# Patient Record
Sex: FEMALE | Race: WHITE | Employment: FULL TIME | URBAN - NONMETROPOLITAN AREA
[De-identification: names, ages, dates, MRNs, and addresses within clinical notes are randomized per-mention and may not be internally consistent; named-entity substitution may affect disease eponyms.]

---

## 2020-05-21 LAB
ALBUMIN SERPL-MCNC: NORMAL G/DL
ALP BLD-CCNC: NORMAL U/L
ALT SERPL-CCNC: NORMAL U/L
ANION GAP SERPL CALCULATED.3IONS-SCNC: NORMAL MMOL/L
AST SERPL-CCNC: NORMAL U/L
AVERAGE GLUCOSE: NORMAL
BILIRUB SERPL-MCNC: NORMAL MG/DL
BUN BLDV-MCNC: NORMAL MG/DL
CALCIUM SERPL-MCNC: NORMAL MG/DL
CHLORIDE BLD-SCNC: NORMAL MMOL/L
CHOLESTEROL, TOTAL: NORMAL
CHOLESTEROL/HDL RATIO: NORMAL
CO2: NORMAL
CREAT SERPL-MCNC: NORMAL MG/DL
GFR CALCULATED: NORMAL
GLUCOSE BLD-MCNC: NORMAL MG/DL
HBA1C MFR BLD: 5.6 %
HDLC SERPL-MCNC: NORMAL MG/DL
LDL CHOLESTEROL CALCULATED: NORMAL
NONHDLC SERPL-MCNC: NORMAL MG/DL
POTASSIUM SERPL-SCNC: NORMAL MMOL/L
SODIUM BLD-SCNC: NORMAL MMOL/L
TOTAL PROTEIN: NORMAL
TRIGL SERPL-MCNC: NORMAL MG/DL
VLDLC SERPL CALC-MCNC: NORMAL MG/DL

## 2020-09-09 RX ORDER — CLOBETASOL PROPIONATE 0.5 MG/G
OINTMENT TOPICAL 2 TIMES DAILY
COMMUNITY
End: 2021-05-19 | Stop reason: SDUPTHER

## 2020-10-26 RX ORDER — OMEPRAZOLE 20 MG/1
20 CAPSULE, DELAYED RELEASE ORAL DAILY
Qty: 90 CAPSULE | Refills: 1 | Status: SHIPPED
Start: 2020-10-26 | End: 2021-04-06 | Stop reason: SDUPTHER

## 2020-11-27 ENCOUNTER — TELEPHONE (OUTPATIENT)
Dept: FAMILY MEDICINE CLINIC | Age: 45
End: 2020-11-27

## 2020-12-07 ENCOUNTER — OFFICE VISIT (OUTPATIENT)
Dept: FAMILY MEDICINE CLINIC | Age: 45
End: 2020-12-07
Payer: COMMERCIAL

## 2020-12-07 VITALS
HEIGHT: 68 IN | SYSTOLIC BLOOD PRESSURE: 140 MMHG | WEIGHT: 259.1 LBS | BODY MASS INDEX: 39.27 KG/M2 | HEART RATE: 90 BPM | DIASTOLIC BLOOD PRESSURE: 84 MMHG | OXYGEN SATURATION: 97 % | TEMPERATURE: 98.1 F

## 2020-12-07 PROCEDURE — 99214 OFFICE O/P EST MOD 30 MIN: CPT | Performed by: NURSE PRACTITIONER

## 2020-12-07 PROCEDURE — 96372 THER/PROPH/DIAG INJ SC/IM: CPT | Performed by: NURSE PRACTITIONER

## 2020-12-07 RX ORDER — IBUPROFEN 200 MG
200 TABLET ORAL EVERY 6 HOURS PRN
COMMUNITY
End: 2022-01-27 | Stop reason: ALTCHOICE

## 2020-12-07 RX ORDER — DEXAMETHASONE SODIUM PHOSPHATE 4 MG/ML
4 INJECTION, SOLUTION INTRA-ARTICULAR; INTRALESIONAL; INTRAMUSCULAR; INTRAVENOUS; SOFT TISSUE ONCE
Qty: 1 ML | Refills: 0
Start: 2020-12-07 | End: 2020-12-07

## 2020-12-07 RX ORDER — KETOROLAC TROMETHAMINE 30 MG/ML
30 INJECTION, SOLUTION INTRAMUSCULAR; INTRAVENOUS ONCE
Status: COMPLETED | OUTPATIENT
Start: 2020-12-07 | End: 2020-12-07

## 2020-12-07 RX ORDER — DEXAMETHASONE SODIUM PHOSPHATE 10 MG/ML
10 INJECTION INTRAMUSCULAR; INTRAVENOUS ONCE
Status: DISCONTINUED | OUTPATIENT
Start: 2020-12-07 | End: 2020-12-07

## 2020-12-07 RX ORDER — SUMATRIPTAN 25 MG/1
25 TABLET, FILM COATED ORAL
Qty: 9 TABLET | Refills: 5 | Status: SHIPPED
Start: 2020-12-07 | End: 2021-01-12 | Stop reason: CLARIF

## 2020-12-07 RX ORDER — TOPIRAMATE 25 MG/1
25 TABLET ORAL NIGHTLY
Qty: 60 TABLET | Refills: 3 | Status: SHIPPED
Start: 2020-12-07 | End: 2021-04-06 | Stop reason: SDUPTHER

## 2020-12-07 RX ADMIN — KETOROLAC TROMETHAMINE 30 MG: 30 INJECTION, SOLUTION INTRAMUSCULAR; INTRAVENOUS at 15:19

## 2020-12-07 ASSESSMENT — PATIENT HEALTH QUESTIONNAIRE - PHQ9
SUM OF ALL RESPONSES TO PHQ9 QUESTIONS 1 & 2: 0
SUM OF ALL RESPONSES TO PHQ QUESTIONS 1-9: 0
1. LITTLE INTEREST OR PLEASURE IN DOING THINGS: 0
2. FEELING DOWN, DEPRESSED OR HOPELESS: 0
SUM OF ALL RESPONSES TO PHQ QUESTIONS 1-9: 0
SUM OF ALL RESPONSES TO PHQ QUESTIONS 1-9: 0

## 2020-12-07 ASSESSMENT — ENCOUNTER SYMPTOMS
TROUBLE SWALLOWING: 0
STRIDOR: 0
SINUS PRESSURE: 0
PHOTOPHOBIA: 0
CONSTIPATION: 0
COLOR CHANGE: 0
EYE DISCHARGE: 0
COUGH: 0
ANAL BLEEDING: 0
FACIAL SWELLING: 0
APNEA: 0
BACK PAIN: 0
CHEST TIGHTNESS: 0
SORE THROAT: 0
EYE PAIN: 1
BLOOD IN STOOL: 0
DIARRHEA: 0
RECTAL PAIN: 0
RHINORRHEA: 0
VOICE CHANGE: 0
EYE REDNESS: 0
NAUSEA: 1
SHORTNESS OF BREATH: 0
WHEEZING: 0
ABDOMINAL DISTENTION: 0
CHOKING: 0
SINUS PAIN: 0
ABDOMINAL PAIN: 0
VOMITING: 0
EYE ITCHING: 0

## 2020-12-07 NOTE — PROGRESS NOTES
20  Bonita Sauer : 1975 Sex: female  Age: 41 y.o. Chief Complaint   Patient presents with    Migraine     just aout every day for for 1 yr, takes ibuprofen for pain       Patient complains of migraine headaches States has headaches nearly every day with some worse than others. She states that her eyes hurt with that, nausea, difficulty concentrating. Review of Systems   Constitutional: Positive for fatigue. Negative for activity change, appetite change, chills, diaphoresis, fever and unexpected weight change. HENT: Negative for congestion, dental problem, drooling, ear discharge, ear pain, facial swelling, hearing loss, mouth sores, nosebleeds, postnasal drip, rhinorrhea, sinus pressure, sinus pain, sneezing, sore throat, tinnitus, trouble swallowing and voice change. Eyes: Positive for pain. Negative for photophobia, discharge, redness, itching and visual disturbance. Respiratory: Negative for apnea, cough, choking, chest tightness, shortness of breath, wheezing and stridor. Cardiovascular: Negative for chest pain, palpitations and leg swelling. Gastrointestinal: Positive for nausea. Negative for abdominal distention, abdominal pain, anal bleeding, blood in stool, constipation, diarrhea, rectal pain and vomiting. Endocrine: Negative for cold intolerance, heat intolerance, polydipsia, polyphagia and polyuria. Genitourinary: Negative for decreased urine volume, difficulty urinating, dysuria, enuresis, flank pain, frequency, genital sores, hematuria and urgency. Musculoskeletal: Negative for arthralgias, back pain, gait problem, joint swelling, myalgias, neck pain and neck stiffness. Skin: Positive for rash (psoratic changes back, abdomen, elbows). Negative for color change, pallor and wound. Allergic/Immunologic: Negative for environmental allergies, food allergies and immunocompromised state. Neurological: Positive for headaches.  Negative for dizziness, tremors, seizures, syncope, facial asymmetry, speech difficulty, weakness, light-headedness and numbness. Hematological: Negative for adenopathy. Does not bruise/bleed easily. Psychiatric/Behavioral: Negative for agitation, behavioral problems, confusion, decreased concentration, hallucinations, self-injury, sleep disturbance and suicidal ideas. The patient is not nervous/anxious and is not hyperactive. Current Outpatient Medications:     ibuprofen (ADVIL;MOTRIN) 200 MG tablet, Take 200 mg by mouth every 6 hours as needed for Pain prn, Disp: , Rfl:     topiramate (TOPAMAX) 25 MG tablet, Take 1 tablet by mouth nightly, Disp: 60 tablet, Rfl: 3    SUMAtriptan (IMITREX) 25 MG tablet, Take 1 tablet by mouth once as needed for Migraine, Disp: 9 tablet, Rfl: 5    dexamethasone (DECADRON) 4 MG/ML injection, Inject 1 mL into the muscle once for 1 dose, Disp: 1 mL, Rfl: 0    metFORMIN (GLUCOPHAGE) 500 MG tablet, Take 1 tablet by mouth 2 times daily (with meals), Disp: 180 tablet, Rfl: 1    omeprazole (PRILOSEC) 20 MG delayed release capsule, Take 1 capsule by mouth daily, Disp: 90 capsule, Rfl: 1    venlafaxine (EFFEXOR-XR) 75 MG XR capsule, Take 150 mg by mouth daily , Disp: , Rfl:     Polyethylene Glycol 3350 (MIRALAX PO), Take  by mouth., Disp: , Rfl:     clobetasol (TEMOVATE) 0.05 % ointment, Apply topically 2 times daily Apply topically 2 times daily. , Disp: , Rfl:     Multiple Vitamin (MULTIVITAMIN PO), Take 1 tablet by mouth 2 times daily.   , Disp: , Rfl:   No Known Allergies    Past Medical History:   Diagnosis Date    Chronic back pain     Headache(784.0)     Hemorrhoids     Herpes genital     Hyperlipidemia     Irregular periods/menstrual cycles     Renal calculi     Varicose veins      Past Surgical History:   Procedure Laterality Date    SERENA-EN-Y GASTRIC BYPASS  5/31/11    Lap    TUBAL LIGATION  2001    UPPER GASTROINTESTINAL ENDOSCOPY  2011     Family History   Family history unknown: Yes     Social History     Socioeconomic History    Marital status:      Spouse name: Not on file    Number of children: Not on file    Years of education: Not on file    Highest education level: Not on file   Occupational History    Not on file   Social Needs    Financial resource strain: Not on file    Food insecurity     Worry: Not on file     Inability: Not on file    Transportation needs     Medical: Not on file     Non-medical: Not on file   Tobacco Use    Smoking status: Never Smoker    Smokeless tobacco: Never Used   Substance and Sexual Activity    Alcohol use: No    Drug use: No    Sexual activity: Yes   Lifestyle    Physical activity     Days per week: Not on file     Minutes per session: Not on file    Stress: Not on file   Relationships    Social connections     Talks on phone: Not on file     Gets together: Not on file     Attends [employer] service: Not on file     Active member of club or organization: Not on file     Attends meetings of clubs or organizations: Not on file     Relationship status: Not on file    Intimate partner violence     Fear of current or ex partner: Not on file     Emotionally abused: Not on file     Physically abused: Not on file     Forced sexual activity: Not on file   Other Topics Concern    Not on file   Social History Narrative    Not on file     Patient Active Problem List   Diagnosis    Chronic back pain    Hyperlipidemia    Indigestion        Vitals:    12/07/20 1416 12/07/20 1427   BP: (!) 144/90 (!) 140/84   Site: Left Upper Arm    Position: Sitting    Cuff Size: Large Adult    Pulse: 90    Temp: 98.1 °F (36.7 °C)    TempSrc: Temporal    SpO2: 97%    Weight: 259 lb 1.6 oz (117.5 kg)    Height: 5' 8\" (1.727 m)        Physical Exam  Vitals signs and nursing note reviewed. Constitutional:       General: She is not in acute distress. Appearance: Normal appearance. She is normal weight.  She is not ill-appearing, toxic-appearing or diaphoretic. HENT:      Head: Normocephalic and atraumatic. Right Ear: Tympanic membrane, ear canal and external ear normal. There is no impacted cerumen. Left Ear: Tympanic membrane, ear canal and external ear normal. There is no impacted cerumen. Nose: Nose normal. No congestion or rhinorrhea. Mouth/Throat:      Mouth: Mucous membranes are moist.      Pharynx: Oropharynx is clear. No oropharyngeal exudate or posterior oropharyngeal erythema. Eyes:      General: No scleral icterus. Right eye: No discharge. Left eye: No discharge. Extraocular Movements: Extraocular movements intact. Conjunctiva/sclera: Conjunctivae normal.      Pupils: Pupils are equal, round, and reactive to light. Neck:      Musculoskeletal: Normal range of motion and neck supple. No neck rigidity or muscular tenderness. Vascular: No carotid bruit. Cardiovascular:      Rate and Rhythm: Normal rate and regular rhythm. Pulses: Normal pulses. Heart sounds: Normal heart sounds. No murmur. No friction rub. No gallop. Pulmonary:      Effort: Pulmonary effort is normal. No respiratory distress. Breath sounds: No stridor. No wheezing, rhonchi or rales. Chest:      Chest wall: No tenderness. Abdominal:      General: Abdomen is flat. Bowel sounds are normal. There is no distension. Palpations: Abdomen is soft. There is no mass. Tenderness: There is no abdominal tenderness. There is no right CVA tenderness, left CVA tenderness, guarding or rebound. Hernia: No hernia is present. Musculoskeletal: Normal range of motion. General: No swelling, tenderness, deformity or signs of injury. Right lower leg: No edema. Left lower leg: No edema. Lymphadenopathy:      Cervical: No cervical adenopathy. Skin:     General: Skin is warm and dry. Capillary Refill: Capillary refill takes less than 2 seconds. Coloration: Skin is not jaundiced or pale. Findings: Rash (psoriatic patches to elbows, shoulder, back, abdomen) present. No bruising, erythema or lesion. Neurological:      General: No focal deficit present. Mental Status: She is alert and oriented to person, place, and time. Mental status is at baseline. Cranial Nerves: No cranial nerve deficit. Sensory: No sensory deficit. Motor: No weakness. Coordination: Coordination normal.      Gait: Gait normal.      Deep Tendon Reflexes: Reflexes normal.   Psychiatric:         Mood and Affect: Mood normal.         Behavior: Behavior normal.         Thought Content: Thought content normal.         Judgment: Judgment normal.         Assessment and Plan:  Ketan Melton was seen today for migraine. Diagnoses and all orders for this visit:    Chronic migraine without aura without status migrainosus, not intractable  -     dexamethasone (DECADRON) 4 MG/ML injection; Inject 1 mL into the muscle once for 1 dose    Chronic back pain, unspecified back location, unspecified back pain laterality  -     dexamethasone (DECADRON) 4 MG/ML injection; Inject 1 mL into the muscle once for 1 dose    Mixed hyperlipidemia    Psoriasis  -     External Referral To Rheumatology    Other orders  -     topiramate (TOPAMAX) 25 MG tablet; Take 1 tablet by mouth nightly  -     SUMAtriptan (IMITREX) 25 MG tablet; Take 1 tablet by mouth once as needed for Migraine  -     ketorolac (TORADOL) injection 30 mg  -     Discontinue: dexamethasone (DECADRON) injection 10 mg        Discussions/Education provided to patients during visit:  [] Discussed the importance to stop smoking. [] Advised to monitor eating habits. [] Reviewed and discussed Imaging results. [] Reviewed and discussed Lab results. [] Discussed the importance of drinking plenty of fluids. [] Cut down on Salt and Caffeine.  [] Exercise 2-3 times weekly, if not more. [] Cut down on Sugar and Fats. [x] Continue Medications as Discussed.   [x] Communicated

## 2020-12-29 RX ORDER — VENLAFAXINE HYDROCHLORIDE 150 MG/1
150 CAPSULE, EXTENDED RELEASE ORAL DAILY
Qty: 90 CAPSULE | Refills: 1 | Status: SHIPPED
Start: 2020-12-29 | End: 2021-04-06 | Stop reason: SDUPTHER

## 2020-12-29 RX ORDER — VENLAFAXINE HYDROCHLORIDE 150 MG/1
150 CAPSULE, EXTENDED RELEASE ORAL DAILY
COMMUNITY
End: 2020-12-29 | Stop reason: SDUPTHER

## 2021-01-12 ENCOUNTER — TELEPHONE (OUTPATIENT)
Dept: FAMILY MEDICINE CLINIC | Age: 46
End: 2021-01-12

## 2021-01-12 RX ORDER — SUMATRIPTAN 50 MG/1
TABLET, FILM COATED ORAL
Qty: 9 TABLET | Refills: 1 | Status: CANCELLED | OUTPATIENT
Start: 2021-01-12

## 2021-01-12 RX ORDER — SUMATRIPTAN 50 MG/1
50 TABLET, FILM COATED ORAL
COMMUNITY
End: 2021-01-12 | Stop reason: SDUPTHER

## 2021-01-12 NOTE — TELEPHONE ENCOUNTER
Patient called office stating she has had a migraine for 2 days and the imitrex is not helping she is wondering if you could give her something else - she was last seen on 12-

## 2021-01-13 RX ORDER — SUMATRIPTAN 50 MG/1
TABLET, FILM COATED ORAL
Qty: 9 TABLET | Refills: 1 | Status: SHIPPED
Start: 2021-01-13 | End: 2021-04-06 | Stop reason: SDUPTHER

## 2021-04-06 ENCOUNTER — OFFICE VISIT (OUTPATIENT)
Dept: FAMILY MEDICINE CLINIC | Age: 46
End: 2021-04-06
Payer: COMMERCIAL

## 2021-04-06 VITALS
TEMPERATURE: 97.5 F | SYSTOLIC BLOOD PRESSURE: 130 MMHG | HEIGHT: 68 IN | WEIGHT: 267.8 LBS | DIASTOLIC BLOOD PRESSURE: 90 MMHG | BODY MASS INDEX: 40.59 KG/M2

## 2021-04-06 DIAGNOSIS — G43.911 INTRACTABLE MIGRAINE WITH STATUS MIGRAINOSUS, UNSPECIFIED MIGRAINE TYPE: Primary | ICD-10-CM

## 2021-04-06 DIAGNOSIS — K30 INDIGESTION: ICD-10-CM

## 2021-04-06 DIAGNOSIS — G89.29 CHRONIC BACK PAIN, UNSPECIFIED BACK LOCATION, UNSPECIFIED BACK PAIN LATERALITY: ICD-10-CM

## 2021-04-06 DIAGNOSIS — M54.9 CHRONIC BACK PAIN, UNSPECIFIED BACK LOCATION, UNSPECIFIED BACK PAIN LATERALITY: ICD-10-CM

## 2021-04-06 DIAGNOSIS — E78.2 MIXED HYPERLIPIDEMIA: ICD-10-CM

## 2021-04-06 PROCEDURE — 99213 OFFICE O/P EST LOW 20 MIN: CPT | Performed by: NURSE PRACTITIONER

## 2021-04-06 PROCEDURE — 96372 THER/PROPH/DIAG INJ SC/IM: CPT | Performed by: NURSE PRACTITIONER

## 2021-04-06 RX ORDER — SUMATRIPTAN 50 MG/1
TABLET, FILM COATED ORAL
Qty: 9 TABLET | Refills: 1 | Status: SHIPPED
Start: 2021-04-06 | End: 2021-05-27 | Stop reason: SDUPTHER

## 2021-04-06 RX ORDER — OMEPRAZOLE 20 MG/1
20 CAPSULE, DELAYED RELEASE ORAL DAILY
Qty: 90 CAPSULE | Refills: 1 | Status: SHIPPED
Start: 2021-04-06 | End: 2021-04-26

## 2021-04-06 RX ORDER — DEXAMETHASONE SODIUM PHOSPHATE 4 MG/ML
4 INJECTION, SOLUTION INTRA-ARTICULAR; INTRALESIONAL; INTRAMUSCULAR; INTRAVENOUS; SOFT TISSUE ONCE
Status: COMPLETED | OUTPATIENT
Start: 2021-04-06 | End: 2021-04-06

## 2021-04-06 RX ORDER — TOPIRAMATE 25 MG/1
25 TABLET ORAL NIGHTLY
Qty: 90 TABLET | Refills: 1 | Status: SHIPPED
Start: 2021-04-06 | End: 2021-05-19 | Stop reason: CLARIF

## 2021-04-06 RX ORDER — KETOROLAC TROMETHAMINE 30 MG/ML
60 INJECTION, SOLUTION INTRAMUSCULAR; INTRAVENOUS ONCE
Status: COMPLETED | OUTPATIENT
Start: 2021-04-06 | End: 2021-04-06

## 2021-04-06 RX ORDER — VENLAFAXINE HYDROCHLORIDE 150 MG/1
150 CAPSULE, EXTENDED RELEASE ORAL DAILY
Qty: 90 CAPSULE | Refills: 1 | Status: SHIPPED
Start: 2021-04-06 | End: 2021-06-17

## 2021-04-06 RX ADMIN — DEXAMETHASONE SODIUM PHOSPHATE 4 MG: 4 INJECTION, SOLUTION INTRA-ARTICULAR; INTRALESIONAL; INTRAMUSCULAR; INTRAVENOUS; SOFT TISSUE at 14:35

## 2021-04-06 RX ADMIN — KETOROLAC TROMETHAMINE 60 MG: 30 INJECTION, SOLUTION INTRAMUSCULAR; INTRAVENOUS at 14:36

## 2021-04-06 RX ADMIN — DEXAMETHASONE SODIUM PHOSPHATE 4 MG: 4 INJECTION, SOLUTION INTRA-ARTICULAR; INTRALESIONAL; INTRAMUSCULAR; INTRAVENOUS; SOFT TISSUE at 14:34

## 2021-04-06 ASSESSMENT — ENCOUNTER SYMPTOMS
ANAL BLEEDING: 0
RHINORRHEA: 0
ABDOMINAL PAIN: 0
EYE PAIN: 1
RECTAL PAIN: 0
COUGH: 0
SINUS PAIN: 0
CONSTIPATION: 0
SORE THROAT: 0
COLOR CHANGE: 0
CHOKING: 0
EYE REDNESS: 0
BLOOD IN STOOL: 0
SHORTNESS OF BREATH: 0
CHEST TIGHTNESS: 0
EYE ITCHING: 0
SINUS PRESSURE: 0
WHEEZING: 0
ABDOMINAL DISTENTION: 0
PHOTOPHOBIA: 1
DIARRHEA: 0
NAUSEA: 0
TROUBLE SWALLOWING: 0
VOICE CHANGE: 0
EYE DISCHARGE: 0
APNEA: 0
FACIAL SWELLING: 0
BACK PAIN: 0
VOMITING: 0
STRIDOR: 0

## 2021-04-06 ASSESSMENT — PATIENT HEALTH QUESTIONNAIRE - PHQ9
2. FEELING DOWN, DEPRESSED OR HOPELESS: 0
SUM OF ALL RESPONSES TO PHQ QUESTIONS 1-9: 0
SUM OF ALL RESPONSES TO PHQ9 QUESTIONS 1 & 2: 0

## 2021-04-06 NOTE — PROGRESS NOTES
Patient had  8 mg of deca given in left gluteal and 60mg  Of ketorlac given.  Per martin gilbert given by aaliyah horan

## 2021-04-06 NOTE — PROGRESS NOTES
asymmetry, speech difficulty, weakness, light-headedness and numbness. Hematological: Negative for adenopathy. Does not bruise/bleed easily. Psychiatric/Behavioral: Negative for agitation, behavioral problems, confusion, decreased concentration, hallucinations, self-injury, sleep disturbance and suicidal ideas. The patient is not nervous/anxious and is not hyperactive. Current Outpatient Medications:     metFORMIN (GLUCOPHAGE) 500 MG tablet, Take 1 tablet by mouth 2 times daily, Disp: 180 tablet, Rfl: 1    omeprazole (PRILOSEC) 20 MG delayed release capsule, Take 1 capsule by mouth daily, Disp: 90 capsule, Rfl: 1    SUMAtriptan (IMITREX) 50 MG tablet, Take 1 tablet at onset of migraine and may repeat 4 hours later if no improvement, Disp: 9 tablet, Rfl: 1    topiramate (TOPAMAX) 25 MG tablet, Take 1 tablet by mouth nightly, Disp: 90 tablet, Rfl: 1    venlafaxine (EFFEXOR XR) 150 MG extended release capsule, Take 1 capsule by mouth daily, Disp: 90 capsule, Rfl: 1    ibuprofen (ADVIL;MOTRIN) 200 MG tablet, Take 200 mg by mouth every 6 hours as needed for Pain prn, Disp: , Rfl:     clobetasol (TEMOVATE) 0.05 % ointment, Apply topically 2 times daily Apply topically 2 times daily. , Disp: , Rfl:     Polyethylene Glycol 3350 (MIRALAX PO), Take  by mouth., Disp: , Rfl:     Multiple Vitamin (MULTIVITAMIN PO), Take 1 tablet by mouth 2 times daily.   , Disp: , Rfl:   No Known Allergies    Past Medical History:   Diagnosis Date    Chronic back pain     Headache(784.0)     Hemorrhoids     Herpes genital     Hyperlipidemia     Irregular periods/menstrual cycles     Renal calculi     Varicose veins      Past Surgical History:   Procedure Laterality Date    SERENA-EN-Y GASTRIC BYPASS  5/31/11    Lap    TUBAL LIGATION  2001    UPPER GASTROINTESTINAL ENDOSCOPY  2011     Family History   Family history unknown: Yes     Social History     Socioeconomic History    Marital status:      Spouse name: Not on file    Number of children: Not on file    Years of education: Not on file    Highest education level: Not on file   Occupational History    Not on file   Social Needs    Financial resource strain: Not on file    Food insecurity     Worry: Not on file     Inability: Not on file    Transportation needs     Medical: Not on file     Non-medical: Not on file   Tobacco Use    Smoking status: Never Smoker    Smokeless tobacco: Never Used   Substance and Sexual Activity    Alcohol use: No    Drug use: No    Sexual activity: Yes   Lifestyle    Physical activity     Days per week: Not on file     Minutes per session: Not on file    Stress: Not on file   Relationships    Social connections     Talks on phone: Not on file     Gets together: Not on file     Attends Congregation service: Not on file     Active member of club or organization: Not on file     Attends meetings of clubs or organizations: Not on file     Relationship status: Not on file    Intimate partner violence     Fear of current or ex partner: Not on file     Emotionally abused: Not on file     Physically abused: Not on file     Forced sexual activity: Not on file   Other Topics Concern    Not on file   Social History Narrative    Not on file     Patient Active Problem List   Diagnosis    Chronic back pain    Hyperlipidemia    Indigestion        Vitals:    04/06/21 1407   BP: (!) 130/90  Comment: provider notifefd of high bp   Temp: 97.5 °F (36.4 °C)   TempSrc: Temporal   Weight: 267 lb 12.8 oz (121.5 kg)   Height: 5' 8\" (1.727 m)       Physical Exam    Assessment and Plan:  Shoshana Lugo was seen today for migraine and medication refill. Diagnoses and all orders for this visit:    Intractable migraine with status migrainosus, unspecified migraine type    Chronic back pain, unspecified back location, unspecified back pain laterality    Mixed hyperlipidemia    Indigestion    Other orders  -     metFORMIN (GLUCOPHAGE) 500 MG tablet;  Take 1 tablet by mouth 2 times daily  -     omeprazole (PRILOSEC) 20 MG delayed release capsule; Take 1 capsule by mouth daily  -     SUMAtriptan (IMITREX) 50 MG tablet; Take 1 tablet at onset of migraine and may repeat 4 hours later if no improvement  -     topiramate (TOPAMAX) 25 MG tablet; Take 1 tablet by mouth nightly  -     venlafaxine (EFFEXOR XR) 150 MG extended release capsule; Take 1 capsule by mouth daily  -     dexamethasone (DECADRON) injection 4 mg  -     dexamethasone (DECADRON) injection 4 mg  -     ketorolac (TORADOL) injection 60 mg        Discussions/Education provided to patients during visit:  [] Discussed the importance to stop smoking. [] Advised to monitor eating habits. [] Reviewed and discussed Imaging results. [] Reviewed and discussed Lab results. [] Discussed the importance of drinking plenty of fluids. [] Cut down on Salt and Caffeine.  [] Exercise 2-3 times weekly, if not more. [] Cut down on Sugar and Fats. [x] Continue Medications as Discussed. [x] Communicated with patient any concerns, to phone office. [x] Follow up as directed. Return if symptoms worsen or fail to improve.       Seen By:  LYNSEY Russell - CNP

## 2021-04-26 RX ORDER — OMEPRAZOLE 20 MG/1
CAPSULE, DELAYED RELEASE ORAL
Qty: 90 CAPSULE | Refills: 1 | Status: SHIPPED
Start: 2021-04-26 | End: 2021-09-23

## 2021-05-07 ENCOUNTER — OFFICE VISIT (OUTPATIENT)
Dept: FAMILY MEDICINE CLINIC | Age: 46
End: 2021-05-07
Payer: COMMERCIAL

## 2021-05-07 VITALS
WEIGHT: 270.7 LBS | DIASTOLIC BLOOD PRESSURE: 90 MMHG | HEART RATE: 85 BPM | SYSTOLIC BLOOD PRESSURE: 138 MMHG | OXYGEN SATURATION: 98 % | TEMPERATURE: 97.2 F | BODY MASS INDEX: 41.03 KG/M2 | HEIGHT: 68 IN

## 2021-05-07 DIAGNOSIS — M05.9 RHEUMATOID ARTHRITIS WITH POSITIVE RHEUMATOID FACTOR, INVOLVING UNSPECIFIED SITE (HCC): Primary | ICD-10-CM

## 2021-05-07 DIAGNOSIS — E78.2 MIXED HYPERLIPIDEMIA: ICD-10-CM

## 2021-05-07 DIAGNOSIS — M54.9 CHRONIC BACK PAIN, UNSPECIFIED BACK LOCATION, UNSPECIFIED BACK PAIN LATERALITY: ICD-10-CM

## 2021-05-07 DIAGNOSIS — E66.01 CLASS 3 SEVERE OBESITY DUE TO EXCESS CALORIES WITH SERIOUS COMORBIDITY AND BODY MASS INDEX (BMI) OF 40.0 TO 44.9 IN ADULT (HCC): ICD-10-CM

## 2021-05-07 DIAGNOSIS — G89.29 CHRONIC BACK PAIN, UNSPECIFIED BACK LOCATION, UNSPECIFIED BACK PAIN LATERALITY: ICD-10-CM

## 2021-05-07 PROCEDURE — 99213 OFFICE O/P EST LOW 20 MIN: CPT | Performed by: NURSE PRACTITIONER

## 2021-05-07 RX ORDER — TRAMADOL HYDROCHLORIDE 50 MG/1
50 TABLET ORAL EVERY 6 HOURS PRN
Qty: 28 TABLET | Refills: 0 | Status: SHIPPED | OUTPATIENT
Start: 2021-05-07 | End: 2021-05-14

## 2021-05-07 RX ORDER — FOLIC ACID 1 MG/1
TABLET ORAL
COMMUNITY
Start: 2021-03-16 | End: 2022-05-26

## 2021-05-07 ASSESSMENT — ENCOUNTER SYMPTOMS
ANAL BLEEDING: 0
RECTAL PAIN: 0
EYE REDNESS: 0
SORE THROAT: 0
SINUS PAIN: 0
ABDOMINAL DISTENTION: 0
VOMITING: 0
STRIDOR: 0
BLOOD IN STOOL: 0
NAUSEA: 0
SINUS PRESSURE: 0
DIARRHEA: 0
WHEEZING: 0
EYE PAIN: 0
PHOTOPHOBIA: 0
APNEA: 0
RHINORRHEA: 0
COUGH: 0
TROUBLE SWALLOWING: 0
BACK PAIN: 1
ABDOMINAL PAIN: 0
CHOKING: 0
EYE DISCHARGE: 0
FACIAL SWELLING: 0
CHEST TIGHTNESS: 0
CONSTIPATION: 0
EYE ITCHING: 0
COLOR CHANGE: 0
VOICE CHANGE: 0
SHORTNESS OF BREATH: 0

## 2021-05-07 NOTE — PROGRESS NOTES
headaches. Hematological: Negative for adenopathy. Does not bruise/bleed easily. Psychiatric/Behavioral: Negative for agitation, behavioral problems, confusion, decreased concentration, hallucinations, self-injury, sleep disturbance and suicidal ideas. The patient is not nervous/anxious and is not hyperactive. Current Outpatient Medications:     folic acid (FOLVITE) 1 MG tablet, TAKE 1 TABLET BY MOUTH EVERY DAY, Disp: , Rfl:     METHOTREXATE SODIUM IJ, Inject 20 mg as directed once a week, Disp: , Rfl:     naltrexone-buPROPion (CONTRAVE) 8-90 MG per extended release tablet, Take 2 tablets by mouth 2 times daily, Disp: 60 tablet, Rfl: 1    traMADol (ULTRAM) 50 MG tablet, Take 1 tablet by mouth every 6 hours as needed for Pain for up to 7 days. Intended supply: 7 days. Take lowest dose possible to manage pain, Disp: 28 tablet, Rfl: 0    omeprazole (PRILOSEC) 20 MG delayed release capsule, TAKE 1 CAPSULE BY MOUTH EVERY DAY, Disp: 90 capsule, Rfl: 1    metFORMIN (GLUCOPHAGE) 500 MG tablet, Take 1 tablet by mouth 2 times daily, Disp: 180 tablet, Rfl: 1    SUMAtriptan (IMITREX) 50 MG tablet, Take 1 tablet at onset of migraine and may repeat 4 hours later if no improvement, Disp: 9 tablet, Rfl: 1    topiramate (TOPAMAX) 25 MG tablet, Take 1 tablet by mouth nightly, Disp: 90 tablet, Rfl: 1    venlafaxine (EFFEXOR XR) 150 MG extended release capsule, Take 1 capsule by mouth daily, Disp: 90 capsule, Rfl: 1    ibuprofen (ADVIL;MOTRIN) 200 MG tablet, Take 200 mg by mouth every 6 hours as needed for Pain prn, Disp: , Rfl:     clobetasol (TEMOVATE) 0.05 % ointment, Apply topically 2 times daily Apply topically 2 times daily. , Disp: , Rfl:     Polyethylene Glycol 3350 (MIRALAX PO), Take  by mouth., Disp: , Rfl:     Multiple Vitamin (MULTIVITAMIN PO), Take 1 tablet by mouth 2 times daily.   , Disp: , Rfl:   No Known Allergies    Past Medical History:   Diagnosis Date    Chronic back pain     Size: Medium Adult Medium Adult   Pulse: 85    Temp: 97.2 °F (36.2 °C)    TempSrc: Temporal    SpO2: 98%    Weight: 270 lb 11.2 oz (122.8 kg)    Height: 5' 8\" (1.727 m)        Physical Exam  Vitals signs and nursing note reviewed. Constitutional:       General: She is not in acute distress. Appearance: Normal appearance. She is obese. She is not ill-appearing, toxic-appearing or diaphoretic. HENT:      Head: Normocephalic and atraumatic. Right Ear: Tympanic membrane, ear canal and external ear normal. There is no impacted cerumen. Left Ear: Tympanic membrane, ear canal and external ear normal. There is no impacted cerumen. Nose: Nose normal. No congestion or rhinorrhea. Mouth/Throat:      Mouth: Mucous membranes are moist.      Pharynx: Oropharynx is clear. No oropharyngeal exudate or posterior oropharyngeal erythema. Eyes:      General: No scleral icterus. Right eye: No discharge. Left eye: No discharge. Extraocular Movements: Extraocular movements intact. Conjunctiva/sclera: Conjunctivae normal.      Pupils: Pupils are equal, round, and reactive to light. Neck:      Musculoskeletal: Normal range of motion and neck supple. No neck rigidity or muscular tenderness. Vascular: No carotid bruit. Cardiovascular:      Rate and Rhythm: Normal rate and regular rhythm. Pulses: Normal pulses. Heart sounds: Normal heart sounds. No murmur. No friction rub. No gallop. Pulmonary:      Effort: Pulmonary effort is normal. No respiratory distress. Breath sounds: No stridor. No wheezing, rhonchi or rales. Chest:      Chest wall: No tenderness. Abdominal:      General: Abdomen is flat. Bowel sounds are normal. There is no distension. Palpations: Abdomen is soft. There is no mass. Tenderness: There is no abdominal tenderness. There is no right CVA tenderness, left CVA tenderness, guarding or rebound. Hernia: No hernia is present. Musculoskeletal: Normal range of motion. General: Tenderness (lumbar) present. No swelling, deformity or signs of injury. Right lower leg: No edema. Left lower leg: No edema. Lymphadenopathy:      Cervical: No cervical adenopathy. Skin:     General: Skin is warm and dry. Capillary Refill: Capillary refill takes less than 2 seconds. Coloration: Skin is not jaundiced or pale. Findings: No bruising, erythema, lesion or rash. Neurological:      General: No focal deficit present. Mental Status: She is alert and oriented to person, place, and time. Mental status is at baseline. Cranial Nerves: No cranial nerve deficit. Sensory: No sensory deficit. Motor: No weakness. Coordination: Coordination normal.      Gait: Gait normal.      Deep Tendon Reflexes: Reflexes normal.   Psychiatric:         Mood and Affect: Mood normal.         Behavior: Behavior normal.         Thought Content: Thought content normal.         Judgment: Judgment normal.         Assessment and Plan:  Lj Saab was seen today for arthritis. Diagnoses and all orders for this visit:    Rheumatoid arthritis with positive rheumatoid factor, involving unspecified site (HCC)  -     traMADol (ULTRAM) 50 MG tablet; Take 1 tablet by mouth every 6 hours as needed for Pain for up to 7 days. Intended supply: 7 days. Take lowest dose possible to manage pain    Chronic back pain, unspecified back location, unspecified back pain laterality    Mixed hyperlipidemia    Class 3 severe obesity due to excess calories with serious comorbidity and body mass index (BMI) of 40.0 to 44.9 in [facility])    Other orders  -     naltrexone-buPROPion (CONTRAVE) 8-90 MG per extended release tablet; Take 2 tablets by mouth 2 times daily        Discussions/Education provided to patients during visit:  [] Discussed the importance to stop smoking. [] Advised to monitor eating habits. [] Reviewed and discussed Imaging results. [] Reviewed and discussed Lab results. [] Discussed the importance of drinking plenty of fluids. [] Cut down on Salt and Caffeine.  [] Exercise 2-3 times weekly, if not more. [] Cut down on Sugar and Fats. [x] Continue Medications as Discussed. [x] Communicated with patient any concerns, to phone office. [x] Follow up as directed. Return in about 1 week (around 5/14/2021), or if symptoms worsen or fail to improve.       Seen By:  LYNSEY Fernandez - CNP

## 2021-05-10 ENCOUNTER — TELEPHONE (OUTPATIENT)
Dept: FAMILY MEDICINE CLINIC | Age: 46
End: 2021-05-10

## 2021-05-10 NOTE — TELEPHONE ENCOUNTER
Patient called wanting to know if office received information from [facility] to prior auth medication. Informed Joe marc  - she will call them back and have them fax the information,.

## 2021-05-17 ENCOUNTER — TELEPHONE (OUTPATIENT)
Dept: FAMILY MEDICINE CLINIC | Age: 46
End: 2021-05-17

## 2021-05-17 NOTE — TELEPHONE ENCOUNTER
Patient is wondering if it has been approved yet. I Informed patient Rodolfo Garrison RN did the prior authorization through coverMyMeds and unfortunately I can't see if it was approved since she did it in her name. Diamond Cleveland checked on 05- and it was still pending. Informed patient she could call the pharmacy and have them run the claim and see if it has been approved otherwise she will have to wait until Friday 05- when Diamond Cleveland returns to the office.

## 2021-05-19 ENCOUNTER — OFFICE VISIT (OUTPATIENT)
Dept: FAMILY MEDICINE CLINIC | Age: 46
End: 2021-05-19
Payer: COMMERCIAL

## 2021-05-19 VITALS
RESPIRATION RATE: 20 BRPM | OXYGEN SATURATION: 98 % | BODY MASS INDEX: 40.62 KG/M2 | HEIGHT: 68 IN | WEIGHT: 268 LBS | SYSTOLIC BLOOD PRESSURE: 170 MMHG | TEMPERATURE: 97.5 F | DIASTOLIC BLOOD PRESSURE: 98 MMHG | HEART RATE: 100 BPM

## 2021-05-19 DIAGNOSIS — R23.3 ABNORMAL BRUISING: ICD-10-CM

## 2021-05-19 DIAGNOSIS — M79.89 LEG SWELLING: ICD-10-CM

## 2021-05-19 DIAGNOSIS — M79.604 PAIN OF RIGHT LOWER EXTREMITY: ICD-10-CM

## 2021-05-19 DIAGNOSIS — M25.551 HIP PAIN, ACUTE, RIGHT: ICD-10-CM

## 2021-05-19 DIAGNOSIS — M25.551 HIP PAIN, ACUTE, RIGHT: Primary | ICD-10-CM

## 2021-05-19 LAB
ALBUMIN SERPL-MCNC: 4.1 G/DL (ref 3.5–5.2)
ALP BLD-CCNC: 77 U/L (ref 35–104)
ALT SERPL-CCNC: 17 U/L (ref 0–32)
ANION GAP SERPL CALCULATED.3IONS-SCNC: 14 MMOL/L (ref 7–16)
AST SERPL-CCNC: 21 U/L (ref 0–31)
BASOPHILS ABSOLUTE: 0.04 E9/L (ref 0–0.2)
BASOPHILS RELATIVE PERCENT: 0.5 % (ref 0–2)
BILIRUB SERPL-MCNC: 0.3 MG/DL (ref 0–1.2)
BUN BLDV-MCNC: 18 MG/DL (ref 6–20)
CALCIUM SERPL-MCNC: 9.2 MG/DL (ref 8.6–10.2)
CHLORIDE BLD-SCNC: 103 MMOL/L (ref 98–107)
CO2: 25 MMOL/L (ref 22–29)
CREAT SERPL-MCNC: 0.7 MG/DL (ref 0.5–1)
EOSINOPHILS ABSOLUTE: 0.16 E9/L (ref 0.05–0.5)
EOSINOPHILS RELATIVE PERCENT: 1.9 % (ref 0–6)
GFR AFRICAN AMERICAN: >60
GFR NON-AFRICAN AMERICAN: >60 ML/MIN/1.73
GLUCOSE BLD-MCNC: 106 MG/DL (ref 74–99)
HCT VFR BLD CALC: 38.7 % (ref 34–48)
HEMOGLOBIN: 12.2 G/DL (ref 11.5–15.5)
IMMATURE GRANULOCYTES #: 0.04 E9/L
IMMATURE GRANULOCYTES %: 0.5 % (ref 0–5)
INTERNATIONAL NORMALIZATION RATIO, POC: 1
LYMPHOCYTES ABSOLUTE: 2.65 E9/L (ref 1.5–4)
LYMPHOCYTES RELATIVE PERCENT: 31.7 % (ref 20–42)
MCH RBC QN AUTO: 30.7 PG (ref 26–35)
MCHC RBC AUTO-ENTMCNC: 31.5 % (ref 32–34.5)
MCV RBC AUTO: 97.5 FL (ref 80–99.9)
MONOCYTES ABSOLUTE: 0.59 E9/L (ref 0.1–0.95)
MONOCYTES RELATIVE PERCENT: 7.1 % (ref 2–12)
NEUTROPHILS ABSOLUTE: 4.87 E9/L (ref 1.8–7.3)
NEUTROPHILS RELATIVE PERCENT: 58.3 % (ref 43–80)
PDW BLD-RTO: 13 FL (ref 11.5–15)
PLATELET # BLD: 337 E9/L (ref 130–450)
PMV BLD AUTO: 9.4 FL (ref 7–12)
POTASSIUM SERPL-SCNC: 4.3 MMOL/L (ref 3.5–5)
PROTHROMBIN TIME, POC: NORMAL
RBC # BLD: 3.97 E12/L (ref 3.5–5.5)
SODIUM BLD-SCNC: 142 MMOL/L (ref 132–146)
TOTAL PROTEIN: 6.9 G/DL (ref 6.4–8.3)
WBC # BLD: 8.4 E9/L (ref 4.5–11.5)

## 2021-05-19 PROCEDURE — 85610 PROTHROMBIN TIME: CPT | Performed by: NURSE PRACTITIONER

## 2021-05-19 PROCEDURE — 99214 OFFICE O/P EST MOD 30 MIN: CPT | Performed by: NURSE PRACTITIONER

## 2021-05-19 RX ORDER — POLYETHYLENE GLYCOL 3350 17 G/17G
17 POWDER, FOR SOLUTION ORAL DAILY
Qty: 510 G | Refills: 5 | Status: SHIPPED | OUTPATIENT
Start: 2021-05-19 | End: 2021-06-18

## 2021-05-19 RX ORDER — CLOBETASOL PROPIONATE 0.5 MG/G
OINTMENT TOPICAL 2 TIMES DAILY
Qty: 3 TUBE | Refills: 5 | Status: SHIPPED
Start: 2021-05-19 | End: 2022-05-26

## 2021-05-19 SDOH — ECONOMIC STABILITY: FOOD INSECURITY: WITHIN THE PAST 12 MONTHS, YOU WORRIED THAT YOUR FOOD WOULD RUN OUT BEFORE YOU GOT MONEY TO BUY MORE.: NEVER TRUE

## 2021-05-19 ASSESSMENT — PATIENT HEALTH QUESTIONNAIRE - PHQ9
SUM OF ALL RESPONSES TO PHQ9 QUESTIONS 1 & 2: 0
SUM OF ALL RESPONSES TO PHQ QUESTIONS 1-9: 0
2. FEELING DOWN, DEPRESSED OR HOPELESS: 0

## 2021-05-19 ASSESSMENT — ENCOUNTER SYMPTOMS
VOMITING: 0
ABDOMINAL PAIN: 0
WHEEZING: 0
NAUSEA: 0
VOICE CHANGE: 0
SHORTNESS OF BREATH: 0
TROUBLE SWALLOWING: 0
CONSTIPATION: 0
DIARRHEA: 0
BACK PAIN: 1
COUGH: 0
BLOOD IN STOOL: 0
CHEST TIGHTNESS: 0

## 2021-05-19 ASSESSMENT — SOCIAL DETERMINANTS OF HEALTH (SDOH): HOW HARD IS IT FOR YOU TO PAY FOR THE VERY BASICS LIKE FOOD, HOUSING, MEDICAL CARE, AND HEATING?: NOT HARD AT ALL

## 2021-05-19 NOTE — PATIENT INSTRUCTIONS
Patient Education        Hip Pain: Care Instructions  Your Care Instructions     Hip pain may be caused by many things, including overuse, a fall, or a twisting movement. Another cause of hip pain is arthritis. Your pain may increase when you stand up, walk, or squat. The pain may come and go or may be constant. Home treatment can help relieve hip pain, swelling, and stiffness. If your pain is ongoing, you may need more tests and treatment. Follow-up care is a key part of your treatment and safety. Be sure to make and go to all appointments, and call your doctor if you are having problems. It's also a good idea to know your test results and keep a list of the medicines you take. How can you care for yourself at home? · Take pain medicines exactly as directed. ? If the doctor gave you a prescription medicine for pain, take it as prescribed. ? If you are not taking a prescription pain medicine, ask your doctor if you can take an over-the-counter medicine. · Rest and protect your hip. Take a break from any activity, including standing or walking, that may cause pain. · Put ice or a cold pack against your hip for 10 to 20 minutes at a time. Try to do this every 1 to 2 hours for the next 3 days (when you are awake) or until the swelling goes down. Put a thin cloth between the ice and your skin. · Sleep on your healthy side with a pillow between your knees, or sleep on your back with pillows under your knees. · If there is no swelling, you can put moist heat, a heating pad, or a warm cloth on your hip. Do gentle stretching exercises to help keep your hip flexible. · Learn how to prevent falls. Have your vision and hearing checked regularly. Wear slippers or shoes with a nonskid sole. · Stay at a healthy weight. · Wear comfortable shoes. When should you call for help? Call 911 anytime you think you may need emergency care.  For example, call if:    · You have sudden chest pain and shortness of breath, or you cough up blood.     · You are not able to stand or walk or bear weight.     · Your buttocks, legs, or feet feel numb or tingly.     · Your leg or foot is cool or pale or changes color.     · You have severe pain. Call your doctor now or seek immediate medical care if:    · You have signs of infection, such as:  ? Increased pain, swelling, warmth, or redness in the hip area. ? Red streaks leading from the hip area. ? Pus draining from the hip area. ? A fever.     · You have signs of a blood clot, such as:  ? Pain in your calf, back of the knee, thigh, or groin. ? Redness and swelling in your leg or groin.     · You are not able to bend, straighten, or move your leg normally.     · You have trouble urinating or having bowel movements. Watch closely for changes in your health, and be sure to contact your doctor if:    · You do not get better as expected. Where can you learn more? Go to https://3225 films.Velocent Systems. org and sign in to your [employer] account. Enter X477 in the NI box to learn more about \"Hip Pain: Care Instructions. \"     If you do not have an account, please click on the \"Sign Up Now\" link. Current as of: February 26, 2020               Content Version: 12.8  © 2006-2021 [employer]. Care instructions adapted under license by [facility] ([facility]). If you have questions about a medical condition or this instruction, always ask your healthcare professional. [place] [address] any warranty or liability for your use of this information. Patient Education        Joint Pain: Care Instructions  Your Care Instructions     Many people have small aches and pains from overuse or injury to muscles and joints. Joint injuries often happen during sports or recreation, work tasks, or projects around the home.  An overuse injury can happen when you put too much stress on a joint or when you do an activity that stresses the joint over and over, such as using the computer or rowing a boat. You can take action at home to help your muscles and joints get better. You should feel better in 1 to 2 weeks, but it can take 3 months or more to heal completely. Follow-up care is a key part of your treatment and safety. Be sure to make and go to all appointments, and call your doctor if you are having problems. It's also a good idea to know your test results and keep a list of the medicines you take. How can you care for yourself at home? · Do not put weight on the injured joint for at least a day or two. · For the first day or two after an injury, do not take hot showers or baths, and do not use hot packs. The heat could make swelling worse. · Put ice or a cold pack on the sore joint for 10 to 20 minutes at a time. Try to do this every 1 to 2 hours for the next 3 days (when you are awake) or until the swelling goes down. Put a thin cloth between the ice and your skin. · Wrap the injury in an elastic bandage. Do not wrap it too tightly because this can cause more swelling. · Prop up the sore joint on a pillow when you ice it or anytime you sit or lie down during the next 3 days. Try to keep it above the level of your heart. This will help reduce swelling. · Take an over-the-counter pain medicine, such as acetaminophen (Tylenol), ibuprofen (Advil, Motrin), or naproxen (Aleve). Read and follow all instructions on the label. · After 1 or 2 days of rest, begin moving the joint gently. While the joint is still healing, you can begin to exercise using activities that do not strain or hurt the painful joint. When should you call for help? Call your doctor now or seek immediate medical care if:    · You have signs of infection, such as:  ? Increased pain, swelling, warmth, and redness. ? Red streaks leading from the joint. ? A fever.    Watch closely for changes in your health, and be sure to contact your doctor if:    · Your movement or symptoms are not getting better after 1 to 2 weeks of home treatment. Where can you learn more? Go to https://chpepiceweb.[employer]F [email or web address] org and sign in to your Mobile Pulse account. Enter P205 in the Yamiseehire box to learn more about \"Joint Pain: Care Instructions. \"     If you do not have an account, please click on the \"Sign Up Now\" link. Current as of: November 16, 2020               Content Version: 12.8  © 2006-2021 [employer]. Care instructions adapted under license by [facility] ([facility]). If you have questions about a medical condition or this instruction, always ask your healthcare professional. [place] [address] any warranty or liability for your use of this information. Patient Education        Leg Pain: Care Instructions  Your Care Instructions  Many things can cause leg pain. Too much exercise or overuse can cause a muscle cramp (or charley horse). You can get leg cramps from not eating a balanced diet that has enough potassium, calcium, and other minerals. If you do not drink enough fluids or are taking certain medicines, you may develop leg cramps. Other causes of leg pain include injuries, blood flow problems, nerve damage, and twisted and enlarged veins (varicose veins). You can usually ease pain with self-care. Your doctor may recommend that you rest your leg and keep it elevated. Follow-up care is a key part of your treatment and safety. Be sure to make and go to all appointments, and call your doctor if you are having problems. It's also a good idea to know your test results and keep a list of the medicines you take. How can you care for yourself at home? · Take pain medicines exactly as directed. ? If the doctor gave you a prescription medicine for pain, take it as prescribed. ? If you are not taking a prescription pain medicine, ask your doctor if you can take an over-the-counter medicine. · Take any other medicines exactly as prescribed.  Call your doctor if you think you are having a problem with your medicine. · Rest your leg while you have pain, and avoid standing for long periods of time. · Prop up your leg at or above the level of your heart when possible. · Make sure you are eating a balanced diet that is rich in calcium, potassium, and magnesium, especially if you are pregnant. · If directed by your doctor, put ice or a cold pack on the area for 10 to 20 minutes at a time. Put a thin cloth between the ice and your skin. · Your leg may be in a splint, a brace, or an elastic bandage, and you may have crutches to help you walk. Follow your doctor's directions about how long to wear supports and how to use the crutches. When should you call for help? Call 911 anytime you think you may need emergency care. For example, call if:    · You have sudden chest pain and shortness of breath, or you cough up blood.     · Your leg is cool or pale or changes color. Call your doctor now or seek immediate medical care if:    · You have increasing or severe pain.     · Your leg suddenly feels weak and you cannot move it.     · You have signs of a blood clot, such as:  ? Pain in your calf, back of the knee, thigh, or groin. ? Redness and swelling in your leg or groin.     · You have signs of infection, such as:  ? Increased pain, swelling, warmth, or redness. ? Red streaks leading from the sore area. ? Pus draining from a place on your leg. ? A fever.     · You cannot bear weight on your leg. Watch closely for changes in your health, and be sure to contact your doctor if:    · You do not get better as expected. Where can you learn more? Go to https://Rioglass Solar Holdingmargieeb.Peers App. org and sign in to your Tutor Assignment account. Enter H852 in the Convertigo box to learn more about \"Leg Pain: Care Instructions. \"     If you do not have an account, please click on the \"Sign Up Now\" link.   Current as of: February 26, 2020               Content Version: 12.8  © the doctor gave you a prescription medicine for pain, take it as prescribed. ? If you are not taking a prescription pain medicine, ask your doctor if you can take an over-the-counter medicine. · Put ice or a cold pack on the area for 10 to 20 minutes at a time to ease pain. Put a thin cloth between the ice and your skin. When should you call for help? Call your doctor now or seek immediate medical care if:    · You have new pain, or your pain gets worse.     · You have new symptoms such as a fever, a rash, or chills. Watch closely for changes in your health, and be sure to contact your doctor if:    · You do not get better as expected. Where can you learn more? Go to https://AReflectionOf Inc..Ver[employer]ell Group. org and sign in to your Me[employer] account. Enter R216 in the Citygoo box to learn more about \"Musculoskeletal Pain: Care Instructions. \"     If you do not have an account, please click on the \"Sign Up Now\" link. Current as of: August 4, 2020               Content Version: 12.8  © 3595-2302 [employer]. Care instructions adapted under license by [facility] ([facility]). If you have questions about a medical condition or this instruction, always ask your healthcare professional. [address] any warranty or liability for your use of this information. Patient Education        Fibromyalgia: Care Instructions  Overview     Fibromyalgia is a painful condition that is not completely understood by medical experts. The cause of fibromyalgia is not known. It can make you feel tired and ache all over. It causes tender spots at specific points of the body that hurt only when you press on them. You may have trouble sleeping, as well as other symptoms. These problems can upset your work and home life. Symptoms tend to come and go, although they may never go away completely. Fibromyalgia does not harm your muscles, joints, or organs.   Follow-up care is a key part of your treatment and safety. Be sure to make and go to all appointments, and call your doctor if you are having problems. It's also a good idea to know your test results and keep a list of the medicines you take. How can you care for yourself at home? · Exercise often. Walk, swim, or bike to help with pain and sleep problems and to make you feel better. · Try to get a good night's sleep. Go to bed and get up at the same time each day, whether you feel rested or not. Make sure you have a good mattress and pillow. · Reduce stress. Avoid things that cause you stress, if you can. If not, work at making them less stressful. Learn to use biofeedback, guided imagery, meditation, or other methods to relax. · Make healthy changes. Eat a balanced diet, quit smoking, and limit alcohol and caffeine. · Use a heating pad set on low or take warm baths or showers for pain. Using cold packs for up to 20 minutes at a time can also relieve pain. Put a thin cloth between the cold pack and your skin. A gentle massage might help too. · Be safe with medicines. Take your medicines exactly as prescribed. Call your doctor if you think you are having a problem with your medicine. Your doctor may talk to you about taking antidepressant medicines. These medicines may improve sleep, relieve pain, and in some cases treat depression. · Learn about fibromyalgia. This makes coping easier. Then, take an active role in your treatment. · Think about joining a support group with others who have fibromyalgia to learn more and get support. When should you call for help? Watch closely for changes in your health, and be sure to contact your doctor if:    · You feel sad, helpless, or hopeless; lose interest in things you used to enjoy; or have other symptoms of depression.     · Your fibromyalgia symptoms get worse. Where can you learn more? Go to [email or web address]://leigh ann.[email or web address] [email or web address] org and sign in to your Run My Errands account.  Enter V003 in the Search Health Information box to learn more about \"Fibromyalgia: Care Instructions. \"     If you do not have an account, please click on the \"Sign Up Now\" link. Current as of: August 4, 2020               Content Version: 12.8  © 2006-2021 [employer]. Care instructions adapted under license by [facility] ([facility]). If you have questions about a medical condition or this instruction, always ask your healthcare professional. [address] any warranty or liability for your use of this information. Patient Education        Musculoskeletal Pain: Care Instructions  Your Care Instructions     Different problems with the bones, muscles, nerves, ligaments, and tendons in the body can cause pain. One or more areas of your body may ache or burn. Or they may feel tired, stiff, or sore. The medical term for this type of pain is musculoskeletal pain. It can have many different causes. Sometimes the pain is caused by an injury such as a strain or sprain. Or you might have pain from using one part of your body in the same way over and over again. This is called overuse. In some cases, the cause of the pain is another health problem such as arthritis or fibromyalgia. The doctor will examine you and ask you questions about your health to help find the cause of your pain. Blood tests or imaging tests like an X-ray may also be helpful. But sometimes doctors can't find a cause of the pain. Treatment depends on your symptoms and the cause of the pain, if known. The doctor has checked you carefully, but problems can develop later. If you notice any problems or new symptoms, get medical treatment right away. Follow-up care is a key part of your treatment and safety. Be sure to make and go to all appointments, and call your doctor if you are having problems. It's also a good idea to know your test results and keep a list of the medicines you take. How can you care for yourself at home?   · Rest until you feel better. · Do not do anything that makes the pain worse. Return to exercise gradually if you feel better and your doctor says it's okay. · Be safe with medicines. Read and follow all instructions on the label. ? If the doctor gave you a prescription medicine for pain, take it as prescribed. ? If you are not taking a prescription pain medicine, ask your doctor if you can take an over-the-counter medicine. · Put ice or a cold pack on the area for 10 to 20 minutes at a time to ease pain. Put a thin cloth between the ice and your skin. When should you call for help? Call your doctor now or seek immediate medical care if:    · You have new pain, or your pain gets worse.     · You have new symptoms such as a fever, a rash, or chills. Watch closely for changes in your health, and be sure to contact your doctor if:    · You do not get better as expected. Where can you learn more? Go to https://DataVote.iDreamBooks. org and sign in to your [employer] account. Enter D618 in the S[employer] box to learn more about \"Musculoskeletal Pain: Care Instructions. \"     If you do not have an account, please click on the \"Sign Up Now\" link. Current as of: August 4, 2020               Content Version: 12.8  © 2006-2021 Healthwise, Incorporated. Care instructions adapted under license by [facility] ([facility]). If you have questions about a medical condition or this instruction, always ask your healthcare professional. [place] [address] any warranty or liability for your use of this information. Patient Education        Learning About Making a Plan for Managing Chronic Pain  How can you plan for managing your pain? You and your doctor will work to make your plan. Your plan can include more than one type of pain control. You may take prescription or over-the-counter drugs. You can also use physical treatments, like massage and acupuncture.  Other things can help too, such as meditation or a type of counseling to change how you think about your pain. It's important to let your doctor know how your pain is affecting your life. The goal of a pain management plan isn't to totally get rid of pain. Instead, the goal is to control the pain enough so that daily activities are easier. If your pain isn't controlled well enough, talk with your doctor. You may need to make a new plan. Or your doctor may refer you to a specialist.  Sample pain management plan  Here is an example of a pain management plan. You can work with your doctor to complete it. My Goals   Example: \"Control pain long enough to walk the dog each day\" or \"Reduce or stop taking pain medicines by the end of summer. \"   1. ________________________________________________  2. ________________________________________________  3. ________________________________________________  4. ________________________________________________  My Non-Medicine Strategies   Example: \"Complete my physical therapy exercises each day\" or \"Do yoga twice a week. \"  1. ________________________________________________  2. ________________________________________________  3. ________________________________________________  My Medicines That Help With Pain   Include prescription and over-the-counter medicines. 1. Medicine: ______________________  ? How much to take: ______________________________  ? How often to take it: ______________________________  ? Other instructions: ______________________________  2. Medicine: ______________________  ? How much to take: ______________________________  ? How often to take it: ______________________________  ? Other instructions: ______________________________  My Next Steps   If I'm not meeting my goals, my doctor recommends:  · ________________________________________________  · ________________________________________________  · ________________________________________________  Where can you learn more?   Go to

## 2021-05-19 NOTE — PROGRESS NOTES
Dejuan Griggs (:  1975) is a 41 y.o. female,Established patient, here for evaluation of the following chief complaint(s):  Leg Swelling (right  hip an leg pain and swelling, has had this for  5 weeks ago feels like they are tired overworked), Medication Check (wants to know whatas going on with her contrave), and Other (inr today per emil 1.0)         ASSESSMENT/PLAN:  1. Hip pain, acute, right  -     XR HIP RIGHT (2-3 VIEWS); Future  2. Leg swelling  -     US DUP LOWER EXTREMITY RIGHT TOREY; Future  3. Pain of right lower extremity  -     US DUP LOWER EXTREMITY RIGHT TOREY; Future  4. Abnormal bruising  -     Comprehensive Metabolic Panel; Future  -     CBC Auto Differential; Future  -     POCT INR  -     US DUP LOWER EXTREMITY RIGHT TOREY; Future      Return in about 1 week (around 2021) for with Meggan. Subjective   SUBJECTIVE/OBJECTIVE:  There is an obvious prominence on the lateral aspect of the right hip, she also has some multiple bruising specifically on the abdomen. She describes a tightness along the thigh of the ipsilateral leg denies any shortness of breath, nausea, vomiting or diarrhea. No fevers reported. She describes having to get better so that she can go back to work. She states she is unable to drive long distances because of the pain in her hip. Review of Systems   Constitutional: Negative for activity change, chills, diaphoresis, fatigue, fever and unexpected weight change. HENT: Negative for trouble swallowing and voice change. Eyes: Negative for visual disturbance. Respiratory: Negative for cough, chest tightness, shortness of breath and wheezing. Cardiovascular: Negative for chest pain, palpitations and leg swelling. Gastrointestinal: Negative for abdominal pain, blood in stool, constipation, diarrhea, nausea and vomiting. Endocrine: Negative for polydipsia, polyphagia and polyuria. Genitourinary: Negative for dysuria, enuresis, frequency and hematuria. Musculoskeletal: Positive for arthralgias, back pain, gait problem, joint swelling and myalgias. Negative for neck stiffness. Skin: Negative for rash. Neurological: Negative for dizziness, seizures, syncope, facial asymmetry, weakness, light-headedness, numbness and headaches. Hematological: Does not bruise/bleed easily. Psychiatric/Behavioral: Negative for behavioral problems, confusion, hallucinations and suicidal ideas. The patient is not nervous/anxious. Objective   Physical Exam  Constitutional:       Appearance: Normal appearance. HENT:      Head: Normocephalic. Cardiovascular:      Rate and Rhythm: Normal rate and regular rhythm. Pulses: Normal pulses. Pulmonary:      Effort: Pulmonary effort is normal.      Breath sounds: Normal breath sounds. Abdominal:      General: Abdomen is flat. Musculoskeletal:      Comments: HIP:    Right      Inspection/Palpation -obvious lateral swelling without any heat or redness appreciated no streaking noted. There is no fluctuance or evidence of abscess. Global  tenderness, swelling extended into the middle thigh but no swelling noted at the knee no deformities, no crepitance, no erythema, no warmth over the region, leg length equal, femoral pulses full and equal    Range of Motion -                                limited range of motion due to pain    Strength/Tone - normal 5/5   Diagnostic Tests  Hip joint is non-tender in all fields and both legs equal in length     Neurological:      Mental Status: She is alert. She also describes having back pain into the sciatic region. Which she is currently seeing Meggan for I wanted to rule out any ominous causes for leg swelling and bruising her PT/INR here was 1.0    Ultrasound done today reveals: The visualized veins of the right lower extremity are patent and free of   echogenic thrombus. The veins demonstrate good compressibility with normal   color flow study and spectral analysis. On this date 5/19/2021 I have spent 20 minutes reviewing previous notes, test results and face to face with the patient discussing the diagnosis and importance of compliance with the treatment plan as well as documenting on the day of the visit. An electronic signature was used to authenticate this note.         --LYNSEY Goldman - CNP

## 2021-05-20 ENCOUNTER — TELEPHONE (OUTPATIENT)
Dept: FAMILY MEDICINE CLINIC | Age: 46
End: 2021-05-20

## 2021-05-20 LAB — SEDIMENTATION RATE, ERYTHROCYTE: 5 MM/HR (ref 0–20)

## 2021-05-20 RX ORDER — PREDNISONE 20 MG/1
20 TABLET ORAL 2 TIMES DAILY
Qty: 10 TABLET | Refills: 0 | Status: SHIPPED | OUTPATIENT
Start: 2021-05-20 | End: 2021-05-25

## 2021-05-20 NOTE — TELEPHONE ENCOUNTER
I sent you a message concerning possibly putting her on prednisone or getting rheumatology involved please advise

## 2021-05-24 ENCOUNTER — TELEPHONE (OUTPATIENT)
Dept: FAMILY MEDICINE CLINIC | Age: 46
End: 2021-05-24

## 2021-05-24 NOTE — TELEPHONE ENCOUNTER
PA for Contrave denied. This was ordered y  Meggan    Pt. Asking if Meggan could order her something else to suppress her appetite? Or should Jay address this?

## 2021-05-25 NOTE — PROGRESS NOTES
I can't give anything that would increase the BP. I considered Dizko SamuraiAZFileTrek but not covered by insurance. Insurance refused Contrave.

## 2021-05-26 ENCOUNTER — TELEPHONE (OUTPATIENT)
Dept: FAMILY MEDICINE CLINIC | Age: 46
End: 2021-05-26

## 2021-05-26 NOTE — TELEPHONE ENCOUNTER
Called pt. To says there is not any other weight loss med to give her because of her high blood pressure. She said she just saw Leland Clark (5-19-21)   and he said he might put her on Ozempic. She would like to try this. Her pharmacy is [facility]. Actually the conversation about Ozempic was 5-7 months ago. Jay let me know if this message should go to Meggan. Also pt. Was to follow up with Meggan around 5-26-21, pt. Never scheduled this.

## 2021-05-26 NOTE — TELEPHONE ENCOUNTER
Again, per Jay , this is an Meggan pt. Now. She can talk to pt. As she was suppose to see Meggan around 5-26-21, pt. Never made this follow up appt. Pt. Advised, she talked to Homer Phillip to set up appt. With Meggan. Pt. States she only saw Meggan because Tracie Laurent was not available.

## 2021-05-26 NOTE — TELEPHONE ENCOUNTER
Aria Jayashree is a diabetic medication not a weight loss medication. I may have stated that one of the benefits of Ozempic is weight loss but I typically only use it in diabetic patients. Also she has been frequenting going to Meggan and it sounds like she is more comfortable seeing her so lets go ahead and switch providers.

## 2021-05-26 NOTE — TELEPHONE ENCOUNTER
Her insurance will not cover Saxenda either. Can't use phenermine because of blood pressure. Nothing to offer at this point.

## 2021-05-27 ENCOUNTER — OFFICE VISIT (OUTPATIENT)
Dept: FAMILY MEDICINE CLINIC | Age: 46
End: 2021-05-27
Payer: COMMERCIAL

## 2021-05-27 ENCOUNTER — NURSE ONLY (OUTPATIENT)
Dept: FAMILY MEDICINE CLINIC | Age: 46
End: 2021-05-27
Payer: COMMERCIAL

## 2021-05-27 VITALS
WEIGHT: 265.5 LBS | HEIGHT: 68 IN | SYSTOLIC BLOOD PRESSURE: 130 MMHG | DIASTOLIC BLOOD PRESSURE: 70 MMHG | RESPIRATION RATE: 18 BRPM | OXYGEN SATURATION: 96 % | BODY MASS INDEX: 40.24 KG/M2 | HEART RATE: 100 BPM | TEMPERATURE: 97.8 F

## 2021-05-27 DIAGNOSIS — M79.604 PAIN OF RIGHT LOWER EXTREMITY: ICD-10-CM

## 2021-05-27 DIAGNOSIS — M79.89 LEG SWELLING: ICD-10-CM

## 2021-05-27 DIAGNOSIS — Z00.00 EXAMINATION: Primary | ICD-10-CM

## 2021-05-27 DIAGNOSIS — M25.551 HIP PAIN, ACUTE, RIGHT: ICD-10-CM

## 2021-05-27 DIAGNOSIS — E66.01 CLASS 3 SEVERE OBESITY DUE TO EXCESS CALORIES WITH SERIOUS COMORBIDITY AND BODY MASS INDEX (BMI) OF 40.0 TO 44.9 IN ADULT (HCC): ICD-10-CM

## 2021-05-27 DIAGNOSIS — E88.81 INSULIN RESISTANCE: ICD-10-CM

## 2021-05-27 DIAGNOSIS — Z00.00 PREVENTATIVE HEALTH CARE: ICD-10-CM

## 2021-05-27 DIAGNOSIS — K30 INDIGESTION: ICD-10-CM

## 2021-05-27 DIAGNOSIS — E78.2 MIXED HYPERLIPIDEMIA: ICD-10-CM

## 2021-05-27 DIAGNOSIS — M05.9 RHEUMATOID ARTHRITIS WITH POSITIVE RHEUMATOID FACTOR, INVOLVING UNSPECIFIED SITE (HCC): ICD-10-CM

## 2021-05-27 DIAGNOSIS — R22.41 HIP MASS, RIGHT: ICD-10-CM

## 2021-05-27 DIAGNOSIS — T50.905A ADVERSE EFFECT OF DRUG, INITIAL ENCOUNTER: ICD-10-CM

## 2021-05-27 DIAGNOSIS — R23.3 ABNORMAL BRUISING: ICD-10-CM

## 2021-05-27 DIAGNOSIS — Z00.00 PREVENTATIVE HEALTH CARE: Primary | ICD-10-CM

## 2021-05-27 LAB
CHOLESTEROL, TOTAL: 192 MG/DL (ref 0–199)
HBA1C MFR BLD: 5.5 % (ref 4–5.6)
HDLC SERPL-MCNC: 55 MG/DL
LDL CHOLESTEROL CALCULATED: 107 MG/DL (ref 0–99)
TRIGL SERPL-MCNC: 150 MG/DL (ref 0–149)
TSH SERPL DL<=0.05 MIU/L-ACNC: 1.32 UIU/ML (ref 0.27–4.2)
VLDLC SERPL CALC-MCNC: 30 MG/DL

## 2021-05-27 PROCEDURE — 99396 PREV VISIT EST AGE 40-64: CPT | Performed by: NURSE PRACTITIONER

## 2021-05-27 PROCEDURE — 36415 COLL VENOUS BLD VENIPUNCTURE: CPT | Performed by: NURSE PRACTITIONER

## 2021-05-27 RX ORDER — SUMATRIPTAN 50 MG/1
TABLET, FILM COATED ORAL
Qty: 9 TABLET | Refills: 1 | Status: SHIPPED
Start: 2021-05-27 | End: 2021-07-21 | Stop reason: SDUPTHER

## 2021-05-27 SDOH — ECONOMIC STABILITY: FOOD INSECURITY: WITHIN THE PAST 12 MONTHS, THE FOOD YOU BOUGHT JUST DIDN'T LAST AND YOU DIDN'T HAVE MONEY TO GET MORE.: NEVER TRUE

## 2021-05-27 ASSESSMENT — ENCOUNTER SYMPTOMS
WHEEZING: 0
VOICE CHANGE: 0
ABDOMINAL PAIN: 0
CHEST TIGHTNESS: 0
CONSTIPATION: 0
NAUSEA: 0
COUGH: 0
VOMITING: 0
BLOOD IN STOOL: 0
SHORTNESS OF BREATH: 0
BACK PAIN: 1
DIARRHEA: 0
TROUBLE SWALLOWING: 0

## 2021-05-27 NOTE — PATIENT INSTRUCTIONS
Patient Education        Insulin Resistance: Care Instructions  Your Care Instructions     Insulin resistance means that the body can't use insulin as it should. Insulin lets sugar (glucose) enter the body's cells, where it is used for energy. It also helps muscles, fat, and liver cells store sugar to be released when needed. If the body tissues don't respond to insulin right, the blood sugar level rises. Insulin resistance mainly is caused by obesity. But other medical conditions, such as acromegaly and Cushing's syndrome, also can cause it. It can run in families too. Follow-up care is a key part of your treatment and safety. Be sure to make and go to all appointments, and call your doctor if you are having problems. It's also a good idea to know your test results and keep a list of the medicines you take. How can you care for yourself at home? · Take your medicines exactly as prescribed. Call your doctor if you think you are having a problem with your medicine. You will get more details on the specific medicines your doctor prescribes. · Make healthy food choices. · Get at least 30 minutes of exercise on most days of the week. Exercise helps control your blood sugar. It also helps you stay at a healthy weight. Walking is a good choice. You also may want to do other activities, such as running, swimming, cycling, or playing tennis or team sports. · Try to lose weight. Losing even a small amount of weight can help. · Do not smoke. If you need help quitting, talk to your doctor about stop-smoking programs and medicines. These can increase your chances of quitting for good. When should you call for help? Watch closely for changes in your health, and be sure to contact your doctor if:    · Your blood sugar stays outside the level your doctor set for you.     · You have any problems. Where can you learn more? Go to [email or web address] [email or web address] org and sign in to your Freshmilk NetTV account.  Enter [id number] in the Search Health Information box to learn more about \"Insulin Resistance: Care Instructions. \"     If you do not have an account, please click on the \"Sign Up Now\" link. Current as of: August 31, 2020               Content Version: 12.8  © 2006-2021 Healthwise, Incorporated. Care instructions adapted under license by [facility] ([facility]). If you have questions about a medical condition or this instruction, always ask your healthcare professional. [place] [address] any warranty or liability for your use of this information. Patient Education        Leg Pain: Care Instructions  Your Care Instructions  Many things can cause leg pain. Too much exercise or overuse can cause a muscle cramp (or charley horse). You can get leg cramps from not eating a balanced diet that has enough potassium, calcium, and other minerals. If you do not drink enough fluids or are taking certain medicines, you may develop leg cramps. Other causes of leg pain include injuries, blood flow problems, nerve damage, and twisted and enlarged veins (varicose veins). You can usually ease pain with self-care. Your doctor may recommend that you rest your leg and keep it elevated. Follow-up care is a key part of your treatment and safety. Be sure to make and go to all appointments, and call your doctor if you are having problems. It's also a good idea to know your test results and keep a list of the medicines you take. How can you care for yourself at home? · Take pain medicines exactly as directed. ? If the doctor gave you a prescription medicine for pain, take it as prescribed. ? If you are not taking a prescription pain medicine, ask your doctor if you can take an over-the-counter medicine. · Take any other medicines exactly as prescribed. Call your doctor if you think you are having a problem with your medicine. · Rest your leg while you have pain, and avoid standing for long periods of time.   · Prop up your leg at or above the level of your heart when possible. · Make sure you are eating a balanced diet that is rich in calcium, potassium, and magnesium, especially if you are pregnant. · If directed by your doctor, put ice or a cold pack on the area for 10 to 20 minutes at a time. Put a thin cloth between the ice and your skin. · Your leg may be in a splint, a brace, or an elastic bandage, and you may have crutches to help you walk. Follow your doctor's directions about how long to wear supports and how to use the crutches. When should you call for help? Call 911 anytime you think you may need emergency care. For example, call if:    · You have sudden chest pain and shortness of breath, or you cough up blood.     · Your leg is cool or pale or changes color. Call your doctor now or seek immediate medical care if:    · You have increasing or severe pain.     · Your leg suddenly feels weak and you cannot move it.     · You have signs of a blood clot, such as:  ? Pain in your calf, back of the knee, thigh, or groin. ? Redness and swelling in your leg or groin.     · You have signs of infection, such as:  ? Increased pain, swelling, warmth, or redness. ? Red streaks leading from the sore area. ? Pus draining from a place on your leg. ? A fever.     · You cannot bear weight on your leg. Watch closely for changes in your health, and be sure to contact your doctor if:    · You do not get better as expected. Where can you learn more? Go to [email or web address] org and sign in to your Soluble Systems account. Enter K358 in the Maternova box to learn more about \"Leg Pain: Care Instructions. \"     If you do not have an account, please click on the \"Sign Up Now\" link. Current as of: February 26, 2020               Content Version: 12.8  © 4566-9570 Healthwise, Incorporated. Care instructions adapted under license by [facility] ([facility]).  If you have questions about a medical condition or this instruction, always ask your healthcare professional. [place] [address] any warranty or liability for your use of this information. Patient Education        Side Effects of Medicine: Care Instructions  Your Care Instructions     Medicines are a big part of treatment for many health problems. But all medicines have side effects. Often these are mild problems. They might include a dry mouth or upset stomach. But sometimes medicines can cause dangerous side effects. One example is a bad allergic reaction. The best treatment will depend on what side effects you have. If you have a serious side effect, you may need to stop taking the medicine. You may also need to take another medicine to treat the side effect. If you have a mild side effect, it may go away after you take the medicine for a while. The doctor has checked you carefully, but problems can develop later. If you notice any problems or new symptoms, get medical treatment right away. Follow-up care is a key part of your treatment and safety. Be sure to make and go to all appointments, and call your doctor if you are having problems. It's also a good idea to know your test results and keep a list of the medicines you take. How can you care for yourself at home? · Be safe with medicines. Take your medicines exactly as prescribed. Call your doctor if you think you are having a problem with your medicine. · Call your doctor if side effects bother you and you wonder if you should keep taking a medicine. Your doctor may be able to lower your dose or change your medicine. Do not suddenly quit taking your medicine unless a doctor tells you to. · Make sure your doctor has a list of all the medicines, vitamins, supplements, and herbal remedies you take. Ask about side effects. When should you call for help?   Watch closely for changes in your health, and be sure to contact your doctor if:    · You think you are having a new problem with your medicine.     · You do not get better as expected. Where can you learn more? Go to [email or web address].[email or web address] org and sign in to your [email or web address] account. Enter D152 in the Streemio box to learn more about \"Side Effects of Medicine: Care Instructions. \"     If you do not have an account, please click on the \"Sign Up Now\" link. Current as of: May 27, 2020               Content Version: 12.8  © 2006-2021 Healthwise, Incorporated. Care instructions adapted under license by [facility] ([facility]). If you have questions about a medical condition or this instruction, always ask your healthcare professional. [address] any warranty or liability for your use of this information.

## 2021-05-27 NOTE — PROGRESS NOTES
Right anicbuicle venipuncture performed     Patient states whatever blood work she needs done is covered through her preventive exam yearly physical insurance will cover these labs the A1C,TSH,LIPID.

## 2021-05-27 NOTE — PROGRESS NOTES
Celeste Ramsay (:  1975) is a 41 y.o. female,Established patient, here for evaluation of the following chief complaint(s): Annual Exam (  Carolin [facility] VISIT, through her insurance ) and Other (wants to discuss weight losss)         ASSESSMENT/PLAN:  1. Examination  2. Insulin resistance  3. Mixed hyperlipidemia  4. Indigestion  5. Adverse effect of drug, initial encounter  Comments:  Metformin  6. Hip mass, right  -     US SOFT TISSUE LIMITED AREA; Future  7. Leg swelling  8. Hip pain, acute, right  9. Abnormal bruising  10. Pain of right lower extremity  11. Rheumatoid arthritis with positive rheumatoid factor, involving unspecified site ([address]  12. Class 3 severe obesity due to excess calories with serious comorbidity and body mass index (BMI) of 40.0 to 44.9 in [facility])      No follow-ups on file. Subjective   SUBJECTIVE/OBJECTIVE:  Has been on Metformin but is having some diarrhea increase in nausea would like to try something else for the Glucose      Patient last visit  There is an obvious prominence on the lateral aspect of the right hip, she also has some multiple bruising specifically on the abdomen. She describes a tightness along the thigh of the ipsilateral leg denies any shortness of breath, nausea, vomiting or diarrhea. No fevers reported. She describes having to get better so that she can go back to work. She states she is unable to drive long distances because of the pain in her hip. Other  Associated symptoms include arthralgias, joint swelling and myalgias. Pertinent negatives include no abdominal pain, chest pain, chills, coughing, diaphoresis, fatigue, fever, headaches, nausea, numbness, rash, vomiting or weakness. Review of Systems   Constitutional: Negative for activity change, chills, diaphoresis, fatigue, fever and unexpected weight change. HENT: Negative for trouble swallowing and voice change. Eyes: Negative for visual disturbance. Respiratory: Negative for cough, chest tightness, shortness of breath and wheezing. Cardiovascular: Negative for chest pain, palpitations and leg swelling. Gastrointestinal: Negative for abdominal pain, blood in stool, constipation, diarrhea, nausea and vomiting. Endocrine: Negative for polydipsia, polyphagia and polyuria. Genitourinary: Negative for dysuria, enuresis, frequency and hematuria. Musculoskeletal: Positive for arthralgias, back pain, gait problem, joint swelling and myalgias. Negative for neck stiffness. Skin: Negative for rash. Neurological: Negative for dizziness, seizures, syncope, facial asymmetry, weakness, light-headedness, numbness and headaches. Hematological: Does not bruise/bleed easily. Psychiatric/Behavioral: Negative for behavioral problems, confusion, hallucinations and suicidal ideas. The patient is not nervous/anxious. Objective   Physical Exam  Constitutional:       Appearance: Normal appearance. HENT:      Head: Normocephalic. Cardiovascular:      Rate and Rhythm: Normal rate and regular rhythm. Pulses: Normal pulses. Pulmonary:      Effort: Pulmonary effort is normal.      Breath sounds: Normal breath sounds. Abdominal:      General: Abdomen is flat. Musculoskeletal:      Comments: HIP:    Right      Inspection/Palpation -obvious lateral swelling without any heat or redness appreciated no streaking noted. There is no fluctuance or evidence of abscess. Global  tenderness, swelling extended into the middle thigh but no swelling noted at the knee no deformities, no crepitance, no erythema, no warmth over the region, leg length equal, femoral pulses full and equal    Range of Motion -                                limited range of motion due to pain    Strength/Tone - normal 5/5   Diagnostic Tests  Hip joint is non-tender in all fields and both legs equal in length     Neurological:      Mental Status: She is alert.            On this date 5/19/2021 I have spent 20 minutes reviewing previous notes, test results and face to face with the patient discussing the diagnosis and importance of compliance with the treatment plan as well as documenting on the day of the visit. An electronic signature was used to authenticate this note.         --LYNSEY Pérez - CNP

## 2021-06-17 RX ORDER — VENLAFAXINE HYDROCHLORIDE 150 MG/1
CAPSULE, EXTENDED RELEASE ORAL
Qty: 90 CAPSULE | Refills: 1 | Status: SHIPPED
Start: 2021-06-17 | End: 2021-11-02 | Stop reason: SDUPTHER

## 2021-07-21 RX ORDER — SUMATRIPTAN 50 MG/1
TABLET, FILM COATED ORAL
Qty: 9 TABLET | Refills: 1 | Status: SHIPPED
Start: 2021-07-21 | End: 2021-09-22 | Stop reason: SDUPTHER

## 2021-09-22 RX ORDER — SUMATRIPTAN 50 MG/1
TABLET, FILM COATED ORAL
Qty: 9 TABLET | Refills: 1 | Status: SHIPPED
Start: 2021-09-22 | End: 2021-11-02 | Stop reason: SDUPTHER

## 2021-09-23 RX ORDER — OMEPRAZOLE 20 MG/1
CAPSULE, DELAYED RELEASE ORAL
Qty: 90 CAPSULE | Refills: 1 | Status: SHIPPED
Start: 2021-09-23 | End: 2021-12-15

## 2021-09-27 ENCOUNTER — OFFICE VISIT (OUTPATIENT)
Dept: FAMILY MEDICINE CLINIC | Age: 46
End: 2021-09-27
Payer: COMMERCIAL

## 2021-09-27 VITALS
HEIGHT: 68 IN | HEART RATE: 94 BPM | TEMPERATURE: 97.8 F | DIASTOLIC BLOOD PRESSURE: 84 MMHG | OXYGEN SATURATION: 100 % | BODY MASS INDEX: 38.72 KG/M2 | SYSTOLIC BLOOD PRESSURE: 124 MMHG | WEIGHT: 255.5 LBS | RESPIRATION RATE: 18 BRPM

## 2021-09-27 DIAGNOSIS — E78.2 MIXED HYPERLIPIDEMIA: ICD-10-CM

## 2021-09-27 DIAGNOSIS — K30 INDIGESTION: ICD-10-CM

## 2021-09-27 DIAGNOSIS — M54.2 CERVICAL PAIN (NECK): Primary | ICD-10-CM

## 2021-09-27 DIAGNOSIS — G89.29 CHRONIC BACK PAIN, UNSPECIFIED BACK LOCATION, UNSPECIFIED BACK PAIN LATERALITY: ICD-10-CM

## 2021-09-27 DIAGNOSIS — M54.9 CHRONIC BACK PAIN, UNSPECIFIED BACK LOCATION, UNSPECIFIED BACK PAIN LATERALITY: ICD-10-CM

## 2021-09-27 PROCEDURE — 96372 THER/PROPH/DIAG INJ SC/IM: CPT | Performed by: NURSE PRACTITIONER

## 2021-09-27 PROCEDURE — 99213 OFFICE O/P EST LOW 20 MIN: CPT | Performed by: NURSE PRACTITIONER

## 2021-09-27 RX ORDER — PREDNISONE 20 MG/1
20 TABLET ORAL DAILY
Qty: 10 TABLET | Refills: 0 | Status: SHIPPED | OUTPATIENT
Start: 2021-09-27 | End: 2021-10-07

## 2021-09-27 RX ORDER — DEXAMETHASONE SODIUM PHOSPHATE 10 MG/ML
10 INJECTION INTRAMUSCULAR; INTRAVENOUS ONCE
Status: COMPLETED | OUTPATIENT
Start: 2021-09-27 | End: 2021-09-27

## 2021-09-27 RX ORDER — CYCLOBENZAPRINE HCL 10 MG
10 TABLET ORAL 3 TIMES DAILY PRN
Qty: 30 TABLET | Refills: 0 | Status: SHIPPED | OUTPATIENT
Start: 2021-09-27 | End: 2021-10-07

## 2021-09-27 RX ORDER — SEMAGLUTIDE 1.34 MG/ML
INJECTION, SOLUTION SUBCUTANEOUS
COMMUNITY
Start: 2021-09-09 | End: 2021-12-03 | Stop reason: SDUPTHER

## 2021-09-27 RX ORDER — DEXAMETHASONE SODIUM PHOSPHATE 10 MG/ML
10 INJECTION INTRAMUSCULAR; INTRAVENOUS ONCE
Qty: 1 ML | Refills: 0
Start: 2021-09-27 | End: 2021-09-27

## 2021-09-27 RX ADMIN — DEXAMETHASONE SODIUM PHOSPHATE 10 MG: 10 INJECTION INTRAMUSCULAR; INTRAVENOUS at 15:50

## 2021-09-27 ASSESSMENT — ENCOUNTER SYMPTOMS
CONSTIPATION: 0
ABDOMINAL DISTENTION: 0
ANAL BLEEDING: 0
CHEST TIGHTNESS: 0
RECTAL PAIN: 0
COLOR CHANGE: 0
FACIAL SWELLING: 0
SORE THROAT: 0
CHOKING: 0
BACK PAIN: 1
EYE REDNESS: 0
STRIDOR: 0
VOMITING: 0
BLOOD IN STOOL: 0
WHEEZING: 0
EYE DISCHARGE: 0
VOICE CHANGE: 0
APNEA: 0
SINUS PAIN: 0
EYE ITCHING: 0
RHINORRHEA: 0
DIARRHEA: 0
EYE PAIN: 0
TROUBLE SWALLOWING: 0
PHOTOPHOBIA: 0
COUGH: 0
NAUSEA: 0
ABDOMINAL PAIN: 0
SINUS PRESSURE: 0
SHORTNESS OF BREATH: 0

## 2021-09-27 NOTE — PROGRESS NOTES
rash and wound. Allergic/Immunologic: Negative for environmental allergies, food allergies and immunocompromised state. Neurological: Positive for numbness (left arm). Negative for dizziness, tremors, seizures, syncope, facial asymmetry, speech difficulty, weakness, light-headedness and headaches. Hematological: Negative for adenopathy. Does not bruise/bleed easily. Psychiatric/Behavioral: Negative for agitation, behavioral problems, confusion, decreased concentration, hallucinations, self-injury, sleep disturbance and suicidal ideas. The patient is not nervous/anxious and is not hyperactive. Current Outpatient Medications:     OZEMPIC, 0.25 OR 0.5 MG/DOSE, 2 MG/1.5ML SOPN, inject 0.5 milligrams subcutaneously every week, Disp: , Rfl:     predniSONE (DELTASONE) 20 MG tablet, Take 1 tablet by mouth daily for 10 days, Disp: 10 tablet, Rfl: 0    cyclobenzaprine (FLEXERIL) 10 MG tablet, Take 1 tablet by mouth 3 times daily as needed for Muscle spasms, Disp: 30 tablet, Rfl: 0    omeprazole (PRILOSEC) 20 MG delayed release capsule, TAKE 1 CAPSULE BY MOUTH EVERY DAY, Disp: 90 capsule, Rfl: 1    SUMAtriptan (IMITREX) 50 MG tablet, Take 1 tablet at onset of migraine and may repeat 4 hours later if no improvement, Disp: 9 tablet, Rfl: 1    venlafaxine (EFFEXOR XR) 150 MG extended release capsule, TAKE 1 CAPSULE BY MOUTH EVERY DAY, Disp: 90 capsule, Rfl: 1    clobetasol (TEMOVATE) 0.05 % ointment, Apply topically 2 times daily Apply topically 2 times daily. , Disp: 3 Tube, Rfl: 5    folic acid (FOLVITE) 1 MG tablet, TAKE 1 TABLET BY MOUTH EVERY DAY, Disp: , Rfl:     METHOTREXATE SODIUM IJ, Inject 20 mg as directed once a week, Disp: , Rfl:     ibuprofen (ADVIL;MOTRIN) 200 MG tablet, Take 200 mg by mouth every 6 hours as needed for Pain prn, Disp: , Rfl:     Multiple Vitamin (MULTIVITAMIN PO), Take 1 tablet by mouth 2 times daily.   , Disp: , Rfl:     metFORMIN (GLUCOPHAGE) 500 MG tablet, Take 1 tablet by mouth 2 times daily (Patient not taking: Reported on 9/27/2021), Disp: 180 tablet, Rfl: 1  No Known Allergies    Past Medical History:   Diagnosis Date    Chronic back pain     Headache(784.0)     Hemorrhoids     Herpes genital     Hyperlipidemia     Irregular periods/menstrual cycles     Renal calculi     Varicose veins      Past Surgical History:   Procedure Laterality Date    SERENA-EN-Y GASTRIC BYPASS  5/31/11    Lap    TUBAL LIGATION  2001    UPPER GASTROINTESTINAL ENDOSCOPY  2011     Family History   Family history unknown: Yes     Social History     Socioeconomic History    Marital status:      Spouse name: Not on file    Number of children: Not on file    Years of education: Not on file    Highest education level: Not on file   Occupational History    Not on file   Tobacco Use    Smoking status: Never Smoker    Smokeless tobacco: Never Used   Vaping Use    Vaping Use: Never used   Substance and Sexual Activity    Alcohol use: No    Drug use: No    Sexual activity: Yes   Other Topics Concern    Not on file   Social History Narrative    Not on file     Social Determinants of Health     Financial Resource Strain: Low Risk     Difficulty of Paying Living Expenses: Not hard at all   Food Insecurity: No Food Insecurity    Worried About Running Out of Food in the Last Year: Never true    Angela of Food in the Last Year: Never true   Transportation Needs:     Lack of Transportation (Medical):      Lack of Transportation (Non-Medical):    Physical Activity:     Days of Exercise per Week:     Minutes of Exercise per Session:    Stress:     Feeling of Stress :    Social Connections:     Frequency of Communication with Friends and Family:     Frequency of Social Gatherings with Friends and Family:     Attends Restorationist Services:     Active Member of Clubs or Organizations:     Attends Club or Organization Meetings:     Marital Status:    Intimate Partner Violence:  Fear of Current or Ex-Partner:     Emotionally Abused:     Physically Abused:     Sexually Abused:      Patient Active Problem List   Diagnosis    Chronic back pain    Hyperlipidemia    Indigestion        Vitals:    09/27/21 1455   BP: 124/84   Pulse: 94   Resp: 18   Temp: 97.8 °F (36.6 °C)   TempSrc: Temporal   SpO2: 100%   Weight: 255 lb 8 oz (115.9 kg)   Height: 5' 8\" (1.727 m)       Physical Exam  Vitals and nursing note reviewed. Constitutional:       General: She is not in acute distress. Appearance: Normal appearance. She is normal weight. She is not ill-appearing, toxic-appearing or diaphoretic. HENT:      Head: Normocephalic and atraumatic. Right Ear: Tympanic membrane, ear canal and external ear normal. There is no impacted cerumen. Left Ear: Tympanic membrane, ear canal and external ear normal. There is no impacted cerumen. Nose: Nose normal. No congestion or rhinorrhea. Mouth/Throat:      Mouth: Mucous membranes are moist.      Pharynx: Oropharynx is clear. No oropharyngeal exudate or posterior oropharyngeal erythema. Eyes:      General: No scleral icterus. Right eye: No discharge. Left eye: No discharge. Extraocular Movements: Extraocular movements intact. Conjunctiva/sclera: Conjunctivae normal.      Pupils: Pupils are equal, round, and reactive to light. Neck:      Vascular: No carotid bruit. Cardiovascular:      Rate and Rhythm: Normal rate and regular rhythm. Pulses: Normal pulses. Heart sounds: Normal heart sounds. No murmur heard. No friction rub. No gallop. Pulmonary:      Effort: Pulmonary effort is normal. No respiratory distress. Breath sounds: No stridor. No wheezing, rhonchi or rales. Chest:      Chest wall: No tenderness. Abdominal:      General: Abdomen is flat. Bowel sounds are normal. There is no distension. Palpations: Abdomen is soft. There is no mass. Tenderness:  There is no abdominal tenderness. There is no right CVA tenderness, left CVA tenderness, guarding or rebound. Hernia: No hernia is present. Musculoskeletal:         General: No swelling, tenderness, deformity or signs of injury. Normal range of motion. Cervical back: Normal range of motion and neck supple. No rigidity. No muscular tenderness. Right lower leg: No edema. Left lower leg: No edema. Lymphadenopathy:      Cervical: No cervical adenopathy. Skin:     General: Skin is warm and dry. Capillary Refill: Capillary refill takes less than 2 seconds. Coloration: Skin is not jaundiced or pale. Findings: No bruising, erythema, lesion or rash. Neurological:      General: No focal deficit present. Mental Status: She is alert and oriented to person, place, and time. Mental status is at baseline. Cranial Nerves: No cranial nerve deficit. Sensory: No sensory deficit. Motor: No weakness. Coordination: Coordination normal.      Gait: Gait normal.      Deep Tendon Reflexes: Reflexes normal.   Psychiatric:         Mood and Affect: Mood normal.         Behavior: Behavior normal.         Thought Content: Thought content normal.         Judgment: Judgment normal.         Assessment and Plan:  Arjun Torres was seen today for neck pain and medication refill. Diagnoses and all orders for this visit:    Cervical pain (neck)  -     XR CERVICAL SPINE (2-3 VIEWS); Future    Chronic back pain, unspecified back location, unspecified back pain laterality    Mixed hyperlipidemia    Indigestion    Other orders  -     Discontinue: dexamethasone (DECADRON) 10 MG/ML injection; Inject 1 mL into the muscle once for 1 dose  -     predniSONE (DELTASONE) 20 MG tablet; Take 1 tablet by mouth daily for 10 days  -     cyclobenzaprine (FLEXERIL) 10 MG tablet;  Take 1 tablet by mouth 3 times daily as needed for Muscle spasms  -     dexamethasone (DECADRON) injection 10 mg        Discussions/Education provided to patients during visit:  [] Discussed the importance to stop smoking. [] Advised to monitor eating habits. [] Reviewed and discussed Imaging results. [] Reviewed and discussed Lab results. [] Discussed the importance of drinking plenty of fluids. [] Cut down on Salt and Caffeine.  [] Exercise 2-3 times weekly, if not more. [] Cut down on Sugar and Fats. [x] Continue Medications as Discussed. [x] Communicated with patient any concerns, to phone office. [x] Follow up as directed. Return in about 2 weeks (around 10/11/2021).       Seen By:      LYNSEY Lee - CNP

## 2021-09-29 ENCOUNTER — OFFICE VISIT (OUTPATIENT)
Dept: FAMILY MEDICINE CLINIC | Age: 46
End: 2021-09-29
Payer: COMMERCIAL

## 2021-09-29 VITALS
HEART RATE: 109 BPM | TEMPERATURE: 96.6 F | HEIGHT: 68 IN | DIASTOLIC BLOOD PRESSURE: 88 MMHG | BODY MASS INDEX: 38.81 KG/M2 | WEIGHT: 256.1 LBS | OXYGEN SATURATION: 98 % | SYSTOLIC BLOOD PRESSURE: 136 MMHG

## 2021-09-29 DIAGNOSIS — G89.29 CHRONIC BACK PAIN, UNSPECIFIED BACK LOCATION, UNSPECIFIED BACK PAIN LATERALITY: ICD-10-CM

## 2021-09-29 DIAGNOSIS — M54.9 CHRONIC BACK PAIN, UNSPECIFIED BACK LOCATION, UNSPECIFIED BACK PAIN LATERALITY: ICD-10-CM

## 2021-09-29 DIAGNOSIS — M54.2 CERVICAL PAIN (NECK): Primary | ICD-10-CM

## 2021-09-29 DIAGNOSIS — M62.838 CERVICAL PARASPINAL MUSCLE SPASM: ICD-10-CM

## 2021-09-29 PROCEDURE — 20552 NJX 1/MLT TRIGGER POINT 1/2: CPT | Performed by: NURSE PRACTITIONER

## 2021-09-29 RX ORDER — LIDOCAINE HYDROCHLORIDE 10 MG/ML
20 INJECTION, SOLUTION INFILTRATION; PERINEURAL ONCE
Status: DISCONTINUED | OUTPATIENT
Start: 2021-09-29 | End: 2021-09-29

## 2021-09-29 RX ORDER — LIDOCAINE HYDROCHLORIDE 10 MG/ML
2 INJECTION, SOLUTION INFILTRATION; PERINEURAL ONCE
Status: COMPLETED | OUTPATIENT
Start: 2021-09-29 | End: 2021-09-29

## 2021-09-29 RX ORDER — TRIAMCINOLONE ACETONIDE 40 MG/ML
40 INJECTION, SUSPENSION INTRA-ARTICULAR; INTRAMUSCULAR ONCE
Status: COMPLETED | OUTPATIENT
Start: 2021-09-29 | End: 2021-09-29

## 2021-09-29 RX ADMIN — LIDOCAINE HYDROCHLORIDE 2 ML: 10 INJECTION, SOLUTION INFILTRATION; PERINEURAL at 16:58

## 2021-09-29 RX ADMIN — TRIAMCINOLONE ACETONIDE 40 MG: 40 INJECTION, SUSPENSION INTRA-ARTICULAR; INTRAMUSCULAR at 16:56

## 2021-09-29 ASSESSMENT — ENCOUNTER SYMPTOMS
BACK PAIN: 0
TROUBLE SWALLOWING: 0
WHEEZING: 0
BLOOD IN STOOL: 0
NAUSEA: 0
CONSTIPATION: 0
DIARRHEA: 0
COUGH: 0
SHORTNESS OF BREATH: 0
ABDOMINAL PAIN: 0
VOMITING: 0
VOICE CHANGE: 0
CHEST TIGHTNESS: 0

## 2021-09-29 NOTE — PROGRESS NOTES
Dolores Greene (:  1975) is a 40 y.o. female,Established patient, here for evaluation of the following chief complaint(s):  Shoulder Pain (back of shoulder pain that radiates down arm and side of back on left side, gets numb and tingly, feels like her muscle is being stretched, hurts to move it certain ways )    10 min spent performing and discussing procedure risks, hazards and alternatives were discussed with the patient. Medications used: 1% lidocaine without epi  2   ml, mixed with 40 mg/ml of Kenalog    The area over the myofascial spasm was prepped in the usual sterile manner the needle was inserted into the affected area and steroid was injected. There were no complications during the procedure. Follow-up: The patient tolerated the procedure well without complications standard post procedure care was explained and return precautions given to the patient. ASSESSMENT/PLAN:  1. Cervical pain (neck)  -     triamcinolone acetonide (KENALOG-40) injection 40 mg; 40 mg, IntraMUSCular, ONCE, On 21 at 1715, For 1 dose  -     lidocaine 1 % injection 20 mL; 20 mL, IntraDERmal, ONCE, On 21 at 1715, For 1 dose  2. Chronic back pain, unspecified back location, unspecified back pain laterality  -     triamcinolone acetonide (KENALOG-40) injection 40 mg; 40 mg, IntraMUSCular, ONCE, On 21 at 1715, For 1 dose  -     lidocaine 1 % injection 20 mL; 20 mL, IntraDERmal, ONCE, On 21 at 1715, For 1 dose  3. Cervical paraspinal muscle spasm  -     triamcinolone acetonide (KENALOG-40) injection 40 mg; 40 mg, IntraMUSCular, ONCE, On 21 at 1715, For 1 dose  -     lidocaine 1 % injection 20 mL; 20 mL, IntraDERmal, ONCE, On 21 at 1715, For 1 dose      Return if symptoms worsen or fail to improve.          Subjective   SUBJECTIVE/OBJECTIVE:        LAST VISIT  Neck Pain       entire left side in pain, pt thinks possible pinched nerve since Thursday   Medication Refill      pt would like a smaller tube of Temovate--last tube cost was $30.00        Patient complains of sudden onset of neck pain that started on Thursday and has progressively gotten worse. She is unable to turn her head to drive. She has pain radiating down her left shoulder and left arm. The muscles feel very tight and then arm feels numb and tingly. She does not know of any mechanism of injury. She does lift boxes at work. Was given Prednisone and Flexiril and an X-Ray was ordered (Which was not done)      Review of Systems   Constitutional: Negative for activity change, chills, diaphoresis, fatigue, fever and unexpected weight change. HENT: Negative for trouble swallowing and voice change. Eyes: Negative for visual disturbance. Respiratory: Negative for cough, chest tightness, shortness of breath and wheezing. Cardiovascular: Negative for chest pain, palpitations and leg swelling. Gastrointestinal: Negative for abdominal pain, blood in stool, constipation, diarrhea, nausea and vomiting. Endocrine: Negative for polydipsia, polyphagia and polyuria. Genitourinary: Negative for dysuria, enuresis, frequency and hematuria. Musculoskeletal: Positive for arthralgias, myalgias, neck pain and neck stiffness. Negative for back pain, gait problem and joint swelling. Skin: Negative for rash. Neurological: Negative for dizziness, seizures, syncope, facial asymmetry, weakness, light-headedness, numbness and headaches. Hematological: Does not bruise/bleed easily. Psychiatric/Behavioral: Negative for behavioral problems, confusion, hallucinations and suicidal ideas. The patient is not nervous/anxious. Objective   Physical Exam  Constitutional:       Appearance: Normal appearance. She is normal weight. Cardiovascular:      Rate and Rhythm: Normal rate and regular rhythm. Pulmonary:      Effort: Pulmonary effort is normal.      Breath sounds: Normal breath sounds. No wheezing. Chest:      Chest wall: No tenderness. Abdominal:      General: Abdomen is flat. Bowel sounds are normal.      Palpations: Abdomen is soft. Musculoskeletal:         General: Tenderness present. No swelling or deformity. Cervical back: Normal range of motion and neck supple. Comments: CERVICAL SPINE :    Inspection/Palpation - No bony tenderness, no paraspinal tenderness, no palpable defect no step offs appreciated, otherwise normal alignment and posture    Range of Motion -   full range of motion  full ROM-moves slowly and ratchet like    Strength/Tone -   no atrophy, normal strength and tone; Non diagnostic due to pain      Diagnostic Tests -bilateral  equal     Neurological:      Mental Status: She is alert. On this date 9/29/2021 I have spent 30 minutes reviewing previous notes, test results and face to face with the patient discussing the diagnosis and importance of compliance with the treatment plan as well as documenting on the day of the visit. An electronic signature was used to authenticate this note.         --Andreia Solano, LYNSEY - CNP

## 2021-10-11 ENCOUNTER — OFFICE VISIT (OUTPATIENT)
Dept: FAMILY MEDICINE CLINIC | Age: 46
End: 2021-10-11
Payer: COMMERCIAL

## 2021-10-11 VITALS
RESPIRATION RATE: 18 BRPM | SYSTOLIC BLOOD PRESSURE: 122 MMHG | DIASTOLIC BLOOD PRESSURE: 78 MMHG | OXYGEN SATURATION: 99 % | WEIGHT: 259.2 LBS | HEART RATE: 102 BPM | TEMPERATURE: 97 F | HEIGHT: 68 IN | BODY MASS INDEX: 39.28 KG/M2

## 2021-10-11 DIAGNOSIS — M25.512 ACUTE PAIN OF LEFT SHOULDER: ICD-10-CM

## 2021-10-11 DIAGNOSIS — M54.12 CERVICAL NEUROPATHIC PAIN: Primary | ICD-10-CM

## 2021-10-11 DIAGNOSIS — M50.90 CERVICAL DISC DISEASE: ICD-10-CM

## 2021-10-11 PROCEDURE — 99213 OFFICE O/P EST LOW 20 MIN: CPT | Performed by: NURSE PRACTITIONER

## 2021-10-11 RX ORDER — CYCLOBENZAPRINE HCL 10 MG
10 TABLET ORAL 3 TIMES DAILY PRN
Qty: 30 TABLET | Refills: 0 | Status: SHIPPED | OUTPATIENT
Start: 2021-10-11 | End: 2021-10-21

## 2021-10-11 ASSESSMENT — ENCOUNTER SYMPTOMS
NAUSEA: 0
WHEEZING: 0
ABDOMINAL PAIN: 0
BLOOD IN STOOL: 0
SHORTNESS OF BREATH: 0
CHOKING: 0
SINUS PRESSURE: 0
EYE DISCHARGE: 0
EYE PAIN: 0
COLOR CHANGE: 0
COUGH: 0
RHINORRHEA: 0
APNEA: 0
SINUS PAIN: 0
CHEST TIGHTNESS: 0
DIARRHEA: 0
EYE ITCHING: 0
ABDOMINAL DISTENTION: 0
RECTAL PAIN: 0
BACK PAIN: 0
EYE REDNESS: 0
SORE THROAT: 0
TROUBLE SWALLOWING: 0
CONSTIPATION: 0
VOMITING: 0
VOICE CHANGE: 0
STRIDOR: 0
PHOTOPHOBIA: 0
FACIAL SWELLING: 0
ANAL BLEEDING: 0

## 2021-10-11 NOTE — PROGRESS NOTES
10/11/21  Shelby Camacho : 1975 Sex: female  Age: 40 y.o. Chief Complaint   Patient presents with    Shoulder Pain     pt here for left shoulder pain, fingers and toes are tingling & numb, pt was here last week shoulder is worse. Patient complains of severe left shoulder pain stating it starts in her neck and radiates into shoulder and down the arm to numbness in hand. She has been taking Aleve`without any relief. She has had an injection of steroids by Margie Muro last week. She has not had any relief from that either. She bas pain with ROM of neck and shoulder. Review of Systems   Constitutional: Negative for activity change, appetite change, chills, diaphoresis, fatigue, fever and unexpected weight change. HENT: Negative for congestion, dental problem, drooling, ear discharge, ear pain, facial swelling, hearing loss, mouth sores, nosebleeds, postnasal drip, rhinorrhea, sinus pressure, sinus pain, sneezing, sore throat, tinnitus, trouble swallowing and voice change. Eyes: Negative for photophobia, pain, discharge, redness, itching and visual disturbance. Respiratory: Negative for apnea, cough, choking, chest tightness, shortness of breath, wheezing and stridor. Cardiovascular: Negative for chest pain, palpitations and leg swelling. Gastrointestinal: Negative for abdominal distention, abdominal pain, anal bleeding, blood in stool, constipation, diarrhea, nausea, rectal pain and vomiting. Endocrine: Negative for cold intolerance, heat intolerance, polydipsia, polyphagia and polyuria. Genitourinary: Negative for decreased urine volume, difficulty urinating, dysuria, enuresis, flank pain, frequency, genital sores, hematuria and urgency. Musculoskeletal: Positive for arthralgias (left shoulder), neck pain and neck stiffness. Negative for back pain, gait problem, joint swelling and myalgias. Skin: Negative for color change, pallor, rash and wound.    Allergic/Immunologic: Negative for environmental allergies, food allergies and immunocompromised state. Neurological: Negative for dizziness, tremors, seizures, syncope, facial asymmetry, speech difficulty, weakness, light-headedness, numbness and headaches. Hematological: Negative for adenopathy. Does not bruise/bleed easily. Psychiatric/Behavioral: Negative for agitation, behavioral problems, confusion, decreased concentration, hallucinations, self-injury, sleep disturbance and suicidal ideas. The patient is nervous/anxious (in regards to pain). The patient is not hyperactive. Current Outpatient Medications:     cyclobenzaprine (FLEXERIL) 10 MG tablet, Take 1 tablet by mouth 3 times daily as needed for Muscle spasms, Disp: 30 tablet, Rfl: 0    OZEMPIC, 0.25 OR 0.5 MG/DOSE, 2 MG/1.5ML SOPN, inject 0.5 milligrams subcutaneously every week, Disp: , Rfl:     omeprazole (PRILOSEC) 20 MG delayed release capsule, TAKE 1 CAPSULE BY MOUTH EVERY DAY, Disp: 90 capsule, Rfl: 1    SUMAtriptan (IMITREX) 50 MG tablet, Take 1 tablet at onset of migraine and may repeat 4 hours later if no improvement, Disp: 9 tablet, Rfl: 1    venlafaxine (EFFEXOR XR) 150 MG extended release capsule, TAKE 1 CAPSULE BY MOUTH EVERY DAY, Disp: 90 capsule, Rfl: 1    clobetasol (TEMOVATE) 0.05 % ointment, Apply topically 2 times daily Apply topically 2 times daily. , Disp: 3 Tube, Rfl: 5    folic acid (FOLVITE) 1 MG tablet, TAKE 1 TABLET BY MOUTH EVERY DAY, Disp: , Rfl:     METHOTREXATE SODIUM IJ, Inject 20 mg as directed once a week, Disp: , Rfl:     ibuprofen (ADVIL;MOTRIN) 200 MG tablet, Take 200 mg by mouth every 6 hours as needed for Pain prn, Disp: , Rfl:     Multiple Vitamin (MULTIVITAMIN PO), Take 1 tablet by mouth 2 times daily.   , Disp: , Rfl:     metFORMIN (GLUCOPHAGE) 500 MG tablet, Take 1 tablet by mouth 2 times daily (Patient not taking: Reported on 9/27/2021), Disp: 180 tablet, Rfl: 1  No Known Allergies    Past Medical History: Diagnosis Date    Chronic back pain     Headache(784.0)     Hemorrhoids     Herpes genital     Hyperlipidemia     Irregular periods/menstrual cycles     Renal calculi     Varicose veins      Past Surgical History:   Procedure Laterality Date    SERENA-EN-Y GASTRIC BYPASS  5/31/11    Lap    TUBAL LIGATION  2001    UPPER GASTROINTESTINAL ENDOSCOPY  2011     Family History   Family history unknown: Yes     Social History     Socioeconomic History    Marital status:      Spouse name: Not on file    Number of children: Not on file    Years of education: Not on file    Highest education level: Not on file   Occupational History    Not on file   Tobacco Use    Smoking status: Never Smoker    Smokeless tobacco: Never Used   Vaping Use    Vaping Use: Never used   Substance and Sexual Activity    Alcohol use: No    Drug use: No    Sexual activity: Yes   Other Topics Concern    Not on file   Social History Narrative    Not on file     Social Determinants of Health     Financial Resource Strain: Low Risk     Difficulty of Paying Living Expenses: Not hard at all   Food Insecurity: No Food Insecurity    Worried About Running Out of Food in the Last Year: Never true    Angela of Food in the Last Year: Never true   Transportation Needs:     Lack of Transportation (Medical):      Lack of Transportation (Non-Medical):    Physical Activity:     Days of Exercise per Week:     Minutes of Exercise per Session:    Stress:     Feeling of Stress :    Social Connections:     Frequency of Communication with Friends and Family:     Frequency of Social Gatherings with Friends and Family:     Attends [employer] Services:     Active Member of Clubs or Organizations:     Attends Club or Organization Meetings:     Marital Status:    Intimate Partner Violence:     Fear of Current or Ex-Partner:     Emotionally Abused:     Physically Abused:     Sexually Abused:      Patient Active Problem List Diagnosis    Chronic back pain    Hyperlipidemia    Indigestion        Vitals:    10/11/21 1051   BP: 122/78   Pulse: 102   Resp: 18   Temp: 97 °F (36.1 °C)   TempSrc: Temporal   SpO2: 99%   Weight: 259 lb 3.2 oz (117.6 kg)   Height: 5' 8\" (1.727 m)       Physical Exam  Vitals and nursing note reviewed. Constitutional:       General: She is not in acute distress. Appearance: Normal appearance. She is normal weight. She is not ill-appearing, toxic-appearing or diaphoretic. HENT:      Head: Normocephalic and atraumatic. Right Ear: Tympanic membrane, ear canal and external ear normal. There is no impacted cerumen. Left Ear: Tympanic membrane, ear canal and external ear normal. There is no impacted cerumen. Nose: Nose normal. No congestion or rhinorrhea. Mouth/Throat:      Mouth: Mucous membranes are moist.      Pharynx: Oropharynx is clear. No oropharyngeal exudate or posterior oropharyngeal erythema. Eyes:      General: No scleral icterus. Right eye: No discharge. Left eye: No discharge. Extraocular Movements: Extraocular movements intact. Conjunctiva/sclera: Conjunctivae normal.      Pupils: Pupils are equal, round, and reactive to light. Neck:      Vascular: No carotid bruit. Cardiovascular:      Rate and Rhythm: Normal rate and regular rhythm. Pulses: Normal pulses. Heart sounds: Normal heart sounds. No murmur heard. No friction rub. No gallop. Pulmonary:      Effort: Pulmonary effort is normal. No respiratory distress. Breath sounds: No stridor. No wheezing, rhonchi or rales. Chest:      Chest wall: No tenderness. Abdominal:      General: Abdomen is flat. Bowel sounds are normal. There is no distension. Palpations: Abdomen is soft. There is no mass. Tenderness: There is no abdominal tenderness. There is no right CVA tenderness, left CVA tenderness, guarding or rebound. Hernia: No hernia is present. Musculoskeletal:         General: Tenderness present. No swelling, deformity or signs of injury. Cervical back: Normal range of motion and neck supple. No rigidity. No muscular tenderness. Right lower leg: No edema. Left lower leg: No edema. Comments: Decreased ROM of cervical spine to the left. States pain shoots down left arm when she looks to the left. Limited extension and abduction of left arm. Lymphadenopathy:      Cervical: No cervical adenopathy. Skin:     General: Skin is warm and dry. Capillary Refill: Capillary refill takes less than 2 seconds. Coloration: Skin is not jaundiced or pale. Findings: No bruising, erythema, lesion or rash. Neurological:      General: No focal deficit present. Mental Status: She is alert and oriented to person, place, and time. Mental status is at baseline. Cranial Nerves: No cranial nerve deficit. Sensory: No sensory deficit. Motor: No weakness. Coordination: Coordination normal.      Gait: Gait normal.      Deep Tendon Reflexes: Reflexes normal.   Psychiatric:         Thought Content: Thought content normal.         Judgment: Judgment normal.      Comments: Anxious about pain, irritable         Assessment and Plan:  Loyd Paniagua was seen today for shoulder pain. Diagnoses and all orders for this visit:    Cervical neuropathic pain  -     MRI CERVICAL SPINE WITH CONTRAST; Future    Acute pain of left shoulder    Other orders  -     cyclobenzaprine (FLEXERIL) 10 MG tablet; Take 1 tablet by mouth 3 times daily as needed for Muscle spasms        Discussions/Education provided to patients during visit:  [] Discussed the importance to stop smoking. [] Advised to monitor eating habits. [] Reviewed and discussed Imaging results. [] Reviewed and discussed Lab results. [] Discussed the importance of drinking plenty of fluids. [] Cut down on Salt and Caffeine.  [] Exercise 2-3 times weekly, if not more.   [] Cut down

## 2021-10-12 ENCOUNTER — OFFICE VISIT (OUTPATIENT)
Dept: FAMILY MEDICINE CLINIC | Age: 46
End: 2021-10-12
Payer: COMMERCIAL

## 2021-10-12 VITALS
SYSTOLIC BLOOD PRESSURE: 136 MMHG | WEIGHT: 258.9 LBS | TEMPERATURE: 97.7 F | BODY MASS INDEX: 37.06 KG/M2 | DIASTOLIC BLOOD PRESSURE: 82 MMHG | HEART RATE: 106 BPM | OXYGEN SATURATION: 98 % | HEIGHT: 70 IN

## 2021-10-12 DIAGNOSIS — M54.12 CERVICAL NEUROPATHIC PAIN: Primary | ICD-10-CM

## 2021-10-12 DIAGNOSIS — M54.2 CERVICAL PAIN (NECK): ICD-10-CM

## 2021-10-12 DIAGNOSIS — M50.90 CERVICAL DISC DISEASE: ICD-10-CM

## 2021-10-12 DIAGNOSIS — M25.512 ACUTE PAIN OF LEFT SHOULDER: ICD-10-CM

## 2021-10-12 DIAGNOSIS — E04.1 THYROID NODULE: ICD-10-CM

## 2021-10-12 DIAGNOSIS — E04.1 NODULE OF LEFT LOBE OF THYROID GLAND: ICD-10-CM

## 2021-10-12 PROCEDURE — 99214 OFFICE O/P EST MOD 30 MIN: CPT | Performed by: NURSE PRACTITIONER

## 2021-10-12 RX ORDER — HYDROCODONE BITARTRATE AND ACETAMINOPHEN 5; 325 MG/1; MG/1
1 TABLET ORAL EVERY 4 HOURS PRN
Qty: 42 TABLET | Refills: 0 | Status: SHIPPED | OUTPATIENT
Start: 2021-10-12 | End: 2021-10-19

## 2021-10-12 RX ORDER — TIZANIDINE 4 MG/1
4 TABLET ORAL 3 TIMES DAILY
Qty: 42 TABLET | Refills: 0 | Status: SHIPPED
Start: 2021-10-12 | End: 2021-11-02 | Stop reason: SDUPTHER

## 2021-10-12 ASSESSMENT — ENCOUNTER SYMPTOMS
COUGH: 0
VOMITING: 0
NAUSEA: 0
CONSTIPATION: 0
ABDOMINAL PAIN: 0
SHORTNESS OF BREATH: 0
WHEEZING: 0
TROUBLE SWALLOWING: 0
DIARRHEA: 0
CHEST TIGHTNESS: 0
BACK PAIN: 0
VOICE CHANGE: 0
BLOOD IN STOOL: 0

## 2021-10-12 NOTE — PROGRESS NOTES
Jolly Mejia (:  1975) is a 40 y.o. female,Established patient, here for evaluation of the following chief complaint(s):  Discuss Labs (Discuss MRI )       ASSESSMENT/PLAN:  1. Cervical neuropathic pain  -     HYDROcodone-acetaminophen (NORCO) 5-325 MG per tablet; Take 1 tablet by mouth every 4 hours as needed for Pain for up to 7 days. Intended supply: 7 days. Take lowest dose possible to manage pain, Disp-42 tablet, R-0Normal  2. Acute pain of left shoulder  -     HYDROcodone-acetaminophen (NORCO) 5-325 MG per tablet; Take 1 tablet by mouth every 4 hours as needed for Pain for up to 7 days. Intended supply: 7 days. Take lowest dose possible to manage pain, Disp-42 tablet, R-0Normal  3. Cervical disc disease  -     HYDROcodone-acetaminophen (NORCO) 5-325 MG per tablet; Take 1 tablet by mouth every 4 hours as needed for Pain for up to 7 days. Intended supply: 7 days. Take lowest dose possible to manage pain, Disp-42 tablet, R-0Normal  4. Cervical pain (neck)  -     HYDROcodone-acetaminophen (NORCO) 5-325 MG per tablet; Take 1 tablet by mouth every 4 hours as needed for Pain for up to 7 days. Intended supply: 7 days. Take lowest dose possible to manage pain, Disp-42 tablet, R-0Normal  5. Thyroid nodule  6. Nodule of left lobe of thyroid gland  Comments:  4 mm       Return in about 6 months (around 2022). Subjective   SUBJECTIVE/OBJECTIVE:  This is a very pleasant 67-year-old white female who has had neck pain and has alternated between me and Omero Kovacs for care. She has not responded to conventional treatment MRI shows some left-sided issues but it has an incidental finding of a 4 mm thyroid node in the left side. I have no documentation of any prior studies reflecting thyroid nodules in the past.  All her thyroid studies have been within normal limits.   Meggan has already done a referral to neurosurgeon for the neck and patient is here to discuss the thyroid nodule      LAST VISIT  Was given a trigger point injection into the region with no real results patient returned saw Meggan who ordered an MRI     Neck Pain       entire left side in pain, pt thinks possible pinched nerve since Thursday   Medication Refill      pt would like a smaller tube of Temovate--last tube cost was $30.00        Patient complains of sudden onset of neck pain that started on Thursday and has progressively gotten worse. She is unable to turn her head to drive. She has pain radiating down her left shoulder and left arm. The muscles feel very tight and then arm feels numb and tingly. She does not know of any mechanism of injury. She does lift boxes at work. Was given Prednisone and Flexiril and an X-Ray was ordered (Which was not done)      Review of Systems   Constitutional: Negative for activity change, chills, diaphoresis, fatigue, fever and unexpected weight change. HENT: Negative for trouble swallowing and voice change. Eyes: Negative for visual disturbance. Respiratory: Negative for cough, chest tightness, shortness of breath and wheezing. Cardiovascular: Negative for chest pain, palpitations and leg swelling. Gastrointestinal: Negative for abdominal pain, blood in stool, constipation, diarrhea, nausea and vomiting. Endocrine: Negative for polydipsia, polyphagia and polyuria. Genitourinary: Negative for dysuria, enuresis, frequency and hematuria. Musculoskeletal: Positive for arthralgias, myalgias, neck pain and neck stiffness. Negative for back pain, gait problem and joint swelling. Skin: Negative for rash. Neurological: Negative for dizziness, seizures, syncope, facial asymmetry, weakness, light-headedness, numbness and headaches. Hematological: Does not bruise/bleed easily. Psychiatric/Behavioral: Negative for behavioral problems, confusion, hallucinations and suicidal ideas. The patient is not nervous/anxious.            Objective   Physical Exam  Constitutional:       Appearance: Normal appearance. She is normal weight. Cardiovascular:      Rate and Rhythm: Normal rate and regular rhythm. Pulmonary:      Effort: Pulmonary effort is normal.      Breath sounds: Normal breath sounds. No wheezing. Chest:      Chest wall: No tenderness. Abdominal:      General: Abdomen is flat. Bowel sounds are normal.      Palpations: Abdomen is soft. Musculoskeletal:         General: Tenderness present. No swelling or deformity. Cervical back: Normal range of motion and neck supple. Comments: CERVICAL SPINE :    Inspection/Palpation - No bony tenderness, no paraspinal tenderness, no palpable defect no step offs appreciated, otherwise normal alignment and posture    Range of Motion -   full range of motion  full ROM-moves slowly and ratchet like    Strength/Tone -   no atrophy, normal strength and tone; Non diagnostic due to pain      Diagnostic Tests -bilateral  equal     Neurological:      Mental Status: She is alert. On this date 9/29/2021 I have spent 30 minutes reviewing previous notes, test results and face to face with the patient discussing the diagnosis and importance of compliance with the treatment plan as well as documenting on the day of the visit. An electronic signature was used to authenticate this note.         --LYNSEY Soriano - CNP

## 2021-10-12 NOTE — PATIENT INSTRUCTIONS
Patient Education        Neck Pain: Care Instructions  Your Care Instructions     You can have neck pain anywhere from the bottom of your head to the top of your shoulders. It can spread to the upper back or arms. Injuries, painting a ceiling, sleeping with your neck twisted, staying in one position for too long, and many other activities can cause neck pain. Most neck pain gets better with home care. Your doctor may recommend medicine to relieve pain or relax your muscles. He or she may suggest exercise and physical therapy to increase flexibility and relieve stress. You may need to wear a special (cervical) collar to support your neck for a day or two. Follow-up care is a key part of your treatment and safety. Be sure to make and go to all appointments, and call your doctor if you are having problems. It's also a good idea to know your test results and keep a list of the medicines you take. How can you care for yourself at home? · Try using a heating pad on a low or medium setting for 15 to 20 minutes every 2 or 3 hours. Try a warm shower in place of one session with the heating pad. · You can also try an ice pack for 10 to 15 minutes every 2 to 3 hours. Put a thin cloth between the ice and your skin. · Take pain medicines exactly as directed. ? If the doctor gave you a prescription medicine for pain, take it as prescribed. ? If you are not taking a prescription pain medicine, ask your doctor if you can take an over-the-counter medicine. · If your doctor recommends a cervical collar, wear it exactly as directed. When should you call for help? Call your doctor now or seek immediate medical care if:    · You have new or worsening numbness in your arms, buttocks or legs.     · You have new or worsening weakness in your arms or legs. (This could make it hard to stand up.)     · You lose control of your bladder or bowels.    Watch closely for changes in your health, and be sure to contact your doctor if:    · Your neck pain is getting worse.     · You are not getting better after 1 week.     · You do not get better as expected. Where can you learn more? Go to https://chpewei.FINXI. org and sign in to your PeerIndex account. Enter [id number] in the [facility] box to learn more about \"Neck Pain: Care Instructions. \"     If you do not have an account, please click on the \"Sign Up Now\" link. Current as of: July 1, 2021               Content Version: 13.0  © 2006-2021 [employer]. Care instructions adapted under license by [facility] ([facility]). If you have questions about a medical condition or this instruction, always ask your healthcare professional. [address] any warranty or liability for your use of this information. Patient Education        Pinched Nerve in the Neck: Care Instructions  Your Care Instructions  A pinched nerve in the neck happens when a vertebra or disc in the upper part of your spine is damaged. This damage can happen because of an injury. Or it can just happen with age. The changes caused by the damage may put pressure on a nearby nerve root, pinching it. This causes symptoms such as sharp pain in your neck, shoulder, arm, hand, or back. You may also have tingling or numbness. Sometimes it makes your arm weaker. The symptoms are usually worse when you turn your head or strain your neck. For many people, the symptoms get better over time and finally go away. Early treatment usually includes medicines for pain and swelling. Sometimes physical therapy and special exercises may help. Follow-up care is a key part of your treatment and safety. Be sure to make and go to all appointments, and call your doctor if you are having problems. It's also a good idea to know your test results and keep a list of the medicines you take. How can you care for yourself at home? · Be safe with medicines.  Read and follow all instructions on the label.  ? If the doctor gave you a prescription medicine for pain, take it as prescribed. ? If you are not taking a prescription pain medicine, ask your doctor if you can take an over-the-counter medicine. · Try using a heating pad on a low or medium setting for 15 to 20 minutes every 2 or 3 hours. Try a warm shower in place of one session with the heating pad. You can also buy single-use heat wraps that last up to 8 hours. · You can also try an ice pack for 10 to 15 minutes every 2 to 3 hours. There isn't strong evidence that either heat or ice will help. But you can try them to see if they help you. · Don't spend too long in one position. Take short breaks to move around and change positions. · Wear a seat belt and shoulder harness when you are in a car. · Sleep with a pillow under your head and neck that keeps your neck straight. · If you were given a neck brace (cervical collar) to limit neck motion, wear it as instructed for as many days as your doctor tells you to. Do not wear it longer than you were told to. Wearing a brace for too long can lead to neck stiffness and can weaken the neck muscles. · Follow your doctor's instructions for gentle neck-stretching exercises. · Do not smoke. Smoking can slow healing of your discs. If you need help quitting, talk to your doctor about stop-smoking programs and medicines. These can increase your chances of quitting for good. · Avoid strenuous work or exercise until your doctor says it is okay. When should you call for help? Call 911 anytime you think you may need emergency care. For example, call if:    · You are unable to move an arm or a leg at all. Call your doctor now or seek immediate medical care if:    · You have new or worse symptoms in your arms, legs, chest, belly, or buttocks. Symptoms may include:  ? Numbness or tingling. ? Weakness. ? Pain.     · You lose bladder or bowel control.    Watch closely for changes in your health, and be sure to contact your doctor if:    · You are not getting better as expected. Where can you learn more? Go to https://chpepiceweb.mobME Solutions. org and sign in to your Novopyxis account. Enter U347 in the luxustravel.es box to learn more about \"Pinched Nerve in the Neck: Care Instructions. \"     If you do not have an account, please click on the \"Sign Up Now\" link. Current as of: July 1, 2021               Content Version: 13.0  © 2006-2021 [employer]. Care instructions adapted under license by [facility] ([facility]). If you have questions about a medical condition or this instruction, always ask your healthcare professional. [address] any warranty or liability for your use of this information. Patient Education        Neck: Exercises  Introduction  Here are some examples of exercises for you to try. The exercises may be suggested for a condition or for rehabilitation. Start each exercise slowly. Ease off the exercises if you start to have pain. You will be told when to start these exercises and which ones will work best for you. How to do the exercises  Neck stretch    1. This stretch works best if you keep your shoulder down as you lean away from it. To help you remember to do this, start by relaxing your shoulders and lightly holding on to your thighs or your chair. 2. Tilt your head toward your shoulder and hold for 15 to 30 seconds. Let the weight of your head stretch your muscles. 3. If you would like a little added stretch, use your hand to gently and steadily pull your head toward your shoulder. For example, keeping your right shoulder down, lean your head to the left. 4. Repeat 2 to 4 times toward each shoulder. Diagonal neck stretch    1. Turn your head slightly toward the direction you will be stretching, and tilt your head diagonally toward your chest and hold for 15 to 30 seconds.   2. If you would like a little added stretch, use your hand to gently and steadily pull your head forward on the diagonal.  3. Repeat 2 to 4 times toward each side. Dorsal glide stretch    The dorsal glide stretches the back of the neck. If you feel pain, do not glide so far back. Some people find this exercise easier to do while lying on their backs with an ice pack on the neck. 1. Sit or stand tall and look straight ahead. 2. Slowly tuck your chin as you glide your head backward over your body  3. Hold for a count of 6, and then relax for up to 10 seconds. 4. Repeat 8 to 12 times. Chest and shoulder stretch    1. Sit or stand tall and glide your head backward as in the dorsal glide stretch. 2. Raise both arms so that your hands are next to your ears. 3. Take a deep breath, and as you breathe out, lower your elbows down and behind your back. You will feel your shoulder blades slide down and together, and at the same time you will feel a stretch across your chest and the front of your shoulders. 4. Hold for about 6 seconds, and then relax for up to 10 seconds. 5. Repeat 8 to 12 times. Strengthening: Hands on head    1. Move your head backward, forward, and side to side against gentle pressure from your hands, holding each position for about 6 seconds. 2. Repeat 8 to 12 times. Follow-up care is a key part of your treatment and safety. Be sure to make and go to all appointments, and call your doctor if you are having problems. It's also a good idea to know your test results and keep a list of the medicines you take. Where can you learn more? Go to https://ODEGARD Media Groupkati.FashionAde.com (Abund[employer]oset). org and sign in to your Cokonnect account. Enter P975 in the [facility] box to learn more about \"Neck: Exercises. \"     If you do not have an account, please click on the \"Sign Up Now\" link. Current as of: July 1, 2021               Content Version: 13.0  © 1275-2560 Healthwise, Incorporated. Care instructions adapted under license by [facility] ([facility]).  If you have questions about a it hard to do your normal activities, surgery is an option when other treatments haven't helped. · Over the long term, surgery and nonsurgical treatments work about the same to reduce pain and other symptoms. · Back surgery has some risks, including infection, nerve damage, and the chance that the surgery won't relieve your symptoms. And even if you get better with surgery, there is a chance that you may get new symptoms in the future. · If you don't choose surgery now, you can change your mind later if your symptoms haven't gotten better or have gotten worse even with other treatments. Why might you choose surgery? · Surgery works well for many people who have sciatica from a herniated disc. · Surgery offers faster pain relief than other treatment. · You have tried exercises and medicines for a few months. You do not think they have helped you. · You feel okay about the idea of having back surgery. Why might you choose not to have surgery? · Treatments without surgery work for most people. · In the long term, people who do not have surgery are just about as likely to be able to do their daily activities as people who do have surgery. · You do not like the thought of surgery. · Your pain is not bad enough that you need to have surgery right now. Your decision  Thinking about the facts and your feelings can help you make a decision that is right for you. Be sure you understand the benefits and risks of your options, and think about what else you need to do before you make the decision. Where can you learn more? Go to https://Dr. Scribbleskati.Logopro. org and sign in to your Agari account. Enter G253 in the Onfido box to learn more about \"Deciding About Surgery for a Herniated Disc. \"     If you do not have an account, please click on the \"Sign Up Now\" link. Current as of: July 1, 2021               Content Version: 13.0  © 6402-1464 Healthwise, Incorporated.    Care instructions adapted under license by [facility] ([facility]). If you have questions about a medical condition or this instruction, always ask your healthcare professional. [address] any warranty or liability for your use of this information.

## 2021-10-13 ENCOUNTER — TELEPHONE (OUTPATIENT)
Dept: FAMILY MEDICINE CLINIC | Age: 46
End: 2021-10-13

## 2021-10-13 DIAGNOSIS — M54.12 CERVICAL NEUROPATHIC PAIN: Primary | ICD-10-CM

## 2021-10-13 DIAGNOSIS — M25.512 ACUTE PAIN OF LEFT SHOULDER: ICD-10-CM

## 2021-10-13 DIAGNOSIS — M50.90 CERVICAL DISC DISEASE: ICD-10-CM

## 2021-10-13 DIAGNOSIS — M54.2 CERVICAL PAIN (NECK): ICD-10-CM

## 2021-10-13 NOTE — TELEPHONE ENCOUNTER
I called her and gave her Dr. Alice Braswell number and the soonest they can get her in for the consult is 11/18/2021? What should she do?

## 2021-10-13 NOTE — TELEPHONE ENCOUNTER
Get her self on the emergency call us and use of medications that I gave her to get her through till that date

## 2021-10-13 NOTE — TELEPHONE ENCOUNTER
The specialist Meggan referred her to is not in that practice anymore, and besides that they are [place] and she does not want to go that far. Can you refer her to someone in [place] please? Thanks.

## 2021-10-14 RX ORDER — FLUCONAZOLE 150 MG/1
150 TABLET ORAL DAILY
Qty: 3 TABLET | Refills: 0 | Status: SHIPPED | OUTPATIENT
Start: 2021-10-14 | End: 2021-10-17

## 2021-10-14 NOTE — TELEPHONE ENCOUNTER
Per patient she has vaginal yeast infection due to taking a lot of medications lately, would like diflucan.

## 2021-10-21 ENCOUNTER — TELEPHONE (OUTPATIENT)
Dept: FAMILY MEDICINE CLINIC | Age: 46
End: 2021-10-21

## 2021-10-21 ENCOUNTER — OFFICE VISIT (OUTPATIENT)
Dept: FAMILY MEDICINE CLINIC | Age: 46
End: 2021-10-21
Payer: COMMERCIAL

## 2021-10-21 VITALS
WEIGHT: 257 LBS | DIASTOLIC BLOOD PRESSURE: 90 MMHG | HEIGHT: 70 IN | RESPIRATION RATE: 18 BRPM | TEMPERATURE: 97.4 F | HEART RATE: 100 BPM | SYSTOLIC BLOOD PRESSURE: 142 MMHG | OXYGEN SATURATION: 98 % | BODY MASS INDEX: 36.79 KG/M2

## 2021-10-21 DIAGNOSIS — M50.90 CERVICAL DISC DISEASE: ICD-10-CM

## 2021-10-21 DIAGNOSIS — M54.2 CERVICAL PAIN (NECK): ICD-10-CM

## 2021-10-21 DIAGNOSIS — M25.512 ACUTE PAIN OF LEFT SHOULDER: Primary | ICD-10-CM

## 2021-10-21 DIAGNOSIS — M54.12 CERVICAL NEUROPATHIC PAIN: ICD-10-CM

## 2021-10-21 PROCEDURE — 99213 OFFICE O/P EST LOW 20 MIN: CPT | Performed by: NURSE PRACTITIONER

## 2021-10-21 RX ORDER — OXYCODONE HYDROCHLORIDE AND ACETAMINOPHEN 5; 325 MG/1; MG/1
1 TABLET ORAL EVERY 6 HOURS PRN
Qty: 28 TABLET | Refills: 0 | Status: SHIPPED
Start: 2021-10-21 | End: 2021-11-02 | Stop reason: SDUPTHER

## 2021-10-21 RX ORDER — GABAPENTIN 100 MG/1
100 CAPSULE ORAL 3 TIMES DAILY
Qty: 90 CAPSULE | Refills: 0 | Status: SHIPPED
Start: 2021-10-21 | End: 2021-11-02

## 2021-10-21 ASSESSMENT — ENCOUNTER SYMPTOMS
CONSTIPATION: 0
SHORTNESS OF BREATH: 0
NAUSEA: 0
DIARRHEA: 0
WHEEZING: 0
ABDOMINAL PAIN: 0
VOICE CHANGE: 0
BLOOD IN STOOL: 0
BACK PAIN: 0
TROUBLE SWALLOWING: 0
CHEST TIGHTNESS: 0
VOMITING: 0
COUGH: 0

## 2021-10-21 NOTE — TELEPHONE ENCOUNTER
Pt. States Tylenol #3 is not helping her neck pain. Could you try something else for her? Pharnacy is [facility].    She doesn't see surgeon for another couple weeks.

## 2021-10-21 NOTE — TELEPHONE ENCOUNTER
So I told her that I could write her for 1 week at a time without seeing her.   I wrote her an increased amount of Tylenol 3 so that it would last her until her appointment if that is not helping her she needs to come back in with the bottle and I will write her something else

## 2021-10-21 NOTE — PATIENT INSTRUCTIONS
Patient Education        Pinched Nerve in the Neck: Care Instructions  Your Care Instructions  A pinched nerve in the neck happens when a vertebra or disc in the upper part of your spine is damaged. This damage can happen because of an injury. Or it can just happen with age. The changes caused by the damage may put pressure on a nearby nerve root, pinching it. This causes symptoms such as sharp pain in your neck, shoulder, arm, hand, or back. You may also have tingling or numbness. Sometimes it makes your arm weaker. The symptoms are usually worse when you turn your head or strain your neck. For many people, the symptoms get better over time and finally go away. Early treatment usually includes medicines for pain and swelling. Sometimes physical therapy and special exercises may help. Follow-up care is a key part of your treatment and safety. Be sure to make and go to all appointments, and call your doctor if you are having problems. It's also a good idea to know your test results and keep a list of the medicines you take. How can you care for yourself at home? · Be safe with medicines. Read and follow all instructions on the label. ? If the doctor gave you a prescription medicine for pain, take it as prescribed. ? If you are not taking a prescription pain medicine, ask your doctor if you can take an over-the-counter medicine. · Try using a heating pad on a low or medium setting for 15 to 20 minutes every 2 or 3 hours. Try a warm shower in place of one session with the heating pad. You can also buy single-use heat wraps that last up to 8 hours. · You can also try an ice pack for 10 to 15 minutes every 2 to 3 hours. There isn't strong evidence that either heat or ice will help. But you can try them to see if they help you. · Don't spend too long in one position. Take short breaks to move around and change positions. · Wear a seat belt and shoulder harness when you are in a car.   · Sleep with a pillow Instructions  Your Care Instructions     You can hurt your shoulder by using it too much during an activity, such as fishing or baseball. It can also happen as part of the everyday wear and tear of getting older. Shoulder injuries can be slow to heal, but your shoulder should get better with time. Your doctor may recommend a sling to rest your shoulder. If you have injured your shoulder, you may need testing and treatment. Follow-up care is a key part of your treatment and safety. Be sure to make and go to all appointments, and call your doctor if you are having problems. It's also a good idea to know your test results and keep a list of the medicines you take. How can you care for yourself at home? · Take pain medicines exactly as directed. ? If the doctor gave you a prescription medicine for pain, take it as prescribed. ? If you are not taking a prescription pain medicine, ask your doctor if you can take an over-the-counter medicine. ? Do not take two or more pain medicines at the same time unless the doctor told you to. Many pain medicines contain acetaminophen, which is Tylenol. Too much acetaminophen (Tylenol) can be harmful. · If your doctor recommends that you wear a sling, use it as directed. Do not take it off before your doctor tells you to. · Put ice or a cold pack on the sore area for 10 to 20 minutes at a time. Put a thin cloth between the ice and your skin. · If there is no swelling, you can put moist heat, a heating pad, or a warm cloth on your shoulder. Some doctors suggest alternating between hot and cold. · Rest your shoulder for a few days. If your doctor recommends it, you can then begin gentle exercise of the shoulder, but do not lift anything heavy. When should you call for help? Call 911 anytime you think you may need emergency care. For example, call if:    · You have chest pain or pressure. This may occur with:  ? Sweating. ? Shortness of breath. ? Nausea or vomiting.   ? Pain that spreads from the chest to the neck, jaw, or one or both shoulders or arms. ? Dizziness or lightheadedness. ? A fast or uneven pulse. After calling 911, chew 1 adult-strength aspirin. Wait for an ambulance. Do not try to drive yourself.     · Your arm or hand is cool or pale or changes color. Call your doctor now or seek immediate medical care if:    · You have signs of infection, such as:  ? Increased pain, swelling, warmth, or redness in your shoulder. ? Red streaks leading from a place on your shoulder. ? Pus draining from an area of your shoulder. ? Swollen lymph nodes in your neck, armpits, or groin. ? A fever. Watch closely for changes in your health, and be sure to contact your doctor if:    · You cannot use your shoulder.     · Your shoulder does not get better as expected. Where can you learn more? Go to https://ToolWire.Interactive Project. org and sign in to your SoftArt account. Enter X707 in the Trovali box to learn more about \"Shoulder Pain: Care Instructions. \"     If you do not have an account, please click on the \"Sign Up Now\" link. Current as of: July 1, 2021               Content Version: 13.0  © 2006-2021 Healthwise, Incorporated. Care instructions adapted under license by [facility] ([facility]). If you have questions about a medical condition or this instruction, always ask your healthcare professional. [place] [address] any warranty or liability for your use of this information.

## 2021-10-21 NOTE — PROGRESS NOTES
Nga Hunt (:  1975) is a 40 y.o. female,Established patient, here for evaluation of the following chief complaint(s):  Discuss Medications (pn meds not working )       ASSESSMENT/PLAN:  1. Acute pain of left shoulder  -     oxyCODONE-acetaminophen (PERCOCET) 5-325 MG per tablet; Take 1 tablet by mouth every 6 hours as needed for Pain for up to 7 days. Intended supply: 7 days. Take lowest dose possible to manage pain, Disp-28 tablet, R-0Normal  2. Cervical neuropathic pain  -     oxyCODONE-acetaminophen (PERCOCET) 5-325 MG per tablet; Take 1 tablet by mouth every 6 hours as needed for Pain for up to 7 days. Intended supply: 7 days. Take lowest dose possible to manage pain, Disp-28 tablet, R-0Normal  3. Cervical disc disease  4. Cervical pain (neck)      No follow-ups on file. Subjective   SUBJECTIVE/OBJECTIVE:  Patient is very tearful here in the office she states that she feels like I do not believe her and that she is a bother every time she comes in and therefore is the reason why she did not call me earlier to tell me that the pain medication was not helping with the pain. I explained to her that we can try to change the pain medication but it would have required her to come in because of the legislative stance on narcotics and acute injuries. She states that the pain medication that I wrote her was only helping with the pain for only a Half hour to maybe an hour  Is not getting relief with the meds is not slated to get in with the specialist 2021  Return with 13 pills still in the bottle      This is a very pleasant 28-year-old white female who has had neck pain and [facility]. She has not responded to conventional treatment MRI shows some left-sided issues but it has an incidental finding of a 4 mm thyroid node in the left side.   I have no documentation of any prior studies reflecting thyroid nodules in the past.  All her thyroid studies have been within normal limits. Meggan has already done a referral to neurosurgeon for the neck and patient is here to discuss the thyroid nodule      LAST VISIT  Was given a trigger point injection into the region with no real results patient returned saw Meggan who ordered an MRI     Neck Pain       entire left side in pain, pt thinks possible pinched nerve since Thursday   Medication Refill      pt would like a smaller tube of Temovate--last tube cost was $30.00        Patient complains of sudden onset of neck pain that started on Thursday and has progressively gotten worse. She is unable to turn her head to drive. She has pain radiating down her left shoulder and left arm. The muscles feel very tight and then arm feels numb and tingly. She does not know of any mechanism of injury. She does lift boxes at work. Was given Prednisone and Flexiril and an X-Ray was ordered (Which was not done)      Review of Systems   Constitutional: Negative for activity change, chills, diaphoresis, fatigue, fever and unexpected weight change. HENT: Negative for trouble swallowing and voice change. Eyes: Negative for visual disturbance. Respiratory: Negative for cough, chest tightness, shortness of breath and wheezing. Cardiovascular: Negative for chest pain, palpitations and leg swelling. Gastrointestinal: Negative for abdominal pain, blood in stool, constipation, diarrhea, nausea and vomiting. Endocrine: Negative for polydipsia, polyphagia and polyuria. Genitourinary: Negative for dysuria, enuresis, frequency and hematuria. Musculoskeletal: Positive for arthralgias, myalgias, neck pain and neck stiffness. Negative for back pain, gait problem and joint swelling. Skin: Negative for rash. Neurological: Negative for dizziness, seizures, syncope, facial asymmetry, weakness, light-headedness, numbness and headaches. Hematological: Does not bruise/bleed easily.    Psychiatric/Behavioral: Negative for behavioral problems, confusion, hallucinations and suicidal ideas. The patient is not nervous/anxious. Objective   Physical Exam  Constitutional:       Appearance: Normal appearance. She is normal weight. Cardiovascular:      Rate and Rhythm: Normal rate and regular rhythm. Pulmonary:      Effort: Pulmonary effort is normal.      Breath sounds: Normal breath sounds. No wheezing. Chest:      Chest wall: No tenderness. Abdominal:      General: Abdomen is flat. Bowel sounds are normal.      Palpations: Abdomen is soft. Musculoskeletal:         General: Tenderness present. No swelling or deformity. Cervical back: Normal range of motion and neck supple. Comments: CERVICAL SPINE :    Inspection/Palpation - No bony tenderness, no paraspinal tenderness, no palpable defect no step offs appreciated, otherwise normal alignment and posture    Range of Motion -   full range of motion  full ROM-moves slowly and ratchet like    Strength/Tone -   no atrophy, normal strength and tone; Non diagnostic due to pain      Diagnostic Tests -bilateral  equal     Neurological:      Mental Status: She is alert. On this date 9/29/2021 I have spent 30 minutes reviewing previous notes, test results and face to face with the patient discussing the diagnosis and importance of compliance with the treatment plan as well as documenting on the day of the visit. An electronic signature was used to authenticate this note.         --LYNSEY Carranza - CNP

## 2021-11-02 ENCOUNTER — OFFICE VISIT (OUTPATIENT)
Dept: FAMILY MEDICINE CLINIC | Age: 46
End: 2021-11-02
Payer: COMMERCIAL

## 2021-11-02 VITALS
HEIGHT: 70 IN | TEMPERATURE: 97.5 F | RESPIRATION RATE: 20 BRPM | WEIGHT: 256 LBS | SYSTOLIC BLOOD PRESSURE: 138 MMHG | DIASTOLIC BLOOD PRESSURE: 88 MMHG | BODY MASS INDEX: 36.65 KG/M2

## 2021-11-02 DIAGNOSIS — M54.9 CHRONIC BACK PAIN, UNSPECIFIED BACK LOCATION, UNSPECIFIED BACK PAIN LATERALITY: ICD-10-CM

## 2021-11-02 DIAGNOSIS — M54.2 CERVICAL PAIN (NECK): ICD-10-CM

## 2021-11-02 DIAGNOSIS — M54.12 CERVICAL NEUROPATHIC PAIN: ICD-10-CM

## 2021-11-02 DIAGNOSIS — G89.29 CHRONIC BACK PAIN, UNSPECIFIED BACK LOCATION, UNSPECIFIED BACK PAIN LATERALITY: ICD-10-CM

## 2021-11-02 DIAGNOSIS — M25.512 ACUTE PAIN OF LEFT SHOULDER: ICD-10-CM

## 2021-11-02 DIAGNOSIS — M50.90 CERVICAL DISC DISEASE: Primary | ICD-10-CM

## 2021-11-02 PROCEDURE — 99213 OFFICE O/P EST LOW 20 MIN: CPT | Performed by: NURSE PRACTITIONER

## 2021-11-02 RX ORDER — VENLAFAXINE HYDROCHLORIDE 150 MG/1
150 CAPSULE, EXTENDED RELEASE ORAL DAILY
Qty: 90 CAPSULE | Refills: 1 | Status: SHIPPED
Start: 2021-11-02 | End: 2022-04-06

## 2021-11-02 RX ORDER — OXYCODONE HYDROCHLORIDE AND ACETAMINOPHEN 5; 325 MG/1; MG/1
1 TABLET ORAL EVERY 4 HOURS PRN
Qty: 42 TABLET | Refills: 0 | Status: SHIPPED | OUTPATIENT
Start: 2021-11-02 | End: 2021-11-09

## 2021-11-02 RX ORDER — SUMATRIPTAN 50 MG/1
TABLET, FILM COATED ORAL
Qty: 9 TABLET | Refills: 1 | Status: SHIPPED
Start: 2021-11-02 | End: 2021-11-24

## 2021-11-02 RX ORDER — GABAPENTIN 300 MG/1
300 CAPSULE ORAL 3 TIMES DAILY
Qty: 90 CAPSULE | Refills: 0 | Status: SHIPPED
Start: 2021-11-02 | End: 2021-11-30

## 2021-11-02 RX ORDER — TIZANIDINE 4 MG/1
4 TABLET ORAL 3 TIMES DAILY
Qty: 90 TABLET | Refills: 0 | Status: SHIPPED
Start: 2021-11-02 | End: 2021-11-24

## 2021-11-02 ASSESSMENT — ENCOUNTER SYMPTOMS
CONSTIPATION: 0
BACK PAIN: 0
CHEST TIGHTNESS: 0
ABDOMINAL PAIN: 0
VOICE CHANGE: 0
COUGH: 0
VOMITING: 0
TROUBLE SWALLOWING: 0
SHORTNESS OF BREATH: 0
NAUSEA: 0
DIARRHEA: 0
WHEEZING: 0
BLOOD IN STOOL: 0

## 2021-11-02 NOTE — PROGRESS NOTES
Dotty Pham (:  1975) is a 40 y.o. female,Established patient, here for evaluation of the following chief complaint(s):  Medication Refill (neck pain goes to neuro on )       ASSESSMENT/PLAN:  1. Cervical disc disease  2. Acute pain of left shoulder  -     oxyCODONE-acetaminophen (PERCOCET) 5-325 MG per tablet; Take 1 tablet by mouth every 4 hours as needed for Pain for up to 7 days. Intended supply: 7 days. Take lowest dose possible to manage pain, Disp-42 tablet, R-0Normal  3. Cervical neuropathic pain  -     oxyCODONE-acetaminophen (PERCOCET) 5-325 MG per tablet; Take 1 tablet by mouth every 4 hours as needed for Pain for up to 7 days. Intended supply: 7 days. Take lowest dose possible to manage pain, Disp-42 tablet, R-0Normal  4. Cervical pain (neck)  5. Chronic back pain, unspecified back location, unspecified back pain laterality      No follow-ups on file. Subjective   SUBJECTIVE/OBJECTIVE:  Patient is very tearful here in the office she states that she feels like I do not believe her and that she is a bother every time she comes in and therefore is the reason why she did not call me earlier to tell me that the pain medication was not helping with the pain. I explained to her that we can try to change the pain medication but it would have required her to come in because of the legislative stance on narcotics and acute injuries. She states that the pain medication that I wrote her was only helping with the pain for only a Half hour to maybe an hour  Is not getting relief with the meds is not slated to get in with the specialist 2021  Return with 13 pills still in the bottle      This is a very pleasant 29-year-old white female who has had neck pain and has alternated between me and [facility] for care. She has not responded to conventional treatment MRI shows some left-sided issues but it has an incidental finding of a 4 mm thyroid node in the left side.   I have no documentation of any prior studies reflecting thyroid nodules in the past.  All her thyroid studies have been within normal limits. Meggan has already done a referral to neurosurgeon for the neck and patient is here to discuss the thyroid nodule      LAST VISIT  Was given a trigger point injection into the region with no real results patient returned saw Meggan who ordered an MRI     Neck Pain       entire left side in pain, pt thinks possible pinched nerve since Thursday   Medication Refill      pt would like a smaller tube of Temovate--last tube cost was $30.00        Patient complains of sudden onset of neck pain that started on Thursday and has progressively gotten worse. She is unable to turn her head to drive. She has pain radiating down her left shoulder and left arm. The muscles feel very tight and then arm feels numb and tingly. She does not know of any mechanism of injury. She does lift boxes at work. Was given Prednisone and Flexiril and an X-Ray was ordered (Which was not done)      Review of Systems   Constitutional: Negative for activity change, chills, diaphoresis, fatigue, fever and unexpected weight change. HENT: Negative for trouble swallowing and voice change. Eyes: Negative for visual disturbance. Respiratory: Negative for cough, chest tightness, shortness of breath and wheezing. Cardiovascular: Negative for chest pain, palpitations and leg swelling. Gastrointestinal: Negative for abdominal pain, blood in stool, constipation, diarrhea, nausea and vomiting. Endocrine: Negative for polydipsia, polyphagia and polyuria. Genitourinary: Negative for dysuria, enuresis, frequency and hematuria. Musculoskeletal: Positive for arthralgias, myalgias, neck pain and neck stiffness. Negative for back pain, gait problem and joint swelling. Skin: Negative for rash. Neurological: Negative for dizziness, seizures, syncope, facial asymmetry, weakness, light-headedness, numbness and headaches. Hematological: Does not bruise/bleed easily. Psychiatric/Behavioral: Negative for behavioral problems, confusion, hallucinations and suicidal ideas. The patient is not nervous/anxious. Objective   Physical Exam  Constitutional:       Appearance: Normal appearance. She is normal weight. Cardiovascular:      Rate and Rhythm: Normal rate and regular rhythm. Pulmonary:      Effort: Pulmonary effort is normal.      Breath sounds: Normal breath sounds. No wheezing. Chest:      Chest wall: No tenderness. Abdominal:      General: Abdomen is flat. Bowel sounds are normal.      Palpations: Abdomen is soft. Musculoskeletal:         General: Tenderness present. No swelling or deformity. Cervical back: Normal range of motion and neck supple. Comments: CERVICAL SPINE :    Inspection/Palpation - No bony tenderness, no paraspinal tenderness, no palpable defect no step offs appreciated, otherwise normal alignment and posture    Range of Motion -   full range of motion  full ROM-moves slowly and ratchet like    Strength/Tone -   no atrophy, normal strength and tone; Non diagnostic due to pain      Diagnostic Tests -bilateral  equal     Neurological:      Mental Status: She is alert. On this date 9/29/2021 I have spent 30 minutes reviewing previous notes, test results and face to face with the patient discussing the diagnosis and importance of compliance with the treatment plan as well as documenting on the day of the visit. An electronic signature was used to authenticate this note.         --LYNSEY Patel - CNP

## 2021-11-02 NOTE — PATIENT INSTRUCTIONS
Patient Education        Learning About a Cervical Epidural Injection  What is a cervical epidural steroid injection? A cervical epidural steroid injection is a shot of medicine into the area around the spinal cord in your neck. You may get it to help with pain, tingling, or numbness in your neck, shoulder, or arm. It may have a steroid to reduce swelling and pain and a local anesthetic to numb the nerves. How is a cervical epidural steroid injection done? The doctor will use a tiny needle to numb the skin where you are getting the injection. After the skin is numb, your doctor will use a larger needle for the epidural injection. He or she may use X-ray or ultrasound to help guide the needle. You may feel some pressure. But you should not feel pain. How long does an epidural steroid injection take? It takes about 10 to 15 minutes to get this injection. You will probably go home about 20 to 30 minutes after you get it. What can you expect after a cervical epidural steroid injection? If your injection included local anesthetic medicine, your neck, shoulder, arm, or hand may feel heavy or numb right after the shot. With a local anesthetic, your pain may be gone right away. But it may return after a few hours. This is because the steroid hasn't started working yet. Before the steroid starts to work, your neck, shoulder, or arm may be sore for a few days. These injections don't always work. When they do, it takes 1 to 5 days. The pain relief can last for several days to a few months or longer. Some people are dizzy or feel sick to their stomach after getting this shot. These symptoms usually don't last very long. You may want to do less than normal for a few days. But you may also be able to return to your daily routine. If your pain is better, you may be able to keep doing your normal activities or physical therapy. But try not to overdo it, even if your pain has improved a lot.  If your pain is only a little better or if it comes back, your doctor may want you to get another injection in a few weeks. If your pain has not changed, talk to your doctor about other treatment choices. Follow-up care is a key part of your treatment and safety. Be sure to make and go to all appointments, and call your doctor if you are having problems. It's also a good idea to know your test results and keep a list of the medicines you take. Where can you learn more? Go to https://PockitpeOpiatalk.[email or web address] org and sign in to your [employer] account. Enter L710 in the [employer] box to learn more about \"Learning About a Cervical Epidural Injection. \"     If you do not have an account, please click on the \"Sign Up Now\" link. Current as of: July 1, 2021               Content Version: 13.0  © 2909-0714 [email or web address]. Care instructions adapted under license by [facility] ([facility]). If you have questions about a medical condition or this instruction, always ask your healthcare professional. [place] [address] any warranty or liability for your use of this information. Patient Education        Neck Pain: Care Instructions  Your Care Instructions     You can have neck pain anywhere from the bottom of your head to the top of your shoulders. It can spread to the upper back or arms. Injuries, painting a ceiling, sleeping with your neck twisted, staying in one position for too long, and many other activities can cause neck pain. Most neck pain gets better with home care. Your doctor may recommend medicine to relieve pain or relax your muscles. He or she may suggest exercise and physical therapy to increase flexibility and relieve stress. You may need to wear a special (cervical) collar to support your neck for a day or two. Follow-up care is a key part of your treatment and safety. Be sure to make and go to all appointments, and call your doctor if you are having problems.  It's also a good idea to know your test results and keep a list of the medicines you take. How can you care for yourself at home? · Try using a heating pad on a low or medium setting for 15 to 20 minutes every 2 or 3 hours. Try a warm shower in place of one session with the heating pad. · You can also try an ice pack for 10 to 15 minutes every 2 to 3 hours. Put a thin cloth between the ice and your skin. · Take pain medicines exactly as directed. ? If the doctor gave you a prescription medicine for pain, take it as prescribed. ? If you are not taking a prescription pain medicine, ask your doctor if you can take an over-the-counter medicine. · If your doctor recommends a cervical collar, wear it exactly as directed. When should you call for help? Call your doctor now or seek immediate medical care if:    · You have new or worsening numbness in your arms, buttocks or legs.     · You have new or worsening weakness in your arms or legs. (This could make it hard to stand up.)     · You lose control of your bladder or bowels. Watch closely for changes in your health, and be sure to contact your doctor if:    · Your neck pain is getting worse.     · You are not getting better after 1 week.     · You do not get better as expected. Where can you learn more? Go to https://Syntaxin.Jianshu. org and sign in to your [employer] account. Enter [id number] in the [facility] box to learn more about \"Neck Pain: Care Instructions. \"     If you do not have an account, please click on the \"Sign Up Now\" link. Current as of: July 1, 2021               Content Version: 13.0  © 4969-4989 [employer]. Care instructions adapted under license by [facility] ([facility]). If you have questions about a medical condition or this instruction, always ask your healthcare professional. [address] any warranty or liability for your use of this information.

## 2021-11-11 ENCOUNTER — TELEPHONE (OUTPATIENT)
Dept: FAMILY MEDICINE CLINIC | Age: 46
End: 2021-11-11

## 2021-11-11 DIAGNOSIS — Z20.822 EXPOSURE TO 2019-NCOV: Primary | ICD-10-CM

## 2021-11-11 NOTE — TELEPHONE ENCOUNTER
Wants covid test again so she can return to work in 8 days since she was vaccinated and  if this one is negative. Shona Nick scheduled her for tomorrow.

## 2021-11-18 ENCOUNTER — INITIAL CONSULT (OUTPATIENT)
Dept: NEUROSURGERY | Age: 46
End: 2021-11-18
Payer: COMMERCIAL

## 2021-11-18 VITALS
RESPIRATION RATE: 20 BRPM | SYSTOLIC BLOOD PRESSURE: 149 MMHG | DIASTOLIC BLOOD PRESSURE: 101 MMHG | WEIGHT: 256 LBS | TEMPERATURE: 98.1 F | BODY MASS INDEX: 38.8 KG/M2 | HEART RATE: 89 BPM | OXYGEN SATURATION: 96 % | HEIGHT: 68 IN

## 2021-11-18 DIAGNOSIS — M54.2 NECK PAIN: Primary | ICD-10-CM

## 2021-11-18 PROCEDURE — 99204 OFFICE O/P NEW MOD 45 MIN: CPT | Performed by: NEUROLOGICAL SURGERY

## 2021-11-18 RX ORDER — METHYLPREDNISOLONE 4 MG/1
TABLET ORAL
Qty: 1 KIT | Refills: 0 | Status: SHIPPED | OUTPATIENT
Start: 2021-11-18 | End: 2021-11-24

## 2021-11-18 RX ORDER — OXYCODONE HYDROCHLORIDE AND ACETAMINOPHEN 5; 325 MG/1; MG/1
1 TABLET ORAL EVERY 4 HOURS PRN
Qty: 42 TABLET | Refills: 0 | Status: SHIPPED
Start: 2021-11-18 | End: 2021-12-03 | Stop reason: SDUPTHER

## 2021-11-18 ASSESSMENT — ENCOUNTER SYMPTOMS
RESPIRATORY NEGATIVE: 1
ALLERGIC/IMMUNOLOGIC NEGATIVE: 1
CONSTIPATION: 1
EYES NEGATIVE: 1

## 2021-11-18 NOTE — PROGRESS NOTES
Martita Larsen (:  1975) is a 40 y.o. female,New patient, here for evaluation of the following chief complaint(s):  Neck Pain (neck pain is constant, down the shoulder in to the left arm, throbbing pain)         ASSESSMENT/PLAN:  1. Neck pain  -     oxyCODONE-acetaminophen (PERCOCET) 5-325 MG per tablet; Take 1 tablet by mouth every 4 hours as needed for Pain for up to 7 days. Intended supply: 7 days. Take lowest dose possible to manage pain, Disp-42 tablet, R-0Normal  -     External Referral To Physical Therapy  -     Holly Ventura MD, Pain Medicine, [place]  40year old lady who presents with neck and left arm pain. Her MRI shows a left C6-C7 herniated disk. I will send her to physical therapy and pain management for epidurals. If she fails this, she will need a C6-C7 anterior cervical diskectomy and fusion    No follow-ups on file. Subjective   SUBJECTIVE/OBJECTIVE:  HPI  40year old lady who presents with neck and left arm pain since September. The pain is described as a combinations of sharp, stabbing, aching, burning, cramping and shooting. The pain is made worse with activity and better with rest. It is rated as 7/10. She admits to numbness, tinging and weakness in her left arm. She has tried oral narcotic pain medications, membrane stabilizers and muscle relaxants. Review of Systems   Constitutional: Negative. HENT: Negative. Eyes: Negative. Respiratory: Negative. Cardiovascular: Negative. Gastrointestinal: Positive for constipation. Endocrine: Negative. Genitourinary: Negative. Musculoskeletal: Negative. Skin: Negative. Allergic/Immunologic: Negative. Neurological: Positive for headaches. Hematological: Bruises/bleeds easily. Psychiatric/Behavioral: The patient is nervous/anxious. Objective   Physical Exam  Vitals reviewed. Constitutional:       General: She is not in acute distress. Appearance: Normal appearance.  She is normal weight. She is not ill-appearing, toxic-appearing or diaphoretic. HENT:      Head: Normocephalic and atraumatic. Eyes:      General: No visual field deficit or scleral icterus. Right eye: No discharge. Left eye: No discharge. Extraocular Movements: Extraocular movements intact. Conjunctiva/sclera: Conjunctivae normal.      Pupils: Pupils are equal, round, and reactive to light. Pulmonary:      Effort: Pulmonary effort is normal. No respiratory distress. Abdominal:      General: Abdomen is flat. There is no distension. Musculoskeletal:         General: No swelling, tenderness, deformity or signs of injury. Right lower leg: No edema. Left lower leg: No edema. Skin:     General: Skin is warm and dry. Capillary Refill: Capillary refill takes less than 2 seconds. Coloration: Skin is not jaundiced or pale. Findings: No bruising, erythema, lesion or rash. Neurological:      General: No focal deficit present. Mental Status: She is alert and oriented to person, place, and time. Mental status is at baseline. GCS: GCS eye subscore is 4. GCS verbal subscore is 5. GCS motor subscore is 6. Cranial Nerves: Cranial nerves are intact. No cranial nerve deficit, dysarthria or facial asymmetry. Sensory: No sensory deficit. Motor: Weakness present. No tremor, atrophy, abnormal muscle tone, seizure activity or pronator drift. Coordination: Coordination is intact. Romberg sign negative. Coordination normal. Finger-Nose-Finger Test and Heel to [facility] Test normal. Rapid alternating movements normal.      Gait: Gait normal.      Deep Tendon Reflexes: Reflexes normal. Babinski sign absent on the right side. Babinski sign absent on the left side. Reflex Scores:       Tricep reflexes are 2+ on the right side and 2+ on the left side. Bicep reflexes are 2+ on the right side and 2+ on the left side.        Brachioradialis reflexes are 2+ on the right side and 2+ on the left side. Patellar reflexes are 2+ on the right side and 2+ on the left side. Achilles reflexes are 2+ on the right side and 2+ on the left side. Comments: 4/5 in LUE   Psychiatric:         Mood and Affect: Mood normal.         Behavior: Behavior normal.         Thought Content: Thought content normal.         Judgment: Judgment normal.            On this date 11/18/2021 I have spent 45 minutes reviewing previous notes, test results and face to face with the patient discussing the diagnosis and importance of compliance with the treatment plan as well as documenting on the day of the visit. An electronic signature was used to authenticate this note.     --Ji Fuentes MD

## 2021-11-22 ENCOUNTER — TELEPHONE (OUTPATIENT)
Dept: FAMILY MEDICINE CLINIC | Age: 46
End: 2021-11-22

## 2021-11-22 NOTE — TELEPHONE ENCOUNTER
I spoke with patient and Covid test was ordered on 11- but it hasn't been done yet. Per patient cancel that order because she went to [facility] and had the Coivd test done there.

## 2021-11-24 ENCOUNTER — TELEPHONE (OUTPATIENT)
Dept: NEUROSURGERY | Age: 46
End: 2021-11-24

## 2021-11-24 RX ORDER — SUMATRIPTAN 50 MG/1
TABLET, FILM COATED ORAL
Qty: 9 TABLET | Refills: 1 | Status: SHIPPED
Start: 2021-11-24 | End: 2022-01-31 | Stop reason: SDUPTHER

## 2021-11-24 RX ORDER — TIZANIDINE 4 MG/1
TABLET ORAL
Qty: 90 TABLET | Refills: 0 | Status: SHIPPED
Start: 2021-11-24 | End: 2021-12-03 | Stop reason: SDUPTHER

## 2021-11-24 NOTE — TELEPHONE ENCOUNTER
Patient requesting a muscle relaxer to [facility].   Pt#  [phone]

## 2021-11-30 ENCOUNTER — OFFICE VISIT (OUTPATIENT)
Dept: FAMILY MEDICINE CLINIC | Age: 46
End: 2021-11-30
Payer: COMMERCIAL

## 2021-11-30 VITALS
HEIGHT: 70 IN | DIASTOLIC BLOOD PRESSURE: 86 MMHG | BODY MASS INDEX: 36.08 KG/M2 | SYSTOLIC BLOOD PRESSURE: 136 MMHG | HEART RATE: 88 BPM | RESPIRATION RATE: 18 BRPM | WEIGHT: 252 LBS | TEMPERATURE: 97.6 F | OXYGEN SATURATION: 98 %

## 2021-11-30 DIAGNOSIS — J35.1 ENLARGED TONSILS: ICD-10-CM

## 2021-11-30 DIAGNOSIS — R09.89 GLOBUS SENSATION: Primary | ICD-10-CM

## 2021-11-30 LAB — S PYO AG THROAT QL: NORMAL

## 2021-11-30 PROCEDURE — 99214 OFFICE O/P EST MOD 30 MIN: CPT | Performed by: NURSE PRACTITIONER

## 2021-11-30 PROCEDURE — 87880 STREP A ASSAY W/OPTIC: CPT | Performed by: NURSE PRACTITIONER

## 2021-11-30 RX ORDER — DEXAMETHASONE 6 MG/1
6 TABLET ORAL DAILY
Qty: 7 TABLET | Refills: 0 | Status: SHIPPED | OUTPATIENT
Start: 2021-11-30 | End: 2021-12-07

## 2021-11-30 RX ORDER — GABAPENTIN 300 MG/1
300 CAPSULE ORAL 3 TIMES DAILY
Qty: 90 CAPSULE | Refills: 0 | Status: SHIPPED
Start: 2021-11-30 | End: 2022-01-14

## 2021-11-30 ASSESSMENT — ENCOUNTER SYMPTOMS
CONSTIPATION: 0
TROUBLE SWALLOWING: 0
SORE THROAT: 0
VOMITING: 0
COUGH: 0
SHORTNESS OF BREATH: 0
DIARRHEA: 0
NAUSEA: 0
WHEEZING: 0
CHEST TIGHTNESS: 0
ABDOMINAL PAIN: 0

## 2021-11-30 NOTE — PROGRESS NOTES
Chief Complaint:   Other (patient states that yesterday she started feeling like someone is putting a finger down her throat. . Patient denies pain, sore throat. . feels it could be tonsilitits )    History of Present Illness   HPI:  See Pineda is a 40 y.o. female who presents to [facility] today for globus sensation. She reports symptoms started yesterday and feels as if something was stuck in her throat or possibly could be tonsillitis. She states that her throat is not sore however it feels like someone is putting her finger down her throat. She states that the symptoms started just randomly. She states it does not feel as if the food is stuck in her throat more like something is blocking her throat. She denies any fever, chills, nausea, vomiting, diarrhea, chest pain, shortness of breath, dysphagia, trismus or drooling. She is handling her secretions well. Prior to Visit Medications    Medication Sig Taking? Authorizing Provider   SUMAtriptan (IMITREX) 50 MG tablet take 1 tablet by mouth AT ONSET OF MIGRAINE MAY REPEAT 4 HOURS LATER IF NO IMPROVEMENT Yes LYNSEY Franco CNP   tiZANidine (ZANAFLEX) 4 MG tablet TAKE 1 TABLET BY MOUTH THREE TIMES A DAY Yes LYNSEY Franco CNP   venlafaxine (EFFEXOR XR) 150 MG extended release capsule Take 1 capsule by mouth daily Yes LYNSEY Franco CNP   gabapentin (NEURONTIN) 300 MG capsule Take 1 capsule by mouth 3 times daily for 30 days.  Intended supply: 30 days Yes LYNSEY Franoc CNP   diclofenac (VOLTAREN) 50 MG EC tablet Take 1 tablet by mouth 2 times daily Yes LYNSEY Franco CNP   OZEMPIC, 0.25 OR 0.5 MG/DOSE, 2 MG/1.5ML SOPN inject 0.5 milligrams subcutaneously every week Yes Historical Provider, MD   omeprazole (PRILOSEC) 20 MG delayed release capsule TAKE 1 CAPSULE BY MOUTH EVERY DAY Yes LYNSEY Franco CNP   clobetasol (TEMOVATE) 0.05 % ointment Apply topically 2 times daily Apply topically 2 times daily. Yes Teja Carey APRN - CNP   folic acid (FOLVITE) 1 MG tablet TAKE 1 TABLET BY MOUTH EVERY DAY Yes Historical Provider, MD   METHOTREXATE SODIUM IJ Inject 20 mg as directed once a week Yes Historical Provider, MD   Multiple Vitamin (MULTIVITAMIN PO) Take 1 tablet by mouth 2 times daily. Yes Historical Provider, MD   metFORMIN (GLUCOPHAGE) 500 MG tablet Take 1 tablet by mouth 2 times daily  Patient not taking: Reported on 9/27/2021  LYNSEY Baird - CNP   ibuprofen (ADVIL;MOTRIN) 200 MG tablet Take 200 mg by mouth every 6 hours as needed for Pain prn  Patient not taking: Reported on 10/21/2021  Historical Provider, MD     Review of Systems   Review of Systems   Constitutional: Negative for activity change, chills and fever. HENT: Negative for congestion, drooling, sore throat and trouble swallowing. Globus sensation in throat   Respiratory: Negative for cough, chest tightness, shortness of breath and wheezing. Cardiovascular: Negative for chest pain, palpitations and leg swelling. Gastrointestinal: Negative for abdominal pain, constipation, diarrhea, nausea and vomiting. Genitourinary: Negative for dysuria and urgency. Musculoskeletal: Negative for myalgias and neck pain. Neurological: Negative for dizziness, weakness, light-headedness and numbness. Psychiatric/Behavioral: The patient is not nervous/anxious. Patient's medical, social, and family history reviewed    Past Medical History:  has a past medical history of Chronic back pain, Headache(784.0), Hemorrhoids, Herpes genital, Hyperlipidemia, Irregular periods/menstrual cycles, Renal calculi, and Varicose veins. Past Surgical History:  has a past surgical history that includes Tubal ligation (2001); Upper gastrointestinal endoscopy (2011); and Adam-en-Y Gastric Bypass (5/31/11). Social History:  reports that she has never smoked.  She has never used smokeless tobacco. She reports that she does not drink alcohol and does not use drugs. Family History: Family history is unknown by patient. Allergies: Patient has no known allergies. Physical Exam   Vital Signs:  /86   Pulse 88   Temp 97.6 °F (36.4 °C)   Resp 18   Ht 5' 10\" (1.778 m)   Wt 252 lb (114.3 kg)   SpO2 98%   BMI 36.16 kg/m²    Oxygen Saturation Interpretation: Normal.    Physical Exam  Vitals reviewed. Constitutional:       Appearance: Normal appearance. She is well-developed. She is not toxic-appearing. HENT:      Head: Normocephalic and atraumatic. Right Ear: Hearing normal.      Left Ear: Hearing normal.      Nose: Nose normal.      Mouth/Throat:      Lips: Pink. Mouth: Mucous membranes are moist.      Pharynx: Oropharynx is clear. Uvula midline. No pharyngeal swelling, oropharyngeal exudate, posterior oropharyngeal erythema or uvula swelling. Tonsils: No tonsillar exudate or tonsillar abscesses. 1+ on the right. 1+ on the left. Eyes:      General: Lids are normal.      Conjunctiva/sclera: Conjunctivae normal.      Pupils: Pupils are equal, round, and reactive to light. Neck:      Trachea: Trachea normal.   Cardiovascular:      Rate and Rhythm: Normal rate and regular rhythm. Pulses: Normal pulses. Heart sounds: Normal heart sounds. Pulmonary:      Effort: Pulmonary effort is normal.      Breath sounds: Normal breath sounds. Abdominal:      General: Abdomen is flat. Bowel sounds are normal.      Palpations: Abdomen is soft. Musculoskeletal:      Cervical back: Normal range of motion. Lymphadenopathy:      Cervical: No cervical adenopathy. Skin:     General: Skin is warm and dry. Capillary Refill: Capillary refill takes less than 2 seconds. Neurological:      Mental Status: She is alert and oriented to person, place, and time.    Psychiatric:         Attention and Perception: Attention normal.         Mood and Affect: Mood and affect normal.         Speech: Speech normal.         Behavior: Behavior normal. Behavior is cooperative. Thought Content: Thought content normal.         Cognition and Memory: Cognition normal.         Judgment: Judgment normal.       Test Results Section   (All laboratory and radiology results have been personally reviewed by myself)  Labs:  Results for orders placed or performed in visit on 11/30/21   POCT rapid strep A   Result Value Ref Range    Strep A Ag None Detected None Detected     Imaging: All Radiology results interpreted by Radiologist unless otherwise noted. No results found. Medical Decision Making   MDM:   69-year-old female who presents here for globus sensation for the last couple days. On physical examination she does have 1+ tonsillar hypertrophy bilaterally without exudate. Rapid strep was negative in the office. X-ray was obtained in the office, will advise patient results once available from radiology. I did look at her x-ray did not see any obvious foreign object obstructing her airway. We will trial her on Decadron for 7 days. She does have an appoint with her PCP on Friday 12/3 and will advise him if her symptoms are not improving. Possible referral to general surgery for EGD if her symptoms not improving. She was advised if her symptoms worsen she is to go to her PCP or the ER immediately. Red flag symptoms were discussed with patient. Patient verbalized understanding is agreeable plan of care. All questions were answered. Assessment / Plan   Impression(s):  Omayra Hernandez was seen today for other. Diagnoses and all orders for this visit:    Globus sensation  -     XR NECK SOFT TISSUE; Future  -     dexamethasone (DECADRON) 6 MG tablet; Take 1 tablet by mouth daily for 7 days    Enlarged tonsils  -     POCT rapid strep A  -     dexamethasone (DECADRON) 6 MG tablet; Take 1 tablet by mouth daily for 7 days    Discharged home. Patient condition is good    Return if symptoms worsen or fail to improve.      New Medications     New Prescriptions    DEXAMETHASONE (DECADRON) 6 MG TABLET    Take 1 tablet by mouth daily for 7 days     Electronically signed by LYNSEY Decker CNP   DD: 11/30/21    **This report was transcribed using voice recognition software. Every effort was made to ensure accuracy; however, inadvertent computerized transcription errors may be present.

## 2021-12-03 ENCOUNTER — OFFICE VISIT (OUTPATIENT)
Dept: FAMILY MEDICINE CLINIC | Age: 46
End: 2021-12-03
Payer: COMMERCIAL

## 2021-12-03 VITALS
DIASTOLIC BLOOD PRESSURE: 72 MMHG | HEIGHT: 70 IN | HEART RATE: 67 BPM | WEIGHT: 246.5 LBS | OXYGEN SATURATION: 98 % | BODY MASS INDEX: 35.29 KG/M2 | SYSTOLIC BLOOD PRESSURE: 128 MMHG | TEMPERATURE: 97.8 F | RESPIRATION RATE: 18 BRPM

## 2021-12-03 DIAGNOSIS — M54.12 CERVICAL RADICULOPATHY: ICD-10-CM

## 2021-12-03 DIAGNOSIS — K30 INDIGESTION: ICD-10-CM

## 2021-12-03 DIAGNOSIS — E04.1 THYROID NODULE: ICD-10-CM

## 2021-12-03 DIAGNOSIS — E78.2 MIXED HYPERLIPIDEMIA: ICD-10-CM

## 2021-12-03 DIAGNOSIS — M25.512 ACUTE PAIN OF LEFT SHOULDER: ICD-10-CM

## 2021-12-03 DIAGNOSIS — M50.30 DDD (DEGENERATIVE DISC DISEASE), CERVICAL: ICD-10-CM

## 2021-12-03 DIAGNOSIS — M54.2 CERVICAL PAIN (NECK): ICD-10-CM

## 2021-12-03 DIAGNOSIS — G89.29 CHRONIC BACK PAIN, UNSPECIFIED BACK LOCATION, UNSPECIFIED BACK PAIN LATERALITY: ICD-10-CM

## 2021-12-03 DIAGNOSIS — M54.9 CHRONIC BACK PAIN, UNSPECIFIED BACK LOCATION, UNSPECIFIED BACK PAIN LATERALITY: ICD-10-CM

## 2021-12-03 DIAGNOSIS — M62.838 CERVICAL PARASPINAL MUSCLE SPASM: ICD-10-CM

## 2021-12-03 DIAGNOSIS — Z20.822 EXPOSURE TO 2019-NCOV: ICD-10-CM

## 2021-12-03 DIAGNOSIS — M50.90 CERVICAL DISC DISEASE: Primary | ICD-10-CM

## 2021-12-03 DIAGNOSIS — M54.2 NECK PAIN: ICD-10-CM

## 2021-12-03 PROCEDURE — 99213 OFFICE O/P EST LOW 20 MIN: CPT | Performed by: FAMILY MEDICINE

## 2021-12-03 RX ORDER — PANTOPRAZOLE SODIUM 40 MG/1
40 TABLET, DELAYED RELEASE ORAL
Qty: 60 TABLET | Refills: 5 | Status: SHIPPED
Start: 2021-12-03 | End: 2022-01-31 | Stop reason: SDUPTHER

## 2021-12-03 RX ORDER — SEMAGLUTIDE 1.34 MG/ML
0.25 INJECTION, SOLUTION SUBCUTANEOUS WEEKLY
Qty: 1 PEN | Refills: 5 | Status: SHIPPED
Start: 2021-12-03 | End: 2022-02-28

## 2021-12-03 RX ORDER — OMEPRAZOLE 20 MG/1
20 CAPSULE, DELAYED RELEASE ORAL DAILY
Qty: 90 CAPSULE | Refills: 1 | Status: CANCELLED | OUTPATIENT
Start: 2021-12-03 | End: 2022-03-03

## 2021-12-03 RX ORDER — TIZANIDINE 4 MG/1
4 TABLET ORAL 3 TIMES DAILY
Qty: 270 TABLET | Refills: 1 | Status: SHIPPED
Start: 2021-12-03 | End: 2022-05-09 | Stop reason: SDUPTHER

## 2021-12-03 RX ORDER — OXYCODONE HYDROCHLORIDE AND ACETAMINOPHEN 5; 325 MG/1; MG/1
1 TABLET ORAL EVERY 4 HOURS PRN
Qty: 42 TABLET | Refills: 0 | Status: SHIPPED | OUTPATIENT
Start: 2021-12-03 | End: 2021-12-10

## 2021-12-03 ASSESSMENT — ENCOUNTER SYMPTOMS
ABDOMINAL DISTENTION: 0
CONSTIPATION: 0
RHINORRHEA: 0
NAUSEA: 0
VOICE CHANGE: 0
WHEEZING: 0
FACIAL SWELLING: 0
DIARRHEA: 0
VOMITING: 0
SORE THROAT: 0
RECTAL PAIN: 0
CHEST TIGHTNESS: 0
PHOTOPHOBIA: 0
APNEA: 0
BACK PAIN: 1
ABDOMINAL PAIN: 1
SHORTNESS OF BREATH: 0
COUGH: 0
SINUS PAIN: 0

## 2021-12-03 ASSESSMENT — PATIENT HEALTH QUESTIONNAIRE - PHQ9
SUM OF ALL RESPONSES TO PHQ QUESTIONS 1-9: 0
SUM OF ALL RESPONSES TO PHQ9 QUESTIONS 1 & 2: 0
1. LITTLE INTEREST OR PLEASURE IN DOING THINGS: 0
SUM OF ALL RESPONSES TO PHQ QUESTIONS 1-9: 0
SUM OF ALL RESPONSES TO PHQ QUESTIONS 1-9: 0
2. FEELING DOWN, DEPRESSED OR HOPELESS: 0

## 2021-12-03 NOTE — PROGRESS NOTES
12/3/21  Nirmal Monreal : 1975 Sex: female  Age: 40 y.o. Chief Complaint   Patient presents with    New Patient     wants tot alk about her throat pain and axcid reflux    Medication Refill     needs all meds plus methotrexte ordered        Multiple problem   Psoriatic arthritis         Review of Systems   Constitutional: Negative for appetite change, chills, diaphoresis, fatigue, fever and unexpected weight change. HENT: Negative for congestion, dental problem, ear discharge, ear pain, facial swelling, hearing loss, mouth sores, rhinorrhea, sinus pain, sore throat, tinnitus and voice change. Eyes: Negative for photophobia and visual disturbance (no visual changes). Respiratory: Negative for apnea, cough, chest tightness, shortness of breath and wheezing. Cardiovascular: Negative for chest pain and palpitations. Gastrointestinal: Positive for abdominal pain. Negative for abdominal distention, constipation, diarrhea, nausea, rectal pain and vomiting. Endocrine: Negative for cold intolerance, heat intolerance and polyuria. Genitourinary: Negative for dysuria, frequency, hematuria and urgency. Musculoskeletal: Positive for arthralgias, back pain, myalgias, neck pain and neck stiffness. Negative for gait problem and joint swelling. Skin: Negative for rash and wound. Allergic/Immunologic: Negative for environmental allergies. Neurological: Positive for light-headedness and headaches. Negative for dizziness, tremors, seizures, syncope, facial asymmetry, speech difficulty, weakness and numbness. Hematological: Does not bruise/bleed easily. Psychiatric/Behavioral: Negative for agitation, hallucinations and suicidal ideas. The patient is not nervous/anxious. Physical Exam  Vitals reviewed. Constitutional:       General: She is not in acute distress. Appearance: She is obese. She is not ill-appearing. HENT:      Head: Normocephalic.       Right Ear: Tympanic membrane, ear canal and external ear normal. There is no impacted cerumen. Left Ear: Tympanic membrane, ear canal and external ear normal. There is no impacted cerumen. Nose: No rhinorrhea. Mouth/Throat:      Pharynx: No posterior oropharyngeal erythema. Eyes:      General:         Right eye: No discharge. Left eye: No discharge. Extraocular Movements: Extraocular movements intact. Pupils: Pupils are equal, round, and reactive to light. Neck:      Vascular: No carotid bruit. Cardiovascular:      Rate and Rhythm: Rhythm irregular. Heart sounds: Normal heart sounds. No murmur heard. No gallop. Pulmonary:      Breath sounds: Normal breath sounds. No wheezing, rhonchi or rales. Abdominal:      Palpations: Abdomen is soft. There is no mass. Tenderness: There is no abdominal tenderness. There is no guarding or rebound. Hernia: No hernia is present. Musculoskeletal:         General: Normal range of motion. Cervical back: Neck supple. Lymphadenopathy:      Cervical: No cervical adenopathy. Skin:     General: Skin is warm and dry. Findings: No bruising or rash. Neurological:      General: No focal deficit present. Mental Status: She is alert and oriented to person, place, and time. Cranial Nerves: No cranial nerve deficit. Motor: No weakness. Coordination: Coordination normal.      Gait: Gait normal.   Psychiatric:         Mood and Affect: Mood normal.         Behavior: Behavior normal.         Assessment and Plan:  Curtis Cruz was seen today for new patient and medication refill. Diagnoses and all orders for this visit:    Cervical disc disease    Neck pain  -     oxyCODONE-acetaminophen (PERCOCET) 5-325 MG per tablet; Take 1 tablet by mouth every 4 hours as needed for Pain for up to 7 days. Intended supply: 7 days.  Take lowest dose possible to manage pain    Cervical paraspinal muscle spasm    DDD (degenerative disc disease), cervical    Cervical radiculopathy    Cervical pain (neck)    Exposure to 2019-nCoV    Chronic back pain, unspecified back location, unspecified back pain laterality    Indigestion    Acute pain of left shoulder    Thyroid nodule    Mixed hyperlipidemia    Other orders  -     tiZANidine (ZANAFLEX) 4 MG tablet; Take 1 tablet by mouth 3 times daily  -     OZEMPIC, 0.25 OR 0.5 MG/DOSE, 2 MG/1.5ML SOPN; Inject 0.25 mg as directed once a week  -     Clotrimazole 1 % OINT; Apply 30 g topically daily  -     pantoprazole (PROTONIX) 40 MG tablet; Take 1 tablet by mouth 2 times daily (before meals)          Discussions/Education provided to patients during visit:  [] Discussed the importance to stop smoking. [] Advised to monitor eating habits. [] Reviewed and discussed Imaging results. [x] Reviewed and discussed Lab results. [x] Discussed the importance of drinking plenty of fluids. [x] Cut down on Salt, Caffeine, and Sugar. [x] Continue Medications as Discussed. [x] Communicated with patient any concerns, to phone office. Discussed headaches and treatment       No follow-ups on file.       Seen by:     Sergio Tolentino DO

## 2021-12-06 ENCOUNTER — TELEPHONE (OUTPATIENT)
Dept: FAMILY MEDICINE CLINIC | Age: 46
End: 2021-12-06

## 2021-12-06 ENCOUNTER — OFFICE VISIT (OUTPATIENT)
Dept: FAMILY MEDICINE CLINIC | Age: 46
End: 2021-12-06
Payer: COMMERCIAL

## 2021-12-06 VITALS
RESPIRATION RATE: 20 BRPM | SYSTOLIC BLOOD PRESSURE: 132 MMHG | OXYGEN SATURATION: 100 % | TEMPERATURE: 97.8 F | WEIGHT: 247.4 LBS | BODY MASS INDEX: 35.42 KG/M2 | HEART RATE: 90 BPM | HEIGHT: 70 IN | DIASTOLIC BLOOD PRESSURE: 80 MMHG

## 2021-12-06 DIAGNOSIS — R10.9 ABDOMINAL PAIN, UNSPECIFIED ABDOMINAL LOCATION: ICD-10-CM

## 2021-12-06 DIAGNOSIS — E78.2 MIXED HYPERLIPIDEMIA: ICD-10-CM

## 2021-12-06 DIAGNOSIS — M50.90 CERVICAL DISC DISEASE: ICD-10-CM

## 2021-12-06 DIAGNOSIS — K30 INDIGESTION: ICD-10-CM

## 2021-12-06 DIAGNOSIS — R31.9 HEMATURIA, UNSPECIFIED TYPE: Primary | ICD-10-CM

## 2021-12-06 DIAGNOSIS — K21.00 GASTROESOPHAGEAL REFLUX DISEASE WITH ESOPHAGITIS, UNSPECIFIED WHETHER HEMORRHAGE: ICD-10-CM

## 2021-12-06 DIAGNOSIS — M50.30 DDD (DEGENERATIVE DISC DISEASE), CERVICAL: ICD-10-CM

## 2021-12-06 DIAGNOSIS — E04.1 THYROID NODULE: ICD-10-CM

## 2021-12-06 DIAGNOSIS — M54.12 CERVICAL RADICULOPATHY: ICD-10-CM

## 2021-12-06 LAB
BILIRUBIN, POC: NORMAL
BLOOD URINE, POC: NORMAL
CLARITY, POC: NORMAL
COLOR, POC: YELLOW
GLUCOSE URINE, POC: NEGATIVE
KETONES, POC: NORMAL
LEUKOCYTE EST, POC: NORMAL
NITRITE, POC: NEGATIVE
PH, POC: 5.5
PROTEIN, POC: NEGATIVE
SPECIFIC GRAVITY, POC: 1.03
UROBILINOGEN, POC: 0.2

## 2021-12-06 PROCEDURE — 81002 URINALYSIS NONAUTO W/O SCOPE: CPT | Performed by: FAMILY MEDICINE

## 2021-12-06 PROCEDURE — 99213 OFFICE O/P EST LOW 20 MIN: CPT | Performed by: FAMILY MEDICINE

## 2021-12-06 RX ORDER — NEEDLES, SAFETY 27GX1/2"
NEEDLE, DISPOSABLE MISCELLANEOUS
Qty: 12 EACH | Refills: 5 | Status: SHIPPED
Start: 2021-12-06 | End: 2022-05-26

## 2021-12-06 RX ORDER — SUCRALFATE ORAL 1 G/10ML
1 SUSPENSION ORAL 4 TIMES DAILY
Qty: 1200 ML | Refills: 3 | Status: SHIPPED
Start: 2021-12-06 | End: 2021-12-15

## 2021-12-06 RX ORDER — NEEDLES, SAFETY 27GX1/2"
NEEDLE, DISPOSABLE MISCELLANEOUS
COMMUNITY
End: 2021-12-06 | Stop reason: SDUPTHER

## 2021-12-06 NOTE — TELEPHONE ENCOUNTER
Dr. Chapo Núñez you saw patient on 12- for GERD - gave her Pantoprazole 40 mg 1 twice daily. She is still has GERD, pain and burping and she left work today. I spoke with patient to be sure she was taking it twice daily and she is - she stated she is coming into with Nick Dubois at his appointment she will just ask Dr. Chapo Núñez what she should do. Informed patient I will just put her on schedule to see Dr. Chapo Núñez too.

## 2021-12-06 NOTE — PROGRESS NOTES
21  Matias Clement : 1975 Sex: female  Age: 40 y.o. Chief Complaint   Patient presents with    Abdominal Pain     stomach feels like its on fire and hurts, thinks it the dexamethasone med    Anxiety     feeling very anxsiouse ,angry, having angry outbursts    Gastroesophageal Reflux     having burning in throat and stomach wants try somthing to coat it, or try carfate        HPI reflux and abdominal pain     Review of Systems   Constitutional: Positive for fatigue. Negative for appetite change, chills, diaphoresis, fever and unexpected weight change. HENT: Negative for congestion, dental problem, ear discharge, ear pain, facial swelling, hearing loss, mouth sores, rhinorrhea, sinus pain, sore throat, tinnitus and voice change. Eyes: Negative for photophobia and visual disturbance (no visual changes). Respiratory: Negative for apnea, cough, chest tightness, shortness of breath and wheezing. Cardiovascular: Negative for chest pain and palpitations. Gastrointestinal: Positive for abdominal pain. Negative for abdominal distention, constipation, diarrhea, nausea, rectal pain and vomiting. Reflux gerd    Endocrine: Negative for cold intolerance, heat intolerance and polyuria. Genitourinary: Positive for frequency. Negative for dysuria, hematuria and urgency. Musculoskeletal: Positive for arthralgias. Negative for back pain, gait problem, joint swelling, myalgias, neck pain and neck stiffness. Skin: Negative for rash and wound. Allergic/Immunologic: Negative for environmental allergies. Neurological: Negative for dizziness, tremors, seizures, syncope, facial asymmetry, speech difficulty, weakness, light-headedness, numbness and headaches. Hematological: Does not bruise/bleed easily. Psychiatric/Behavioral: Negative for agitation, hallucinations and suicidal ideas. The patient is not nervous/anxious. Physical Exam  Vitals reviewed.    Constitutional: General: She is not in acute distress. Appearance: She is not ill-appearing. HENT:      Head: Normocephalic. Right Ear: Tympanic membrane, ear canal and external ear normal. There is no impacted cerumen. Left Ear: Tympanic membrane, ear canal and external ear normal. There is no impacted cerumen. Nose: No rhinorrhea. Mouth/Throat:      Pharynx: No posterior oropharyngeal erythema. Eyes:      General:         Right eye: No discharge. Left eye: No discharge. Extraocular Movements: Extraocular movements intact. Pupils: Pupils are equal, round, and reactive to light. Neck:      Vascular: No carotid bruit. Cardiovascular:      Rate and Rhythm: Regular rhythm. Heart sounds: Normal heart sounds. No murmur heard. No gallop. Pulmonary:      Breath sounds: Normal breath sounds. No wheezing, rhonchi or rales. Abdominal:      General: There is distension. Palpations: Abdomen is soft. There is no mass. Tenderness: There is abdominal tenderness. There is no guarding or rebound. Hernia: No hernia is present. Musculoskeletal:         General: Normal range of motion. Cervical back: Neck supple. Lymphadenopathy:      Cervical: No cervical adenopathy. Skin:     General: Skin is warm and dry. Findings: No bruising or rash. Neurological:      General: No focal deficit present. Mental Status: She is alert and oriented to person, place, and time. Cranial Nerves: No cranial nerve deficit. Motor: No weakness. Coordination: Coordination normal.      Gait: Gait normal.   Psychiatric:         Mood and Affect: Mood normal.         Behavior: Behavior normal.         Assessment and Plan:  Adonis James was seen today for abdominal pain, anxiety and gastroesophageal reflux.     Diagnoses and all orders for this visit:    Hematuria, unspecified type  -     POCT Urinalysis no Micro    Indigestion    Gastroesophageal reflux disease with esophagitis, unspecified whether hemorrhage    Thyroid nodule    DDD (degenerative disc disease), cervical    Cervical disc disease    Cervical radiculopathy    Mixed hyperlipidemia    Abdominal pain, unspecified abdominal location    Other orders  -     NEEDLE, DISP, 27 G (EASY TOUCH FLIPLOCK NEEDLES) 27G X 1/2\" MISC; Inject 4 times a month  -     Discontinue: sucralfate (CARAFATE) 1 GM/10ML suspension; Take 10 mLs by mouth 4 times daily (Patient not taking: Reported on 12/7/2021)          Discussions/Education provided to patients during visit:  [] Discussed the importance to stop smoking. [] Advised to monitor eating habits. [] Reviewed and discussed Imaging results. [] Reviewed and discussed Lab results. [x] Discussed the importance of drinking plenty of fluids. [x] Cut down on Salt, Caffeine, and Sugar. [x] Continue Medications as Discussed. [x] Communicated with patient any concerns, to phone office. Patient stopped the decadron       No follow-ups on file.       Seen by:     Jojo Barnes DO

## 2021-12-07 RX ORDER — SUCRALFATE 1 G/1
1 TABLET ORAL 4 TIMES DAILY
Qty: 120 TABLET | Refills: 5 | Status: SHIPPED
Start: 2021-12-07 | End: 2022-04-06

## 2021-12-07 RX ORDER — SUCRALFATE 1 G/1
1 TABLET ORAL 4 TIMES DAILY
COMMUNITY
End: 2021-12-07 | Stop reason: SDUPTHER

## 2021-12-07 NOTE — TELEPHONE ENCOUNTER
Received [facility] from [facility] Carafate suspension not covered by insurance the tablets are covered for 10.00 co-pay. I spoke with patient and she said Dr. Malou Milian thought liquid would be easier to swallow but since not covered send tablets to [facility].

## 2021-12-15 ENCOUNTER — OFFICE VISIT (OUTPATIENT)
Dept: PAIN MANAGEMENT | Age: 46
End: 2021-12-15
Payer: COMMERCIAL

## 2021-12-15 ENCOUNTER — PREP FOR PROCEDURE (OUTPATIENT)
Dept: PAIN MANAGEMENT | Age: 46
End: 2021-12-15

## 2021-12-15 ENCOUNTER — TELEPHONE (OUTPATIENT)
Dept: PAIN MANAGEMENT | Age: 46
End: 2021-12-15

## 2021-12-15 VITALS
TEMPERATURE: 98.4 F | WEIGHT: 247 LBS | RESPIRATION RATE: 20 BRPM | BODY MASS INDEX: 35.36 KG/M2 | SYSTOLIC BLOOD PRESSURE: 138 MMHG | HEART RATE: 84 BPM | HEIGHT: 70 IN | DIASTOLIC BLOOD PRESSURE: 82 MMHG | OXYGEN SATURATION: 97 %

## 2021-12-15 DIAGNOSIS — M54.12 CERVICAL RADICULOPATHY: Primary | ICD-10-CM

## 2021-12-15 DIAGNOSIS — M50.30 DDD (DEGENERATIVE DISC DISEASE), CERVICAL: ICD-10-CM

## 2021-12-15 DIAGNOSIS — M50.30 DDD (DEGENERATIVE DISC DISEASE), CERVICAL: Primary | ICD-10-CM

## 2021-12-15 DIAGNOSIS — M47.812 CERVICAL SPONDYLOSIS: ICD-10-CM

## 2021-12-15 DIAGNOSIS — E66.9 OBESITY, UNSPECIFIED CLASSIFICATION, UNSPECIFIED OBESITY TYPE, UNSPECIFIED WHETHER SERIOUS COMORBIDITY PRESENT: ICD-10-CM

## 2021-12-15 DIAGNOSIS — M54.12 CERVICAL RADICULOPATHY: ICD-10-CM

## 2021-12-15 PROCEDURE — 99204 OFFICE O/P NEW MOD 45 MIN: CPT | Performed by: ANESTHESIOLOGY

## 2021-12-15 RX ORDER — SYRINGE AND NEEDLE,INSULIN,1ML 25GX1"
SYRINGE, EMPTY DISPOSABLE MISCELLANEOUS
COMMUNITY
Start: 2021-12-06 | End: 2022-05-26

## 2021-12-15 RX ORDER — PEN NEEDLE, DIABETIC 29 G X1/2"
NEEDLE, DISPOSABLE MISCELLANEOUS
COMMUNITY
Start: 2021-12-12 | End: 2022-05-26

## 2021-12-15 NOTE — TELEPHONE ENCOUNTER
Call to Azul Florez that procedure was approved for 12/16/2021 and that the surgery center should call her a few days before for the pre op call and after 3:00 PM the business day before with the arrival time. Instructed Neha to hold ibuprofen for 24 hours, naprosyn for 4 days and any aspirin containing products or fish oil for 7 days. Instructed to call office back if any questions. Jacinto Nassar verbalized understanding.   Suki Watts RN  Pain Management

## 2021-12-15 NOTE — PROGRESS NOTES
[facility]  [place], [address] [facility]  Dept: [phone]          Consult Note      Patient:  Urszula Azar,  1975    Date of Service:  12/15/21     Requesting Physician:  Francoise Maldonado MD    Reason for Consult:      Patient presents with complaints of neck pain    HISTORY OF PRESENT ILLNESS:      Ms. Urszula Azar is a 40 y.o. female presented today to [address] [place] [address] for evaluation of  Neck pain and left UE pain for about 3 months. Sudden onset pain over the neck with left UE radiation. Pain is constant and is described as aching and throbbing. Pain does radiate to left upper extremity. She  has numbness, tingling, weakness of the left upper extremities     Patient does not have bladder or bowel dysfunction. Alleviating factors include: rest.  Aggravating factors include: movement, bending, lifting. Pain causes functional limitations/ limits Adl's : Yes    Nursing notes and details of the pain history reviewed. Please see intake notes for details. Has been evaluated by Yulisa Dangelo- Dr. Hortencia Weaver- referred for interventions. NOTE:  H/o psoriatic arthritis- on Methotrexate  S/p Gastric bypass in     Previous treatments:   Physical Therapy/ HEP : yes, has been doing HEP since the onset     Medications: - NSAID's : yes -            - Membrane stabilizers : yes - Gabapentin            - Opioids : yes, short term            - Adjuvants or Others : yes, oral steroids/ muscle relaxants    Surgeries: no C spine surgery    She has not been on anticoagulation medications     She is not diabetic.     H/O Smoking: no  H/O alcohol abuse : no  H/O Illicit drug use : no    Employment: employed    Imaging:     MRI of Cervical spine: 10/11/2021:  FINDINGS:  Alignment is relatively anatomic. Omelia Sovereign is preservation of disc height and signal      intensity.  Bone marrow signal intensity is normal.  The craniocervical junction appears intact.      No abnormal signal is identified within the cord in the cervical region.  The thyroid gland is      mildly heterogeneous with a enhancing 4 mm nodule in the left thyroid gland.  Additional findings      and levels are as follows:             C5-6:  Minimal broad-based disc osteophyte complex.  4.5 mm nerve root sheath diverticula on the      left.             C6-7:  Moderate broad-based disc osteophyte complex.  Uncovertebral hypertrophy.  Mild to moderate      central canal stenosis.  6.5mm AP canal diameter.  Moderate to severe left neural foraminal      stenosis.  Mild right neural foraminal stenosis.             IMPRESSION 3T MRI Cervical Spine without and with intravenous contrast:      1.  Disc disease and degenerative changes as discussed worse at C6-7.  Correlate with dermatome      mapping.      2.  4 mm enhancing nodule in the left thyroid gland. Past Medical History:   Diagnosis Date    Chronic back pain     Headache(784.0)     Hemorrhoids     Herpes genital     Hyperlipidemia     Irregular periods/menstrual cycles     Renal calculi     Varicose veins        Past Surgical History:   Procedure Laterality Date    SERENA-EN-Y GASTRIC BYPASS  5/31/11    Lap    TUBAL LIGATION  2001    UPPER GASTROINTESTINAL ENDOSCOPY  2011       Prior to Admission medications    Medication Sig Start Date End Date Taking?  Authorizing Provider   sucralfate (CARAFATE) 1 GM tablet Take 1 tablet by mouth 4 times daily 12/7/21  Yes Roberto Jay DO   NEEDLE, DISP, 27 G (EASY TOUCH FLIPLOCK NEEDLES) 27G X 1/2\" MISC Inject 4 times a month 12/6/21  Yes Roberto Jay DO   tiZANidine (ZANAFLEX) 4 MG tablet Take 1 tablet by mouth 3 times daily 12/3/21 3/3/22 Yes Roberto Jay DO   OZEMPIC, 0.25 OR 0.5 MG/DOSE, 2 MG/1.5ML SOPN Inject 0.25 mg as directed once a week 12/3/21  Yes Roberto Jay DO   Clotrimazole 1 % OINT Apply 30 g topically daily 12/3/21 1/2/22 Yes Sheryle Finder Cohen, DO   pantoprazole (PROTONIX) 40 MG tablet Take 1 tablet by mouth 2 times daily (before meals) 12/3/21  Yes Johnny Hudson DO   SUMAtriptan (IMITREX) 50 MG tablet take 1 tablet by mouth AT ONSET OF MIGRAINE MAY REPEAT 4 HOURS LATER IF NO IMPROVEMENT 11/24/21  Yes LYNSEY Eller CNP   venlafaxine (EFFEXOR XR) 150 MG extended release capsule Take 1 capsule by mouth daily 11/2/21 1/31/22 Yes LYNSEY Eller CNP   omeprazole (PRILOSEC) 20 MG delayed release capsule TAKE 1 CAPSULE BY MOUTH EVERY DAY 9/23/21  Yes LYNSEY Mohan CNP   clobetasol (TEMOVATE) 0.05 % ointment Apply topically 2 times daily Apply topically 2 times daily. 5/19/21  Yes LYNSEY Eller CNP   folic acid (FOLVITE) 1 MG tablet TAKE 1 TABLET BY MOUTH EVERY DAY 3/16/21  Yes Historical Provider, MD   METHOTREXATE SODIUM IJ Inject 0.08 mg as directed once a week    Yes Historical Provider, MD   ibuprofen (ADVIL;MOTRIN) 200 MG tablet Take 200 mg by mouth every 6 hours as needed for Pain prn    Yes Historical Provider, MD   Multiple Vitamin (MULTIVITAMIN PO) Take 1 tablet by mouth 2 times daily. Yes Historical Provider, MD   sucralfate (CARAFATE) 1 GM/10ML suspension Take 10 mLs by mouth 4 times daily  Patient not taking: Reported on 12/7/2021 12/6/21   Johnny Hudson DO   gabapentin (NEURONTIN) 300 MG capsule TAKE 1 CAPSULE BY MOUTH 3 TIMES DAILY FOR 30 DAYS.  INTENDED SUPPLY: 30 DAYS  Patient not taking: Reported on 12/3/2021 11/30/21 12/30/21  Johnny Hudson DO   diclofenac (VOLTAREN) 50 MG EC tablet Take 1 tablet by mouth 2 times daily  Patient not taking: Reported on 12/3/2021 10/12/21   LYNSEY Eller CNP   metFORMIN (GLUCOPHAGE) 500 MG tablet Take 1 tablet by mouth 2 times daily  Patient not taking: Reported on 9/27/2021 4/6/21 7/5/21  LYNSEY Delgado CNP       No Known Allergies    Social History     Socioeconomic History    Marital status:      Spouse name: Not on file    Number of children: Not on file    Years of education: Not on file    Highest education level: Not on file   Occupational History    Not on file   Tobacco Use    Smoking status: Never Smoker    Smokeless tobacco: Never Used   Vaping Use    Vaping Use: Never used   Substance and Sexual Activity    Alcohol use: No    Drug use: No    Sexual activity: Yes   Other Topics Concern    Not on file   Social History Narrative    Not on file     Social Determinants of Health     Financial Resource Strain: Low Risk     Difficulty of Paying Living Expenses: Not hard at all   Food Insecurity: No Food Insecurity    Worried About Running Out of Food in the Last Year: Never true    [address] in the Last Year: Never true   Transportation Needs:     Lack of Transportation (Medical): Not on file    Lack of Transportation (Non-Medical): Not on file   Physical Activity:     Days of Exercise per Week: Not on file    Minutes of Exercise per Session: Not on file   Stress:     Feeling of Stress : Not on file   Social Connections:     Frequency of Communication with Friends and Family: Not on file    Frequency of Social Gatherings with Friends and Family: Not on file    Attends Buddhist Services: Not on file    Active Member of [address] [place] [address] or Organizations: Not on file    Attends Club or Organization Meetings: Not on file    Marital Status: Not on file   Intimate Partner Violence:     Fear of Current or Ex-Partner: Not on file    Emotionally Abused: Not on file    Physically Abused: Not on file    Sexually Abused: Not on file   Housing Stability:     Unable to Pay for Housing in the Last Year: Not on file    Number of [place] in the Last Year: Not on file    Unstable Housing in the Last Year: Not on file       Family History   Family history unknown: Yes       REVIEW OF SYSTEMS:     Patient specifically denies fever/chills, chest pain, shortness of breath, new bowel or bladder complaints. All other review of systems was negative.   Review of Systems - documented

## 2021-12-15 NOTE — PROGRESS NOTES
[facility]  INSTRUCTIONS  . .......................................................................................................................................... [x] Parking the day of Surgery is located in the [facility]. Upon entering the door, make immediate right into the surgery reception room    [x]  Bring photo ID and insurance card     [x] You may have a light breakfast day of procedure    [x]  Wear loose comfortable clothing    [x]  Please follow instructions for medications as given per Dr's office    [x] You can expect a call the business day prior to procedure to notify you of your arrival time     [x] Please arrange for     []  Other instructions          Have you been tested for COVID  No           Have you been told you were positive for COVID Yes (11-9-21)  Have you had any known exposure to someone that is positive for COVID No  Do you have a cough                   No              Do you have shortness of breath No                 Do you have a sore throat            No                Are you having chills                    No                Are you having muscle aches. No                    Please come to the hospital wearing a mask and have your significant other wear a mask as well. Both of you should check your temperature before leaving to come here,  if it is 100 or higher please call [phone] for instruction.

## 2021-12-15 NOTE — PROGRESS NOTES
Patient:  Teri Antonio  1975  Date of Service:  12/15/21      Do you currently have any of the following:    Fever: No  Headache:  No  Cough: No  Shortness of breath: No  Exposed to anyone with these symptoms: No       Patient presents with complaints of neck pain pain that started 3 months ago and has been getting worse. She states the pain began following No specific cause    Pain is constant and is described as aching, throbbing, shooting, stabbing and numb. She rates the pain as a 10/10 on her worst day , 4/10 on her best day, and a 6/10 on average on the VAS scale. Pain does radiate to left arm. She  has numbness, tingling, weakness of the left arm. Alleviating factors include: heat and rest.  Aggravating factors include:  movement. She states that the pain does keep her from sleeping at night. She took her last dose of Percocet this morning. She is  on NSAIDS and  is not on anticoagulation medications to include none and is managed by . Previous treatments: none     Personal Expectations from this treatment: decrease pain, return to work    Resp 20   Ht 5' 10\" (1.778 m)   Wt 247 lb (112 kg)   BMI 35.44 kg/m²     No LMP recorded.

## 2021-12-16 ENCOUNTER — HOSPITAL ENCOUNTER (OUTPATIENT)
Age: 46
Setting detail: OUTPATIENT SURGERY
Discharge: HOME | End: 2021-12-16
Attending: ANESTHESIOLOGY | Admitting: ANESTHESIOLOGY
Payer: COMMERCIAL

## 2021-12-16 ENCOUNTER — HOSPITAL ENCOUNTER (OUTPATIENT)
Dept: GENERAL RADIOLOGY | Age: 46
Discharge: HOME | End: 2021-12-18
Attending: ANESTHESIOLOGY
Payer: COMMERCIAL

## 2021-12-16 VITALS
WEIGHT: 247 LBS | RESPIRATION RATE: 20 BRPM | BODY MASS INDEX: 35.36 KG/M2 | TEMPERATURE: 98 F | HEART RATE: 88 BPM | HEIGHT: 70 IN | OXYGEN SATURATION: 96 % | DIASTOLIC BLOOD PRESSURE: 85 MMHG | SYSTOLIC BLOOD PRESSURE: 144 MMHG

## 2021-12-16 DIAGNOSIS — R52 PAIN MANAGEMENT: ICD-10-CM

## 2021-12-16 LAB
HCG, URINE, POC: NEGATIVE
Lab: NORMAL
NEGATIVE QC PASS/FAIL: NORMAL
POSITIVE QC PASS/FAIL: NORMAL

## 2021-12-16 PROCEDURE — 6360000002 HC RX W HCPCS: Performed by: ANESTHESIOLOGY

## 2021-12-16 PROCEDURE — 2500000003 HC RX 250 WO HCPCS: Performed by: ANESTHESIOLOGY

## 2021-12-16 PROCEDURE — 6360000004 HC RX CONTRAST MEDICATION: Performed by: ANESTHESIOLOGY

## 2021-12-16 PROCEDURE — 3600000002 HC SURGERY LEVEL 2 BASE: Performed by: ANESTHESIOLOGY

## 2021-12-16 PROCEDURE — 62321 NJX INTERLAMINAR CRV/THRC: CPT | Performed by: ANESTHESIOLOGY

## 2021-12-16 PROCEDURE — 7100000010 HC PHASE II RECOVERY - FIRST 15 MIN: Performed by: ANESTHESIOLOGY

## 2021-12-16 PROCEDURE — 3209999900 FLUORO FOR SURGICAL PROCEDURES

## 2021-12-16 PROCEDURE — 7100000011 HC PHASE II RECOVERY - ADDTL 15 MIN: Performed by: ANESTHESIOLOGY

## 2021-12-16 PROCEDURE — 3600000012 HC SURGERY LEVEL 2 ADDTL 15MIN: Performed by: ANESTHESIOLOGY

## 2021-12-16 PROCEDURE — 2709999900 HC NON-CHARGEABLE SUPPLY: Performed by: ANESTHESIOLOGY

## 2021-12-16 PROCEDURE — 2580000003 HC RX 258: Performed by: ANESTHESIOLOGY

## 2021-12-16 RX ORDER — LIDOCAINE HYDROCHLORIDE 5 MG/ML
INJECTION, SOLUTION INFILTRATION; INTRAVENOUS PRN
Status: DISCONTINUED | OUTPATIENT
Start: 2021-12-16 | End: 2021-12-16 | Stop reason: ALTCHOICE

## 2021-12-16 RX ORDER — METHYLPREDNISOLONE ACETATE 40 MG/ML
INJECTION, SUSPENSION INTRA-ARTICULAR; INTRALESIONAL; INTRAMUSCULAR; SOFT TISSUE PRN
Status: DISCONTINUED | OUTPATIENT
Start: 2021-12-16 | End: 2021-12-16 | Stop reason: ALTCHOICE

## 2021-12-16 RX ORDER — SODIUM CHLORIDE 9 MG/ML
INJECTION INTRAVENOUS PRN
Status: DISCONTINUED | OUTPATIENT
Start: 2021-12-16 | End: 2021-12-16 | Stop reason: ALTCHOICE

## 2021-12-16 RX ORDER — OXYCODONE HYDROCHLORIDE AND ACETAMINOPHEN 5; 325 MG/1; MG/1
1 TABLET ORAL EVERY 4 HOURS PRN
COMMUNITY
End: 2021-12-27 | Stop reason: SDUPTHER

## 2021-12-16 ASSESSMENT — PAIN - FUNCTIONAL ASSESSMENT: PAIN_FUNCTIONAL_ASSESSMENT: 0-10

## 2021-12-16 ASSESSMENT — PAIN SCALES - GENERAL: PAINLEVEL_OUTOF10: 0

## 2021-12-16 NOTE — H&P
11/2/21 1/31/22 Yes LYNSEY Duran CNP   clobetasol (TEMOVATE) 0.05 % ointment Apply topically 2 times daily Apply topically 2 times daily. 5/19/21  Yes LYNSEY Soriano CNP   folic acid (FOLVITE) 1 MG tablet TAKE 1 TABLET BY MOUTH EVERY DAY 3/16/21  Yes Historical Provider, MD   METHOTREXATE SODIUM IJ Inject 0.08 mg as directed once a week Friday   Yes Historical Provider, MD   Multiple Vitamin (MULTIVITAMIN PO) Take 1 tablet by mouth daily    Yes Historical Provider, MD   B-D INSULIN SYRINGE 1CC/25GX1\" 25G X 1\" 1 ML MISC INJECT FOUR TIMES A MONTH AS DIRECTED 12/6/21   Historical Provider, MD   BD INSULIN SYRINGE U/F 31G X 5/16\" 1 ML MISC USE FOR METHOTREXATE ADMINISTRATION ONCE A WEEK AS DIRECTED 12/12/21   Historical Provider, MD   gabapentin (NEURONTIN) 300 MG capsule TAKE 1 CAPSULE BY MOUTH 3 TIMES DAILY FOR 30 DAYS.  INTENDED SUPPLY: 30 DAYS  Patient not taking: Reported on 12/3/2021 11/30/21 12/30/21  Dre Gomes DO   diclofenac (VOLTAREN) 50 MG EC tablet Take 1 tablet by mouth 2 times daily  Patient not taking: Reported on 12/3/2021 10/12/21   LYNSEY Soriano CNP   metFORMIN (GLUCOPHAGE) 500 MG tablet Take 1 tablet by mouth 2 times daily  Patient not taking: Reported on 9/27/2021 4/6/21 7/5/21  LYNSEY Jasso CNP   ibuprofen (ADVIL;MOTRIN) 200 MG tablet Take 200 mg by mouth every 6 hours as needed for Pain prn     Historical Provider, MD       No Known Allergies    Social History     Socioeconomic History    Marital status:      Spouse name: Not on file    Number of children: Not on file    Years of education: Not on file    Highest education level: Not on file   Occupational History    Not on file   Tobacco Use    Smoking status: Never Smoker    Smokeless tobacco: Never Used   Vaping Use    Vaping Use: Never used   Substance and Sexual Activity    Alcohol use: No    Drug use: No    Sexual activity: Not on file   Other Topics Concern    Not on file Social History Narrative    Not on file     Social Determinants of Health     Financial Resource Strain: Low Risk     Difficulty of Paying Living Expenses: Not hard at all   Food Insecurity: No Food Insecurity    Worried About Running Out of Food in the Last Year: Never true    [address] in the Last Year: Never true   Transportation Needs:     Lack of Transportation (Medical): Not on file    Lack of Transportation (Non-Medical): Not on file   Physical Activity:     Days of Exercise per Week: Not on file    Minutes of Exercise per Session: Not on file   Stress:     Feeling of Stress : Not on file   Social Connections:     Frequency of Communication with Friends and Family: Not on file    Frequency of Social Gatherings with Friends and Family: Not on file    Attends [employer] Services: Not on file    Active Member of [address] [place] [address] or Organizations: Not on file    Attends Club or Organization Meetings: Not on file    Marital Status: Not on file   Intimate Partner Violence:     Fear of Current or Ex-Partner: Not on file    Emotionally Abused: Not on file    Physically Abused: Not on file    Sexually Abused: Not on file   Housing Stability:     Unable to Pay for Housing in the Last Year: Not on file    Number of [place] in the Last Year: Not on file    Unstable Housing in the Last Year: Not on file       Family History   Family history unknown: Yes         REVIEW OF SYSTEMS:    CONSTITUTIONAL:  negative for  fevers, chills, sweats and fatigue    RESPIRATORY:  negative for  dry cough, cough with sputum, dyspnea, wheezing and chest pain    CARDIOVASCULAR:  negative for chest pain, dyspnea, palpitations, syncope    GASTROINTESTINAL:  negative for nausea, vomiting, change in bowel habits, diarrhea, constipation and abdominal pain    MUSCULOSKELETAL: negative for muscle weakness    SKIN: negative for itching or rashes.     BEHAVIOR/PSYCH:  negative for poor appetite, increased appetite, decreased sleep and poor concentration    All other systems negative      PHYSICAL EXAM:    VITALS:  BP (!) 158/98   Pulse 80   Temp 98 °F (36.7 °C) (Temporal)   Resp 16   Ht 5' 10\" (1.778 m)   Wt 247 lb (112 kg)   SpO2 97%   BMI 35.44 kg/m²     CONSTITUTIONAL:  awake, alert, cooperative, no apparent distress, and appears stated age    EYES: PERRLA, EOMI    LUNGS:  No increased work of breathing, no audible wheezing    CARDIOVASCULAR:  regular rate and rhythm    ABDOMEN:  Soft non tender non distended     EXTREMITIES: no signs of clubbing or cyanosis. MUSCULOSKELETAL: negative for flaccid muscle tone or spastic movements. SKIN: gross examination reveals no signs of rashes, or diaphoresis. NEURO: Cranial nerves II-XII grossly intact. No signs of agitated mood. Assessment/Plan:    Patient  is here for cervical Shellie for  Neck pain/ UE pain. The patient was counseled at length about the risks of jc Covid-19 during their perioperative period and any recovery window from their procedure. The patient was made aware that jc Covid-19  may worsen their prognosis for recovering from their procedure  and lend to a higher morbidity and/or mortality risk. All material risks, benefits, and reasonable alternatives including postponing the procedure were discussed. The patient does wish to proceed with the procedure at this time.       Katlyn Mcgee MD

## 2021-12-16 NOTE — OP NOTE
Operative Note      Patient: Ankit Crockett  YOB: 1975  MRN: [id number]    Date of Procedure: 2021    Pre-Op Diagnosis: CERVICAL RADICULOPATHY, cervical DDD    Post-Op Diagnosis: Same       Procedure(s):  CERVICAL EPIDURAL STEROID INJECTION UNDER FLUORO AT C6 - C7 LEFT PARAMEDIAN    Surgeon(s):  Greta Mendoza MD    Assistant:   * No surgical staff found *    Anesthesia: Local    Estimated Blood Loss (mL): Minimal    Complications: None    Specimens:   * No specimens in log *    Implants:  * No implants in log *      Drains: * No LDAs found *    Findings: good needle placement    Detailed Description of Procedure:   2021    Patient: Ankit Crockett  :  1975  Age:  40 y.o. Sex:  female     PRE-PROCEDURE DIAGNOSIS: Cervical degenerative disc disease, cervical radicular pain. POST-PROCEDURE DIAGNOSIS: Same. PROCEDURE: Fluoroscopic guided cervical epidural steroid injection, #1 at C6-7 level. SURGEON: Greta Mendoza MD    ANESTHESIA: Local    ESTIMATED BLOOD LOSS: None.  ______________________________________________________________________  BRIEF HISTORY:  Ankit Crockett comes in today for the first  therapeutic cervical epidural injection under fluoroscopic guidance. The potential complications of this procedure were discussed with the her again today. She has elected to undergo the aforementioned procedure. Jean complete History & Physical examination were reviewed in depth, a copy of which is in the chart. DESCRIPTION OF PROCEDURE:    After confirming written and informed consent, a time-out was performed and Jean name and date of birth, the procedure to be performed as well as the plan for the location of the needle insertion were confirmed. The patient was brought into the procedure room and placed in the prone position with the head flexed midline on the fluoroscopy table.  A pillow was placed under the patient's head to increase cervical interlaminar space. Standard monitors were placed, and vital signs were observed throughout the procedure. The area was prepped with chloraprep and the C6-C7 interspace was marked under fluoroscopy. The skin and subcutaneous tissues at the above level were anesthestized with 0.5% lidocaine. An # 18 gauge 3-1/2 tuohy epidural needle was inserted and advanced toward the inferior lamina until bony contact was made. The needle was then advanced superiorly toward epidural space. From this point on loss of resistance technique with 5 cc glass syringe was used to confirm entrance of the needle into the epidural space under intermittent lateral fluoroscopy. Once in the epidural space, negative aspiration for blood and CSF was confirmed . Needle tip placement was confirmed by visualizing epidural spread of 1 ml of omnipaque 240 visualized in both AP and lateral live fluoroscopic views. Then after negative aspiration, a solution of 0.9 % saline 3 ml and 60 mg DepoMedrol was easily injected. The needle was gently removed intact. The patient neck was cleaned and a Band-Aid was placed over the needle insertion point. Disposition the patient tolerated the procedure well and there were no complications . Vital signs remained stable throughout the procedure. The patient was escorted to the recovery area where they remained until discharge and written discharge instructions for the procedure were given. Plan: Curtis Cruz will return to our pain management center as scheduled.      Estela Escoto MD

## 2021-12-27 ENCOUNTER — OFFICE VISIT (OUTPATIENT)
Dept: FAMILY MEDICINE CLINIC | Age: 46
End: 2021-12-27
Payer: COMMERCIAL

## 2021-12-27 VITALS
SYSTOLIC BLOOD PRESSURE: 122 MMHG | BODY MASS INDEX: 36.26 KG/M2 | WEIGHT: 253.3 LBS | TEMPERATURE: 97.1 F | RESPIRATION RATE: 20 BRPM | HEIGHT: 70 IN | OXYGEN SATURATION: 97 % | HEART RATE: 106 BPM | DIASTOLIC BLOOD PRESSURE: 70 MMHG

## 2021-12-27 DIAGNOSIS — M54.2 NECK PAIN: Primary | ICD-10-CM

## 2021-12-27 DIAGNOSIS — M62.838 CERVICAL PARASPINAL MUSCLE SPASM: ICD-10-CM

## 2021-12-27 DIAGNOSIS — K21.00 GASTROESOPHAGEAL REFLUX DISEASE WITH ESOPHAGITIS, UNSPECIFIED WHETHER HEMORRHAGE: ICD-10-CM

## 2021-12-27 DIAGNOSIS — E78.2 MIXED HYPERLIPIDEMIA: ICD-10-CM

## 2021-12-27 DIAGNOSIS — M25.512 ACUTE PAIN OF LEFT SHOULDER: ICD-10-CM

## 2021-12-27 DIAGNOSIS — M50.30 DDD (DEGENERATIVE DISC DISEASE), CERVICAL: ICD-10-CM

## 2021-12-27 DIAGNOSIS — M50.90 CERVICAL DISC DISEASE: ICD-10-CM

## 2021-12-27 DIAGNOSIS — M54.12 CERVICAL RADICULOPATHY: ICD-10-CM

## 2021-12-27 DIAGNOSIS — G44.89 OTHER HEADACHE SYNDROME: ICD-10-CM

## 2021-12-27 PROCEDURE — 96372 THER/PROPH/DIAG INJ SC/IM: CPT | Performed by: FAMILY MEDICINE

## 2021-12-27 PROCEDURE — 99214 OFFICE O/P EST MOD 30 MIN: CPT | Performed by: FAMILY MEDICINE

## 2021-12-27 RX ORDER — OMEPRAZOLE 20 MG/1
CAPSULE, DELAYED RELEASE ORAL
COMMUNITY
Start: 2021-12-24 | End: 2022-02-03

## 2021-12-27 RX ORDER — KETOROLAC TROMETHAMINE 30 MG/ML
60 INJECTION, SOLUTION INTRAMUSCULAR; INTRAVENOUS ONCE
Status: COMPLETED | OUTPATIENT
Start: 2021-12-27 | End: 2021-12-27

## 2021-12-27 RX ORDER — OXYCODONE HYDROCHLORIDE AND ACETAMINOPHEN 5; 325 MG/1; MG/1
1 TABLET ORAL EVERY 4 HOURS PRN
Qty: 42 TABLET | Refills: 0 | Status: SHIPPED
Start: 2021-12-27 | End: 2022-01-19 | Stop reason: SDUPTHER

## 2021-12-27 RX ORDER — KETOROLAC TROMETHAMINE 30 MG/ML
30 INJECTION, SOLUTION INTRAMUSCULAR; INTRAVENOUS ONCE
Qty: 1 ML | Refills: 0
Start: 2021-12-27 | End: 2021-12-27 | Stop reason: CLARIF

## 2021-12-27 RX ORDER — DULOXETIN HYDROCHLORIDE 30 MG/1
30 CAPSULE, DELAYED RELEASE ORAL DAILY
Qty: 90 CAPSULE | Refills: 1 | Status: SHIPPED
Start: 2021-12-27 | End: 2022-02-03

## 2021-12-27 RX ORDER — DEXAMETHASONE SODIUM PHOSPHATE 10 MG/ML
10 INJECTION INTRAMUSCULAR; INTRAVENOUS ONCE
Status: COMPLETED | OUTPATIENT
Start: 2021-12-27 | End: 2021-12-27

## 2021-12-27 RX ADMIN — KETOROLAC TROMETHAMINE 60 MG: 30 INJECTION, SOLUTION INTRAMUSCULAR; INTRAVENOUS at 15:25

## 2021-12-27 RX ADMIN — DEXAMETHASONE SODIUM PHOSPHATE 10 MG: 10 INJECTION INTRAMUSCULAR; INTRAVENOUS at 15:02

## 2021-12-27 ASSESSMENT — PATIENT HEALTH QUESTIONNAIRE - PHQ9
SUM OF ALL RESPONSES TO PHQ QUESTIONS 1-9: 0
1. LITTLE INTEREST OR PLEASURE IN DOING THINGS: 0
SUM OF ALL RESPONSES TO PHQ QUESTIONS 1-9: 0
2. FEELING DOWN, DEPRESSED OR HOPELESS: 0
SUM OF ALL RESPONSES TO PHQ QUESTIONS 1-9: 0
SUM OF ALL RESPONSES TO PHQ9 QUESTIONS 1 & 2: 0

## 2021-12-27 NOTE — PROGRESS NOTES
21  Maxcine Ala : 1975 Sex: female  Age: 40 y.o. Chief Complaint   Patient presents with    Neck Pain     2 week check up , has injection 2 weeks ago did not work still having neck pain,     Migraine     wants injection today, she usally gets deca and toradol for migrains and somthing else prescribed her med isnt working for it    [facility] Anxiety     having bad anxity and the shakes today        HPI  Migraines ,anxiety ,depression   Discussed meds     Review of Systems   Constitutional: Positive for fatigue. Negative for appetite change, chills, diaphoresis, fever and unexpected weight change. HENT: Negative for congestion, dental problem, ear discharge, ear pain, facial swelling, hearing loss, mouth sores, rhinorrhea, sinus pain, sore throat, tinnitus and voice change. Eyes: Negative for photophobia and visual disturbance (no visual changes). Respiratory: Negative for apnea, cough, chest tightness, shortness of breath and wheezing. Cardiovascular: Negative for chest pain and palpitations. Gastrointestinal: Negative for abdominal distention, constipation, diarrhea, nausea, rectal pain and vomiting. Reflux gerd    Endocrine: Negative for cold intolerance, heat intolerance and polyuria. Genitourinary: Positive for frequency. Negative for dysuria, hematuria and urgency. Musculoskeletal: Positive for arthralgias. Negative for back pain, gait problem, joint swelling, myalgias, neck pain and neck stiffness. Skin: Negative for rash and wound. Allergic/Immunologic: Negative for environmental allergies. Neurological: Positive for light-headedness and headaches. Negative for dizziness, tremors, seizures, syncope, facial asymmetry, speech difficulty, weakness and numbness. Hematological: Does not bruise/bleed easily. Psychiatric/Behavioral: Negative for agitation, hallucinations and suicidal ideas. The patient is not nervous/anxious. Physical Exam  Vitals reviewed. Constitutional:       General: She is not in acute distress. Appearance: She is not ill-appearing. HENT:      Head: Normocephalic. Right Ear: Tympanic membrane, ear canal and external ear normal. There is no impacted cerumen. Left Ear: Tympanic membrane, ear canal and external ear normal. There is no impacted cerumen. Nose: No rhinorrhea. Mouth/Throat:      Pharynx: No posterior oropharyngeal erythema. Eyes:      General:         Right eye: No discharge. Left eye: No discharge. Extraocular Movements: Extraocular movements intact. Pupils: Pupils are equal, round, and reactive to light. Neck:      Vascular: No carotid bruit. Cardiovascular:      Rate and Rhythm: Regular rhythm. Heart sounds: Normal heart sounds. No murmur heard. No gallop. Pulmonary:      Breath sounds: Normal breath sounds. No wheezing, rhonchi or rales. Abdominal:      Palpations: Abdomen is soft. There is no mass. Tenderness: There is no guarding or rebound. Hernia: No hernia is present. Musculoskeletal:         General: Normal range of motion. Cervical back: Neck supple. Tenderness present. Comments: Spasm and cervical spine decreased motion seems to be a had a contributor   Lymphadenopathy:      Cervical: No cervical adenopathy. Skin:     General: Skin is warm and dry. Findings: No bruising or rash. Neurological:      General: No focal deficit present. Mental Status: She is alert and oriented to person, place, and time. Cranial Nerves: No cranial nerve deficit. Motor: No weakness. Coordination: Coordination normal.      Gait: Gait normal.   Psychiatric:         Mood and Affect: Mood normal.         Behavior: Behavior normal.         Assessment and Plan:  Charo Driscoll was seen today for neck pain, migraine and anxiety. Diagnoses and all orders for this visit:    Neck pain  -     oxyCODONE-acetaminophen (PERCOCET) 5-325 MG per tablet;  Take 1 tablet by mouth every 4 hours as needed for Pain for up to 7 days. Acute pain of left shoulder  -     oxyCODONE-acetaminophen (PERCOCET) 5-325 MG per tablet; Take 1 tablet by mouth every 4 hours as needed for Pain for up to 7 days. Cervical disc disease    Cervical radiculopathy    Other headache syndrome    Gastroesophageal reflux disease with esophagitis, unspecified whether hemorrhage    Mixed hyperlipidemia    Cervical paraspinal muscle spasm    DDD (degenerative disc disease), cervical    Other orders  -     DULoxetine (CYMBALTA) 30 MG extended release capsule; Take 1 capsule by mouth daily  -     Discontinue: ketorolac (TORADOL) 30 MG/ML injection; Inject 1 mL into the muscle once for 1 dose  -     dexamethasone (DECADRON) injection 10 mg  -     ketorolac (TORADOL) injection 60 mg          Discussions/Education provided to patients during visit:  [] Discussed the importance to stop smoking. [] Advised to monitor eating habits. [] Reviewed and discussed Imaging results. [] Reviewed and discussed Lab results. [x] Discussed the importance of drinking plenty of fluids. [x] Cut down on Salt, Caffeine, and Sugar. [x] Continue Medications as Discussed. [x] Communicated with patient any concerns, to phone office. 1month   Toradol 60 mg IM given  Decadron 10 mg given  Treatments for her migraine and seems to be a good treatment at this time for her she has had these in the past and that works for so we gave her those shots. And we will see if that relieves her headache recheck in 1 month      Return in about 1 month (around 1/27/2022).       Seen by:     Anatoly Jameson, DO

## 2022-01-08 ASSESSMENT — ENCOUNTER SYMPTOMS
COUGH: 0
FACIAL SWELLING: 0
PHOTOPHOBIA: 0
CHEST TIGHTNESS: 0
SINUS PAIN: 0
NAUSEA: 0
RECTAL PAIN: 0
ABDOMINAL DISTENTION: 0
APNEA: 0
CONSTIPATION: 0
VOMITING: 0
SORE THROAT: 0
DIARRHEA: 0
RHINORRHEA: 0
SHORTNESS OF BREATH: 0
BACK PAIN: 0
WHEEZING: 0
VOICE CHANGE: 0

## 2022-01-14 RX ORDER — GABAPENTIN 300 MG/1
CAPSULE ORAL
Qty: 90 CAPSULE | Refills: 5 | Status: SHIPPED
Start: 2022-01-14 | End: 2022-02-28

## 2022-01-18 DIAGNOSIS — M54.2 NECK PAIN: ICD-10-CM

## 2022-01-18 DIAGNOSIS — M25.512 ACUTE PAIN OF LEFT SHOULDER: ICD-10-CM

## 2022-01-18 NOTE — TELEPHONE ENCOUNTER
Patients last appointment 12/27/2021.   Patients next scheduled appointment   Future Appointments   Date Time Provider Jimmie Ngo   1/27/2022  9:45 AM Cookie Eisenberg MD [facility]   1/31/2022  9:00 AM DO PATRICE [facility]

## 2022-01-19 ENCOUNTER — TELEPHONE (OUTPATIENT)
Dept: FAMILY MEDICINE CLINIC | Age: 47
End: 2022-01-19

## 2022-01-19 RX ORDER — OXYCODONE HYDROCHLORIDE AND ACETAMINOPHEN 5; 325 MG/1; MG/1
1 TABLET ORAL EVERY 4 HOURS PRN
Qty: 42 TABLET | Refills: 0 | Status: SHIPPED
Start: 2022-01-19 | End: 2022-02-21 | Stop reason: SDUPTHER

## 2022-01-19 NOTE — TELEPHONE ENCOUNTER
Refill for her Percocet is not at the pharmacy. Rx request sent to Dr. Vivian Rosas 1/18/22. Med list does not show Dr. Vivian Rosas has addressed this. Tried to inform pt., had to leave a message on her answer machine to call me back. Pt called back advised Dr. Vivian Rosas has not sent in her rx. Dr. Vivian Rosas did you get this rx for her Percocet?

## 2022-01-27 ENCOUNTER — PREP FOR PROCEDURE (OUTPATIENT)
Dept: PAIN MANAGEMENT | Age: 47
End: 2022-01-27

## 2022-01-27 ENCOUNTER — OFFICE VISIT (OUTPATIENT)
Dept: PAIN MANAGEMENT | Age: 47
End: 2022-01-27
Payer: COMMERCIAL

## 2022-01-27 VITALS
RESPIRATION RATE: 16 BRPM | TEMPERATURE: 98.1 F | BODY MASS INDEX: 39.71 KG/M2 | WEIGHT: 253 LBS | HEIGHT: 67 IN | OXYGEN SATURATION: 98 % | HEART RATE: 78 BPM | SYSTOLIC BLOOD PRESSURE: 138 MMHG | DIASTOLIC BLOOD PRESSURE: 88 MMHG

## 2022-01-27 DIAGNOSIS — M54.12 CERVICAL RADICULOPATHY: ICD-10-CM

## 2022-01-27 DIAGNOSIS — M54.12 CERVICAL RADICULOPATHY: Primary | ICD-10-CM

## 2022-01-27 DIAGNOSIS — M50.30 DDD (DEGENERATIVE DISC DISEASE), CERVICAL: Primary | ICD-10-CM

## 2022-01-27 DIAGNOSIS — M47.812 CERVICAL SPONDYLOSIS: ICD-10-CM

## 2022-01-27 DIAGNOSIS — M50.30 DDD (DEGENERATIVE DISC DISEASE), CERVICAL: ICD-10-CM

## 2022-01-27 PROCEDURE — 99213 OFFICE O/P EST LOW 20 MIN: CPT | Performed by: ANESTHESIOLOGY

## 2022-01-27 RX ORDER — SECUKINUMAB 150 MG/ML
300 INJECTION SUBCUTANEOUS ONCE
COMMUNITY

## 2022-01-27 NOTE — PROGRESS NOTES
[facility]        [address]  [place], [address] [facility]  Dept: [phone]      Follow up Note      Nickoce Done     Date of Visit:  1/27/2022    CC:  Patient presents for follow up   Chief Complaint   Patient presents with    Follow-up     post procedure        HPI:    Neck pain and left UE pain for > 3 months.     Sudden onset pain over the neck with left UE radiation. Pain causes functional limitations/ limits Adl's : Yes     Nursing notes and details of the pain history reviewed. Please see intake notes for details.     Has been evaluated by [facility]- Dr. Gonzalez Points- referred for interventions.     NOTE:  H/o psoriatic arthritis- on Methotrexate  S/p Gastric bypass in 2009  H/o fibromaylgia- diffuse pain     Previous treatments:   Physical Therapy/ HEP : yes, has been doing HEP since the onset      Medications: - NSAID's : yes -                       - Membrane stabilizers : yes - Gabapentin                       - Opioids : yes, short term                       - Adjuvants or Others : yes, oral steroids/ muscle relaxants     Surgeries: no C spine surgery     She has not been on anticoagulation medications      She is not diabetic.     H/O Smoking: no  H/O alcohol abuse : no  H/O Illicit drug use : no     Employment: employed     Imaging:      MRI of Cervical spine: 10/11/2021:  FINDINGS:  Alignment is relatively anatomic.  There is preservation of disc height and signal      intensity.  Bone marrow signal intensity is normal.  The craniocervical junction appears intact.      No abnormal signal is identified within the cord in the cervical region.  The thyroid gland is      mildly heterogeneous with a enhancing 4 mm nodule in the left thyroid gland.  Additional findings      and levels are as follows:             C5-6:  Minimal broad-based disc osteophyte complex.  4.5 mm nerve root sheath diverticula on the      left.             C6-7:  Moderate broad-based disc osteophyte complex.  Uncovertebral hypertrophy.  Mild to moderate      central canal stenosis.  6.5mm AP canal diameter.  Moderate to severe left neural foraminal      stenosis.  Mild right neural foraminal stenosis.             IMPRESSION 3T MRI Cervical Spine without and with intravenous contrast:      1.  Disc disease and degenerative changes as discussed worse at C6-7.  Correlate with dermatome      mapping.      2.  4 mm enhancing nodule in the left thyroid gland. OARRS report[de-identified] Reviewed    Past Medical History:   Diagnosis Date    Arthritis     psoriatic    Chronic back pain     Depression     Headache(784.0)     Hemorrhoids     Herpes genital     \"years ago\" ; no outbreaks    Irregular periods/menstrual cycles     Renal calculi     history of    Varicose veins        Past Surgical History:   Procedure Laterality Date    PAIN MANAGEMENT PROCEDURE N/A 12/16/2021    CERVICAL EPIDURAL STEROID INJECTION UNDER FLUORO AT C6 - C7 LEFT PARAMEDIAN performed by Mindi Dela Cruz MD at [address] [place] [address]  5/31/11    [facility]    TUBAL LIGATION  2001    UPPER GASTROINTESTINAL ENDOSCOPY  2011       Prior to Admission medications    Medication Sig Start Date End Date Taking?  Authorizing Provider   secukinumab (COSENTYX) 150 MG/ML SOSY Inject 300 mg into the skin once   Yes Historical Provider, MD   gabapentin (NEURONTIN) 300 MG capsule TAKE 1 CAPSULE BY MOUTH 3 TIMES DAILY FOR 30 DAYS 1/14/22 4/14/22 Yes Treva Mcbride, DO   omeprazole (PRILOSEC) 20 MG delayed release capsule  12/24/21  Yes Historical Provider, MD   DULoxetine (CYMBALTA) 30 MG extended release capsule Take 1 capsule by mouth daily 12/27/21  Yes Gabriela Truong, DO   B-D INSULIN SYRINGE 1CC/25GX1\" 25G X 1\" 1 ML MISC INJECT FOUR TIMES A MONTH AS DIRECTED 12/6/21  Yes Historical Provider, MD   BD INSULIN SYRINGE U/F 31G X 5/16\" 1 ML MISC USE FOR METHOTREXATE ADMINISTRATION ONCE A WEEK AS DIRECTED 12/12/21  Yes Historical Provider, MD sucralfate (CARAFATE) 1 GM tablet Take 1 tablet by mouth 4 times daily 12/7/21  Yes Kiley Houston, DO   NEEDLE, DISP, 27 G (EASY TOUCH FLIPLOCK NEEDLES) 27G X 1/2\" MISC Inject 4 times a month 12/6/21  Yes Kiley Lagos, DO   tiZANidine (ZANAFLEX) 4 MG tablet Take 1 tablet by mouth 3 times daily 12/3/21 3/3/22 Yes Yashira Truong, DO   OZEMPIC, 0.25 OR 0.5 MG/DOSE, 2 MG/1.5ML SOPN Inject 0.25 mg as directed once a week 12/3/21  Yes Kiley Lagos, DO   pantoprazole (PROTONIX) 40 MG tablet Take 1 tablet by mouth 2 times daily (before meals) 12/3/21  Yes Kiley Lagos,    SUMAtriptan (IMITREX) 50 MG tablet take 1 tablet by mouth AT ONSET OF MIGRAINE MAY REPEAT 4 HOURS LATER IF NO IMPROVEMENT 11/24/21  Yes LYNSEY Pedroza CNP   venlafaxine (EFFEXOR XR) 150 MG extended release capsule Take 1 capsule by mouth daily 11/2/21 1/31/22 Yes LYNSEY Duran CNP   clobetasol (TEMOVATE) 0.05 % ointment Apply topically 2 times daily Apply topically 2 times daily.  5/19/21  Yes LYNSEY Pedroza CNP   folic acid (FOLVITE) 1 MG tablet TAKE 1 TABLET BY MOUTH EVERY DAY 3/16/21  Yes Historical Provider, MD   Multiple Vitamin (MULTIVITAMIN PO) Take 1 tablet by mouth daily    Yes Historical Provider, MD   diclofenac (VOLTAREN) 50 MG EC tablet Take 1 tablet by mouth 2 times daily  Patient not taking: Reported on 12/3/2021 10/12/21   LYNSEY Pedroza CNP   metFORMIN (GLUCOPHAGE) 500 MG tablet Take 1 tablet by mouth 2 times daily  Patient not taking: Reported on 9/27/2021 4/6/21 7/5/21  LYNSEY Fisher CNP       No Known Allergies    Social History     Socioeconomic History    Marital status:      Spouse name: Not on file    Number of children: Not on file    Years of education: Not on file    Highest education level: Not on file   Occupational History    Not on file   Tobacco Use    Smoking status: Never Smoker    Smokeless tobacco: Never Used   Vaping Use    Vaping Use: Never in no distress and A & O x 3  Build:Overweight  Function: Rises from seated position easily and Moves about room without difficulty     HEENT:     Head:normocephalic, atraumatic     Lungs:     Breathing:normal breathing pattern      CVS:     RRR     Abdomen:     Shape:non-distended and normal     Cervical spine:     Inspection:normal  Palpation:tenderness paravertebral muscles, tenderness trapezium, left, right and positive. Range of motion:some reduction +, pain on end ROM +   Spurling's: positive left     Thoracic spine:                Spine inspection:normal      Lumbar spine:     Spine inspection: Normal   Palpation: Tenderness paravertebral muscles No bilaterally  Range of motion: Decreased,  SLR : negative bilaterally     Musculoskeletal:     Trigger points -no     Extremities:     Tremors:None bilaterally upper and lower  Edema:none x all 4 extremities     Neurological:     Sensory: Normal to light touch      Motor:   Right  5/5              Left  4/5               Right Bicep 5/5           Left Bicep 5/5              Right Triceps 5/5       Left Triceps 5/5          Right Deltoid 5/5     Left Deltoid 5/5                  Right Quadriceps 5/5          Left Quadriceps 5/5           Right Gastrocnemius 5/5    Left Gastrocnemius 5/5  Right Ant Tibialis 5/5  Left Ant Tibialis 5/5     Gait:normal Yes     Dermatology:     Skin:no rashes or lesions noted     Assessment/Plan:       Diagnosis Orders   1. DDD (degenerative disc disease), cervical      2. Cervical radiculopathy      3. Cervical spondylosis      4. Obesity, unspecified classification, unspecified obesity type, unspecified whether serious comorbidity present            40 y.o.  female with h/o neck pain and left UE radicular pain. Failed conservative treatment.   MRI: C6-7 DDD/ stenosis     Has been evaluated by NSG- recommended interventions.     Past h/o gastric bypass/ h/o psoriatic arthritis.     She takes percocet prn/ Zanaflex Prn.     Has tried Gabapentin. S/P Cervical DOMINICK # 1 with > 50% improvement. Has residual pain, causing functional limitations. Doing HEP.    Plan:  # 2 Cervical Epidural steroid injection under fluoroscopy. RBA discussed patient agreed.     Physical therapy.     Instructed to take percocet only prn for severe pain. Discussed issues with long term opioid use. She has been started on Cymbalta- which is helping her.    (she apparently is not taking gabapentin-).  Consider changing it to Lyrica.      Counseling : Patient encouraged to stay active and to watch/lose weight     Encouraged to continue Regular home exercise program as tolerated - stretching / strengthening.     Treatment plan discussed with the patient including medication and procedure side effects.     Grayson Martines MD    CC;  Zohaib Talley, DO

## 2022-01-27 NOTE — PROGRESS NOTES
Do you currently have any of the following:    Fever: No  Headache:  No  Cough: No  Shortness of breath: No  Exposed to anyone with these symptoms: No         Colin Medina presents to the [facility] on 1/27/2022. Deni Rico is complaining of pain neck /left shoulder blade  The pain is intermittent. The pain is described as burning and numb. Pain is rated on her best day at a 3, on her worst day at a 7, and on average at a 4 on the VAS scale. She took her last dose of     Any procedures since your last visit:     Pacemaker or defibrillator: No managed by     She is not on NSAIDS and is not on anticoagulation medications to include none and is managed by     Medication Contract and Consent for Opioid Use Documents Filed      No documents found                /88   Pulse 78   Temp 98.1 °F (36.7 °C) (Infrared)   Resp 16   Ht 5' 7\" (1.702 m)   Wt 253 lb (114.8 kg)   SpO2 98%   BMI 39.63 kg/m²      No LMP recorded.

## 2022-01-31 ENCOUNTER — OFFICE VISIT (OUTPATIENT)
Dept: FAMILY MEDICINE CLINIC | Age: 47
End: 2022-01-31
Payer: COMMERCIAL

## 2022-01-31 VITALS
TEMPERATURE: 97.2 F | HEART RATE: 71 BPM | HEIGHT: 67 IN | WEIGHT: 247.6 LBS | OXYGEN SATURATION: 99 % | SYSTOLIC BLOOD PRESSURE: 122 MMHG | DIASTOLIC BLOOD PRESSURE: 80 MMHG | BODY MASS INDEX: 38.86 KG/M2

## 2022-01-31 DIAGNOSIS — Z00.00 ROUTINE GENERAL MEDICAL EXAMINATION AT A HEALTH CARE FACILITY: ICD-10-CM

## 2022-01-31 DIAGNOSIS — Z00.00 ROUTINE GENERAL MEDICAL EXAMINATION AT A HEALTH CARE FACILITY: Primary | ICD-10-CM

## 2022-01-31 LAB
ALBUMIN SERPL-MCNC: 4.2 G/DL (ref 3.5–5.2)
ALP BLD-CCNC: 76 U/L (ref 35–104)
ALT SERPL-CCNC: 17 U/L (ref 0–32)
ANION GAP SERPL CALCULATED.3IONS-SCNC: 13 MMOL/L (ref 7–16)
AST SERPL-CCNC: 21 U/L (ref 0–31)
BASOPHILS ABSOLUTE: 0.05 E9/L (ref 0–0.2)
BASOPHILS RELATIVE PERCENT: 0.7 % (ref 0–2)
BILIRUB SERPL-MCNC: 0.5 MG/DL (ref 0–1.2)
BILIRUBIN, POC: NORMAL
BLOOD URINE, POC: NEGATIVE
BUN BLDV-MCNC: 14 MG/DL (ref 6–20)
CALCIUM SERPL-MCNC: 9.8 MG/DL (ref 8.6–10.2)
CHLORIDE BLD-SCNC: 105 MMOL/L (ref 98–107)
CHOLESTEROL, TOTAL: 179 MG/DL (ref 0–199)
CLARITY, POC: NORMAL
CO2: 25 MMOL/L (ref 22–29)
COLOR, POC: NORMAL
CREAT SERPL-MCNC: 0.8 MG/DL (ref 0.5–1)
EOSINOPHILS ABSOLUTE: 0.16 E9/L (ref 0.05–0.5)
EOSINOPHILS RELATIVE PERCENT: 2.3 % (ref 0–6)
GFR AFRICAN AMERICAN: >60
GFR NON-AFRICAN AMERICAN: >60 ML/MIN/1.73
GLUCOSE BLD-MCNC: 99 MG/DL (ref 74–99)
GLUCOSE URINE, POC: NEGATIVE
HBA1C MFR BLD: 5.4 % (ref 4–5.6)
HCT VFR BLD CALC: 38.2 % (ref 34–48)
HDLC SERPL-MCNC: 46 MG/DL
HEMOGLOBIN: 12.2 G/DL (ref 11.5–15.5)
IMMATURE GRANULOCYTES #: 0.02 E9/L
IMMATURE GRANULOCYTES %: 0.3 % (ref 0–5)
KETONES, POC: NORMAL
LDL CHOLESTEROL CALCULATED: 105 MG/DL (ref 0–99)
LEUKOCYTE EST, POC: NEGATIVE
LYMPHOCYTES ABSOLUTE: 1.79 E9/L (ref 1.5–4)
LYMPHOCYTES RELATIVE PERCENT: 25.8 % (ref 20–42)
MCH RBC QN AUTO: 31.7 PG (ref 26–35)
MCHC RBC AUTO-ENTMCNC: 31.9 % (ref 32–34.5)
MCV RBC AUTO: 99.2 FL (ref 80–99.9)
MONOCYTES ABSOLUTE: 0.47 E9/L (ref 0.1–0.95)
MONOCYTES RELATIVE PERCENT: 6.8 % (ref 2–12)
NEUTROPHILS ABSOLUTE: 4.46 E9/L (ref 1.8–7.3)
NEUTROPHILS RELATIVE PERCENT: 64.1 % (ref 43–80)
NITRITE, POC: NEGATIVE
PDW BLD-RTO: 13.6 FL (ref 11.5–15)
PH, POC: 6
PLATELET # BLD: 340 E9/L (ref 130–450)
PMV BLD AUTO: 9.9 FL (ref 7–12)
POTASSIUM SERPL-SCNC: 4.5 MMOL/L (ref 3.5–5)
PROTEIN, POC: NORMAL
RBC # BLD: 3.85 E12/L (ref 3.5–5.5)
SODIUM BLD-SCNC: 143 MMOL/L (ref 132–146)
SPECIFIC GRAVITY, POC: 1.03
T4 FREE: 1.13 NG/DL (ref 0.93–1.7)
TOTAL PROTEIN: 6.8 G/DL (ref 6.4–8.3)
TRIGL SERPL-MCNC: 139 MG/DL (ref 0–149)
TSH SERPL DL<=0.05 MIU/L-ACNC: 0.95 UIU/ML (ref 0.27–4.2)
UROBILINOGEN, POC: NORMAL
VLDLC SERPL CALC-MCNC: 28 MG/DL
WBC # BLD: 7 E9/L (ref 4.5–11.5)

## 2022-01-31 PROCEDURE — 99214 OFFICE O/P EST MOD 30 MIN: CPT | Performed by: FAMILY MEDICINE

## 2022-01-31 PROCEDURE — 81002 URINALYSIS NONAUTO W/O SCOPE: CPT | Performed by: FAMILY MEDICINE

## 2022-01-31 RX ORDER — SUMATRIPTAN 50 MG/1
TABLET, FILM COATED ORAL
Qty: 9 TABLET | Refills: 1 | Status: SHIPPED
Start: 2022-01-31 | End: 2022-02-21

## 2022-01-31 RX ORDER — PANTOPRAZOLE SODIUM 40 MG/1
40 TABLET, DELAYED RELEASE ORAL
Qty: 180 TABLET | Refills: 1 | Status: SHIPPED
Start: 2022-01-31 | End: 2022-02-15 | Stop reason: SDUPTHER

## 2022-01-31 RX ORDER — DULOXETIN HYDROCHLORIDE 60 MG/1
60 CAPSULE, DELAYED RELEASE ORAL DAILY
Qty: 30 CAPSULE | Refills: 5 | Status: SHIPPED
Start: 2022-01-31 | End: 2022-07-20

## 2022-01-31 RX ORDER — ONDANSETRON 4 MG/1
4 TABLET, ORALLY DISINTEGRATING ORAL 3 TIMES DAILY PRN
Qty: 60 TABLET | Refills: 1 | Status: SHIPPED
Start: 2022-01-31 | End: 2022-09-06

## 2022-01-31 RX ORDER — CLOTRIMAZOLE AND BETAMETHASONE DIPROPIONATE 10; .64 MG/G; MG/G
CREAM TOPICAL
Qty: 60 G | Refills: 2 | Status: SHIPPED | OUTPATIENT
Start: 2022-01-31

## 2022-01-31 ASSESSMENT — ENCOUNTER SYMPTOMS
RHINORRHEA: 0
SHORTNESS OF BREATH: 0
DIARRHEA: 0
SORE THROAT: 0
APNEA: 0
FACIAL SWELLING: 0
NAUSEA: 0
SINUS PAIN: 0
COUGH: 0
ABDOMINAL DISTENTION: 0
CONSTIPATION: 0
CHEST TIGHTNESS: 0
WHEEZING: 0
RECTAL PAIN: 0
PHOTOPHOBIA: 0
VOICE CHANGE: 0
BACK PAIN: 0
VOMITING: 0

## 2022-01-31 NOTE — PROGRESS NOTES
22  Alyse Phelan : 1975 Sex: female  Age: 40 y.o. Chief Complaint   Patient presents with    Annual Exam       HPI follow UP   HX OF PSORIATIC ARTHRITIS   NOW  SAYING ARTHRITIS DR SAYS FIBROMYALGIA     Review of Systems   Constitutional: Positive for fatigue. Negative for appetite change, chills, diaphoresis, fever and unexpected weight change. HENT: Negative for congestion, dental problem, ear discharge, ear pain, facial swelling, hearing loss, mouth sores, rhinorrhea, sinus pain, sore throat, tinnitus and voice change. Eyes: Negative for photophobia and visual disturbance (no visual changes). Respiratory: Negative for apnea, cough, chest tightness, shortness of breath and wheezing. Cardiovascular: Negative for chest pain and palpitations. Gastrointestinal: Negative for abdominal distention, constipation, diarrhea, nausea, rectal pain and vomiting. Reflux gerd    Endocrine: Negative for cold intolerance, heat intolerance and polyuria. Genitourinary: Positive for frequency. Negative for dysuria, hematuria and urgency. Musculoskeletal: Positive for arthralgias. Negative for back pain, gait problem, joint swelling, myalgias, neck pain and neck stiffness. Skin: Negative for rash and wound. Allergic/Immunologic: Negative for environmental allergies. Neurological: Positive for light-headedness and headaches. Negative for dizziness, tremors, seizures, syncope, facial asymmetry, speech difficulty, weakness and numbness. Hematological: Does not bruise/bleed easily. Psychiatric/Behavioral: Negative for agitation, hallucinations and suicidal ideas. The patient is not nervous/anxious. Physical Exam  Vitals reviewed. Constitutional:       General: She is not in acute distress. Appearance: She is not ill-appearing. HENT:      Head: Normocephalic. Right Ear: Tympanic membrane, ear canal and external ear normal. There is no impacted cerumen.       Left Ear: Tympanic membrane, ear canal and external ear normal. There is no impacted cerumen. Nose: No rhinorrhea. Mouth/Throat:      Pharynx: No posterior oropharyngeal erythema. Eyes:      General:         Right eye: No discharge. Left eye: No discharge. Extraocular Movements: Extraocular movements intact. Pupils: Pupils are equal, round, and reactive to light. Neck:      Vascular: No carotid bruit. Cardiovascular:      Rate and Rhythm: Regular rhythm. Heart sounds: Normal heart sounds. No murmur heard. No gallop. Pulmonary:      Breath sounds: Normal breath sounds. No wheezing, rhonchi or rales. Abdominal:      Palpations: Abdomen is soft. There is no mass. Tenderness: There is no guarding or rebound. Hernia: No hernia is present. Musculoskeletal:      Cervical back: Neck supple. Tenderness present. Comments: Spasm and cervical spine decreased motion seems to be a had a contributor   Lymphadenopathy:      Cervical: No cervical adenopathy. Skin:     General: Skin is warm and dry. Findings: No bruising or rash. Neurological:      General: No focal deficit present. Mental Status: She is alert and oriented to person, place, and time. Cranial Nerves: No cranial nerve deficit. Motor: No weakness. Coordination: Coordination normal.      Gait: Gait normal.   Psychiatric:         Mood and Affect: Mood normal.         Behavior: Behavior normal.         Assessment and Plan:  Trent Santillan was seen today for annual exam.    Diagnoses and all orders for this visit:    Routine general medical examination at a health care facility  -     POCT Urinalysis no Micro  -     Hemoglobin A1C; Future  -     TSH without Reflex; Future  -     T4, Free; Future  -     Lipid Panel; Future  -     Comprehensive Metabolic Panel;  Future  -     CBC Auto Differential; Future    Other orders  -     SUMAtriptan (IMITREX) 50 MG tablet; take 1 tablet by mouth AT ONSET OF MIGRAINE MAY REPEAT 4 HOURS LATER IF NO IMPROVEMENT  -     pantoprazole (PROTONIX) 40 MG tablet; Take 1 tablet by mouth 2 times daily (before meals)  -     DULoxetine (CYMBALTA) 60 MG extended release capsule; Take 1 capsule by mouth daily  -     ondansetron (ZOFRAN-ODT) 4 MG disintegrating tablet; Take 1 tablet by mouth 3 times daily as needed for Nausea or Vomiting  -     clotrimazole-betamethasone (LOTRISONE) 1-0.05 % cream; Apply topically 2 times daily. -     Cancel: CBC Auto Differential; Future  -     Cancel: Comprehensive Metabolic Panel; Future  -     Cancel: Lipid Panel; Future  -     Cancel: T4, Free; Future  -     Cancel: TSH without Reflex; Future  -     Cancel: Hemoglobin A1C; Future  -     US THYROID; Future          Discussions/Education provided to patients during visit:  [] Discussed the importance to stop smoking. [] Advised to monitor eating habits. [] Reviewed and discussed Imaging results. [] Reviewed and discussed Lab results. [] Discussed the importance of drinking plenty of fluids. [] Cut down on Salt, Caffeine, and Sugar. [x] Continue Medications as Discussed. February US OF THYROID  AT [facility] PATIENT PREFERENCE   Continue meds  Currently seeing rheumatologist who now believe that she has fibromyalgia also  [x] Communicated with patient any concerns, to phone office. Labs ordered and recheck in with labs in 2 months      No follow-ups on file.       Seen by:     Nasir Presley DO

## 2022-02-01 ENCOUNTER — TELEPHONE (OUTPATIENT)
Dept: FAMILY MEDICINE CLINIC | Age: 47
End: 2022-02-01

## 2022-02-01 ENCOUNTER — TELEPHONE (OUTPATIENT)
Dept: PAIN MANAGEMENT | Age: 47
End: 2022-02-01

## 2022-02-01 NOTE — TELEPHONE ENCOUNTER
Per patient she saw her blood work results on my chart and it shows anemia. Should she get B-12 injections. Informed patient Dr. Carmen Driscoll has not reviewed the results yet - lets see what he says.

## 2022-02-01 NOTE — TELEPHONE ENCOUNTER
Call to Vance Maravilla that procedure was approved for 02/07/2022 and that the surgery center should call her a few days before for the pre op call and after 3:00 PM the business day before with the arrival time. Instructed Neha to hold ibuprofen and voltaren for 24 hours, naprosyn for 4 days and any aspirin containing products or fish oil for 7 days. Instructed to call office back if any questions. Karlene Belle verbalized understanding.     Shanelle Parish RN  Pain Management

## 2022-02-03 RX ORDER — FERROUS SULFATE 325(65) MG
325 TABLET ORAL
COMMUNITY

## 2022-02-03 RX ORDER — UBIDECARENONE 75 MG
50 CAPSULE ORAL DAILY
COMMUNITY
End: 2022-05-26

## 2022-02-03 NOTE — PROGRESS NOTES
Have you been tested for COVID  Yes           Have you been told you were positive for COVID Yes 11/2021  Have you had any known exposure to someone that is positive for COVID No  Do you have a cough                   No              Do you have shortness of breath No                 Do you have a sore throat            No                Are you having chills                    No                Are you having muscle aches. No                    Please come to the hospital wearing a mask and have your significant other wear a mask as well. Both of you should check your temperature before leaving to come here,  if it is 100 or higher please call [phone] for instruction. [facility] PAIN MANAGEMENT  INSTRUCTIONS  . .......................................................................................................................................... [x] Parking the day of Surgery is located in the [facility].   Upon entering the door, make immediate right into the surgery reception room    [x]  Bring photo ID and insurance card     [x] You may have a light breakfast day of procedure    [x]  Wear loose comfortable clothing    [x]  Please follow instructions for medications as given per Dr's office    [x] You can expect a call the business day prior to procedure to notify you of your arrival time     [x] Please arrange for     []  Other instructions

## 2022-02-07 ENCOUNTER — HOSPITAL ENCOUNTER (OUTPATIENT)
Dept: GENERAL RADIOLOGY | Age: 47
Setting detail: OUTPATIENT SURGERY
Discharge: HOME | End: 2022-02-09
Attending: ANESTHESIOLOGY
Payer: COMMERCIAL

## 2022-02-07 ENCOUNTER — HOSPITAL ENCOUNTER (OUTPATIENT)
Age: 47
Setting detail: OUTPATIENT SURGERY
Discharge: HOME | End: 2022-02-07
Attending: ANESTHESIOLOGY | Admitting: ANESTHESIOLOGY
Payer: COMMERCIAL

## 2022-02-07 VITALS
WEIGHT: 247 LBS | RESPIRATION RATE: 18 BRPM | TEMPERATURE: 97.8 F | HEIGHT: 67 IN | SYSTOLIC BLOOD PRESSURE: 176 MMHG | DIASTOLIC BLOOD PRESSURE: 83 MMHG | BODY MASS INDEX: 38.77 KG/M2 | OXYGEN SATURATION: 100 % | HEART RATE: 78 BPM

## 2022-02-07 DIAGNOSIS — R52 PAIN MANAGEMENT: ICD-10-CM

## 2022-02-07 LAB
HCG, URINE, POC: NEGATIVE
Lab: NORMAL
NEGATIVE QC PASS/FAIL: NORMAL
POSITIVE QC PASS/FAIL: NORMAL

## 2022-02-07 PROCEDURE — 7100000010 HC PHASE II RECOVERY - FIRST 15 MIN: Performed by: ANESTHESIOLOGY

## 2022-02-07 PROCEDURE — 3209999900 FLUORO FOR SURGICAL PROCEDURES

## 2022-02-07 PROCEDURE — 6360000002 HC RX W HCPCS: Performed by: ANESTHESIOLOGY

## 2022-02-07 PROCEDURE — 7100000011 HC PHASE II RECOVERY - ADDTL 15 MIN: Performed by: ANESTHESIOLOGY

## 2022-02-07 PROCEDURE — A4216 STERILE WATER/SALINE, 10 ML: HCPCS | Performed by: ANESTHESIOLOGY

## 2022-02-07 PROCEDURE — 62321 NJX INTERLAMINAR CRV/THRC: CPT | Performed by: ANESTHESIOLOGY

## 2022-02-07 PROCEDURE — 2709999900 HC NON-CHARGEABLE SUPPLY: Performed by: ANESTHESIOLOGY

## 2022-02-07 PROCEDURE — 2580000003 HC RX 258: Performed by: ANESTHESIOLOGY

## 2022-02-07 PROCEDURE — 6360000004 HC RX CONTRAST MEDICATION: Performed by: ANESTHESIOLOGY

## 2022-02-07 PROCEDURE — 2500000003 HC RX 250 WO HCPCS: Performed by: ANESTHESIOLOGY

## 2022-02-07 PROCEDURE — 3600000002 HC SURGERY LEVEL 2 BASE: Performed by: ANESTHESIOLOGY

## 2022-02-07 RX ORDER — LIDOCAINE HYDROCHLORIDE 5 MG/ML
INJECTION, SOLUTION INFILTRATION; INTRAVENOUS PRN
Status: DISCONTINUED | OUTPATIENT
Start: 2022-02-07 | End: 2022-02-07 | Stop reason: ALTCHOICE

## 2022-02-07 RX ORDER — METHYLPREDNISOLONE ACETATE 40 MG/ML
INJECTION, SUSPENSION INTRA-ARTICULAR; INTRALESIONAL; INTRAMUSCULAR; SOFT TISSUE PRN
Status: DISCONTINUED | OUTPATIENT
Start: 2022-02-07 | End: 2022-02-07 | Stop reason: ALTCHOICE

## 2022-02-07 RX ORDER — OMEPRAZOLE 20 MG/1
20 CAPSULE, DELAYED RELEASE ORAL EVERY 12 HOURS
COMMUNITY
End: 2022-02-28

## 2022-02-07 RX ORDER — SODIUM CHLORIDE 9 MG/ML
INJECTION INTRAVENOUS PRN
Status: DISCONTINUED | OUTPATIENT
Start: 2022-02-07 | End: 2022-02-07 | Stop reason: ALTCHOICE

## 2022-02-07 ASSESSMENT — PAIN - FUNCTIONAL ASSESSMENT: PAIN_FUNCTIONAL_ASSESSMENT: 0-10

## 2022-02-07 NOTE — H&P
LAURIE FERREIRA [facility] - BE[facility]AVIOR[facility] HE[facility] SERVICES Pain Management  [place], [address]  Dept: [phone]    Procedure History & Physical      Anniece Bamberger     HPI:    Patient  is here for Cervical Shellie for neck pain/ UE pain. Labs/imaging studies reviewed   All question and concerns addressed including R/B/A associated with the procedure    Past Medical History:   Diagnosis Date    Arthritis     psoriatic    Chronic back pain     Depression     Headache(784.0)     Hemorrhoids     Herpes genital     \"years ago\" ; no outbreaks    Irregular periods/menstrual cycles     Renal calculi     history of    Varicose veins        Past Surgical History:   Procedure Laterality Date    PAIN MANAGEMENT PROCEDURE N/A 12/16/2021    CERVICAL EPIDURAL STEROID INJECTION UNDER FLUORO AT C6 - C7 LEFT PARAMEDIAN performed by Etta Song MD at [facility]  5/31/11    [facility]    TUBAL LIGATION  2001    UPPER GASTROINTESTINAL ENDOSCOPY  2011       Prior to Admission medications    Medication Sig Start Date End Date Taking?  Authorizing Provider   omeprazole (PRILOSEC) 20 MG delayed release capsule Take 20 mg by mouth every 12 hours   Yes Historical Provider, MD   vitamin B-12 (CYANOCOBALAMIN) 100 MCG tablet Take 50 mcg by mouth daily   Yes Historical Provider, MD   ferrous sulfate (IRON 325) 325 (65 Fe) MG tablet Take 325 mg by mouth daily (with breakfast)   Yes Historical Provider, MD   SUMAtriptan (IMITREX) 50 MG tablet take 1 tablet by mouth AT ONSET OF MIGRAINE MAY REPEAT 4 HOURS LATER IF NO IMPROVEMENT 1/31/22  Yes Amaya Clemente,    pantoprazole (PROTONIX) 40 MG tablet Take 1 tablet by mouth 2 times daily (before meals) 1/31/22 7/30/22 Yes Amaya Clemente DO   DULoxetine (CYMBALTA) 60 MG extended release capsule Take 1 capsule by mouth daily 1/31/22  Yes Amaya Clemente,    ondansetron (ZOFRAN-ODT) 4 MG disintegrating tablet Take 1 tablet by mouth 3 times daily as needed for Nausea or Vomiting 1/31/22 3/2/22 Yes Estefany Velazco HILARIA Truong, DO   clotrimazole-betamethasone (LOTRISONE) 1-0.05 % cream Apply topically 2 times daily. 1/31/22  Yes Vicki Osborne, DO   secukinumab (COSENTYX) 150 MG/ML SOSY Inject 300 mg into the skin once   Yes Historical Provider, MD   sucralfate (CARAFATE) 1 GM tablet Take 1 tablet by mouth 4 times daily 12/7/21  Yes Vicki Osborne, DO   tiZANidine (ZANAFLEX) 4 MG tablet Take 1 tablet by mouth 3 times daily 12/3/21 3/3/22 Yes Vicki Osborne, DO   OZEMPIC, 0.25 OR 0.5 MG/DOSE, 2 MG/1.5ML SOPN Inject 0.25 mg as directed once a week 12/3/21  Yes Vicki Osborne,    venlafaxine (EFFEXOR XR) 150 MG extended release capsule Take 1 capsule by mouth daily 11/2/21 2/3/22 Yes LYNSEY Duran - CNP   clobetasol (TEMOVATE) 0.05 % ointment Apply topically 2 times daily Apply topically 2 times daily.  5/19/21  Yes LYNSEY Cantor CNP   folic acid (FOLVITE) 1 MG tablet TAKE 1 TABLET BY MOUTH EVERY DAY 3/16/21  Yes Historical Provider, MD   Multiple Vitamin (MULTIVITAMIN PO) Take 1 tablet by mouth daily    Yes Historical Provider, MD   gabapentin (NEURONTIN) 300 MG capsule TAKE 1 CAPSULE BY MOUTH 3 TIMES DAILY FOR 30 DAYS 1/14/22 4/14/22  Vicki Osborne,    B-D INSULIN SYRINGE 1CC/25GX1\" 25G X 1\" 1 ML MISC INJECT FOUR TIMES A MONTH AS DIRECTED  Patient not taking: Reported on 1/31/2022 12/6/21   Historical Provider, MD   BD INSULIN SYRINGE U/F 31G X 5/16\" 1 ML MISC USE FOR METHOTREXATE ADMINISTRATION ONCE A WEEK AS DIRECTED  Patient not taking: Reported on 1/31/2022 12/12/21   Historical Provider, MD   NEEDLE, DISP, 27 G (EASY TOUCH [address]) 27G X 1/2\" MISC Inject 4 times a month  Patient not taking: Reported on 1/31/2022 12/6/21   Vicki Osborne DO   metFORMIN (GLUCOPHAGE) 500 MG tablet Take 1 tablet by mouth 2 times daily  Patient not taking: Reported on 9/27/2021 4/6/21 7/5/21  LYNSEY Kirk CNP       No Known Allergies    Social History     Socioeconomic History    Marital status:      Spouse name: Not on file    Number of children: Not on file    Years of education: Not on file    Highest education level: Not on file   Occupational History    Not on file   Tobacco Use    Smoking status: Never Smoker    Smokeless tobacco: Never Used   Vaping Use    Vaping Use: Never used   Substance and Sexual Activity    Alcohol use: No    Drug use: No    Sexual activity: Not on file   Other Topics Concern    Not on file   Social History Narrative    Not on file     Social Determinants of Health     Financial Resource Strain: Low Risk     Difficulty of Paying Living Expenses: Not hard at all   Food Insecurity: No Food Insecurity    Worried About [facility] in the Last Year: Never true    [facility] in the Last Year: Never true   Transportation Needs:     Lack of Transportation (Medical): Not on file    Lack of Transportation (Non-Medical):  Not on file   Physical Activity:     Days of Exercise per Week: Not on file    Minutes of Exercise per Session: Not on file   Stress:     Feeling of Stress : Not on file   Social Connections:     Frequency of Communication with Friends and Family: Not on file    Frequency of Social Gatherings with Friends and Family: Not on file    Attends Jehovah's witness Services: Not on file    Active Member of [address] [place] [address] [employer] or Organizations: Not on file    Attends Club or Organization Meetings: Not on file    Marital Status: Not on file   Intimate Partner Violence:     Fear of Current or Ex-Partner: Not on file    Emotionally Abused: Not on file    Physically Abused: Not on file    Sexually Abused: Not on file   Housing Stability:     Unable to Pay for Housing in the Last Year: Not on file    Number of [place] in the Last Year: Not on file    Unstable Housing in the Last Year: Not on file       Family History   Family history unknown: Yes         REVIEW OF SYSTEMS:    CONSTITUTIONAL:  negative for  fevers, chills, sweats and fatigue    RESPIRATORY:  negative for  dry cough, cough with sputum, dyspnea, wheezing and chest pain    CARDIOVASCULAR:  negative for chest pain, dyspnea, palpitations, syncope    GASTROINTESTINAL:  negative for nausea, vomiting, change in bowel habits, diarrhea, constipation and abdominal pain    MUSCULOSKELETAL: negative for muscle weakness    SKIN: negative for itching or rashes. BEHAVIOR/PSYCH:  negative for poor appetite, increased appetite, decreased sleep and poor concentration    All other systems negative      PHYSICAL EXAM:    VITALS:  BP (!) 165/99   Pulse 81   Temp 97.8 °F (36.6 °C) (Temporal)   Resp 20   Ht 5' 6.5\" (1.689 m)   Wt 247 lb (112 kg)   LMP 01/17/2022 (Approximate)   SpO2 96%   BMI 39.27 kg/m²     CONSTITUTIONAL:  awake, alert, cooperative, no apparent distress, and appears stated age    EYES: PERRLA, EOMI    LUNGS:  No increased work of breathing, no audible wheezing    CARDIOVASCULAR:  regular rate and rhythm    ABDOMEN:  Soft non tender non distended     EXTREMITIES: no signs of clubbing or cyanosis. MUSCULOSKELETAL: negative for flaccid muscle tone or spastic movements. SKIN: gross examination reveals no signs of rashes, or diaphoresis. NEURO: Cranial nerves II-XII grossly intact. No signs of agitated mood. Assessment/Plan:    Patient  is here for Cervical Shellie for neck pain/ UE pain. The patient was counseled at length about the risks of jc Covid-19 during their perioperative period and any recovery window from their procedure. The patient was made aware that jc Covid-19  may worsen their prognosis for recovering from their procedure  and lend to a higher morbidity and/or mortality risk. All material risks, benefits, and reasonable alternatives including postponing the procedure were discussed. The patient does wish to proceed with the procedure at this time.     Mindi Dela Cruz MD

## 2022-02-15 DIAGNOSIS — Z00.00 ROUTINE GENERAL MEDICAL EXAMINATION AT A HEALTH CARE FACILITY: ICD-10-CM

## 2022-02-15 RX ORDER — PANTOPRAZOLE SODIUM 40 MG/1
40 TABLET, DELAYED RELEASE ORAL
Qty: 180 TABLET | Refills: 1 | Status: SHIPPED
Start: 2022-02-15 | End: 2022-04-06

## 2022-02-15 NOTE — TELEPHONE ENCOUNTER
Patients last appointment 1/31/2022.   Patients next scheduled appointment   Future Appointments   Date Time Provider Jimmie Ngo   2/28/2022  9:45 AM Augusto Isaac MD [facility] PAIN [facility]

## 2022-02-19 DIAGNOSIS — Z00.00 ROUTINE GENERAL MEDICAL EXAMINATION AT A HEALTH CARE FACILITY: ICD-10-CM

## 2022-02-21 ENCOUNTER — OFFICE VISIT (OUTPATIENT)
Dept: FAMILY MEDICINE CLINIC | Age: 47
End: 2022-02-21
Payer: COMMERCIAL

## 2022-02-21 VITALS
HEIGHT: 67 IN | SYSTOLIC BLOOD PRESSURE: 138 MMHG | BODY MASS INDEX: 38.19 KG/M2 | OXYGEN SATURATION: 98 % | WEIGHT: 243.3 LBS | TEMPERATURE: 97 F | HEART RATE: 113 BPM | DIASTOLIC BLOOD PRESSURE: 90 MMHG

## 2022-02-21 DIAGNOSIS — M54.2 NECK PAIN: ICD-10-CM

## 2022-02-21 DIAGNOSIS — T14.8XXA BRUISING: ICD-10-CM

## 2022-02-21 DIAGNOSIS — M25.512 ACUTE PAIN OF LEFT SHOULDER: ICD-10-CM

## 2022-02-21 DIAGNOSIS — M50.90 CERVICAL DISC DISEASE: ICD-10-CM

## 2022-02-21 DIAGNOSIS — R53.83 OTHER FATIGUE: ICD-10-CM

## 2022-02-21 DIAGNOSIS — E78.2 MIXED HYPERLIPIDEMIA: ICD-10-CM

## 2022-02-21 DIAGNOSIS — M50.30 DDD (DEGENERATIVE DISC DISEASE), CERVICAL: ICD-10-CM

## 2022-02-21 DIAGNOSIS — G89.29 CHRONIC BACK PAIN, UNSPECIFIED BACK LOCATION, UNSPECIFIED BACK PAIN LATERALITY: ICD-10-CM

## 2022-02-21 DIAGNOSIS — K21.00 GASTROESOPHAGEAL REFLUX DISEASE WITH ESOPHAGITIS, UNSPECIFIED WHETHER HEMORRHAGE: ICD-10-CM

## 2022-02-21 DIAGNOSIS — R07.9 CHEST PAIN, UNSPECIFIED TYPE: ICD-10-CM

## 2022-02-21 DIAGNOSIS — M62.838 CERVICAL PARASPINAL MUSCLE SPASM: ICD-10-CM

## 2022-02-21 DIAGNOSIS — R07.9 CHEST PAIN, UNSPECIFIED TYPE: Primary | ICD-10-CM

## 2022-02-21 DIAGNOSIS — M54.9 CHRONIC BACK PAIN, UNSPECIFIED BACK LOCATION, UNSPECIFIED BACK PAIN LATERALITY: ICD-10-CM

## 2022-02-21 DIAGNOSIS — M54.12 CERVICAL RADICULOPATHY: ICD-10-CM

## 2022-02-21 LAB
ALBUMIN SERPL-MCNC: 4.3 G/DL (ref 3.5–5.2)
ALP BLD-CCNC: 75 U/L (ref 35–104)
ALT SERPL-CCNC: 17 U/L (ref 0–32)
ANION GAP SERPL CALCULATED.3IONS-SCNC: 8 MMOL/L (ref 7–16)
AST SERPL-CCNC: 18 U/L (ref 0–31)
BASOPHILS ABSOLUTE: 0.04 E9/L (ref 0–0.2)
BASOPHILS RELATIVE PERCENT: 0.5 % (ref 0–2)
BILIRUB SERPL-MCNC: 0.3 MG/DL (ref 0–1.2)
BUN BLDV-MCNC: 18 MG/DL (ref 6–20)
C-REACTIVE PROTEIN: 0.3 MG/DL (ref 0–0.4)
CALCIUM SERPL-MCNC: 9.2 MG/DL (ref 8.6–10.2)
CHLORIDE BLD-SCNC: 103 MMOL/L (ref 98–107)
CO2: 30 MMOL/L (ref 22–29)
CREAT SERPL-MCNC: 0.8 MG/DL (ref 0.5–1)
EOSINOPHILS ABSOLUTE: 0.12 E9/L (ref 0.05–0.5)
EOSINOPHILS RELATIVE PERCENT: 1.4 % (ref 0–6)
GFR AFRICAN AMERICAN: >60
GFR NON-AFRICAN AMERICAN: >60 ML/MIN/1.73
GLUCOSE BLD-MCNC: 98 MG/DL (ref 74–99)
HCT VFR BLD CALC: 41.6 % (ref 34–48)
HEMOGLOBIN: 13.5 G/DL (ref 11.5–15.5)
IMMATURE GRANULOCYTES #: 0.03 E9/L
IMMATURE GRANULOCYTES %: 0.3 % (ref 0–5)
INR BLD: 1
LYMPHOCYTES ABSOLUTE: 2.37 E9/L (ref 1.5–4)
LYMPHOCYTES RELATIVE PERCENT: 27 % (ref 20–42)
MCH RBC QN AUTO: 32 PG (ref 26–35)
MCHC RBC AUTO-ENTMCNC: 32.5 % (ref 32–34.5)
MCV RBC AUTO: 98.6 FL (ref 80–99.9)
MONOCYTES ABSOLUTE: 0.42 E9/L (ref 0.1–0.95)
MONOCYTES RELATIVE PERCENT: 4.8 % (ref 2–12)
NEUTROPHILS ABSOLUTE: 5.81 E9/L (ref 1.8–7.3)
NEUTROPHILS RELATIVE PERCENT: 66 % (ref 43–80)
PDW BLD-RTO: 12.7 FL (ref 11.5–15)
PLATELET # BLD: 400 E9/L (ref 130–450)
PMV BLD AUTO: 9.4 FL (ref 7–12)
POTASSIUM SERPL-SCNC: 4.9 MMOL/L (ref 3.5–5)
PROTHROMBIN TIME: 10.7 SEC (ref 9.3–12.4)
RBC # BLD: 4.22 E12/L (ref 3.5–5.5)
SODIUM BLD-SCNC: 141 MMOL/L (ref 132–146)
TOTAL PROTEIN: 7.1 G/DL (ref 6.4–8.3)
WBC # BLD: 8.8 E9/L (ref 4.5–11.5)

## 2022-02-21 PROCEDURE — 99214 OFFICE O/P EST MOD 30 MIN: CPT | Performed by: FAMILY MEDICINE

## 2022-02-21 PROCEDURE — 93000 ELECTROCARDIOGRAM COMPLETE: CPT | Performed by: FAMILY MEDICINE

## 2022-02-21 RX ORDER — SUMATRIPTAN 50 MG/1
TABLET, FILM COATED ORAL
Qty: 9 TABLET | Refills: 5 | Status: SHIPPED | OUTPATIENT
Start: 2022-02-21

## 2022-02-21 RX ORDER — OXYCODONE HYDROCHLORIDE AND ACETAMINOPHEN 5; 325 MG/1; MG/1
1 TABLET ORAL EVERY 4 HOURS PRN
Qty: 42 TABLET | Refills: 0 | Status: SHIPPED
Start: 2022-02-21 | End: 2022-03-24 | Stop reason: SDUPTHER

## 2022-02-21 RX ORDER — OMEPRAZOLE 40 MG/1
40 CAPSULE, DELAYED RELEASE ORAL
Qty: 180 CAPSULE | Refills: 1 | Status: SHIPPED
Start: 2022-02-21 | End: 2022-02-22 | Stop reason: SDUPTHER

## 2022-02-21 ASSESSMENT — ENCOUNTER SYMPTOMS: CHEST TIGHTNESS: 1

## 2022-02-21 NOTE — PROGRESS NOTES
22  Gloria Espinal : 1975 Sex: female  Age: 40 y.o. Chief Complaint   Patient presents with    Bleeding/Bruising     bruising all over out of nowhere, the bruise on left arm she woke up with this yesterday morning, there is a hard lump, very painful to touch.  Other     shaky, exhausted, not sleeping well, headaches, sweaty, low iron, she is on her period at the time and she has been having blood clots, cramping really bad also     Chest Pain     this started today.  Weight Loss     she has been losing weight, shes eating normal but still losing weight     Discuss Medications     Protonix- insurance will not cover anymore       HPI  As above bruising   Chest pain r side started today       Review of Systems   Constitutional: Positive for fatigue. Negative for appetite change, chills, diaphoresis, fever and unexpected weight change. HENT: Positive for postnasal drip. Negative for congestion, dental problem, ear discharge, ear pain, facial swelling, hearing loss, mouth sores, rhinorrhea, sinus pain, sore throat, tinnitus and voice change. Eyes: Negative for photophobia and visual disturbance (no visual changes). Respiratory: Positive for chest tightness (sharp pain r chest ) and shortness of breath. Negative for apnea, cough and wheezing. Cardiovascular: Positive for chest pain (r chest at midsternal  ). Negative for palpitations. Gastrointestinal: Negative for abdominal distention, abdominal pain, constipation, diarrhea, nausea, rectal pain and vomiting. Endocrine: Negative for cold intolerance, heat intolerance and polyuria. Genitourinary: Positive for frequency. Negative for dysuria, hematuria and urgency. Musculoskeletal: Positive for arthralgias and back pain. Negative for gait problem, joint swelling, myalgias, neck pain and neck stiffness. Skin: Negative for rash and wound. Allergic/Immunologic: Negative for environmental allergies.    Neurological: Negative for dizziness, tremors, seizures, syncope, facial asymmetry, speech difficulty, weakness, light-headedness, numbness and headaches. Hematological: Bruises/bleeds easily. Psychiatric/Behavioral: Negative for agitation, hallucinations and suicidal ideas. The patient is not nervous/anxious. Physical Exam  Vitals reviewed. Constitutional:       General: She is not in acute distress. Appearance: She is not ill-appearing. HENT:      Head: Normocephalic. Right Ear: Tympanic membrane, ear canal and external ear normal. There is no impacted cerumen. Left Ear: Tympanic membrane, ear canal and external ear normal. There is no impacted cerumen. Nose: No rhinorrhea. Mouth/Throat:      Pharynx: No posterior oropharyngeal erythema. Eyes:      General:         Right eye: No discharge. Left eye: No discharge. Extraocular Movements: Extraocular movements intact. Pupils: Pupils are equal, round, and reactive to light. Neck:      Vascular: No carotid bruit. Cardiovascular:      Rate and Rhythm: Regular rhythm. Heart sounds: Normal heart sounds. No murmur heard. No gallop. Pulmonary:      Breath sounds: Normal breath sounds. No wheezing, rhonchi or rales. Abdominal:      Palpations: Abdomen is soft. There is no mass. Tenderness: There is no guarding or rebound. Hernia: No hernia is present. Musculoskeletal:         General: Normal range of motion. Cervical back: Neck supple. Tenderness present. Comments: Spasm and cervical spine decreased motion seems to be a had a contributor   Lymphadenopathy:      Cervical: No cervical adenopathy. Skin:     General: Skin is warm and dry. Findings: No bruising or rash. Neurological:      General: No focal deficit present. Mental Status: She is alert and oriented to person, place, and time. Cranial Nerves: No cranial nerve deficit. Motor: No weakness.       Coordination: Coordination normal.      Gait: Gait normal.   Psychiatric:         Mood and Affect: Mood normal.         Behavior: Behavior normal.         Assessment and Plan:  Immanuel Rojas was seen today for bleeding/bruising, other, chest pain, weight loss and discuss medications. Diagnoses and all orders for this visit:    Chest pain, unspecified type  -     EKG 12 Lead  -     CBC with Auto Differential; Future  -     Comprehensive Metabolic Panel; Future  -     C-Reactive Protein; Future  -     Sedimentation Rate; Future  -     Protime-INR; Future  -     Cancel: Platelet Count; Future  -     MONONUCLEOSIS SCREEN; Future    Neck pain  -     oxyCODONE-acetaminophen (PERCOCET) 5-325 MG per tablet; Take 1 tablet by mouth every 4 hours as needed for Pain for up to 7 days. -     CBC with Auto Differential; Future  -     Comprehensive Metabolic Panel; Future  -     C-Reactive Protein; Future  -     Sedimentation Rate; Future  -     Protime-INR; Future  -     Cancel: Platelet Count; Future  -     MONONUCLEOSIS SCREEN; Future    Acute pain of left shoulder  -     oxyCODONE-acetaminophen (PERCOCET) 5-325 MG per tablet; Take 1 tablet by mouth every 4 hours as needed for Pain for up to 7 days. -     CBC with Auto Differential; Future  -     Comprehensive Metabolic Panel; Future  -     C-Reactive Protein; Future  -     Sedimentation Rate; Future  -     Protime-INR; Future  -     Cancel: Platelet Count; Future  -     MONONUCLEOSIS SCREEN; Future    Other fatigue  -     CBC with Auto Differential; Future  -     Comprehensive Metabolic Panel; Future  -     C-Reactive Protein; Future  -     Sedimentation Rate; Future  -     Protime-INR; Future  -     Cancel: Platelet Count; Future  -     MONONUCLEOSIS SCREEN; Future    Bruising  -     CBC with Auto Differential; Future  -     Comprehensive Metabolic Panel; Future  -     C-Reactive Protein; Future  -     Sedimentation Rate; Future  -     Protime-INR; Future  -     Cancel: Platelet Count;  Future  - MONONUCLEOSIS SCREEN; Future    Gastroesophageal reflux disease with esophagitis, unspecified whether hemorrhage    DDD (degenerative disc disease), cervical    Cervical radiculopathy    Mixed hyperlipidemia    Cervical disc disease    Cervical paraspinal muscle spasm    Chronic back pain, unspecified back location, unspecified back pain laterality    Other orders  -     Discontinue: omeprazole (PRILOSEC) 40 MG delayed release capsule; Take 1 capsule by mouth every morning (before breakfast) (Patient taking differently: Take 40 mg by mouth 2 times daily )          Discussions/Education provided to patients during visit:  [] Discussed the importance to stop smoking. [] Advised to monitor eating habits. [] Reviewed and discussed Imaging results. [x] Reviewed and discussed Lab results. [x] Discussed the importance of drinking plenty of fluids. [] Cut down on Salt, Caffeine, and Sugar. [x] Continue Medications as Discussed. [x] Communicated with patient any concerns, to phone office. Beula Bound multiple labs today to try to figure out what is going on with her  She is very nervous and very concerned about her bruising there is an excessive amount of bruising that is why we draw the blood that we did  If we do not see anything that we can pin is blood problem we will go ahead and consider her seeing a hematologist,  Follow with results within the next few days  Extremely anxious she may need something for her nerves in the near future  Recheck 1 month      No follow-ups on file.       Seen by:     Anatoly Jameson, DO

## 2022-02-22 LAB
MONO TEST: NEGATIVE
SEDIMENTATION RATE, ERYTHROCYTE: 6 MM/HR (ref 0–20)

## 2022-02-22 RX ORDER — OMEPRAZOLE 40 MG/1
40 CAPSULE, DELAYED RELEASE ORAL 2 TIMES DAILY
Qty: 180 CAPSULE | Refills: 1 | Status: SHIPPED
Start: 2022-02-22 | End: 2022-10-06 | Stop reason: SDUPTHER

## 2022-02-22 NOTE — TELEPHONE ENCOUNTER
Dr. Scout Coffman patient stated you were increasing her omeprazole to 40 mg twice daily. PLEASE SEND CORRECTED RX. PENDED.

## 2022-02-23 DIAGNOSIS — R23.3 ABNORMAL BRUISING: Primary | ICD-10-CM

## 2022-02-28 ENCOUNTER — OFFICE VISIT (OUTPATIENT)
Dept: PAIN MANAGEMENT | Age: 47
End: 2022-02-28
Payer: COMMERCIAL

## 2022-02-28 VITALS
BODY MASS INDEX: 38.14 KG/M2 | TEMPERATURE: 94.9 F | RESPIRATION RATE: 18 BRPM | HEART RATE: 74 BPM | SYSTOLIC BLOOD PRESSURE: 120 MMHG | DIASTOLIC BLOOD PRESSURE: 80 MMHG | WEIGHT: 243 LBS | OXYGEN SATURATION: 99 % | HEIGHT: 67 IN

## 2022-02-28 DIAGNOSIS — M79.7 FIBROMYALGIA: ICD-10-CM

## 2022-02-28 DIAGNOSIS — M50.30 DDD (DEGENERATIVE DISC DISEASE), CERVICAL: Primary | ICD-10-CM

## 2022-02-28 DIAGNOSIS — M54.12 CERVICAL RADICULOPATHY: ICD-10-CM

## 2022-02-28 DIAGNOSIS — M47.812 CERVICAL SPONDYLOSIS: ICD-10-CM

## 2022-02-28 DIAGNOSIS — E66.9 OBESITY, UNSPECIFIED CLASSIFICATION, UNSPECIFIED OBESITY TYPE, UNSPECIFIED WHETHER SERIOUS COMORBIDITY PRESENT: ICD-10-CM

## 2022-02-28 PROCEDURE — 99213 OFFICE O/P EST LOW 20 MIN: CPT | Performed by: ANESTHESIOLOGY

## 2022-02-28 RX ORDER — HYDROXYZINE PAMOATE 25 MG/1
25 CAPSULE ORAL 3 TIMES DAILY PRN
COMMUNITY
End: 2022-02-28 | Stop reason: SDUPTHER

## 2022-02-28 RX ORDER — HYDROXYZINE PAMOATE 25 MG/1
25 CAPSULE ORAL 3 TIMES DAILY PRN
Qty: 90 CAPSULE | Refills: 0 | Status: SHIPPED
Start: 2022-02-28 | End: 2022-04-06

## 2022-02-28 ASSESSMENT — ENCOUNTER SYMPTOMS
CONSTIPATION: 0
VOMITING: 0
DIARRHEA: 0
ABDOMINAL DISTENTION: 0
APNEA: 0
ABDOMINAL PAIN: 0
PHOTOPHOBIA: 0
NAUSEA: 0
RHINORRHEA: 0
SORE THROAT: 0
COUGH: 0
FACIAL SWELLING: 0
RECTAL PAIN: 0
SINUS PAIN: 0
SHORTNESS OF BREATH: 1
WHEEZING: 0
BACK PAIN: 1
VOICE CHANGE: 0

## 2022-02-28 NOTE — PROGRESS NOTES
Do you currently have any of the following:    Fever: No  Headache:  No  Cough: No  Shortness of breath: No  Exposed to anyone with these symptoms: No         Colin Medina presents to the [facility] on 2/28/2022. Deni Rico is complaining of pain none  The pain is none . The pain is described as none Pain is rated on her best day at a 0, on her worst day at a 0, and on average at a 0 on the VAS scale. She took her last dose of      Any procedures since your last visit:     Pacemaker or defibrillator: No managed by. She is not on NSAIDS and is not on anticoagulation medications to include none and is managed by     Medication Contract and Consent for Opioid Use Documents Filed      No documents found                /80   Pulse 74   Temp 94.9 °F (34.9 °C) (Infrared)   Resp 18   Ht 5' 7\" (1.702 m)   Wt 243 lb (110.2 kg)   SpO2 99%   BMI 38.06 kg/m²      No LMP recorded.

## 2022-02-28 NOTE — PROGRESS NOTES
[facility] Pain Management        [address]  [facility], [address] [facility]  Dept: [phone]      Follow up Note      Pee Chanell     Date of Visit:  2/28/2022    CC:  Patient presents for follow up   Chief Complaint   Patient presents with    Follow-up     post procedure        HPI:    Neck pain and left UE pain. Failed conservative treatment.      Nursing notes and details of the pain history reviewed.  Please see intake notes for details.     Has been evaluated by [facility]- Dr. Carina Walsh- referred for interventions.     NOTE:  H/o psoriatic arthritis- on Methotrexate  S/p Gastric bypass in 2009  H/o fibromyalgia- diffuse pain     Previous treatments:   Physical Therapy/ HEP : yes, has been doing HEP since the onset      Medications: - NSAID's : yes                        - Membrane stabilizers : yes - Gabapentin                       - Opioids : yes, short term                       - Adjuvants or Others : yes, oral steroids/ muscle relaxants     Surgeries: no C spine surgery     She has not been on anticoagulation medications      She is not diabetic.     H/O Smoking: no  H/O alcohol abuse : no  H/O Illicit drug use : no     Employment: employed     Imaging:      MRI of Cervical spine: 10/11/2021:  FINDINGS:  Alignment is relatively anatomic.  There is preservation of disc height and signal      intensity.  Bone marrow signal intensity is normal.  The craniocervical junction appears intact.      No abnormal signal is identified within the cord in the cervical region.  The thyroid gland is      mildly heterogeneous with a enhancing 4 mm nodule in the left thyroid gland.  Additional findings      and levels are as follows:             C5-6:  Minimal broad-based disc osteophyte complex.  4.5 mm nerve root sheath diverticula on the      left.             C6-7:  Moderate broad-based disc osteophyte complex.  Uncovertebral hypertrophy.  Mild to moderate      central canal stenosis.  6.5mm AP canal diameter.  Moderate to severe left neural foraminal      stenosis.  Mild right neural foraminal stenosis.             IMPRESSION 3T MRI Cervical Spine without and with intravenous contrast:      1.  Disc disease and degenerative changes as discussed worse at C6-7.  Correlate with dermatome      mapping.      2.  4 mm enhancing nodule in the left thyroid gland. OARRS report[de-identified] Reviewed    Past Medical History:   Diagnosis Date    Arthritis     psoriatic    Chronic back pain     Depression     Headache(784.0)     Hemorrhoids     Herpes genital     \"years ago\" ; no outbreaks    Irregular periods/menstrual cycles     Renal calculi     history of    Varicose veins        Past Surgical History:   Procedure Laterality Date    PAIN MANAGEMENT PROCEDURE N/A 12/16/2021    CERVICAL EPIDURAL STEROID INJECTION UNDER FLUORO AT C6 - C7 LEFT PARAMEDIAN performed by Pernell Lanes, MD at [facility] [address] Left 2/7/2022    #2 CERVICAL EPIDURAL STEROID INJECTION UNDER FLUOROSCOPIC GUIDANCE AT C6-C7 LEFT PARAMEDIAN performed by Pernell Lanes, MD at [facility] [place]  5/31/11    Lap    TUBAL LIGATION  2001    UPPER GASTROINTESTINAL ENDOSCOPY  2011       Prior to Admission medications    Medication Sig Start Date End Date Taking? Authorizing Provider   omeprazole (PRILOSEC) 40 MG delayed release capsule Take 1 capsule by mouth 2 times daily 2/22/22 8/21/22 Yes Keith Marroquin, DO   SUMAtriptan (IMITREX) 50 MG tablet take 1 tablet by mouth if needed  Holderrieth Hungerford may repeat in 4 hours IF headache PERSISTS 2/21/22  Yes Keith Marroquin, DO   oxyCODONE-acetaminophen (PERCOCET) 5-325 MG per tablet Take 1 tablet by mouth every 4 hours as needed for Pain for up to 7 days.  2/21/22 2/28/22 Yes Benjamin Truong, DO   ferrous sulfate (IRON 325) 325 (65 Fe) MG tablet Take 325 mg by mouth daily (with breakfast)   Yes Historical Provider, MD   DULoxetine (CYMBALTA) 60 MG extended release capsule Take 1 capsule by mouth daily 1/31/22  Yes Beto Portillo, DO   ondansetron (ZOFRAN-ODT) 4 MG disintegrating tablet Take 1 tablet by mouth 3 times daily as needed for Nausea or Vomiting 1/31/22 3/2/22 Yes Beto Portillo, DO   clotrimazole-betamethasone (LOTRISONE) 1-0.05 % cream Apply topically 2 times daily. 1/31/22  Yes Beto Portillo, DO   secukinumab (COSENTYX) 150 MG/ML SOSY Inject 300 mg into the skin once   Yes Historical Provider, MD   sucralfate (CARAFATE) 1 GM tablet Take 1 tablet by mouth 4 times daily 12/7/21  Yes Beto Portillo, DO   tiZANidine (ZANAFLEX) 4 MG tablet Take 1 tablet by mouth 3 times daily 12/3/21 3/3/22 Yes Beto Portillo, DO   venlafaxine (EFFEXOR XR) 150 MG extended release capsule Take 1 capsule by mouth daily 11/2/21 2/28/22 Yes LYNSEY Duran - CNP   clobetasol (TEMOVATE) 0.05 % ointment Apply topically 2 times daily Apply topically 2 times daily.  5/19/21  Yes LYNSEY Leija CNP   folic acid (FOLVITE) 1 MG tablet TAKE 1 TABLET BY MOUTH EVERY DAY 3/16/21  Yes Historical Provider, MD   Multiple Vitamin (MULTIVITAMIN PO) Take 1 tablet by mouth daily    Yes Historical Provider, MD   pantoprazole (PROTONIX) 40 MG tablet Take 1 tablet by mouth 2 times daily (before meals)  Patient not taking: Reported on 2/21/2022 2/15/22 8/14/22  LYNSEY Rodriguez - CNP   vitamin B-12 (CYANOCOBALAMIN) 100 MCG tablet Take 50 mcg by mouth daily  Patient not taking: Reported on 2/28/2022    Historical Provider, MD VALDERRAMA INSULIN SYRINGE 1CC/25GX1\" 25G X 1\" 1 ML MISC INJECT FOUR TIMES A MONTH AS DIRECTED  Patient not taking: Reported on 1/31/2022 12/6/21   Historical Provider, MD VILLA INSULIN SYRINGE U/F 31G X 5/16\" 1 ML MISC USE FOR METHOTREXATE ADMINISTRATION ONCE A WEEK AS DIRECTED  Patient not taking: Reported on 1/31/2022 12/12/21   Historical Provider, MD   NEEDLE, DISP, 27 G (EASY TOUCH FLIPLOCK NEEDLES) 27G X 1/2\" MISC Inject 4 times a month  Patient not taking: Reported on 1/31/2022 12/6/21   Lawrnce Landau, DO   metFORMIN (GLUCOPHAGE) 500 MG tablet Take 1 tablet by mouth 2 times daily  Patient not taking: Reported on 9/27/2021 4/6/21 7/5/21  Meggan Young, APRN - CNP       No Known Allergies    Social History     Socioeconomic History    Marital status:      Spouse name: Not on file    Number of children: Not on file    Years of education: Not on file    Highest education level: Not on file   Occupational History    Not on file   Tobacco Use    Smoking status: Never Smoker    Smokeless tobacco: Never Used   Vaping Use    Vaping Use: Never used   Substance and Sexual Activity    Alcohol use: No    Drug use: No    Sexual activity: Not on file   Other Topics Concern    Not on file   Social History Narrative    Not on file     Social Determinants of Health     Financial Resource Strain: Low Risk     Difficulty of Paying Living Expenses: Not hard at all   Food Insecurity: No Food Insecurity    Worried About [address] in the Last Year: Never true    [address] in the Last Year: Never true   Transportation Needs:     Lack of Transportation (Medical): Not on file    Lack of Transportation (Non-Medical):  Not on file   Physical Activity:     Days of Exercise per Week: Not on file    Minutes of Exercise per Session: Not on file   Stress:     Feeling of Stress : Not on file   Social Connections:     Frequency of Communication with Friends and Family: Not on file    Frequency of Social Gatherings with Friends and Family: Not on file    Attends [employer] Services: Not on file    Active Member of Clubs or Organizations: Not on file    Attends Club or Organization Meetings: Not on file    Marital Status: Not on file   Intimate Partner Violence:     Fear of Current or Ex-Partner: Not on file    Emotionally Abused: Not on file    Physically Abused: Not on file    Sexually Abused: Not on file   Housing Stability:     Unable to Pay for Housing in the Last Year: Not on file    Number of Places Lived in the Last Year: Not on file    Unstable Housing in the Last Year: Not on file       Family History   Family history unknown: Yes       REVIEW OF SYSTEMS:     Ethan Quezada denies fever/chills, chest pain, shortness of breath, new bowel or bladder complaints. All other review of systems was negative. PHYSICAL EXAMINATION:      /80   Pulse 74   Temp 94.9 °F (34.9 °C) (Infrared)   Resp 18   Ht 5' 7\" (1.702 m)   Wt 243 lb (110.2 kg)   SpO2 99%   BMI 38.06 kg/m²       General:       General appearance:  Pleasant and well-hydrated, in no distress and A & O x 3  Build:Overweight  Function: Rises from seated position easily and Moves about room without difficulty     HEENT:     Head:normocephalic, atraumatic     Lungs:     Breathing:normal breathing pattern      CVS:     RRR     Abdomen:     Shape:non-distended and normal     Cervical spine:     Inspection:normal  Palpation:tenderness paravertebral muscles, tenderness trapezium, left, right and positive. Range of motion:pain improved     Thoracic spine:                Spine inspection:normal      Lumbar spine:     Spine inspection: Normal   Palpation: Tenderness paravertebral muscles No bilaterally  Range of motion: Decreased,  SLR : negative bilaterally     Musculoskeletal:     Tender points +     Extremities:     Tremors:None bilaterally upper and lower  Edema:no     Neurological:     Sensory: Normal to light touch      Motor:   No new changes     Gait:normal Yes       Assessment/Plan:   Diagnosis Orders   1. DDD (degenerative disc disease), cervical     2. Cervical radiculopathy     3. Cervical spondylosis     4. Fibromyalgia     5. Obesity, unspecified classification, unspecified obesity type, unspecified whether serious comorbidity present            40 y.o.  female with h/o neck pain and left UE radicular pain. Failed conservative treatment.   MRI: C6-7 DDD/ stenosis     Has been evaluated by [facility]- recommended interventions.     Past h/o gastric bypass/ h/o psoriatic arthritis.     She takes percocet prn/ Zanaflex Prn.     Has tried Gabapentin. S/P Cervical DOMINICK # 2 with almost complete pain relief.      Physical therapy/ HEP.     Instructed to take percocet only prn for severe pain- she apparently takes it for fibromyalgia. Discussed issues with long term opioid use. Recommend non opioid treatment options. She has been started on Cymbalta- which is helping her.    (she apparently is not taking gabapentin-). Consider changing it to Lyrica.     NOTE: She is consulting a hematologist for h/o easy bruising.     F/U in 6 months or prn.     Counseling : Patient encouraged to stay active and to watch/lose weight     Encouraged to continue Regular home exercise program as tolerated - stretching / strengthening.     Treatment plan discussed with the patient including medication and procedure side effects.     Christofer Haley MD    CC;  Mayra Park DO

## 2022-02-28 NOTE — TELEPHONE ENCOUNTER
Per patient she is having a panic attack she called off work today will back tomorrow needs work slip and Medication. Spoke with Dr. Carmen greenwood for work slip and will do visteral 25 mg tid prn to [facility].

## 2022-03-04 ENCOUNTER — OFFICE VISIT (OUTPATIENT)
Dept: FAMILY MEDICINE CLINIC | Age: 47
End: 2022-03-04
Payer: COMMERCIAL

## 2022-03-04 VITALS
BODY MASS INDEX: 38.96 KG/M2 | TEMPERATURE: 97 F | HEIGHT: 67 IN | HEART RATE: 110 BPM | WEIGHT: 248.2 LBS | DIASTOLIC BLOOD PRESSURE: 78 MMHG | OXYGEN SATURATION: 97 % | SYSTOLIC BLOOD PRESSURE: 130 MMHG

## 2022-03-04 DIAGNOSIS — M54.12 CERVICAL RADICULOPATHY: ICD-10-CM

## 2022-03-04 DIAGNOSIS — M54.9 CHRONIC BACK PAIN, UNSPECIFIED BACK LOCATION, UNSPECIFIED BACK PAIN LATERALITY: ICD-10-CM

## 2022-03-04 DIAGNOSIS — R07.9 CHEST PAIN, UNSPECIFIED TYPE: ICD-10-CM

## 2022-03-04 DIAGNOSIS — F41.9 ANXIETY AND DEPRESSION: ICD-10-CM

## 2022-03-04 DIAGNOSIS — F32.A ANXIETY AND DEPRESSION: ICD-10-CM

## 2022-03-04 DIAGNOSIS — F41.9 ANXIETY: Primary | ICD-10-CM

## 2022-03-04 DIAGNOSIS — E78.2 MIXED HYPERLIPIDEMIA: ICD-10-CM

## 2022-03-04 DIAGNOSIS — R53.83 OTHER FATIGUE: ICD-10-CM

## 2022-03-04 DIAGNOSIS — G89.29 CHRONIC BACK PAIN, UNSPECIFIED BACK LOCATION, UNSPECIFIED BACK PAIN LATERALITY: ICD-10-CM

## 2022-03-04 DIAGNOSIS — M54.2 NECK PAIN: ICD-10-CM

## 2022-03-04 DIAGNOSIS — M50.90 CERVICAL DISC DISEASE: ICD-10-CM

## 2022-03-04 DIAGNOSIS — M25.512 ACUTE PAIN OF LEFT SHOULDER: ICD-10-CM

## 2022-03-04 DIAGNOSIS — K21.00 GASTROESOPHAGEAL REFLUX DISEASE WITH ESOPHAGITIS, UNSPECIFIED WHETHER HEMORRHAGE: ICD-10-CM

## 2022-03-04 DIAGNOSIS — M50.30 DDD (DEGENERATIVE DISC DISEASE), CERVICAL: ICD-10-CM

## 2022-03-04 PROCEDURE — 99214 OFFICE O/P EST MOD 30 MIN: CPT | Performed by: FAMILY MEDICINE

## 2022-03-04 RX ORDER — ALPRAZOLAM 0.5 MG/1
0.5 TABLET ORAL 2 TIMES DAILY PRN
Qty: 60 TABLET | Refills: 1 | Status: SHIPPED
Start: 2022-03-04 | End: 2022-05-05 | Stop reason: SDUPTHER

## 2022-03-04 NOTE — PROGRESS NOTES
3/4/22  Rebekah Nelson : 1975 Sex: female  Age: 40 y.o. Chief Complaint   Patient presents with    Anxiety     Panic attacks, exhausted ,been going on for awhile now     Depression     crying a lot, exhausted ,not sleeping at all ,her mind just doesnt shut off , on edge, overwhelmed,  been going on for awhile now        HPI this patient is having a lot of anxiety crying a lot some depression not sleeping at all mind cannot stop that sort of thing    Review of Systems   Constitutional: Positive for fatigue and unexpected weight change. Negative for appetite change, chills, diaphoresis and fever. HENT: Negative for congestion, dental problem, ear discharge, ear pain, facial swelling, hearing loss, mouth sores, rhinorrhea, sinus pain, sore throat, tinnitus and voice change. Eyes: Negative for photophobia and visual disturbance (no visual changes). Respiratory: Positive for shortness of breath (panic ). Negative for apnea, cough, chest tightness and wheezing. Cardiovascular: Negative for chest pain, palpitations and leg swelling. Gastrointestinal: Negative for abdominal distention, abdominal pain, constipation, diarrhea, nausea, rectal pain and vomiting. Endocrine: Negative for cold intolerance, heat intolerance and polyuria. Genitourinary: Positive for frequency. Negative for dysuria, hematuria and urgency. Musculoskeletal: Positive for arthralgias and myalgias. Negative for back pain, gait problem, joint swelling, neck pain and neck stiffness. Skin: Negative for rash and wound. Allergic/Immunologic: Negative for environmental allergies. Neurological: Negative for dizziness, tremors, seizures, syncope, facial asymmetry, speech difficulty, weakness, light-headedness, numbness and headaches. Hematological: Does not bruise/bleed easily. Psychiatric/Behavioral: Positive for agitation and sleep disturbance. Negative for hallucinations and suicidal ideas.  The patient is nervous/anxious and is hyperactive. Physical Exam  Vitals reviewed. Constitutional:       General: She is not in acute distress. Appearance: She is not ill-appearing. HENT:      Head: Normocephalic. Right Ear: Tympanic membrane, ear canal and external ear normal. There is no impacted cerumen. Left Ear: Tympanic membrane, ear canal and external ear normal. There is no impacted cerumen. Nose: No rhinorrhea. Mouth/Throat:      Pharynx: No posterior oropharyngeal erythema. Eyes:      General:         Right eye: No discharge. Left eye: No discharge. Extraocular Movements: Extraocular movements intact. Pupils: Pupils are equal, round, and reactive to light. Neck:      Vascular: No carotid bruit. Cardiovascular:      Rate and Rhythm: Regular rhythm. Heart sounds: Normal heart sounds. No murmur heard. No gallop. Pulmonary:      Breath sounds: Normal breath sounds. No wheezing, rhonchi or rales. Abdominal:      Palpations: Abdomen is soft. There is no mass. Tenderness: There is no guarding or rebound. Hernia: No hernia is present. Musculoskeletal:         General: Normal range of motion. Cervical back: Neck supple. Tenderness present. Comments: Spasm and cervical spine decreased motion seems to be a had a contributor   Lymphadenopathy:      Cervical: No cervical adenopathy. Skin:     General: Skin is warm and dry. Findings: No bruising or rash. Neurological:      General: No focal deficit present. Mental Status: She is alert and oriented to person, place, and time. Cranial Nerves: No cranial nerve deficit. Motor: No weakness. Coordination: Coordination normal.      Gait: Gait normal.   Psychiatric:         Mood and Affect: Mood normal.         Behavior: Behavior normal.         Assessment and Plan:  Craig Torres was seen today for anxiety and depression.     Diagnoses and all orders for this visit:    Anxiety  - ALPRAZolam (XANAX) 0.5 MG tablet; Take 1 tablet by mouth 2 times daily as needed (anxiety) for up to 30 days. Anxiety and depression    Cervical disc disease    DDD (degenerative disc disease), cervical    Gastroesophageal reflux disease with esophagitis, unspecified whether hemorrhage    Chest pain, unspecified type    Cervical radiculopathy    Neck pain    Acute pain of left shoulder    Other fatigue    Mixed hyperlipidemia    Chronic back pain, unspecified back location, unspecified back pain laterality          Discussions/Education provided to patients during visit:  [] Discussed the importance to stop smoking. [] Advised to monitor eating habits. [] Reviewed and discussed Imaging results. [x] Reviewed and discussed Lab results. [x] Discussed the importance of drinking plenty of fluids. [x] Cut down on Salt, Caffeine, and Sugar. [x] Continue Medications as Discussed. [x] Communicated with patient any concerns, to phone office. cymbalta to 90m   Xanax added . 5  Bid anxiet and panics   Off effexer taper continue   We  counseled this patient extensively about her anxiety about her depression,went thru scenerios with meds   Xanax and manipulation of current meds done         No follow-ups on file.       Seen by:     Carmelo Serrano DO

## 2022-03-24 DIAGNOSIS — M54.2 NECK PAIN: ICD-10-CM

## 2022-03-24 DIAGNOSIS — M25.512 ACUTE PAIN OF LEFT SHOULDER: ICD-10-CM

## 2022-03-24 RX ORDER — OXYCODONE HYDROCHLORIDE AND ACETAMINOPHEN 5; 325 MG/1; MG/1
1 TABLET ORAL EVERY 4 HOURS PRN
Qty: 42 TABLET | Refills: 0 | Status: SHIPPED
Start: 2022-03-24 | End: 2022-04-29 | Stop reason: SDUPTHER

## 2022-03-29 ASSESSMENT — ENCOUNTER SYMPTOMS
ABDOMINAL PAIN: 0
SHORTNESS OF BREATH: 1
WHEEZING: 0
SINUS PAIN: 0
NAUSEA: 0
CHEST TIGHTNESS: 0
SORE THROAT: 0
PHOTOPHOBIA: 0
APNEA: 0
CONSTIPATION: 0
RECTAL PAIN: 0
FACIAL SWELLING: 0
VOMITING: 0
BACK PAIN: 0
VOICE CHANGE: 0
ABDOMINAL DISTENTION: 0
RHINORRHEA: 0
COUGH: 0
DIARRHEA: 0

## 2022-03-30 ENCOUNTER — OFFICE VISIT (OUTPATIENT)
Dept: PAIN MANAGEMENT | Age: 47
End: 2022-03-30
Payer: COMMERCIAL

## 2022-03-30 ENCOUNTER — PREP FOR PROCEDURE (OUTPATIENT)
Dept: PAIN MANAGEMENT | Age: 47
End: 2022-03-30

## 2022-03-30 VITALS
BODY MASS INDEX: 38.92 KG/M2 | SYSTOLIC BLOOD PRESSURE: 143 MMHG | HEART RATE: 92 BPM | HEIGHT: 67 IN | DIASTOLIC BLOOD PRESSURE: 103 MMHG | WEIGHT: 248 LBS | RESPIRATION RATE: 16 BRPM | OXYGEN SATURATION: 95 % | TEMPERATURE: 97.6 F

## 2022-03-30 DIAGNOSIS — M79.7 FIBROMYALGIA: ICD-10-CM

## 2022-03-30 DIAGNOSIS — M54.12 CERVICAL RADICULOPATHY: Primary | ICD-10-CM

## 2022-03-30 DIAGNOSIS — M47.812 CERVICAL SPONDYLOSIS: ICD-10-CM

## 2022-03-30 DIAGNOSIS — M50.30 DDD (DEGENERATIVE DISC DISEASE), CERVICAL: ICD-10-CM

## 2022-03-30 PROCEDURE — 99203 OFFICE O/P NEW LOW 30 MIN: CPT | Performed by: PHYSICIAN ASSISTANT

## 2022-03-30 PROCEDURE — 99213 OFFICE O/P EST LOW 20 MIN: CPT | Performed by: PHYSICIAN ASSISTANT

## 2022-03-30 NOTE — PROGRESS NOTES
Do you currently have any of the following:    Fever: No  Headache:  No  Cough: No  Shortness of breath: No  Exposed to anyone with these symptoms: No         Gloria Espinal [facility] to the [facility] on 3/30/2022. Kylie Hwang is complaining of pain Left arm and left shoulder. The pain is constant. The pain is described as aching, throbbing and shooting. Pain is rated on her best day at a 6, on her worst day at a 10, and on average at a 6 on the VAS scale. She took her last dose of Percocet today. Any procedures since your last visit: No, with  % relief. Pacemaker or defibrillator: No managed by . She is not on NSAIDS and is not on anticoagulation medications to include none and is managed by . Medication Contract and Consent for Opioid Use Documents Filed      No documents found                BP (!) 143/103   Pulse 92   Temp 97.6 °F (36.4 °C) (Infrared)   Resp 16   Ht 5' 7\" (1.702 m)   Wt 248 lb (112.5 kg)   SpO2 95%   BMI 38.84 kg/m²      No LMP recorded.

## 2022-03-30 NOTE — PROGRESS NOTES
[facility]  [address]  [facility]    Follow up Note      Rebekah Nelson     Date of Visit:  3/30/2022    CC:  Patient presents for follow up   Chief Complaint   Patient presents with    Follow-up    Shoulder Pain     left shoulder pain    Arm Pain     left arm       HPI:    Pain is a little worse to left arm. Injection is wearing off. Appropriate analgesia with current medications regimen: Not applicable. Change in quality of symptoms:no. Medication side effects:not applicable . Recent diagnostic testing:none. Recent interventional procedures:none. Previous treatments:   Physical Therapy/ HEP : yes, has been doing HEP since the onset      Medications: - NSAID's : yes                        - Membrane stabilizers : yes - Gabapentin                       - Opioids : yes, short term                       - Adjuvants or Others : yes, oral steroids/ muscle relaxants     Surgeries: no C spine surgery     She has not been on anticoagulation medications      She is not diabetic.     H/O Smoking: no  H/O alcohol abuse : no  H/O Illicit drug use : no     Employment: employed     Imaging:      MRI of Cervical spine: 10/11/2021:  FINDINGS:  Alignment is relatively anatomic.  There is preservation of disc height and signal      intensity.  Bone marrow signal intensity is normal.  The craniocervical junction appears intact.      No abnormal signal is identified within the cord in the cervical region.  The thyroid gland is      mildly heterogeneous with a enhancing 4 mm nodule in the left thyroid gland.  Additional findings      and levels are as follows:             C5-6:  Minimal broad-based disc osteophyte complex.  4.5 mm nerve root sheath diverticula on the      left.              C6-7:  Moderate broad-based disc osteophyte complex.  Uncovertebral hypertrophy.  Mild to moderate      central canal stenosis.  6.5mm AP canal diameter.  Moderate to severe left neural foraminal    stenosis.  Mild right neural foraminal stenosis.             IMPRESSION 3T MRI Cervical Spine without and with intravenous contrast:      1.  Disc disease and degenerative changes as discussed worse at C6-7.  Correlate with dermatome      mapping.      2.  4 mm enhancing nodule in the left thyroid gland.          OARRS report[de-identified] Reviewed      Potential Aberrant Drug-Related Behavior:      Urine Drug Screening:  No medications initiated. Opioid Agreement:  Date enacted:  N/A  Renewal date:    Past Medical History:   Diagnosis Date    Arthritis     psoriatic    Chronic back pain     Depression     Headache(784.0)     Hemorrhoids     Herpes genital     \"years ago\" ; no outbreaks    Irregular periods/menstrual cycles     Renal calculi     history of    Varicose veins        Past Surgical History:   Procedure Laterality Date    PAIN MANAGEMENT PROCEDURE N/A 12/16/2021    CERVICAL EPIDURAL STEROID INJECTION UNDER FLUORO AT C6 - C7 LEFT PARAMEDIAN performed by Brian Hinds MD at [facility] PAIN MANAGEMENT PROCEDURE Left 2/7/2022    #2 CERVICAL EPIDURAL STEROID INJECTION UNDER FLUOROSCOPIC GUIDANCE AT C6-C7 LEFT PARAMEDIAN performed by Brian Hinds MD at [address] [place] [address]  5/31/11    Lap    TUBAL LIGATION  2001    UPPER GASTROINTESTINAL ENDOSCOPY  2011       Prior to Admission medications    Medication Sig Start Date End Date Taking? Authorizing Provider   oxyCODONE-acetaminophen (PERCOCET) 5-325 MG per tablet Take 1 tablet by mouth every 4 hours as needed for Pain for up to 7 days. 3/24/22 3/31/22 Yes Serafin Truong, DO   ALPRAZolam Donzella Sans) 0.5 MG tablet Take 1 tablet by mouth 2 times daily as needed (anxiety) for up to 30 days.  3/4/22 4/3/22 Yes Sweetie Giron DO   hydrOXYzine (VISTARIL) 25 MG capsule Take 1 capsule by mouth 3 times daily as needed for Anxiety 2/28/22  Yes Sweetie Giron,    omeprazole (PRILOSEC) 40 MG delayed release capsule Take 1 capsule by mouth 2 times daily 2/22/22 8/21/22 Yes Carmelo Serrano DO   SUMAtriptan (IMITREX) 50 MG tablet take 1 tablet by mouth if needed AT ONSET OF HEADACHE may repeat in 4 hours IF headache PERSISTS 2/21/22  Yes Carmelo Serrano DO   pantoprazole (PROTONIX) 40 MG tablet Take 1 tablet by mouth 2 times daily (before meals) 2/15/22 8/14/22 Yes LYSNEY Mock CNP   vitamin B-12 (CYANOCOBALAMIN) 100 MCG tablet Take 50 mcg by mouth daily    Yes Historical Provider, MD   ferrous sulfate (IRON 325) 325 (65 Fe) MG tablet Take 325 mg by mouth daily (with breakfast)   Yes Historical Provider, MD   DULoxetine (CYMBALTA) 60 MG extended release capsule Take 1 capsule by mouth daily 1/31/22  Yes Carmelo Serrano DO   clotrimazole-betamethasone (LOTRISONE) 1-0.05 % cream Apply topically 2 times daily. 1/31/22  Yes True Truong, DO   secukinumab (COSENTYX) 150 MG/ML SOSY Inject 300 mg into the skin once   Yes Historical Provider, MD   B-D INSULIN SYRINGE 1CC/25GX1\" 25G X 1\" 1 ML MISC INJECT FOUR TIMES A MONTH AS DIRECTED 12/6/21  Yes Historical Provider, MD   BD INSULIN SYRINGE U/F 31G X 5/16\" 1 ML MISC USE FOR METHOTREXATE ADMINISTRATION ONCE A WEEK AS DIRECTED 12/12/21  Yes Historical Provider, MD   sucralfate (CARAFATE) 1 GM tablet Take 1 tablet by mouth 4 times daily 12/7/21  Yes Carmelo Serrano DO   NEEDLE, DISP, 27 G (EASY TOUCH FLIPLOCK NEEDLES) 27G X 1/2\" MISC Inject 4 times a month 12/6/21  Yes True Truong, DO   clobetasol (TEMOVATE) 0.05 % ointment Apply topically 2 times daily Apply topically 2 times daily.  5/19/21  Yes LYNSEY Lanza CNP   folic acid (FOLVITE) 1 MG tablet TAKE 1 TABLET BY MOUTH EVERY DAY 3/16/21  Yes Historical Provider, MD   Multiple Vitamin (MULTIVITAMIN PO) Take 1 tablet by mouth daily    Yes Historical Provider, MD   venlafaxine (EFFEXOR XR) 150 MG extended release capsule Take 1 capsule by mouth daily 11/2/21 3/4/22  LYNSEY Lanza - CNP   metFORMIN (GLUCOPHAGE) 500 MG tablet Take 1 tablet by mouth 2 times daily  Patient not taking: Reported on 9/27/2021 4/6/21 7/5/21  LYNSEY Iyer CNP       No Known Allergies    Social History     Socioeconomic History    Marital status:      Spouse name: Not on file    Number of children: Not on file    Years of education: Not on file    Highest education level: Not on file   Occupational History    Not on file   Tobacco Use    Smoking status: Never Smoker    Smokeless tobacco: Never Used   Vaping Use    Vaping Use: Never used   Substance and Sexual Activity    Alcohol use: No    Drug use: No    Sexual activity: Not on file   Other Topics Concern    Not on file   Social History Narrative    Not on file     Social Determinants of Health     Financial Resource Strain: Low Risk     Difficulty of Paying Living Expenses: Not hard at all   Food Insecurity: No Food Insecurity    Worried About [address] [employer] in the Last Year: Never true    [facility] [employer] in the Last Year: Never true   Transportation Needs:     Lack of Transportation (Medical): Not on file    Lack of Transportation (Non-Medical):  Not on file   Physical Activity:     Days of Exercise per Week: Not on file    Minutes of Exercise per Session: Not on file   Stress:     Feeling of Stress : Not on file   Social Connections:     Frequency of Communication with Friends and Family: Not on file    Frequency of Social Gatherings with Friends and Family: Not on file    Attends Protestant Services: Not on file    Active Member of Clubs or Organizations: Not on file    Attends Club or Organization Meetings: Not on file    Marital Status: Not on file   Intimate Partner Violence:     Fear of Current or Ex-Partner: Not on file    Emotionally Abused: Not on file    Physically Abused: Not on file    Sexually Abused: Not on file   Housing Stability:     Unable to Pay for Housing in the Last Year: Not on file    Number of [place] in the Last Year: Not on file    Unstable Housing in the Last Year: Not on file       Family History   Family history unknown: Yes       REVIEW OF SYSTEMS:     Shell Saab denies fever/chills, chest pain, shortness of breath, new bowel or bladder complaints. All other review of systems was negative. PHYSICAL EXAMINATION:      BP (!) 143/103   Pulse 92   Temp 97.6 °F (36.4 °C) (Infrared)   Resp 16   Ht 5' 7\" (1.702 m)   Wt 248 lb (112.5 kg)   SpO2 95%   BMI 38.84 kg/m²     General:      General appearance:   pleasant and well-hydrated. , in mild discomfort and A & O x3  Build:Overweight    HEENT:    Head:normocephalic and atraumatic  Sclera: icterus absent,    Lungs:    Breathing:Normal expansion. No wheezing. Abdomen:    Shape:non-distended and normal    Cervical spine:    Inspection:normal  Range of motion:abnormal moderately flexion, extension rotation left and is painful. Extremities:    Tremors:None bilaterally upper and lower  Range of motion:Generally normal shoulders, pain with internal rotation of hips not done. Intact:Yes  Edema:Normal      Neurological:    [address] [facility]    Dermatology:    Skin:no unusual rashes, no skin lesions, no palpable subcutaneous nodules and good skin turgor    Impression:      Diagnosis Orders   1. DDD (degenerative disc disease), cervical      2. Cervical radiculopathy      3. Cervical spondylosis      4. Fibromyalgia      5. Obesity, unspecified classification, unspecified obesity type, unspecified whether serious comorbidity present               40 y. o[place] Kiss h/o neck pain and left UE radicular pain. Failed conservative treatment. MRI: C6-7 DDD/ stenosis     Has been evaluated by NSG- recommended interventions.     Past h/o gastric bypass/ h/o psoriatic arthritis.     She takes percocet prn/ Zanaflex Prn.     Has tried Gabapentin.     S/P Cervical DOMINICK # 2 with almost complete pain relief until recently. Almost 2 months relief. Repeat ordered.  This will be the last one until she starts a new cycle in June.      Physical therapy/ HEP.     Instructed to take percocet only prn for severe pain- she apparently takes it for fibromyalgia. Discussed issues with long term opioid use. Recommend non opioid treatment options.     She has been started on Cymbalta- which is helping her.     (she apparently is not taking gabapentin-). Consider changing it to Lyrica.      NOTE: She is consulting a hematologist for h/o easy bruising.        Counseling : Patient encouraged to stay active and to watch/lose weight     Encouraged to continue Regular home exercise program as tolerated - stretching / strengthening.     Treatment plan discussed with the patient including  procedure side effects. We discussed with the patient that combining opioids, benzodiazepines, alcohol, illicit drugs or sleep aids increases the risk of respiratory depression including death. We discussed that these medications may cause drowsiness, sedation or dizziness and have counseled the patient not to drive or operate machinery. We have discussed that these medications will be prescribed only by one provider. We have discussed with the patient about age related risk factors and have thoroughly discussed the importance of taking these medications as prescribed. The patient verbalizes understanding.     ccreferring physic

## 2022-03-31 ENCOUNTER — TELEPHONE (OUTPATIENT)
Dept: PAIN MANAGEMENT | Age: 47
End: 2022-03-31

## 2022-03-31 NOTE — TELEPHONE ENCOUNTER
Call to Jazmin Natacha that procedure was approved for 4/7/2022 and that the surgery center should call her a few days before for the pre op call and after 3:00 PM the business day before with the arrival time. Instructed Neha to hold ibuprofen for 24 hours, naprosyn for 4 days and any aspirin containing products or fish oil for 7 days. Instructed to call office back if any questions. Ramez Fagan verbalized understanding.

## 2022-04-06 NOTE — PROGRESS NOTES
[facility]  INSTRUCTIONS  . .......................................................................................................................................... Have you been tested for COVID  No           Have you been told you were positive for COVID No  Have you had any known exposure to someone that is positive for COVID No  Do you have a cough                   No              Do you have shortness of breath No                 Do you have a sore throat            No                Are you having chills                    No                Are you having muscle aches. No                    Please come to the hospital wearing a mask and have your significant other wear a mask as well. Both of you should check your temperature before leaving to come here,  if it is 100 or higher please call [phone] for instruction. [x] Parking the day of Surgery is located in the [facility].   Upon entering the door, make immediate right into the surgery reception room    [x]  Bring photo ID and insurance card     [x] You may have a light breakfast day of procedure    [x]  Wear loose comfortable clothing    [x]  Please follow instructions for medications as given per Dr's office    [x] You can expect a call the business day prior to procedure to notify you of your arrival time     [x] Please arrange for     []  Other instructions

## 2022-04-07 ENCOUNTER — HOSPITAL ENCOUNTER (OUTPATIENT)
Age: 47
Setting detail: OUTPATIENT SURGERY
Discharge: HOME | End: 2022-04-07
Attending: ANESTHESIOLOGY | Admitting: ANESTHESIOLOGY
Payer: COMMERCIAL

## 2022-04-07 ENCOUNTER — HOSPITAL ENCOUNTER (OUTPATIENT)
Dept: GENERAL RADIOLOGY | Age: 47
Discharge: HOME | End: 2022-04-09
Attending: ANESTHESIOLOGY
Payer: COMMERCIAL

## 2022-04-07 VITALS
OXYGEN SATURATION: 98 % | TEMPERATURE: 97.7 F | HEART RATE: 82 BPM | RESPIRATION RATE: 15 BRPM | DIASTOLIC BLOOD PRESSURE: 74 MMHG | HEIGHT: 67 IN | SYSTOLIC BLOOD PRESSURE: 138 MMHG | BODY MASS INDEX: 38.92 KG/M2 | WEIGHT: 248 LBS

## 2022-04-07 DIAGNOSIS — R52 PAIN: ICD-10-CM

## 2022-04-07 LAB
HCG, URINE, POC: NEGATIVE
Lab: NORMAL
NEGATIVE QC PASS/FAIL: NORMAL
POSITIVE QC PASS/FAIL: NORMAL

## 2022-04-07 PROCEDURE — 6370000000 HC RX 637 (ALT 250 FOR IP)

## 2022-04-07 PROCEDURE — 62321 NJX INTERLAMINAR CRV/THRC: CPT | Performed by: ANESTHESIOLOGY

## 2022-04-07 PROCEDURE — 3600000002 HC SURGERY LEVEL 2 BASE: Performed by: ANESTHESIOLOGY

## 2022-04-07 PROCEDURE — 2709999900 HC NON-CHARGEABLE SUPPLY: Performed by: ANESTHESIOLOGY

## 2022-04-07 PROCEDURE — 2580000003 HC RX 258: Performed by: ANESTHESIOLOGY

## 2022-04-07 PROCEDURE — 6360000002 HC RX W HCPCS: Performed by: ANESTHESIOLOGY

## 2022-04-07 PROCEDURE — A4216 STERILE WATER/SALINE, 10 ML: HCPCS | Performed by: ANESTHESIOLOGY

## 2022-04-07 PROCEDURE — 3600000012 HC SURGERY LEVEL 2 ADDTL 15MIN: Performed by: ANESTHESIOLOGY

## 2022-04-07 PROCEDURE — 7100000011 HC PHASE II RECOVERY - ADDTL 15 MIN: Performed by: ANESTHESIOLOGY

## 2022-04-07 PROCEDURE — 6360000004 HC RX CONTRAST MEDICATION: Performed by: ANESTHESIOLOGY

## 2022-04-07 PROCEDURE — 3209999900 FLUORO FOR SURGICAL PROCEDURES

## 2022-04-07 PROCEDURE — 7100000010 HC PHASE II RECOVERY - FIRST 15 MIN: Performed by: ANESTHESIOLOGY

## 2022-04-07 PROCEDURE — 2500000003 HC RX 250 WO HCPCS: Performed by: ANESTHESIOLOGY

## 2022-04-07 RX ORDER — METHYLPREDNISOLONE ACETATE 40 MG/ML
INJECTION, SUSPENSION INTRA-ARTICULAR; INTRALESIONAL; INTRAMUSCULAR; SOFT TISSUE PRN
Status: DISCONTINUED | OUTPATIENT
Start: 2022-04-07 | End: 2022-04-07 | Stop reason: ALTCHOICE

## 2022-04-07 RX ORDER — KETOROLAC TROMETHAMINE 10 MG/1
10 TABLET, FILM COATED ORAL 3 TIMES DAILY PRN
Qty: 6 TABLET | Refills: 0 | Status: SHIPPED | OUTPATIENT
Start: 2022-04-07 | End: 2022-05-26

## 2022-04-07 RX ORDER — SODIUM CHLORIDE 9 MG/ML
INJECTION INTRAVENOUS PRN
Status: DISCONTINUED | OUTPATIENT
Start: 2022-04-07 | End: 2022-04-07 | Stop reason: ALTCHOICE

## 2022-04-07 RX ORDER — KETOROLAC TROMETHAMINE 30 MG/ML
30 INJECTION, SOLUTION INTRAMUSCULAR; INTRAVENOUS ONCE
Status: COMPLETED | OUTPATIENT
Start: 2022-04-07 | End: 2022-04-07

## 2022-04-07 RX ORDER — IBUPROFEN 600 MG/1
TABLET ORAL
Status: COMPLETED
Start: 2022-04-07 | End: 2022-04-07

## 2022-04-07 RX ORDER — OXYCODONE HYDROCHLORIDE AND ACETAMINOPHEN 5; 325 MG/1; MG/1
TABLET ORAL
Status: COMPLETED
Start: 2022-04-07 | End: 2022-04-07

## 2022-04-07 RX ORDER — LIDOCAINE HYDROCHLORIDE 5 MG/ML
INJECTION, SOLUTION INFILTRATION; INTRAVENOUS PRN
Status: DISCONTINUED | OUTPATIENT
Start: 2022-04-07 | End: 2022-04-07 | Stop reason: ALTCHOICE

## 2022-04-07 RX ORDER — OXYCODONE HYDROCHLORIDE AND ACETAMINOPHEN 5; 325 MG/1; MG/1
2 TABLET ORAL ONCE
Status: COMPLETED | OUTPATIENT
Start: 2022-04-07 | End: 2022-04-07

## 2022-04-07 RX ORDER — IBUPROFEN 600 MG/1
600 TABLET ORAL ONCE
Status: COMPLETED | OUTPATIENT
Start: 2022-04-07 | End: 2022-04-07

## 2022-04-07 RX ADMIN — OXYCODONE HYDROCHLORIDE AND ACETAMINOPHEN 2 TABLET: 5; 325 TABLET ORAL at 10:45

## 2022-04-07 RX ADMIN — IBUPROFEN 600 MG: 600 TABLET ORAL at 10:44

## 2022-04-07 RX ADMIN — KETOROLAC TROMETHAMINE 30 MG: 30 INJECTION, SOLUTION INTRAMUSCULAR; INTRAVENOUS at 10:58

## 2022-04-07 ASSESSMENT — PAIN - FUNCTIONAL ASSESSMENT: PAIN_FUNCTIONAL_ASSESSMENT: 0-10

## 2022-04-07 ASSESSMENT — PAIN SCALES - GENERAL
PAINLEVEL_OUTOF10: 10
PAINLEVEL_OUTOF10: 7
PAINLEVEL_OUTOF10: 0
PAINLEVEL_OUTOF10: 0
PAINLEVEL_OUTOF10: 10

## 2022-04-07 ASSESSMENT — PAIN DESCRIPTION - ORIENTATION
ORIENTATION: LEFT
ORIENTATION: LEFT

## 2022-04-07 ASSESSMENT — PAIN DESCRIPTION - PAIN TYPE
TYPE: SURGICAL PAIN
TYPE: CHRONIC PAIN
TYPE: SURGICAL PAIN

## 2022-04-07 ASSESSMENT — PAIN DESCRIPTION - LOCATION
LOCATION: BACK;ARM;NECK
LOCATION: BACK;ARM;NECK

## 2022-04-07 ASSESSMENT — PAIN DESCRIPTION - PROGRESSION: CLINICAL_PROGRESSION: RAPIDLY IMPROVING

## 2022-04-07 NOTE — PROGRESS NOTES
Patient states pain much better. Patient now calm and states is feeling \"ok\" now. Resting quietly in bed. Will continue to monitor.

## 2022-04-07 NOTE — OP NOTE
Operative Note      Patient: Dotty Werner  YOB: 1975  MRN: [id number]    Date of Procedure: 2022    Pre-Op Diagnosis: CERVICAL RADICULOPATHY, cervical DDD    Post-Op Diagnosis: Same       Procedure(s):  CERVICAL EPIDURAL STEROID INJECTION C6-C7 LEFT PARAMEDIAN    Surgeon(s):  Vernon Mendieta MD    Assistant:   * No surgical staff found *    Anesthesia: Local    Estimated Blood Loss (mL): Minimal    Complications: None    Specimens:   * No specimens in log *    Implants:  * No implants in log *      Drains: * No LDAs found *    Findings: good needle placement    Detailed Description of Procedure:   2022    Patient: Dotty Werner  :  1975  Age:  40 y.o. Sex:  female     PRE-PROCEDURE DIAGNOSIS: Cervical degenerative disc disease, cervical radicular pain. POST-PROCEDURE DIAGNOSIS: Same. PROCEDURE: Fluoroscopic guided cervical epidural steroid injection, # at C6-C7 level. SURGEON: Vernon Mendieta MD    ANESTHESIA: Local    ESTIMATED BLOOD LOSS: None.  ______________________________________________________________________  BRIEF HISTORY:  Dotty Werner comes in today for the   therapeutic cervical epidural injection under fluoroscopic guidance. The potential complications of this procedure were discussed with the her again today. She has elected to undergo the aforementioned procedure. Jean complete History & Physical examination were reviewed in depth, a copy of which is in the chart. DESCRIPTION OF PROCEDURE:    After confirming written and informed consent, a time-out was performed and Jean name and date of birth, the procedure to be performed as well as the plan for the location of the needle insertion were confirmed. The patient was brought into the procedure room and placed in the prone position with the head flexed midline on the fluoroscopy table. A pillow was placed under the patient's head to increase cervical interlaminar space.  Standard monitors were placed, and vital signs were observed throughout the procedure. The area was prepped with chloraprep and the C6-C7 interspace was marked under fluoroscopy. The skin and subcutaneous tissues at the above level were anesthestized with 0.5% lidocaine. An # 18 gauge 3-1/2 tuohy epidural needle was inserted and advanced toward the inferior lamina until bony contact was made. The needle was then advanced superiorly toward epidural space. From this point on loss of resistance technique with 5 cc glass syringe was used to confirm entrance of the needle into the epidural space under intermittent lateral fluoroscopy. Once in the epidural space , negative aspiration for blood and CSF was confirmed . Needle tip placement was confirmed by visualizing epidural spread of 0.5-1 ml of omnipaque 240 visualized in both AP and lateral live fluoroscopic views. Then after negative aspiration, a solution of 0.9 % Saline 3 ml and 60 mg DepoMedrol was easily injected. The needle was gently removed intact. The patient neck was cleaned and a Band-Aid was placed over the needle insertion point. Disposition the patient tolerated the procedure well and there were no complications . Vital signs remained stable throughout the procedure. The patient was escorted to the recovery area where they remained until discharge and written discharge instructions for the procedure were given. Plan: Shiloh Ahmadi will return to our pain management center as scheduled.      Nigel Kirkpatrick MD

## 2022-04-07 NOTE — H&P
[facility]      Procedure History & Physical      Felicita Ricks     HPI:    Patient  is here for cervical DMOINICK for neck pain/ UE pain. Labs/imaging studies reviewed   All question and concerns addressed including R/B/A associated with the procedure    Past Medical History:   Diagnosis Date    Arthritis     psoriatic    Chronic back pain     Depression     Headache(784.0)     Hemorrhoids     Herpes genital     \"years ago\" ; no outbreaks    Irregular periods/menstrual cycles     Renal calculi     history of    Varicose veins        Past Surgical History:   Procedure Laterality Date    PAIN MANAGEMENT PROCEDURE N/A 12/16/2021    CERVICAL EPIDURAL STEROID INJECTION UNDER FLUORO AT C6 - C7 LEFT PARAMEDIAN performed by Debbe Lennox, MD at [facility] [address] Left 2/7/2022    #2 CERVICAL EPIDURAL STEROID INJECTION UNDER FLUOROSCOPIC GUIDANCE AT C6-C7 LEFT PARAMEDIAN performed by Debbe Lennox, MD at [address] [place] [address]  5/31/11    Lap    TUBAL LIGATION  2001    UPPER GASTROINTESTINAL ENDOSCOPY  2011       Prior to Admission medications    Medication Sig Start Date End Date Taking? Authorizing Provider   omeprazole (PRILOSEC) 40 MG delayed release capsule Take 1 capsule by mouth 2 times daily 2/22/22 8/21/22  Lindsay Goldstein DO   SUMAtriptan (IMITREX) 50 MG tablet take 1 tablet by mouth if needed AT ONSET OF HEADACHE may repeat in 4 hours IF headache PERSISTS 2/21/22   Lindsay Goldstein DO   vitamin B-12 (CYANOCOBALAMIN) 100 MCG tablet Take 50 mcg by mouth daily     Historical Provider, MD   ferrous sulfate (IRON 325) 325 (65 Fe) MG tablet Take 325 mg by mouth daily (with breakfast)    Historical Provider, MD   DULoxetine (CYMBALTA) 60 MG extended release capsule Take 1 capsule by mouth daily 1/31/22   Lindsay Goldstein DO   clotrimazole-betamethasone (LOTRISONE) 1-0.05 % cream Apply topically 2 times daily.  1/31/22   Lindsay Goldstein DO secukinumab (COSENTYX) 150 MG/ML SOSY Inject 300 mg into the skin once    Historical Provider, MD   B-D INSULIN SYRINGE 1CC/25GX1\" 25G X 1\" 1 ML MISC INJECT FOUR TIMES A MONTH AS DIRECTED  Patient not taking: Reported on 4/7/2022 12/6/21   Historical Provider, MD   BD INSULIN SYRINGE U/F 31G X 5/16\" 1 ML MISC USE FOR METHOTREXATE ADMINISTRATION ONCE A WEEK AS DIRECTED  Patient not taking: Reported on 4/7/2022 12/12/21   Historical Provider, MD   NEEDLE, DISP, 27 G (EASY TOUCH FLIPLOCK NEEDLES) 27G X 1/2\" MISC Inject 4 times a month  Patient not taking: Reported on 4/7/2022 12/6/21   Mellissa Shade, DO   clobetasol (TEMOVATE) 0.05 % ointment Apply topically 2 times daily Apply topically 2 times daily. 5/19/21   Deepti Ring, APRN - CNP   folic acid (FOLVITE) 1 MG tablet TAKE 1 TABLET BY MOUTH EVERY DAY 3/16/21   Historical Provider, MD   Multiple Vitamin (MULTIVITAMIN PO) Take 1 tablet by mouth daily     Historical Provider, MD       No Known Allergies    Social History     Socioeconomic History    Marital status:      Spouse name: Not on file    Number of children: Not on file    Years of education: Not on file    Highest education level: Not on file   Occupational History    Not on file   Tobacco Use    Smoking status: Never Smoker    Smokeless tobacco: Never Used   Vaping Use    Vaping Use: Never used   Substance and Sexual Activity    Alcohol use: No    Drug use: No    Sexual activity: Not on file   Other Topics Concern    Not on file   Social History Narrative    Not on file     Social Determinants of Health     Financial Resource Strain: Low Risk     Difficulty of Paying Living Expenses: Not hard at all   Food Insecurity: No Food Insecurity    Worried About [address] in the Last Year: Never true    [address] [employer] in the Last Year: Never true   Transportation Needs:     Lack of Transportation (Medical): Not on file    Lack of Transportation (Non-Medical):  Not on file Physical Activity:     Days of Exercise per Week: Not on file    Minutes of Exercise per Session: Not on file   Stress:     Feeling of Stress : Not on file   Social Connections:     Frequency of Communication with Friends and Family: Not on file    Frequency of Social Gatherings with Friends and Family: Not on file    Attends Mosque Services: Not on file    Active Member of [address] [place] [address] or Organizations: Not on file    Attends Club or Organization Meetings: Not on file    Marital Status: Not on file   Intimate Partner Violence:     Fear of Current or Ex-Partner: Not on file    Emotionally Abused: Not on file    Physically Abused: Not on file    Sexually Abused: Not on file   Housing Stability:     Unable to Pay for Housing in the Last Year: Not on file    Number of [place] in the Last Year: Not on file    Unstable Housing in the Last Year: Not on file       Family History   Family history unknown: Yes         REVIEW OF SYSTEMS:    CONSTITUTIONAL:  negative for  fevers, chills, sweats and fatigue    RESPIRATORY:  negative for  dry cough, cough with sputum, dyspnea, wheezing and chest pain    CARDIOVASCULAR:  negative for chest pain, dyspnea, palpitations, syncope    GASTROINTESTINAL:  negative for nausea, vomiting, change in bowel habits, diarrhea, constipation and abdominal pain    MUSCULOSKELETAL: negative for muscle weakness    SKIN: negative for itching or rashes.     BEHAVIOR/PSYCH:  negative for poor appetite, increased appetite, decreased sleep and poor concentration    All other systems negative      PHYSICAL EXAM:    VITALS:  BP (!) 143/72   Pulse 102   Temp 97.2 °F (36.2 °C) (Temporal)   Resp 14   Ht 5' 7\" (1.702 m)   Wt 248 lb (112.5 kg)   LMP 03/23/2022   SpO2 93%   BMI 38.84 kg/m²     CONSTITUTIONAL:  awake, alert, cooperative, no apparent distress, and appears stated age    EYES: PERRLA, EOMI    LUNGS:  No increased work of breathing, no audible wheezing    CARDIOVASCULAR: regular rate and rhythm    ABDOMEN:  Soft non tender non distended     EXTREMITIES: no signs of clubbing or cyanosis. MUSCULOSKELETAL: negative for flaccid muscle tone or spastic movements. SKIN: gross examination reveals no signs of rashes, or diaphoresis. NEURO: Cranial nerves II-XII grossly intact. No signs of agitated mood. Assessment/Plan:    Patient  is here for cervical DOMINICK for neck pain/ UE pain. The patient was counseled at length about the risks of jc Covid-19 during their perioperative period and any recovery window from their procedure. The patient was made aware that jc Covid-19  may worsen their prognosis for recovering from their procedure  and lend to a higher morbidity and/or mortality risk. All material risks, benefits, and reasonable alternatives including postponing the procedure were discussed. The patient does wish to proceed with the procedure at this time.       Maricarmen Rowe MD

## 2022-04-07 NOTE — PROGRESS NOTES
Patient has a ride to go home. Discharge instructions reviewed with patient and , copy given. Anesthesia instructions reviewed and copy given. Denies questions or concerns.

## 2022-04-07 NOTE — PROGRESS NOTES
Patient tearful, anxious, stating pain 12/10 from neck and going down back and left arm. Spouse at bedside. Dr. Blake Verduzco updated, came to room to evaluate patient-orders received.

## 2022-04-07 NOTE — PROGRESS NOTES
[address] called, they do not have Toradol to fill the patient's Rx. Called pt, she requests Rx to be sent to [facility] (formerly [facility]) in [facility]. Outpatient pharmacy notified and will transfer Rx.

## 2022-04-29 DIAGNOSIS — M54.2 NECK PAIN: ICD-10-CM

## 2022-04-29 DIAGNOSIS — M25.512 ACUTE PAIN OF LEFT SHOULDER: ICD-10-CM

## 2022-04-29 RX ORDER — OXYCODONE HYDROCHLORIDE AND ACETAMINOPHEN 5; 325 MG/1; MG/1
1 TABLET ORAL EVERY 4 HOURS PRN
Qty: 42 TABLET | Refills: 0 | Status: SHIPPED
Start: 2022-04-29 | End: 2022-05-31 | Stop reason: SDUPTHER

## 2022-05-05 DIAGNOSIS — F41.9 ANXIETY: ICD-10-CM

## 2022-05-05 RX ORDER — ALPRAZOLAM 0.5 MG/1
0.5 TABLET ORAL 2 TIMES DAILY PRN
Qty: 60 TABLET | Refills: 0 | Status: SHIPPED
Start: 2022-05-05 | End: 2022-05-26

## 2022-05-09 ENCOUNTER — OFFICE VISIT (OUTPATIENT)
Dept: FAMILY MEDICINE CLINIC | Age: 47
End: 2022-05-09
Payer: COMMERCIAL

## 2022-05-09 VITALS
HEART RATE: 102 BPM | HEIGHT: 67 IN | DIASTOLIC BLOOD PRESSURE: 98 MMHG | TEMPERATURE: 97.1 F | BODY MASS INDEX: 40.12 KG/M2 | OXYGEN SATURATION: 97 % | WEIGHT: 255.6 LBS | SYSTOLIC BLOOD PRESSURE: 160 MMHG

## 2022-05-09 DIAGNOSIS — M54.9 CHRONIC BACK PAIN, UNSPECIFIED BACK LOCATION, UNSPECIFIED BACK PAIN LATERALITY: ICD-10-CM

## 2022-05-09 DIAGNOSIS — M50.90 CERVICAL DISC DISEASE: ICD-10-CM

## 2022-05-09 DIAGNOSIS — E78.2 MIXED HYPERLIPIDEMIA: ICD-10-CM

## 2022-05-09 DIAGNOSIS — R07.9 CHEST PAIN, UNSPECIFIED TYPE: ICD-10-CM

## 2022-05-09 DIAGNOSIS — F41.9 ANXIETY AND DEPRESSION: ICD-10-CM

## 2022-05-09 DIAGNOSIS — M54.12 CERVICAL RADICULOPATHY: ICD-10-CM

## 2022-05-09 DIAGNOSIS — F32.A ANXIETY AND DEPRESSION: ICD-10-CM

## 2022-05-09 DIAGNOSIS — G89.29 CHRONIC BACK PAIN, UNSPECIFIED BACK LOCATION, UNSPECIFIED BACK PAIN LATERALITY: ICD-10-CM

## 2022-05-09 DIAGNOSIS — R53.83 OTHER FATIGUE: ICD-10-CM

## 2022-05-09 DIAGNOSIS — M25.512 ACUTE PAIN OF LEFT SHOULDER: ICD-10-CM

## 2022-05-09 DIAGNOSIS — F41.9 ANXIETY: Primary | ICD-10-CM

## 2022-05-09 DIAGNOSIS — M50.30 DDD (DEGENERATIVE DISC DISEASE), CERVICAL: ICD-10-CM

## 2022-05-09 DIAGNOSIS — K21.00 GASTROESOPHAGEAL REFLUX DISEASE WITH ESOPHAGITIS, UNSPECIFIED WHETHER HEMORRHAGE: ICD-10-CM

## 2022-05-09 PROCEDURE — 99214 OFFICE O/P EST MOD 30 MIN: CPT | Performed by: FAMILY MEDICINE

## 2022-05-09 RX ORDER — DULOXETIN HYDROCHLORIDE 30 MG/1
CAPSULE, DELAYED RELEASE ORAL
COMMUNITY
Start: 2022-04-21 | End: 2022-05-26

## 2022-05-09 RX ORDER — TIZANIDINE 4 MG/1
4 TABLET ORAL 3 TIMES DAILY
Qty: 270 TABLET | Refills: 1 | Status: SHIPPED | OUTPATIENT
Start: 2022-05-09 | End: 2022-08-07

## 2022-05-09 NOTE — PROGRESS NOTES
22  Liam Vargas : 1975 Sex: female  Age: 40 y.o. Chief Complaint   Patient presents with    Medication Check     one month        HPI  Overall check up    Review of Systems   Constitutional: Positive for fatigue and unexpected weight change. Negative for appetite change, chills, diaphoresis and fever. HENT: Negative for congestion, dental problem, ear discharge, ear pain, facial swelling, hearing loss, mouth sores, rhinorrhea, sinus pain, sore throat, tinnitus and voice change. Eyes: Negative for photophobia and visual disturbance (no visual changes). Respiratory: Positive for shortness of breath (panic ). Negative for apnea, cough, chest tightness and wheezing. Cardiovascular: Negative for chest pain, palpitations and leg swelling. Gastrointestinal: Negative for abdominal distention, abdominal pain, constipation, diarrhea, nausea, rectal pain and vomiting. Endocrine: Negative for cold intolerance, heat intolerance and polyuria. Genitourinary: Positive for frequency. Negative for dysuria, hematuria and urgency. Musculoskeletal: Positive for arthralgias and myalgias. Negative for back pain, gait problem, joint swelling, neck pain and neck stiffness. Skin: Negative for rash and wound. Allergic/Immunologic: Negative for environmental allergies. Neurological: Negative for dizziness, tremors, seizures, syncope, facial asymmetry, speech difficulty, weakness, light-headedness, numbness and headaches. Hematological: Does not bruise/bleed easily. Psychiatric/Behavioral: Positive for agitation and sleep disturbance. Negative for hallucinations and suicidal ideas. The patient is nervous/anxious and is hyperactive. Physical Exam  Vitals reviewed. Constitutional:       General: She is not in acute distress. Appearance: She is not ill-appearing. HENT:      Head: Normocephalic.       Right Ear: Tympanic membrane, ear canal and external ear normal. There is no impacted cerumen. Left Ear: Tympanic membrane, ear canal and external ear normal. There is no impacted cerumen. Nose: No rhinorrhea. Mouth/Throat:      Pharynx: No posterior oropharyngeal erythema. Eyes:      General:         Right eye: No discharge. Left eye: No discharge. Extraocular Movements: Extraocular movements intact. Pupils: Pupils are equal, round, and reactive to light. Neck:      Vascular: No carotid bruit. Cardiovascular:      Rate and Rhythm: Regular rhythm. Heart sounds: Normal heart sounds. No murmur heard. No gallop. Pulmonary:      Breath sounds: Normal breath sounds. No wheezing, rhonchi or rales. Abdominal:      Palpations: Abdomen is soft. There is no mass. Tenderness: There is no guarding or rebound. Hernia: No hernia is present. Musculoskeletal:         General: Normal range of motion. Cervical back: Neck supple. Tenderness present. Comments: Spasm and cervical spine decreased motion seems to be a had a contributor   Lymphadenopathy:      Cervical: No cervical adenopathy. Skin:     General: Skin is warm and dry. Findings: No bruising or rash. Neurological:      General: No focal deficit present. Mental Status: She is alert and oriented to person, place, and time. Cranial Nerves: No cranial nerve deficit. Motor: No weakness. Coordination: Coordination normal.      Gait: Gait normal.   Psychiatric:         Mood and Affect: Mood normal.         Behavior: Behavior normal.         Assessment and Plan:  Geno Boucher was seen today for medication check.     Diagnoses and all orders for this visit:    Anxiety  -     External Referral To Psychiatry    Anxiety and depression  -     External Referral To Psychiatry    DDD (degenerative disc disease), cervical    Mixed hyperlipidemia    Gastroesophageal reflux disease with esophagitis, unspecified whether hemorrhage    Cervical radiculopathy    Acute pain of left shoulder    Chest pain, unspecified type    Cervical disc disease    Other fatigue    Chronic back pain, unspecified back location, unspecified back pain laterality    Other orders  -     tiZANidine (ZANAFLEX) 4 MG tablet; Take 1 tablet by mouth 3 times daily          Discussions/Education provided to patients during visit:  [] Discussed the importance to stop smoking. [] Advised to monitor eating habits. [] Reviewed and discussed Imaging results. [] Reviewed and discussed Lab results. [x] Discussed the importance of drinking plenty of fluids. [x] Cut down on Salt, Caffeine, and Sugar. [x] Continue Medications as Discussed. [x] Communicated with patient any concerns, to phone office. Discussion of anxiety and depression we went over her medicines in detail with her about what she should be taking as far as the antidepressant medications antianxiety medications how she should be taking it and various situations that we have that she is tried in the past and what we can possibly try in the future  We decided she has a lot of deep-seated problems and she probably needs to see a psychiatrist so we are referring her to someone that we can find and the other situation is we did give her some tizanidine for shoulder discomfort and pain prescription was written for that  She will be followed in about a month and see what is happening if her feelings get worse she may have to go sooner than we can get her into mental professional      No follow-ups on file.       Seen by:     Burnie Saint, DO

## 2022-05-26 ENCOUNTER — OFFICE VISIT (OUTPATIENT)
Dept: FAMILY MEDICINE CLINIC | Age: 47
End: 2022-05-26
Payer: COMMERCIAL

## 2022-05-26 ENCOUNTER — TELEPHONE (OUTPATIENT)
Dept: FAMILY MEDICINE CLINIC | Age: 47
End: 2022-05-26

## 2022-05-26 VITALS
DIASTOLIC BLOOD PRESSURE: 78 MMHG | HEIGHT: 67 IN | SYSTOLIC BLOOD PRESSURE: 132 MMHG | RESPIRATION RATE: 18 BRPM | BODY MASS INDEX: 40.59 KG/M2 | TEMPERATURE: 97.5 F | OXYGEN SATURATION: 98 % | WEIGHT: 258.6 LBS | HEART RATE: 84 BPM

## 2022-05-26 DIAGNOSIS — T14.8XXA BRUISING: ICD-10-CM

## 2022-05-26 DIAGNOSIS — M06.9 RHEUMATOID ARTHRITIS, INVOLVING UNSPECIFIED SITE, UNSPECIFIED WHETHER RHEUMATOID FACTOR PRESENT (HCC): ICD-10-CM

## 2022-05-26 DIAGNOSIS — M25.50 ARTHRALGIA, UNSPECIFIED JOINT: ICD-10-CM

## 2022-05-26 DIAGNOSIS — R60.9 SWELLING: ICD-10-CM

## 2022-05-26 DIAGNOSIS — M79.7 FIBROMYALGIA: ICD-10-CM

## 2022-05-26 DIAGNOSIS — G89.29 CHRONIC BACK PAIN, UNSPECIFIED BACK LOCATION, UNSPECIFIED BACK PAIN LATERALITY: ICD-10-CM

## 2022-05-26 DIAGNOSIS — M54.9 CHRONIC BACK PAIN, UNSPECIFIED BACK LOCATION, UNSPECIFIED BACK PAIN LATERALITY: ICD-10-CM

## 2022-05-26 DIAGNOSIS — M50.30 DDD (DEGENERATIVE DISC DISEASE), CERVICAL: ICD-10-CM

## 2022-05-26 DIAGNOSIS — M25.50 ARTHRALGIA, UNSPECIFIED JOINT: Primary | ICD-10-CM

## 2022-05-26 DIAGNOSIS — F41.9 ANXIETY: ICD-10-CM

## 2022-05-26 LAB
ALBUMIN SERPL-MCNC: 4 G/DL (ref 3.5–5.2)
ALP BLD-CCNC: 63 U/L (ref 35–104)
ALT SERPL-CCNC: 19 U/L (ref 0–32)
ANION GAP SERPL CALCULATED.3IONS-SCNC: 16 MMOL/L (ref 7–16)
APTT: 35.4 SEC (ref 24.5–35.1)
AST SERPL-CCNC: 22 U/L (ref 0–31)
BASOPHILS ABSOLUTE: 0.03 E9/L (ref 0–0.2)
BASOPHILS RELATIVE PERCENT: 0.5 % (ref 0–2)
BILIRUB SERPL-MCNC: 0.3 MG/DL (ref 0–1.2)
BUN BLDV-MCNC: 17 MG/DL (ref 6–20)
CALCIUM SERPL-MCNC: 8.6 MG/DL (ref 8.6–10.2)
CHLORIDE BLD-SCNC: 106 MMOL/L (ref 98–107)
CO2: 23 MMOL/L (ref 22–29)
CREAT SERPL-MCNC: 0.7 MG/DL (ref 0.5–1)
EOSINOPHILS ABSOLUTE: 0.12 E9/L (ref 0.05–0.5)
EOSINOPHILS RELATIVE PERCENT: 2.1 % (ref 0–6)
GFR AFRICAN AMERICAN: >60
GFR NON-AFRICAN AMERICAN: >60 ML/MIN/1.73
GLUCOSE BLD-MCNC: 85 MG/DL (ref 74–99)
HCT VFR BLD CALC: 40.4 % (ref 34–48)
HEMOGLOBIN: 13.2 G/DL (ref 11.5–15.5)
IMMATURE GRANULOCYTES #: 0.02 E9/L
IMMATURE GRANULOCYTES %: 0.4 % (ref 0–5)
INR BLD: 0.9
LYMPHOCYTES ABSOLUTE: 1.58 E9/L (ref 1.5–4)
LYMPHOCYTES RELATIVE PERCENT: 27.9 % (ref 20–42)
MCH RBC QN AUTO: 32.4 PG (ref 26–35)
MCHC RBC AUTO-ENTMCNC: 32.7 % (ref 32–34.5)
MCV RBC AUTO: 99 FL (ref 80–99.9)
MONOCYTES ABSOLUTE: 0.28 E9/L (ref 0.1–0.95)
MONOCYTES RELATIVE PERCENT: 4.9 % (ref 2–12)
NEUTROPHILS ABSOLUTE: 3.64 E9/L (ref 1.8–7.3)
NEUTROPHILS RELATIVE PERCENT: 64.2 % (ref 43–80)
PDW BLD-RTO: 12 FL (ref 11.5–15)
PLATELET # BLD: 314 E9/L (ref 130–450)
PMV BLD AUTO: 8.8 FL (ref 7–12)
POTASSIUM SERPL-SCNC: 4.1 MMOL/L (ref 3.5–5)
PROTHROMBIN TIME: 10.1 SEC (ref 9.3–12.4)
RBC # BLD: 4.08 E12/L (ref 3.5–5.5)
RHEUMATOID FACTOR: <10 IU/ML (ref 0–13)
SEDIMENTATION RATE, ERYTHROCYTE: 3 MM/HR (ref 0–20)
SODIUM BLD-SCNC: 145 MMOL/L (ref 132–146)
TOTAL PROTEIN: 6.7 G/DL (ref 6.4–8.3)
WBC # BLD: 5.7 E9/L (ref 4.5–11.5)

## 2022-05-26 PROCEDURE — 99214 OFFICE O/P EST MOD 30 MIN: CPT | Performed by: NURSE PRACTITIONER

## 2022-05-26 RX ORDER — BUSPIRONE HYDROCHLORIDE 5 MG/1
5 TABLET ORAL 2 TIMES DAILY
COMMUNITY
Start: 2022-05-18 | End: 2022-05-31

## 2022-05-26 RX ORDER — PREDNISONE 20 MG/1
20 TABLET ORAL DAILY
Qty: 10 TABLET | Refills: 0 | Status: SHIPPED | OUTPATIENT
Start: 2022-05-26 | End: 2022-06-05

## 2022-05-26 RX ORDER — FLUOXETINE HYDROCHLORIDE 40 MG/1
CAPSULE ORAL
COMMUNITY
Start: 2022-05-18 | End: 2022-07-24

## 2022-05-26 SDOH — ECONOMIC STABILITY: FOOD INSECURITY: WITHIN THE PAST 12 MONTHS, THE FOOD YOU BOUGHT JUST DIDN'T LAST AND YOU DIDN'T HAVE MONEY TO GET MORE.: NEVER TRUE

## 2022-05-26 SDOH — ECONOMIC STABILITY: FOOD INSECURITY: WITHIN THE PAST 12 MONTHS, YOU WORRIED THAT YOUR FOOD WOULD RUN OUT BEFORE YOU GOT MONEY TO BUY MORE.: NEVER TRUE

## 2022-05-26 ASSESSMENT — PATIENT HEALTH QUESTIONNAIRE - PHQ9
SUM OF ALL RESPONSES TO PHQ QUESTIONS 1-9: 1
2. FEELING DOWN, DEPRESSED OR HOPELESS: 1
1. LITTLE INTEREST OR PLEASURE IN DOING THINGS: 0
6. FEELING BAD ABOUT YOURSELF - OR THAT YOU ARE A FAILURE OR HAVE LET YOURSELF OR YOUR FAMILY DOWN: 0
9. THOUGHTS THAT YOU WOULD BE BETTER OFF DEAD, OR OF HURTING YOURSELF: 0
7. TROUBLE CONCENTRATING ON THINGS, SUCH AS READING THE NEWSPAPER OR WATCHING TELEVISION: 0
4. FEELING TIRED OR HAVING LITTLE ENERGY: 0
SUM OF ALL RESPONSES TO PHQ QUESTIONS 1-9: 1
5. POOR APPETITE OR OVEREATING: 0
8. MOVING OR SPEAKING SO SLOWLY THAT OTHER PEOPLE COULD HAVE NOTICED. OR THE OPPOSITE, BEING SO FIGETY OR RESTLESS THAT YOU HAVE BEEN MOVING AROUND A LOT MORE THAN USUAL: 0
SUM OF ALL RESPONSES TO PHQ QUESTIONS 1-9: 1
3. TROUBLE FALLING OR STAYING ASLEEP: 0
10. IF YOU CHECKED OFF ANY PROBLEMS, HOW DIFFICULT HAVE THESE PROBLEMS MADE IT FOR YOU TO DO YOUR WORK, TAKE CARE OF THINGS AT HOME, OR GET ALONG WITH OTHER PEOPLE: 0
SUM OF ALL RESPONSES TO PHQ QUESTIONS 1-9: 1
SUM OF ALL RESPONSES TO PHQ9 QUESTIONS 1 & 2: 1

## 2022-05-26 ASSESSMENT — ANXIETY QUESTIONNAIRES
GAD7 TOTAL SCORE: 0
3. WORRYING TOO MUCH ABOUT DIFFERENT THINGS: 0
2. NOT BEING ABLE TO STOP OR CONTROL WORRYING: 0
6. BECOMING EASILY ANNOYED OR IRRITABLE: 0
1. FEELING NERVOUS, ANXIOUS, OR ON EDGE: 0
IF YOU CHECKED OFF ANY PROBLEMS ON THIS QUESTIONNAIRE, HOW DIFFICULT HAVE THESE PROBLEMS MADE IT FOR YOU TO DO YOUR WORK, TAKE CARE OF THINGS AT HOME, OR GET ALONG WITH OTHER PEOPLE: NOT DIFFICULT AT ALL
4. TROUBLE RELAXING: 0
5. BEING SO RESTLESS THAT IT IS HARD TO SIT STILL: 0
7. FEELING AFRAID AS IF SOMETHING AWFUL MIGHT HAPPEN: 0

## 2022-05-26 ASSESSMENT — ENCOUNTER SYMPTOMS
EYE PAIN: 0
CHEST TIGHTNESS: 0
RECTAL PAIN: 0
SHORTNESS OF BREATH: 1
COLOR CHANGE: 0
STRIDOR: 0
DIARRHEA: 0
FACIAL SWELLING: 0
EYE ITCHING: 0
CONSTIPATION: 0
ABDOMINAL DISTENTION: 0
VOMITING: 0
BACK PAIN: 0
EYE DISCHARGE: 0
SINUS PRESSURE: 0
RHINORRHEA: 0
SORE THROAT: 0
APNEA: 0
ANAL BLEEDING: 0
BLOOD IN STOOL: 0
WHEEZING: 0
EYE REDNESS: 0
NAUSEA: 0
PHOTOPHOBIA: 0
COUGH: 0
TROUBLE SWALLOWING: 0
VOICE CHANGE: 0
CHOKING: 0
SINUS PAIN: 0
ABDOMINAL PAIN: 0

## 2022-05-26 ASSESSMENT — SOCIAL DETERMINANTS OF HEALTH (SDOH): HOW HARD IS IT FOR YOU TO PAY FOR THE VERY BASICS LIKE FOOD, HOUSING, MEDICAL CARE, AND HEATING?: NOT HARD AT ALL

## 2022-05-26 NOTE — PROGRESS NOTES
22  Beata Davison : 1975 Sex: female  Age: 40 y.o. Chief Complaint   Patient presents with    Joint Swelling     swelling through whole body,started a week ago        Patient states \" I woke up this morning and have swelling all over my whole body and I'm so freaked out! \" Patient is very anxious. She has not had fever, chills, sweats, No chest pain, palpitations. She feels a little short of breath with exertion. No gi symptoms except feels bloated. No urinary complaints. Review of Systems   Constitutional: Negative for activity change, appetite change, chills, diaphoresis, fatigue, fever and unexpected weight change. HENT: Negative for congestion, dental problem, drooling, ear discharge, ear pain, facial swelling, hearing loss, mouth sores, nosebleeds, postnasal drip, rhinorrhea, sinus pressure, sinus pain, sneezing, sore throat, tinnitus, trouble swallowing and voice change. Eyes: Negative for photophobia, pain, discharge, redness, itching and visual disturbance. Respiratory: Positive for shortness of breath. Negative for apnea, cough, choking, chest tightness, wheezing and stridor. Cardiovascular: Positive for leg swelling. Negative for chest pain and palpitations. Gastrointestinal: Negative for abdominal distention, abdominal pain, anal bleeding, blood in stool, constipation, diarrhea, nausea, rectal pain and vomiting. Endocrine: Negative for cold intolerance, heat intolerance, polydipsia, polyphagia and polyuria. Genitourinary: Negative for decreased urine volume, difficulty urinating, dysuria, enuresis, flank pain, frequency, genital sores, hematuria and urgency. Musculoskeletal: Positive for arthralgias, joint swelling and myalgias. Negative for back pain, gait problem, neck pain and neck stiffness. Skin: Negative for color change, pallor, rash and wound. Allergic/Immunologic: Negative for environmental allergies, food allergies and immunocompromised state. Neurological: Negative for dizziness, tremors, seizures, syncope, facial asymmetry, speech difficulty, weakness, light-headedness, numbness and headaches. Hematological: Negative for adenopathy. Does not bruise/bleed easily. Psychiatric/Behavioral: Negative for agitation, behavioral problems, confusion, decreased concentration, hallucinations, self-injury, sleep disturbance and suicidal ideas. The patient is nervous/anxious. The patient is not hyperactive. Current Outpatient Medications:     predniSONE (DELTASONE) 20 MG tablet, Take 1 tablet by mouth daily for 10 days, Disp: 10 tablet, Rfl: 0    tiZANidine (ZANAFLEX) 4 MG tablet, Take 1 tablet by mouth 3 times daily, Disp: 270 tablet, Rfl: 1    omeprazole (PRILOSEC) 40 MG delayed release capsule, Take 1 capsule by mouth 2 times daily, Disp: 180 capsule, Rfl: 1    SUMAtriptan (IMITREX) 50 MG tablet, take 1 tablet by mouth if needed AT ONSET OF HEADACHE may repeat in 4 hours IF headache PERSISTS, Disp: 9 tablet, Rfl: 5    ferrous sulfate (IRON 325) 325 (65 Fe) MG tablet, Take 325 mg by mouth daily (with breakfast), Disp: , Rfl:     DULoxetine (CYMBALTA) 60 MG extended release capsule, Take 1 capsule by mouth daily, Disp: 30 capsule, Rfl: 5    clotrimazole-betamethasone (LOTRISONE) 1-0.05 % cream, Apply topically 2 times daily. , Disp: 60 g, Rfl: 2    secukinumab (COSENTYX) 150 MG/ML SOSY, Inject 300 mg into the skin once, Disp: , Rfl:     Multiple Vitamin (MULTIVITAMIN PO), Take 1 tablet by mouth daily , Disp: , Rfl:     busPIRone (BUSPAR) 5 MG tablet, Take 5 mg by mouth in the morning and at bedtime, Disp: , Rfl:     FLUoxetine (PROZAC) 40 MG capsule, take 1 capsule by mouth once daily, Disp: , Rfl:   No Known Allergies    Past Medical History:   Diagnosis Date    Arthritis     psoriatic    Chronic back pain     Depression     Headache(784.0)     Hemorrhoids     Herpes genital     \"years ago\" ; no outbreaks    Irregular periods/menstrual cycles     Renal calculi     history of    Varicose veins      Past Surgical History:   Procedure Laterality Date    PAIN MANAGEMENT PROCEDURE N/A 12/16/2021    CERVICAL EPIDURAL STEROID INJECTION UNDER FLUORO AT C6 - C7 LEFT PARAMEDIAN performed by Gailen Cockayne, MD at [facility] OR    PAIN MANAGEMENT PROCEDURE Left 2/7/2022    #2 CERVICAL EPIDURAL STEROID INJECTION UNDER FLUOROSCOPIC GUIDANCE AT C6-C7 LEFT PARAMEDIAN performed by Gailen Cockayne, MD at [facility] PAIN MANAGEMENT PROCEDURE Left 4/7/2022    CERVICAL EPIDURAL STEROID INJECTION C6-C7 LEFT PARAMEDIAN performed by Gailen Cockayne, MD at [address] [place] [address]  5/31/11    Lap    TUBAL LIGATION  2001    UPPER GASTROINTESTINAL ENDOSCOPY  2011     Family History   Family history unknown: Yes     Social History     Socioeconomic History    Marital status:      Spouse name: Not on file    Number of children: Not on file    Years of education: Not on file    Highest education level: Not on file   Occupational History    Not on file   Tobacco Use    Smoking status: Never Smoker    Smokeless tobacco: Never Used   Vaping Use    Vaping Use: Never used   Substance and Sexual Activity    Alcohol use: No    Drug use: No    Sexual activity: Not on file   Other Topics Concern    Not on file   Social History Narrative    Not on file     Social Determinants of Health     Financial Resource Strain: Low Risk     Difficulty of Paying Living Expenses: Not hard at all   Food Insecurity: No Food Insecurity    Worried About Running Out of Food in the Last Year: Never true    Angela of Food in the Last Year: Never true   Transportation Needs:     Lack of Transportation (Medical): Not on file    Lack of Transportation (Non-Medical):  Not on file   Physical Activity:     Days of Exercise per Week: Not on file    Minutes of Exercise per Session: Not on file   Stress:     Feeling of Stress : Not on file Social Connections:     Frequency of Communication with Friends and Family: Not on file    Frequency of Social Gatherings with Friends and Family: Not on file    Attends Jew Services: Not on file    Active Member of Clubs or Organizations: Not on file    Attends Club or Organization Meetings: Not on file    Marital Status: Not on file   Intimate Partner Violence:     Fear of Current or Ex-Partner: Not on file    Emotionally Abused: Not on file    Physically Abused: Not on file    Sexually Abused: Not on file   Housing Stability:     Unable to Pay for Housing in the Last Year: Not on file    Number of [place] in the Last Year: Not on file    Unstable Housing in the Last Year: Not on file     Patient Active Problem List   Diagnosis    Chronic back pain    Hyperlipidemia    Indigestion    DDD (degenerative disc disease), cervical    Cervical radiculopathy        Vitals:    05/26/22 1124   BP: 132/78   Pulse: 84   Resp: 18   Temp: 97.5 °F (36.4 °C)   TempSrc: Temporal   SpO2: 98%   Weight: 258 lb 9.6 oz (117.3 kg)   Height: 5' 7\" (1.702 m)       Physical Exam  Vitals and nursing note reviewed. Constitutional:       General: She is not in acute distress. Appearance: Normal appearance. She is obese. She is not ill-appearing, toxic-appearing or diaphoretic. HENT:      Head: Normocephalic and atraumatic. Right Ear: Tympanic membrane, ear canal and external ear normal. There is no impacted cerumen. Left Ear: Tympanic membrane, ear canal and external ear normal. There is no impacted cerumen. Nose: Nose normal. No congestion or rhinorrhea. Mouth/Throat:      Mouth: Mucous membranes are moist.      Pharynx: Oropharynx is clear. No oropharyngeal exudate or posterior oropharyngeal erythema. Eyes:      General: No scleral icterus. Right eye: No discharge. Left eye: No discharge. Extraocular Movements: Extraocular movements intact. Conjunctiva/sclera: Conjunctivae normal.      Pupils: Pupils are equal, round, and reactive to light. Neck:      Vascular: No carotid bruit. Cardiovascular:      Rate and Rhythm: Normal rate and regular rhythm. Pulses: Normal pulses. Heart sounds: Normal heart sounds. No murmur heard. No friction rub. No gallop. Pulmonary:      Effort: Pulmonary effort is normal. No respiratory distress. Breath sounds: No stridor. No wheezing, rhonchi or rales. Chest:      Chest wall: No tenderness. Abdominal:      General: Bowel sounds are normal. There is no distension. Palpations: Abdomen is soft. There is no mass. Tenderness: There is no abdominal tenderness. There is no right CVA tenderness, left CVA tenderness, guarding or rebound. Hernia: No hernia is present. Comments: obese   Musculoskeletal:         General: Swelling (generalized puffiness to hands and feet) present. No tenderness, deformity or signs of injury. Normal range of motion. Cervical back: Normal range of motion and neck supple. No rigidity. No muscular tenderness. Right lower leg: Edema (nonpitting) present. Left lower leg: Edema (nonpitting) present. Lymphadenopathy:      Cervical: No cervical adenopathy. Skin:     General: Skin is warm and dry. Capillary Refill: Capillary refill takes less than 2 seconds. Coloration: Skin is not jaundiced or pale. Findings: No bruising, erythema, lesion or rash. Neurological:      General: No focal deficit present. Mental Status: She is alert and oriented to person, place, and time. Mental status is at baseline. Cranial Nerves: No cranial nerve deficit. Sensory: No sensory deficit. Motor: No weakness. Coordination: Coordination normal.      Gait: Gait normal.      Deep Tendon Reflexes: Reflexes normal.   Psychiatric:         Thought Content:  Thought content normal.         Judgment: Judgment normal.      Comments: Extremely anxious         Assessment and Plan:  Hiram Marley was seen today for joint swelling. Diagnoses and all orders for this visit:    Arthralgia, unspecified joint  -     NICHOLE; Future  -     CBC with Auto Differential; Future  -     Comprehensive Metabolic Panel; Future  -     Sedimentation Rate; Future  -     C-reactive protein; Future  -     Rheumatoid Factor; Future    Rheumatoid arthritis, involving unspecified site, unspecified whether rheumatoid factor present ([facility] [address]  -     NICHOLE; Future  -     CBC with Auto Differential; Future  -     Comprehensive Metabolic Panel; Future  -     Sedimentation Rate; Future  -     C-reactive protein; Future  -     Rheumatoid Factor; Future    Swelling    Anxiety    Bruising  -     Protime-INR; Future  -     APTT; Future    Fibromyalgia    DDD (degenerative disc disease), cervical    Chronic back pain, unspecified back location, unspecified back pain laterality    Other orders  -     predniSONE (DELTASONE) 20 MG tablet; Take 1 tablet by mouth daily for 10 days        Discussions/Education provided to patients during visit:  [] Discussed the importance to stop smoking. [] Advised to monitor eating habits. [] Reviewed and discussed Imaging results. [] Reviewed and discussed Lab results. [] Discussed the importance of drinking plenty of fluids. [] Cut down on Salt and Caffeine.  [] Exercise 2-3 times weekly, if not more. [] Cut down on Sugar and Fats. [x] Continue Medications as Discussed. [x] Communicated with patient any concerns, to phone office. [x] Follow up as directed. Return if symptoms worsen or fail to improve.       Seen By:      LYNSEY Quintana - CNP

## 2022-05-27 ENCOUNTER — HOSPITAL ENCOUNTER (EMERGENCY)
Age: 47
Discharge: HOME | End: 2022-05-27
Attending: EMERGENCY MEDICINE
Payer: COMMERCIAL

## 2022-05-27 ENCOUNTER — TELEPHONE (OUTPATIENT)
Dept: FAMILY MEDICINE CLINIC | Age: 47
End: 2022-05-27

## 2022-05-27 ENCOUNTER — APPOINTMENT (OUTPATIENT)
Dept: GENERAL RADIOLOGY | Age: 47
End: 2022-05-27
Payer: COMMERCIAL

## 2022-05-27 VITALS
OXYGEN SATURATION: 99 % | BODY MASS INDEX: 39.25 KG/M2 | SYSTOLIC BLOOD PRESSURE: 140 MMHG | TEMPERATURE: 97.7 F | RESPIRATION RATE: 16 BRPM | DIASTOLIC BLOOD PRESSURE: 82 MMHG | HEIGHT: 68 IN | WEIGHT: 259 LBS | HEART RATE: 80 BPM

## 2022-05-27 DIAGNOSIS — R07.9 CHEST PAIN, UNSPECIFIED TYPE: Primary | ICD-10-CM

## 2022-05-27 DIAGNOSIS — R52 GENERALIZED PAIN: ICD-10-CM

## 2022-05-27 DIAGNOSIS — R06.00 DYSPNEA, UNSPECIFIED TYPE: ICD-10-CM

## 2022-05-27 LAB
ALBUMIN SERPL-MCNC: 3.9 G/DL (ref 3.5–5.2)
ALP BLD-CCNC: 64 U/L (ref 35–104)
ALT SERPL-CCNC: 22 U/L (ref 0–32)
ANION GAP SERPL CALCULATED.3IONS-SCNC: 10 MMOL/L (ref 7–16)
ANTI-NUCLEAR ANTIBODY (ANA): NEGATIVE
AST SERPL-CCNC: 21 U/L (ref 0–31)
BASOPHILS ABSOLUTE: 0.04 E9/L (ref 0–0.2)
BASOPHILS RELATIVE PERCENT: 0.5 % (ref 0–2)
BILIRUB SERPL-MCNC: 0.4 MG/DL (ref 0–1.2)
BUN BLDV-MCNC: 16 MG/DL (ref 6–20)
CALCIUM SERPL-MCNC: 8.6 MG/DL (ref 8.6–10.2)
CHLORIDE BLD-SCNC: 104 MMOL/L (ref 98–107)
CO2: 25 MMOL/L (ref 22–29)
CREAT SERPL-MCNC: 0.7 MG/DL (ref 0.5–1)
D DIMER: <200 NG/ML DDU
EKG ATRIAL RATE: 83 BPM
EKG P AXIS: 25 DEGREES
EKG P-R INTERVAL: 170 MS
EKG Q-T INTERVAL: 376 MS
EKG QRS DURATION: 84 MS
EKG QTC CALCULATION (BAZETT): 441 MS
EKG R AXIS: 0 DEGREES
EKG T AXIS: 28 DEGREES
EKG VENTRICULAR RATE: 83 BPM
EOSINOPHILS ABSOLUTE: 0.14 E9/L (ref 0.05–0.5)
EOSINOPHILS RELATIVE PERCENT: 1.8 % (ref 0–6)
GFR AFRICAN AMERICAN: >60
GFR NON-AFRICAN AMERICAN: >60 ML/MIN/1.73
GLUCOSE BLD-MCNC: 104 MG/DL (ref 74–99)
HCG, URINE, POC: NEGATIVE
HCT VFR BLD CALC: 36.7 % (ref 34–48)
HEMOGLOBIN: 12.5 G/DL (ref 11.5–15.5)
IMMATURE GRANULOCYTES #: 0.04 E9/L
IMMATURE GRANULOCYTES %: 0.5 % (ref 0–5)
INFLUENZA A BY PCR: NOT DETECTED
INFLUENZA B BY PCR: NOT DETECTED
LIPASE: 22 U/L (ref 13–60)
LYMPHOCYTES ABSOLUTE: 1.53 E9/L (ref 1.5–4)
LYMPHOCYTES RELATIVE PERCENT: 19.4 % (ref 20–42)
Lab: NORMAL
MAGNESIUM: 2 MG/DL (ref 1.6–2.6)
MCH RBC QN AUTO: 31.9 PG (ref 26–35)
MCHC RBC AUTO-ENTMCNC: 34.1 % (ref 32–34.5)
MCV RBC AUTO: 93.6 FL (ref 80–99.9)
MONOCYTES ABSOLUTE: 0.52 E9/L (ref 0.1–0.95)
MONOCYTES RELATIVE PERCENT: 6.6 % (ref 2–12)
NEGATIVE QC PASS/FAIL: NORMAL
NEUTROPHILS ABSOLUTE: 5.6 E9/L (ref 1.8–7.3)
NEUTROPHILS RELATIVE PERCENT: 71.2 % (ref 43–80)
PDW BLD-RTO: 11.6 FL (ref 11.5–15)
PLATELET # BLD: 286 E9/L (ref 130–450)
PMV BLD AUTO: 8.8 FL (ref 7–12)
POSITIVE QC PASS/FAIL: NORMAL
POTASSIUM SERPL-SCNC: 4 MMOL/L (ref 3.5–5)
PRO-BNP: 129 PG/ML (ref 0–125)
RBC # BLD: 3.92 E12/L (ref 3.5–5.5)
SARS-COV-2, NAAT: NOT DETECTED
SODIUM BLD-SCNC: 139 MMOL/L (ref 132–146)
TOTAL PROTEIN: 6.2 G/DL (ref 6.4–8.3)
TROPONIN, HIGH SENSITIVITY: <6 NG/L (ref 0–9)
WBC # BLD: 7.9 E9/L (ref 4.5–11.5)

## 2022-05-27 PROCEDURE — 85378 FIBRIN DEGRADE SEMIQUANT: CPT

## 2022-05-27 PROCEDURE — 6360000002 HC RX W HCPCS: Performed by: STUDENT IN AN ORGANIZED HEALTH CARE EDUCATION/TRAINING PROGRAM

## 2022-05-27 PROCEDURE — 87502 INFLUENZA DNA AMP PROBE: CPT

## 2022-05-27 PROCEDURE — 83735 ASSAY OF MAGNESIUM: CPT

## 2022-05-27 PROCEDURE — 83690 ASSAY OF LIPASE: CPT

## 2022-05-27 PROCEDURE — 99285 EMERGENCY DEPT VISIT HI MDM: CPT

## 2022-05-27 PROCEDURE — 85025 COMPLETE CBC W/AUTO DIFF WBC: CPT

## 2022-05-27 PROCEDURE — 83880 ASSAY OF NATRIURETIC PEPTIDE: CPT

## 2022-05-27 PROCEDURE — 80053 COMPREHEN METABOLIC PANEL: CPT

## 2022-05-27 PROCEDURE — 84484 ASSAY OF TROPONIN QUANT: CPT

## 2022-05-27 PROCEDURE — 96374 THER/PROPH/DIAG INJ IV PUSH: CPT

## 2022-05-27 PROCEDURE — 93005 ELECTROCARDIOGRAM TRACING: CPT | Performed by: STUDENT IN AN ORGANIZED HEALTH CARE EDUCATION/TRAINING PROGRAM

## 2022-05-27 PROCEDURE — 71045 X-RAY EXAM CHEST 1 VIEW: CPT

## 2022-05-27 PROCEDURE — 36415 COLL VENOUS BLD VENIPUNCTURE: CPT

## 2022-05-27 PROCEDURE — 87635 SARS-COV-2 COVID-19 AMP PRB: CPT

## 2022-05-27 RX ORDER — KETOROLAC TROMETHAMINE 30 MG/ML
15 INJECTION, SOLUTION INTRAMUSCULAR; INTRAVENOUS ONCE
Status: COMPLETED | OUTPATIENT
Start: 2022-05-27 | End: 2022-05-27

## 2022-05-27 RX ADMIN — KETOROLAC TROMETHAMINE 15 MG: 30 INJECTION, SOLUTION INTRAMUSCULAR at 13:57

## 2022-05-27 ASSESSMENT — PAIN DESCRIPTION - DESCRIPTORS: DESCRIPTORS: DULL;ACHING

## 2022-05-27 ASSESSMENT — PAIN DESCRIPTION - LOCATION
LOCATION: HEAD;BACK
LOCATION: CHEST

## 2022-05-27 ASSESSMENT — ENCOUNTER SYMPTOMS
NAUSEA: 0
DIARRHEA: 0
ABDOMINAL PAIN: 0
SHORTNESS OF BREATH: 1
COLOR CHANGE: 0
VOMITING: 0
WHEEZING: 0
CONSTIPATION: 0
BACK PAIN: 0
STRIDOR: 0
COUGH: 0
ABDOMINAL DISTENTION: 0

## 2022-05-27 ASSESSMENT — PAIN DESCRIPTION - PAIN TYPE: TYPE: ACUTE PAIN

## 2022-05-27 ASSESSMENT — PAIN SCALES - GENERAL: PAINLEVEL_OUTOF10: 5

## 2022-05-27 ASSESSMENT — PAIN DESCRIPTION - FREQUENCY: FREQUENCY: CONTINUOUS

## 2022-05-27 ASSESSMENT — PAIN - FUNCTIONAL ASSESSMENT: PAIN_FUNCTIONAL_ASSESSMENT: 0-10

## 2022-05-27 NOTE — ED PROVIDER NOTES
[address]      Pt Name: Bonita Sauer  MRN: [id number]  [place] 1975  Date of evaluation: 5/27/2022      CHIEF COMPLAINT       Chief Complaint   Patient presents with    Chest Pain     mid chest radiates straight thru to back     Other     generalized body edema     Shortness of Breath        HPI  Bonita Sauer is a 40 y.o. female history of chronic back pain presents with complaints of chest pain, generalized body edema, and shortness of breath per patient. Patient states that she has had chest pain, shortness of breath, that has been consistent for last 2 days. Describes her chest pain as gradual in onset, sharp, stabbing, states it goes right through her back. No alleviating or exacerbating factors. Not take any medications or measures alleviate her symptoms. Patient saw her primary care physician yesterday. Had full labs done. She was concerned about mildly elevated labs from her results. I evaluated her results that were on her phone. Everything was not within normal limits. Admits to orthopnea and paroxysmal nocturnal dyspnea and an unintentional weight gain of 5 pounds. On patient's chart she was prescribed prednisone yesterday. States that she has not been taking it. Denies fevers, chills, nausea, vomiting, abdominal pain, flank pain, dysuria, hematuria, diarrhea, constipation, new rashes or sores. Except as noted above the remainder of the review of systems was reviewed and negative. Review of Systems   Constitutional: Negative for activity change, appetite change, diaphoresis, fatigue, fever and unexpected weight change. HENT: Negative for congestion. Eyes: Negative for visual disturbance. Respiratory: Positive for shortness of breath. Negative for cough, wheezing and stridor. Cardiovascular: Positive for chest pain and leg swelling. Negative for palpitations. Gastrointestinal: Negative for abdominal distention, abdominal pain, constipation, diarrhea, nausea and vomiting. Endocrine: Negative for polyphagia and polyuria. Genitourinary: Negative for dysuria and hematuria. Musculoskeletal: Negative for back pain. Skin: Negative for color change, pallor, rash and wound. Neurological: Negative for dizziness and syncope. Hematological: Negative for adenopathy. Does not bruise/bleed easily. Psychiatric/Behavioral: Negative for agitation. Physical Exam  Vitals and nursing note reviewed. Constitutional:       General: She is not in acute distress. Appearance: Normal appearance. She is well-developed. She is obese. She is not ill-appearing or diaphoretic. HENT:      Head: Normocephalic and atraumatic. Nose: Nose normal.      Mouth/Throat:      Mouth: Mucous membranes are moist.      Pharynx: Oropharynx is clear. Eyes:      Extraocular Movements: Extraocular movements intact. Pupils: Pupils are equal, round, and reactive to light. Cardiovascular:      Rate and Rhythm: Normal rate and regular rhythm. Pulses: Normal pulses. Heart sounds: Normal heart sounds. Pulmonary:      Effort: Pulmonary effort is normal.      Breath sounds: Normal breath sounds. No decreased breath sounds, wheezing, rhonchi or rales. Chest:      Chest wall: No mass or deformity. Abdominal:      General: Bowel sounds are normal.      Palpations: Abdomen is soft. Tenderness: There is no abdominal tenderness. There is no right CVA tenderness, left CVA tenderness or rebound. Negative signs include Bowers's sign, Rovsing's sign and McBurney's sign. Musculoskeletal:         General: Normal range of motion. Cervical back: Normal range of motion. Right lower leg: No tenderness. No edema. Left lower leg: No tenderness. No edema. Skin:     General: Skin is warm and dry. Capillary Refill: Capillary refill takes less than 2 seconds. Neurological:      General: No focal deficit present. Mental Status: She is alert and oriented to person, place, and time. Psychiatric:         Mood and Affect: Mood normal.          Procedures     MDM  Number of Diagnoses or Management Options  Chest pain, unspecified type  Dyspnea, unspecified type  Generalized pain  Diagnosis management comments: 52-year-old female history of fibromyalgia presents with complaints of chest pain, shortness, bilateral leg swelling, and unintentional of 5 pounds in the last week. She was prescribed prednisone at an outpatient facility. States that she has not been taking it. Vitals significant here for hypertension. Patient is not tachycardic and afebrile. On physical exam patient is in no acute distress, speaking full sentences, alert and oriented x3. Lungs are clear to auscultation bilaterally no wheeze rales rhonchi. Normal S1-S2. Diagnostic labs and imaging treatment reviewed. EKG showed normal sinus rhythm. No signs of ischemia. Troponin negative. D-dimer was also less than 200. I do not suspect the patient has a pulmonary embolism. Patient does have generalized pain. She was given analgesic medication here in the department with resolution of her symptoms. She was informed of all the results of evaluation she is to follow-up with her primary care physician as soon as she is able. Patient is awake alert, hemodynamic stable, afebrile and in no respiratory distress. Discussed with patient plan for close outpatient follow-up with the patient's PCP as well as return precautions and the patient understands and agrees to the plan.     Patient was seen with attending.                   --------------------------------------------- PAST HISTORY ---------------------------------------------  Past Medical History:  has a past medical history of Arthritis, Chronic back pain, Depression, Headache(784.0), Hemorrhoids, Herpes genital, Irregular periods/menstrual cycles, Renal calculi, and Varicose veins. Past Surgical History:  has a past surgical history that includes Tubal ligation (2001); Upper gastrointestinal endoscopy (2011); Adam-en-Y Gastric Bypass (5/31/11); Pain management procedure (N/A, 12/16/2021); Pain management procedure (Left, 2/7/2022); and Pain management procedure (Left, 4/7/2022). Social History:  reports that she has never smoked. She has never used smokeless tobacco. She reports that she does not drink alcohol and does not use drugs. Family History: Family history is unknown by patient. The patients home medications have been reviewed. Allergies: Patient has no known allergies.     -------------------------------------------------- RESULTS -------------------------------------------------  Labs:  Results for orders placed or performed during the hospital encounter of 05/27/22   Rapid influenza A/B antigens    Specimen: Nares   Result Value Ref Range    Influenza A by PCR Not Detected Not Detected    Influenza B by PCR Not Detected Not Detected   COVID-19, Rapid    Specimen: Nasopharyngeal Swab   Result Value Ref Range    SARS-CoV-2, NAAT Not Detected Not Detected   CBC with Auto Differential   Result Value Ref Range    WBC 7.9 4.5 - 11.5 E9/L    RBC 3.92 3.50 - 5.50 E12/L    Hemoglobin 12.5 11.5 - 15.5 g/dL    Hematocrit 36.7 34.0 - 48.0 %    MCV 93.6 80.0 - 99.9 fL    MCH 31.9 26.0 - 35.0 pg    MCHC 34.1 32.0 - 34.5 %    RDW 11.6 11.5 - 15.0 fL    Platelets 668 600 - 991 E9/L    MPV 8.8 7.0 - 12.0 fL    Neutrophils % 71.2 43.0 - 80.0 %    Immature Granulocytes % 0.5 0.0 - 5.0 %    Lymphocytes % 19.4 (L) 20.0 - 42.0 %    Monocytes % 6.6 2.0 - 12.0 %    Eosinophils % 1.8 0.0 - 6.0 %    Basophils % 0.5 0.0 - 2.0 %    Neutrophils Absolute 5.60 1.80 - 7.30 E9/L    Immature Granulocytes # 0.04 E9/L    Lymphocytes Absolute 1.53 1.50 - 4.00 E9/L    Monocytes Absolute 0.52 0.10 - 0.95 E9/L    Eosinophils Absolute 0.14 0.05 - 0.50 E9/L    Basophils Absolute 0.04 0.00 - 0.20 E9/L   Comprehensive Metabolic Panel   Result Value Ref Range    Sodium 139 132 - 146 mmol/L    Potassium 4.0 3.5 - 5.0 mmol/L    Chloride 104 98 - 107 mmol/L    CO2 25 22 - 29 mmol/L    Anion Gap 10 7 - 16 mmol/L    Glucose 104 (H) 74 - 99 mg/dL    BUN 16 6 - 20 mg/dL    CREATININE 0.7 0.5 - 1.0 mg/dL    GFR Non-African American >60 >=60 mL/min/1.73    GFR African American >60     Calcium 8.6 8.6 - 10.2 mg/dL    Total Protein 6.2 (L) 6.4 - 8.3 g/dL    Albumin 3.9 3.5 - 5.2 g/dL    Total Bilirubin 0.4 0.0 - 1.2 mg/dL    Alkaline Phosphatase 64 35 - 104 U/L    ALT 22 0 - 32 U/L    AST 21 0 - 31 U/L   Magnesium   Result Value Ref Range    Magnesium 2.0 1.6 - 2.6 mg/dL   Brain Natriuretic Peptide   Result Value Ref Range    Pro- (H) 0 - 125 pg/mL   D-Dimer, Quantitative   Result Value Ref Range    D-Dimer, Quant <200 ng/mL DDU   Lipase   Result Value Ref Range    Lipase 22 13 - 60 U/L   Troponin   Result Value Ref Range    Troponin, High Sensitivity <6 0 - 9 ng/L   POC Pregnancy Urine   Result Value Ref Range    HCG, Urine, POC Negative Negative    Lot Number [id number]     Positive QC Pass/Fail Pass     Negative QC Pass/Fail Pass    EKG 12 Lead   Result Value Ref Range    Ventricular Rate 83 BPM    Atrial Rate 83 BPM    P-R Interval 170 ms    QRS Duration 84 ms    Q-T Interval 376 ms    QTc Calculation (Bazett) 441 ms    P Axis 25 degrees    R Axis 0 degrees    T Axis 28 degrees       ECG:  EKG read inter by me. Heart rate 80. Normal sinus rhythm. Left axis deviation. QTc 440. No ST elevations or depressions. Stable as compared to previous EKG.     Radiology:  XR CHEST PORTABLE   Final Result   No acute pulmonary process             ------------------------- NURSING NOTES AND VITALS REVIEWED ---------------------------  Date / Time Roomed:  5/27/2022 10:35 AM  ED Bed Assignment:  21/21    The nursing notes within the ED encounter and vital signs as below have been reviewed. BP (!) 140/82 Comment: manual  Pulse 80   Temp 97.7 °F (36.5 °C) (Oral)   Resp 16   Ht 5' 7.5\" (1.715 m)   Wt 259 lb (117.5 kg)   SpO2 99%   BMI 39.97 kg/m²   Oxygen Saturation Interpretation: normal      ------------------------------------------ PROGRESS NOTES ------------------------------------------    I have spoken with the patient and discussed todays results, in addition to providing specific details for the plan of care and counseling regarding the diagnosis and prognosis. Their questions are answered at this time and they are agreeable with the plan. I discussed at length with them reasons for immediate return here for re evaluation. They will followup with their PCP by calling their office tomorrow. --------------------------------- ADDITIONAL PROVIDER NOTES ---------------------------------  At this time the patient is without objective evidence of an acute process requiring hospitalization or inpatient management. They have remained hemodynamically stable throughout their entire ED visit and are stable for discharge with outpatient follow-up. The plan has been discussed in detail and they are aware of the specific conditions for emergent return, as well as the importance of follow-up. Discharge Medication List as of 5/27/2022  2:04 PM          Diagnosis:  1. Chest pain, unspecified type    2. Dyspnea, unspecified type    3. Generalized pain        Disposition:  Patient's disposition: Discharge to home  Patient's condition is stable.       Kimmie Phipps MD  Resident  06/01/22 [id number]

## 2022-05-27 NOTE — TELEPHONE ENCOUNTER
Not feeling well, just had lab work done at [facility]. Wants Dr. Mikki Zurita to tell her what the abnormal results mean. Her phone is [phone].

## 2022-05-31 ENCOUNTER — OFFICE VISIT (OUTPATIENT)
Dept: FAMILY MEDICINE CLINIC | Age: 47
End: 2022-05-31
Payer: COMMERCIAL

## 2022-05-31 VITALS
DIASTOLIC BLOOD PRESSURE: 96 MMHG | HEIGHT: 68 IN | SYSTOLIC BLOOD PRESSURE: 156 MMHG | WEIGHT: 258 LBS | BODY MASS INDEX: 39.1 KG/M2 | TEMPERATURE: 97.9 F | OXYGEN SATURATION: 98 % | HEART RATE: 92 BPM

## 2022-05-31 DIAGNOSIS — R51.9 ACUTE NONINTRACTABLE HEADACHE, UNSPECIFIED HEADACHE TYPE: Primary | ICD-10-CM

## 2022-05-31 DIAGNOSIS — M54.2 NECK PAIN: ICD-10-CM

## 2022-05-31 DIAGNOSIS — F41.9 ANXIETY AND DEPRESSION: ICD-10-CM

## 2022-05-31 DIAGNOSIS — F32.A ANXIETY AND DEPRESSION: ICD-10-CM

## 2022-05-31 DIAGNOSIS — M54.12 CERVICAL NEUROPATHIC PAIN: ICD-10-CM

## 2022-05-31 DIAGNOSIS — G89.29 CHRONIC BACK PAIN, UNSPECIFIED BACK LOCATION, UNSPECIFIED BACK PAIN LATERALITY: ICD-10-CM

## 2022-05-31 DIAGNOSIS — M06.9 RHEUMATOID ARTHRITIS, INVOLVING UNSPECIFIED SITE, UNSPECIFIED WHETHER RHEUMATOID FACTOR PRESENT (HCC): ICD-10-CM

## 2022-05-31 DIAGNOSIS — F41.9 ANXIETY: ICD-10-CM

## 2022-05-31 DIAGNOSIS — M25.512 ACUTE PAIN OF LEFT SHOULDER: ICD-10-CM

## 2022-05-31 DIAGNOSIS — I10 PRIMARY HYPERTENSION: ICD-10-CM

## 2022-05-31 DIAGNOSIS — M79.7 FIBROMYALGIA: ICD-10-CM

## 2022-05-31 DIAGNOSIS — M54.9 CHRONIC BACK PAIN, UNSPECIFIED BACK LOCATION, UNSPECIFIED BACK PAIN LATERALITY: ICD-10-CM

## 2022-05-31 DIAGNOSIS — K21.00 GASTROESOPHAGEAL REFLUX DISEASE WITH ESOPHAGITIS, UNSPECIFIED WHETHER HEMORRHAGE: ICD-10-CM

## 2022-05-31 PROCEDURE — 99214 OFFICE O/P EST MOD 30 MIN: CPT | Performed by: NURSE PRACTITIONER

## 2022-05-31 RX ORDER — LISINOPRIL 10 MG/1
10 TABLET ORAL DAILY
Qty: 90 TABLET | Refills: 1 | Status: SHIPPED | OUTPATIENT
Start: 2022-05-31

## 2022-05-31 RX ORDER — OXYCODONE HYDROCHLORIDE AND ACETAMINOPHEN 5; 325 MG/1; MG/1
1 TABLET ORAL EVERY 4 HOURS PRN
Qty: 42 TABLET | Refills: 0 | Status: SHIPPED | OUTPATIENT
Start: 2022-05-31 | End: 2022-06-07

## 2022-05-31 RX ORDER — SEMAGLUTIDE 1.34 MG/ML
INJECTION, SOLUTION SUBCUTANEOUS
COMMUNITY
Start: 2022-05-11

## 2022-05-31 ASSESSMENT — ENCOUNTER SYMPTOMS
EYE REDNESS: 0
ABDOMINAL PAIN: 0
RECTAL PAIN: 0
EYE ITCHING: 0
FACIAL SWELLING: 0
APNEA: 0
BLOOD IN STOOL: 0
RECTAL PAIN: 0
CONSTIPATION: 0
ABDOMINAL DISTENTION: 0
VOMITING: 0
EYE PAIN: 0
COLOR CHANGE: 0
WHEEZING: 0
ABDOMINAL DISTENTION: 0
EYE DISCHARGE: 0
DIARRHEA: 0
NAUSEA: 0
PHOTOPHOBIA: 0
COUGH: 0
BACK PAIN: 0
DIARRHEA: 0
RHINORRHEA: 0
SHORTNESS OF BREATH: 1
PHOTOPHOBIA: 0
BACK PAIN: 0
SORE THROAT: 0
TROUBLE SWALLOWING: 0
STRIDOR: 0
WHEEZING: 0
CONSTIPATION: 0
VOMITING: 0
ABDOMINAL PAIN: 0
FACIAL SWELLING: 0
SINUS PAIN: 0
SORE THROAT: 0
APNEA: 0
RHINORRHEA: 0
NAUSEA: 0
COUGH: 0
VOICE CHANGE: 0
CHEST TIGHTNESS: 0
CHEST TIGHTNESS: 0
VOICE CHANGE: 0
CHOKING: 0
ANAL BLEEDING: 0
SINUS PRESSURE: 0
SINUS PAIN: 0
SHORTNESS OF BREATH: 0

## 2022-05-31 NOTE — TELEPHONE ENCOUNTER
Patients last appointment 5/9/2022.   Patients next scheduled appointment   Future Appointments   Date Time Provider Jimmie Ngo   5/31/2022 11:15 AM LYNSEY Aguilera - CNP [facility] PROVIDER:[TOKEN:[[id number]]]

## 2022-05-31 NOTE — LETTER
[facility] [address]  Phone: [phone]  Fax: [phone] LYNSEY Ritchie CNP        May 31, 2022     Patient: Felicita Ricks   YOB: 1975   Date of Visit: 5/31/2022       To Whom It May Concern: It is my medical opinion that María Guzman may return to work on June 2, 2022. If you have any questions or concerns, please don't hesitate to call.     Sincerely,        LYNSEY Valle CNP

## 2022-05-31 NOTE — PROGRESS NOTES
22  General Simple : 1975 Sex: female  Age: 40 y.o. Chief Complaint   Patient presents with    Other     ER Follow up [facility], swelling, elevated blood pressure, over all not feeling well. Patient here post ER visit [facility]. She was not feeling well. Her BP was elevated. She was scared she was having a heart attack. Her EKG showed sinus rhythm with possible slight left atrial enlargement. Labs were normal except  which I told her was really not significant at that level. She had checked it on my chart and it was flagged as elevated. She looked up online and elevated said could be CHF which had her scared. She was relieved when we discussed the results and realized it must be much higher to be significant for CHF. She does state she has a headache. She stopped taking the new medications that psychiatry put her on trazadone and Buspar because she thinks that is what made her feel funny. I did tell her that her BP has been showing elevated for several visits and I would like to try some BP medication. She is in agreement with this. Her swelling is improved and aches are improved with the prednisone taper. Review of Systems   Constitutional: Negative for activity change, appetite change, chills, diaphoresis, fatigue, fever and unexpected weight change. HENT: Negative for congestion, dental problem, drooling, ear discharge, ear pain, facial swelling, hearing loss, mouth sores, nosebleeds, postnasal drip, rhinorrhea, sinus pressure, sinus pain, sneezing, sore throat, tinnitus, trouble swallowing and voice change. Eyes: Negative for photophobia, pain, discharge, redness, itching and visual disturbance. Respiratory: Negative for apnea, cough, choking, chest tightness, shortness of breath, wheezing and stridor. Cardiovascular: Negative for chest pain, palpitations and leg swelling.    Gastrointestinal: Negative for abdominal distention, abdominal pain, anal bleeding, blood in stool, constipation, diarrhea, nausea, rectal pain and vomiting. Endocrine: Negative for cold intolerance, heat intolerance, polydipsia, polyphagia and polyuria. Genitourinary: Negative for decreased urine volume, difficulty urinating, dysuria, enuresis, flank pain, frequency, genital sores, hematuria and urgency. Musculoskeletal: Positive for arthralgias and myalgias. Negative for back pain, gait problem, joint swelling, neck pain and neck stiffness. Skin: Negative for color change, pallor, rash and wound. Allergic/Immunologic: Negative for environmental allergies, food allergies and immunocompromised state. Neurological: Positive for headaches. Negative for dizziness, tremors, seizures, syncope, facial asymmetry, speech difficulty, weakness, light-headedness and numbness. Hematological: Negative for adenopathy. Does not bruise/bleed easily. Psychiatric/Behavioral: Negative for agitation, behavioral problems, confusion, decreased concentration, hallucinations, self-injury, sleep disturbance and suicidal ideas. The patient is nervous/anxious. The patient is not hyperactive. Current Outpatient Medications:     OZEMPIC, 0.25 OR 0.5 MG/DOSE, 2 MG/1.5ML SOPN, inject 0.25 milligrams subcutaneously every week, Disp: , Rfl:     oxyCODONE-acetaminophen (PERCOCET) 5-325 MG per tablet, Take 1 tablet by mouth every 4 hours as needed for Pain for up to 7 days. , Disp: 42 tablet, Rfl: 0    lisinopril (PRINIVIL;ZESTRIL) 10 MG tablet, Take 1 tablet by mouth daily, Disp: 90 tablet, Rfl: 1    FLUoxetine (PROZAC) 40 MG capsule, take 1 capsule by mouth once daily, Disp: , Rfl:     predniSONE (DELTASONE) 20 MG tablet, Take 1 tablet by mouth daily for 10 days, Disp: 10 tablet, Rfl: 0    tiZANidine (ZANAFLEX) 4 MG tablet, Take 1 tablet by mouth 3 times daily, Disp: 270 tablet, Rfl: 1    omeprazole (PRILOSEC) 40 MG delayed release capsule, Take 1 capsule by mouth 2 times daily, Disp: 180 capsule, Rfl: 1    SUMAtriptan (IMITREX) 50 MG tablet, take 1 tablet by mouth if needed AT ONSET OF HEADACHE may repeat in 4 hours IF headache PERSISTS, Disp: 9 tablet, Rfl: 5    ferrous sulfate (IRON 325) 325 (65 Fe) MG tablet, Take 325 mg by mouth daily (with breakfast), Disp: , Rfl:     DULoxetine (CYMBALTA) 60 MG extended release capsule, Take 1 capsule by mouth daily, Disp: 30 capsule, Rfl: 5    clotrimazole-betamethasone (LOTRISONE) 1-0.05 % cream, Apply topically 2 times daily. , Disp: 60 g, Rfl: 2    secukinumab (COSENTYX) 150 MG/ML SOSY, Inject 300 mg into the skin once, Disp: , Rfl:     Multiple Vitamin (MULTIVITAMIN PO), Take 1 tablet by mouth daily , Disp: , Rfl:   No Known Allergies    Past Medical History:   Diagnosis Date    Arthritis     psoriatic    Chronic back pain     Depression     Headache(784.0)     Hemorrhoids     Herpes genital     \"years ago\" ; no outbreaks    Irregular periods/menstrual cycles     Renal calculi     history of    Varicose veins      Past Surgical History:   Procedure Laterality Date    PAIN MANAGEMENT PROCEDURE N/A 12/16/2021    CERVICAL EPIDURAL STEROID INJECTION UNDER FLUORO AT C6 - C7 LEFT PARAMEDIAN performed by Pastor Montague MD at [place] [id number] Left 2/7/2022    #2 CERVICAL EPIDURAL STEROID INJECTION UNDER FLUOROSCOPIC GUIDANCE AT C6-C7 LEFT PARAMEDIAN performed by Pastor Montague MD at [place] [id number] Left 4/7/2022    CERVICAL EPIDURAL STEROID INJECTION C6-C7 LEFT PARAMEDIAN performed by Pastor Montague MD at [address] [place] [address]  5/31/11    Lap    TUBAL LIGATION  2001    UPPER GASTROINTESTINAL ENDOSCOPY  2011     Family History   Family history unknown: Yes     Social History     Socioeconomic History    Marital status:      Spouse name: Not on file    Number of children: Not on file    Years of education: Not on file    Highest education level: Not on file Occupational History    Not on file   Tobacco Use    Smoking status: Never Smoker    Smokeless tobacco: Never Used   Vaping Use    Vaping Use: Never used   Substance and Sexual Activity    Alcohol use: No    Drug use: No    Sexual activity: Not on file   Other Topics Concern    Not on file   Social History Narrative    Not on file     Social Determinants of Health     Financial Resource Strain: Low Risk     Difficulty of Paying Living Expenses: Not hard at all   Food Insecurity: No Food Insecurity    Worried About [facility] in the Last Year: Never true    [facility] in the Last Year: Never true   Transportation Needs:     Lack of Transportation (Medical): Not on file    Lack of Transportation (Non-Medical):  Not on file   Physical Activity:     Days of Exercise per Week: Not on file    Minutes of Exercise per Session: Not on file   Stress:     Feeling of Stress : Not on file   Social Connections:     Frequency of Communication with Friends and Family: Not on file    Frequency of Social Gatherings with Friends and Family: Not on file    Attends [employer]oration[employer] Services: Not on file    Active Member of [address] [place] [address] or Organizations: Not on file    Attends Club or Organization Meetings: Not on file    Marital Status: Not on file   Intimate Partner Violence:     Fear of Current or Ex-Partner: Not on file    Emotionally Abused: Not on file    Physically Abused: Not on file    Sexually Abused: Not on file   Housing Stability:     Unable to Pay for Housing in the Last Year: Not on file    Number of [place] in the Last Year: Not on file    Unstable Housing in the Last Year: Not on file     Patient Active Problem List   Diagnosis    Chronic back pain    Hyperlipidemia    Indigestion    DDD (degenerative disc disease), cervical    Cervical radiculopathy        Vitals:    05/31/22 1057 05/31/22 1103   BP: (!) 178/94 (!) 156/96   Site: Left Upper Arm Left Upper Arm   Position: Sitting Sitting   Cuff Size: Medium Adult Medium Adult   Pulse: 92    Temp: 97.9 °F (36.6 °C)    TempSrc: Temporal    SpO2: 98%    Weight: 258 lb (117 kg)    Height: 5' 7.5\" (1.715 m)        Physical Exam  Vitals and nursing note reviewed. Constitutional:       General: She is not in acute distress. Appearance: Normal appearance. She is normal weight. She is not ill-appearing, toxic-appearing or diaphoretic. HENT:      Head: Normocephalic and atraumatic. Right Ear: Tympanic membrane, ear canal and external ear normal. There is no impacted cerumen. Left Ear: Tympanic membrane, ear canal and external ear normal. There is no impacted cerumen. Nose: Nose normal. No congestion or rhinorrhea. Mouth/Throat:      Mouth: Mucous membranes are moist.      Pharynx: Oropharynx is clear. No oropharyngeal exudate or posterior oropharyngeal erythema. Eyes:      General: No scleral icterus. Right eye: No discharge. Left eye: No discharge. Extraocular Movements: Extraocular movements intact. Conjunctiva/sclera: Conjunctivae normal.      Pupils: Pupils are equal, round, and reactive to light. Neck:      Vascular: No carotid bruit. Cardiovascular:      Rate and Rhythm: Normal rate and regular rhythm. Pulses: Normal pulses. Heart sounds: Normal heart sounds. No murmur heard. No friction rub. No gallop. Pulmonary:      Effort: Pulmonary effort is normal. No respiratory distress. Breath sounds: No stridor. No wheezing, rhonchi or rales. Chest:      Chest wall: No tenderness. Abdominal:      General: Abdomen is flat. Bowel sounds are normal. There is no distension. Palpations: Abdomen is soft. There is no mass. Tenderness: There is no abdominal tenderness. There is no right CVA tenderness, left CVA tenderness, guarding or rebound. Hernia: No hernia is present. Musculoskeletal:         General: No swelling, tenderness, deformity or signs of injury. Normal range of motion. Cervical back: Normal range of motion and neck supple. No rigidity. No muscular tenderness. Right lower leg: No edema. Left lower leg: No edema. Lymphadenopathy:      Cervical: No cervical adenopathy. Skin:     General: Skin is warm and dry. Capillary Refill: Capillary refill takes less than 2 seconds. Coloration: Skin is not jaundiced or pale. Findings: No bruising, erythema, lesion or rash. Neurological:      General: No focal deficit present. Mental Status: She is alert and oriented to person, place, and time. Mental status is at baseline. Cranial Nerves: No cranial nerve deficit. Sensory: No sensory deficit. Motor: No weakness. Coordination: Coordination normal.      Gait: Gait normal.      Deep Tendon Reflexes: Reflexes normal.   Psychiatric:         Thought Content: Thought content normal.         Judgment: Judgment normal.      Comments: Very anxious in mood and behavior. Assessment and Plan:  Jolanda Kanner was seen today for other. Diagnoses and all orders for this visit:    Acute nonintractable headache, unspecified headache type  Comments:  likely BP and anxiety related    Primary hypertension  Comments:  start on lisinopril    Anxiety  Comments:  follow up with psychiatry. Provided emotional support    Neck pain  -     oxyCODONE-acetaminophen (PERCOCET) 5-325 MG per tablet; Take 1 tablet by mouth every 4 hours as needed for Pain for up to 7 days. Acute pain of left shoulder  -     oxyCODONE-acetaminophen (PERCOCET) 5-325 MG per tablet; Take 1 tablet by mouth every 4 hours as needed for Pain for up to 7 days. Chronic back pain, unspecified back location, unspecified back pain laterality  Comments:  on chronic analgesics    Anxiety and depression  Comments:  to follow with psychiatry. Requires extra reassurance during each visit.     Fibromyalgia  Comments:  more comfortable post prednisone    Rheumatoid arthritis, involving unspecified site, unspecified whether rheumatoid factor present ([facility] [address]  Comments:  more comfortable post prednisone    Gastroesophageal reflux disease with esophagitis, unspecified whether hemorrhage  Comments:  stable on omeprazole    Cervical neuropathic pain  Comments:  stable on current medications    Other orders  -     lisinopril (PRINIVIL;ZESTRIL) 10 MG tablet; Take 1 tablet by mouth daily        Discussions/Education provided to patients during visit:  [] Discussed the importance to stop smoking. [] Advised to monitor eating habits. [] Reviewed and discussed Imaging results. [] Reviewed and discussed Lab results. [] Discussed the importance of drinking plenty of fluids. [] Cut down on Salt and Caffeine.  [] Exercise 2-3 times weekly, if not more. [] Cut down on Sugar and Fats. [x] Continue Medications as Discussed. [x] Communicated with patient any concerns, to phone office. [x] Follow up as directed. Return in about 1 month (around 6/30/2022).       Seen By:      LYNSEY Zhu - CNP

## 2022-06-01 RX ORDER — TIZANIDINE 4 MG/1
TABLET ORAL
Qty: 270 TABLET | Refills: 1 | OUTPATIENT
Start: 2022-06-01

## 2022-06-09 DIAGNOSIS — F41.9 ANXIETY: ICD-10-CM

## 2022-06-09 RX ORDER — ALPRAZOLAM 0.5 MG/1
TABLET ORAL
Qty: 60 TABLET | Refills: 2 | Status: SHIPPED
Start: 2022-06-09 | End: 2022-07-24

## 2022-07-01 ENCOUNTER — TELEPHONE (OUTPATIENT)
Dept: FAMILY MEDICINE CLINIC | Age: 47
End: 2022-07-01

## 2022-07-01 ENCOUNTER — OFFICE VISIT (OUTPATIENT)
Dept: FAMILY MEDICINE CLINIC | Age: 47
End: 2022-07-01
Payer: COMMERCIAL

## 2022-07-01 VITALS
HEIGHT: 68 IN | DIASTOLIC BLOOD PRESSURE: 75 MMHG | TEMPERATURE: 97 F | OXYGEN SATURATION: 99 % | WEIGHT: 252 LBS | HEART RATE: 100 BPM | SYSTOLIC BLOOD PRESSURE: 138 MMHG | RESPIRATION RATE: 18 BRPM | BODY MASS INDEX: 38.19 KG/M2

## 2022-07-01 DIAGNOSIS — M25.50 ARTHRALGIA, UNSPECIFIED JOINT: ICD-10-CM

## 2022-07-01 DIAGNOSIS — M79.7 FIBROMYALGIA: ICD-10-CM

## 2022-07-01 DIAGNOSIS — M54.2 NECK PAIN: ICD-10-CM

## 2022-07-01 DIAGNOSIS — E53.8 B12 DEFICIENCY: ICD-10-CM

## 2022-07-01 DIAGNOSIS — I10 PRIMARY HYPERTENSION: Primary | ICD-10-CM

## 2022-07-01 DIAGNOSIS — G89.29 CHRONIC BACK PAIN, UNSPECIFIED BACK LOCATION, UNSPECIFIED BACK PAIN LATERALITY: ICD-10-CM

## 2022-07-01 DIAGNOSIS — M54.9 CHRONIC BACK PAIN, UNSPECIFIED BACK LOCATION, UNSPECIFIED BACK PAIN LATERALITY: ICD-10-CM

## 2022-07-01 DIAGNOSIS — R51.9 ACUTE NONINTRACTABLE HEADACHE, UNSPECIFIED HEADACHE TYPE: ICD-10-CM

## 2022-07-01 DIAGNOSIS — M50.30 DDD (DEGENERATIVE DISC DISEASE), CERVICAL: ICD-10-CM

## 2022-07-01 DIAGNOSIS — K30 INDIGESTION: ICD-10-CM

## 2022-07-01 DIAGNOSIS — M54.12 CERVICAL NEUROPATHIC PAIN: ICD-10-CM

## 2022-07-01 DIAGNOSIS — K21.00 GASTROESOPHAGEAL REFLUX DISEASE WITH ESOPHAGITIS, UNSPECIFIED WHETHER HEMORRHAGE: ICD-10-CM

## 2022-07-01 DIAGNOSIS — E04.1 THYROID NODULE: ICD-10-CM

## 2022-07-01 PROCEDURE — 99213 OFFICE O/P EST LOW 20 MIN: CPT | Performed by: FAMILY MEDICINE

## 2022-07-01 RX ORDER — OXYCODONE HYDROCHLORIDE AND ACETAMINOPHEN 5; 325 MG/1; MG/1
1 TABLET ORAL EVERY 4 HOURS PRN
Qty: 42 TABLET | Refills: 0 | Status: SHIPPED
Start: 2022-07-01 | End: 2022-08-09 | Stop reason: SDUPTHER

## 2022-07-01 RX ORDER — CLONIDINE HYDROCHLORIDE 0.1 MG/1
TABLET ORAL
COMMUNITY
Start: 2022-06-09

## 2022-07-01 RX ORDER — CYANOCOBALAMIN 1000 UG/ML
1000 INJECTION INTRAMUSCULAR; SUBCUTANEOUS ONCE
Qty: 4 ML | Refills: 5 | Status: SHIPPED
Start: 2022-07-01 | End: 2022-07-19

## 2022-07-01 RX ORDER — MIRTAZAPINE 15 MG/1
TABLET, FILM COATED ORAL
COMMUNITY
Start: 2022-06-22

## 2022-07-01 RX ORDER — VORTIOXETINE 20 MG/1
TABLET, FILM COATED ORAL
COMMUNITY
Start: 2022-06-22

## 2022-07-01 SDOH — ECONOMIC STABILITY: FOOD INSECURITY: WITHIN THE PAST 12 MONTHS, YOU WORRIED THAT YOUR FOOD WOULD RUN OUT BEFORE YOU GOT MONEY TO BUY MORE.: NEVER TRUE

## 2022-07-01 SDOH — ECONOMIC STABILITY: FOOD INSECURITY: WITHIN THE PAST 12 MONTHS, THE FOOD YOU BOUGHT JUST DIDN'T LAST AND YOU DIDN'T HAVE MONEY TO GET MORE.: NEVER TRUE

## 2022-07-01 ASSESSMENT — PATIENT HEALTH QUESTIONNAIRE - PHQ9
5. POOR APPETITE OR OVEREATING: 0
2. FEELING DOWN, DEPRESSED OR HOPELESS: 0
SUM OF ALL RESPONSES TO PHQ9 QUESTIONS 1 & 2: 0
9. THOUGHTS THAT YOU WOULD BE BETTER OFF DEAD, OR OF HURTING YOURSELF: 0
4. FEELING TIRED OR HAVING LITTLE ENERGY: 0
8. MOVING OR SPEAKING SO SLOWLY THAT OTHER PEOPLE COULD HAVE NOTICED. OR THE OPPOSITE, BEING SO FIGETY OR RESTLESS THAT YOU HAVE BEEN MOVING AROUND A LOT MORE THAN USUAL: 0
SUM OF ALL RESPONSES TO PHQ QUESTIONS 1-9: 0
3. TROUBLE FALLING OR STAYING ASLEEP: 0
6. FEELING BAD ABOUT YOURSELF - OR THAT YOU ARE A FAILURE OR HAVE LET YOURSELF OR YOUR FAMILY DOWN: 0
1. LITTLE INTEREST OR PLEASURE IN DOING THINGS: 0
7. TROUBLE CONCENTRATING ON THINGS, SUCH AS READING THE NEWSPAPER OR WATCHING TELEVISION: 0
10. IF YOU CHECKED OFF ANY PROBLEMS, HOW DIFFICULT HAVE THESE PROBLEMS MADE IT FOR YOU TO DO YOUR WORK, TAKE CARE OF THINGS AT HOME, OR GET ALONG WITH OTHER PEOPLE: 0
SUM OF ALL RESPONSES TO PHQ QUESTIONS 1-9: 0

## 2022-07-01 ASSESSMENT — SOCIAL DETERMINANTS OF HEALTH (SDOH): HOW HARD IS IT FOR YOU TO PAY FOR THE VERY BASICS LIKE FOOD, HOUSING, MEDICAL CARE, AND HEATING?: NOT HARD AT ALL

## 2022-07-01 NOTE — PROGRESS NOTES
22  Sahil Flores : 1975 Sex: female  Age: 40 y.o. Chief Complaint   Patient presents with    Blood Pressure Check     bp is doing better now,has been checking it . HPI overall checkup  Blood pressure checkup be controlled and is doing better at home monitor    Review of Systems   Constitutional:  Positive for fatigue and unexpected weight change. Negative for appetite change, chills, diaphoresis and fever. HENT:  Negative for congestion, dental problem, ear discharge, ear pain, facial swelling, hearing loss, mouth sores, rhinorrhea, sinus pain, sore throat, tinnitus and voice change. Eyes:  Negative for photophobia and visual disturbance (no visual changes). Respiratory:  Positive for shortness of breath (panic is better). Negative for apnea, cough, chest tightness and wheezing. Cardiovascular:  Negative for chest pain, palpitations and leg swelling. Gastrointestinal:  Negative for abdominal distention, abdominal pain, constipation, diarrhea, nausea, rectal pain and vomiting. Endocrine: Negative for cold intolerance, heat intolerance and polyuria. Genitourinary:  Negative for dysuria, hematuria and urgency. Musculoskeletal:  Positive for arthralgias and myalgias. Negative for back pain, gait problem, joint swelling, neck pain and neck stiffness. Skin:  Negative for rash and wound. Allergic/Immunologic: Negative for environmental allergies. Neurological:  Negative for dizziness, tremors, seizures, syncope, facial asymmetry, speech difficulty, weakness, light-headedness, numbness and headaches. Hematological:  Does not bruise/bleed easily. Psychiatric/Behavioral:  Positive for agitation and sleep disturbance. Negative for hallucinations and suicidal ideas. The patient is nervous/anxious (improved) and is hyperactive. Physical Exam  Vitals reviewed. Constitutional:       General: She is not in acute distress. Appearance: She is not ill-appearing.    HENT: to 7 days. Intended supply: 7 days. Take lowest dose possible to manage pain    Arthralgia, unspecified joint    DDD (degenerative disc disease), cervical    Indigestion    Chronic back pain, unspecified back location, unspecified back pain laterality    Other orders  -     Discontinue: cyanocobalamin 1000 MCG/ML injection; Inject 1 mL into the muscle once for 1 dose        Discussions/Education provided to patients during visit:  [] Discussed the importance to stop smoking. [] Advised to monitor eating habits. [] Reviewed and discussed Imaging results. [] Reviewed and discussed Lab results. [] Discussed the importance of drinking plenty of fluids. [] Cut down on Salt, Caffeine, and Sugar. [x] Continue Medications as Discussed. [x] Communicated with patient any concerns, to phone office. Weekly injections of b12   bp good stable   Follow-up in 1 month see how blood pressure readings are see how the mental health is going and see if her doing well overall  Follow-up at that time orders are written      Return in about 1 month (around 8/1/2022).       Seen by:     Sanjiv Diallo DO

## 2022-07-01 NOTE — TELEPHONE ENCOUNTER
Laura Renteria from [facility] called and asked for a clarification on the B12 sent over. He asked how often the medication was to be given as it was written for 1 dose, and also how much was to be injected as said 1 mL and also 4 mL on the script. Please call the pharmacy at [phone].

## 2022-07-05 ENCOUNTER — NURSE ONLY (OUTPATIENT)
Dept: FAMILY MEDICINE CLINIC | Age: 47
End: 2022-07-05
Payer: COMMERCIAL

## 2022-07-05 DIAGNOSIS — E53.8 B12 DEFICIENCY: Primary | ICD-10-CM

## 2022-07-05 PROCEDURE — 96372 THER/PROPH/DIAG INJ SC/IM: CPT | Performed by: FAMILY MEDICINE

## 2022-07-05 RX ORDER — CYANOCOBALAMIN 1000 UG/ML
1000 INJECTION INTRAMUSCULAR; SUBCUTANEOUS ONCE
Status: COMPLETED | OUTPATIENT
Start: 2022-07-05 | End: 2022-07-05

## 2022-07-05 RX ADMIN — CYANOCOBALAMIN 1000 MCG: 1000 INJECTION INTRAMUSCULAR; SUBCUTANEOUS at 09:22

## 2022-07-12 ENCOUNTER — NURSE ONLY (OUTPATIENT)
Dept: FAMILY MEDICINE CLINIC | Age: 47
End: 2022-07-12
Payer: COMMERCIAL

## 2022-07-12 DIAGNOSIS — E53.8 B12 DEFICIENCY: Primary | ICD-10-CM

## 2022-07-12 PROCEDURE — 96372 THER/PROPH/DIAG INJ SC/IM: CPT | Performed by: FAMILY MEDICINE

## 2022-07-12 RX ORDER — CYANOCOBALAMIN 1000 UG/ML
1000 INJECTION INTRAMUSCULAR; SUBCUTANEOUS ONCE
Status: COMPLETED | OUTPATIENT
Start: 2022-07-12 | End: 2022-07-12

## 2022-07-12 RX ADMIN — CYANOCOBALAMIN 1000 MCG: 1000 INJECTION INTRAMUSCULAR; SUBCUTANEOUS at 09:20

## 2022-07-19 ENCOUNTER — NURSE ONLY (OUTPATIENT)
Dept: FAMILY MEDICINE CLINIC | Age: 47
End: 2022-07-19
Payer: COMMERCIAL

## 2022-07-19 DIAGNOSIS — E53.8 B12 DEFICIENCY: Primary | ICD-10-CM

## 2022-07-19 PROCEDURE — 96372 THER/PROPH/DIAG INJ SC/IM: CPT | Performed by: FAMILY MEDICINE

## 2022-07-19 RX ORDER — CYANOCOBALAMIN 1000 UG/ML
1000 INJECTION INTRAMUSCULAR; SUBCUTANEOUS ONCE
Status: COMPLETED | OUTPATIENT
Start: 2022-07-19 | End: 2022-07-19

## 2022-07-19 RX ADMIN — CYANOCOBALAMIN 1000 MCG: 1000 INJECTION INTRAMUSCULAR; SUBCUTANEOUS at 09:07

## 2022-07-20 DIAGNOSIS — Z00.00 ROUTINE GENERAL MEDICAL EXAMINATION AT A HEALTH CARE FACILITY: ICD-10-CM

## 2022-07-20 RX ORDER — DULOXETIN HYDROCHLORIDE 60 MG/1
CAPSULE, DELAYED RELEASE ORAL
Qty: 30 CAPSULE | Refills: 5 | Status: SHIPPED | OUTPATIENT
Start: 2022-07-20

## 2022-07-24 ASSESSMENT — ENCOUNTER SYMPTOMS
BACK PAIN: 0
FACIAL SWELLING: 0
VOMITING: 0
APNEA: 0
CONSTIPATION: 0
COUGH: 0
ABDOMINAL DISTENTION: 0
SORE THROAT: 0
PHOTOPHOBIA: 0
DIARRHEA: 0
RHINORRHEA: 0
CHEST TIGHTNESS: 0
SINUS PAIN: 0
VOICE CHANGE: 0
SHORTNESS OF BREATH: 1
NAUSEA: 0
WHEEZING: 0
RECTAL PAIN: 0
ABDOMINAL PAIN: 0

## 2022-07-26 ENCOUNTER — NURSE ONLY (OUTPATIENT)
Dept: FAMILY MEDICINE CLINIC | Age: 47
End: 2022-07-26
Payer: COMMERCIAL

## 2022-07-26 PROCEDURE — 96372 THER/PROPH/DIAG INJ SC/IM: CPT | Performed by: FAMILY MEDICINE

## 2022-07-26 RX ORDER — CYANOCOBALAMIN 1000 UG/ML
1000 INJECTION INTRAMUSCULAR; SUBCUTANEOUS ONCE
Status: COMPLETED | OUTPATIENT
Start: 2022-07-26 | End: 2022-07-26

## 2022-07-26 RX ADMIN — CYANOCOBALAMIN 1000 MCG: 1000 INJECTION INTRAMUSCULAR; SUBCUTANEOUS at 09:06

## 2022-08-02 ENCOUNTER — NURSE ONLY (OUTPATIENT)
Dept: FAMILY MEDICINE CLINIC | Age: 47
End: 2022-08-02
Payer: COMMERCIAL

## 2022-08-02 DIAGNOSIS — E53.8 B12 DEFICIENCY: Primary | ICD-10-CM

## 2022-08-02 PROCEDURE — 96372 THER/PROPH/DIAG INJ SC/IM: CPT | Performed by: FAMILY MEDICINE

## 2022-08-02 RX ORDER — CYANOCOBALAMIN 1000 UG/ML
1000 INJECTION INTRAMUSCULAR; SUBCUTANEOUS ONCE
Status: COMPLETED | OUTPATIENT
Start: 2022-08-02 | End: 2022-08-02

## 2022-08-02 RX ADMIN — CYANOCOBALAMIN 1000 MCG: 1000 INJECTION INTRAMUSCULAR; SUBCUTANEOUS at 08:59

## 2022-08-09 ENCOUNTER — NURSE ONLY (OUTPATIENT)
Dept: FAMILY MEDICINE CLINIC | Age: 47
End: 2022-08-09
Payer: COMMERCIAL

## 2022-08-09 DIAGNOSIS — E53.8 B12 DEFICIENCY: Primary | ICD-10-CM

## 2022-08-09 DIAGNOSIS — M54.2 NECK PAIN: ICD-10-CM

## 2022-08-09 PROCEDURE — 96372 THER/PROPH/DIAG INJ SC/IM: CPT | Performed by: FAMILY MEDICINE

## 2022-08-09 RX ORDER — CYANOCOBALAMIN 1000 UG/ML
1000 INJECTION INTRAMUSCULAR; SUBCUTANEOUS ONCE
Status: COMPLETED | OUTPATIENT
Start: 2022-08-09 | End: 2022-08-09

## 2022-08-09 RX ORDER — OXYCODONE HYDROCHLORIDE AND ACETAMINOPHEN 5; 325 MG/1; MG/1
1 TABLET ORAL EVERY 4 HOURS PRN
Qty: 42 TABLET | Refills: 0 | Status: SHIPPED
Start: 2022-08-09 | End: 2022-09-13 | Stop reason: SDUPTHER

## 2022-08-09 RX ADMIN — CYANOCOBALAMIN 1000 MCG: 1000 INJECTION INTRAMUSCULAR; SUBCUTANEOUS at 09:12

## 2022-08-09 NOTE — TELEPHONE ENCOUNTER
Patients last appointment 7/1/2022. Patients next scheduled appointment   Future Appointments   Date Time Provider Jimmie Ngo   8/16/2022  9:00 AM SCHEDULE, [facility]  [facility]

## 2022-08-16 ENCOUNTER — NURSE ONLY (OUTPATIENT)
Dept: FAMILY MEDICINE CLINIC | Age: 47
End: 2022-08-16
Payer: COMMERCIAL

## 2022-08-16 PROCEDURE — 96372 THER/PROPH/DIAG INJ SC/IM: CPT | Performed by: FAMILY MEDICINE

## 2022-08-16 RX ORDER — CYANOCOBALAMIN 1000 UG/ML
1000 INJECTION INTRAMUSCULAR; SUBCUTANEOUS ONCE
Status: COMPLETED | OUTPATIENT
Start: 2022-08-16 | End: 2022-08-16

## 2022-08-16 RX ADMIN — CYANOCOBALAMIN 1000 MCG: 1000 INJECTION INTRAMUSCULAR; SUBCUTANEOUS at 09:06

## 2022-08-23 ENCOUNTER — NURSE ONLY (OUTPATIENT)
Dept: FAMILY MEDICINE CLINIC | Age: 47
End: 2022-08-23
Payer: COMMERCIAL

## 2022-08-23 PROCEDURE — 96372 THER/PROPH/DIAG INJ SC/IM: CPT | Performed by: FAMILY MEDICINE

## 2022-08-23 RX ORDER — CYANOCOBALAMIN 1000 UG/ML
1000 INJECTION INTRAMUSCULAR; SUBCUTANEOUS ONCE
Status: COMPLETED | OUTPATIENT
Start: 2022-08-23 | End: 2022-08-23

## 2022-08-23 RX ADMIN — CYANOCOBALAMIN 1000 MCG: 1000 INJECTION INTRAMUSCULAR; SUBCUTANEOUS at 10:10

## 2022-09-06 ENCOUNTER — NURSE ONLY (OUTPATIENT)
Dept: FAMILY MEDICINE CLINIC | Age: 47
End: 2022-09-06
Payer: COMMERCIAL

## 2022-09-06 DIAGNOSIS — Z00.00 ROUTINE GENERAL MEDICAL EXAMINATION AT A HEALTH CARE FACILITY: ICD-10-CM

## 2022-09-06 PROCEDURE — 96372 THER/PROPH/DIAG INJ SC/IM: CPT | Performed by: NURSE PRACTITIONER

## 2022-09-06 RX ORDER — ONDANSETRON 4 MG/1
4 TABLET, FILM COATED ORAL DAILY PRN
Qty: 30 TABLET | Refills: 0 | Status: SHIPPED | OUTPATIENT
Start: 2022-09-06

## 2022-09-06 RX ORDER — ONDANSETRON 4 MG/1
4 TABLET, FILM COATED ORAL EVERY 8 HOURS PRN
COMMUNITY

## 2022-09-06 RX ORDER — CYANOCOBALAMIN 1000 UG/ML
1000 INJECTION INTRAMUSCULAR; SUBCUTANEOUS ONCE
Status: COMPLETED | OUTPATIENT
Start: 2022-09-06 | End: 2022-09-06

## 2022-09-06 RX ADMIN — CYANOCOBALAMIN 1000 MCG: 1000 INJECTION INTRAMUSCULAR; SUBCUTANEOUS at 09:17

## 2022-09-13 ENCOUNTER — NURSE ONLY (OUTPATIENT)
Dept: FAMILY MEDICINE CLINIC | Age: 47
End: 2022-09-13
Payer: COMMERCIAL

## 2022-09-13 DIAGNOSIS — M54.2 NECK PAIN: ICD-10-CM

## 2022-09-13 DIAGNOSIS — E53.8 B12 DEFICIENCY: Primary | ICD-10-CM

## 2022-09-13 PROCEDURE — 96372 THER/PROPH/DIAG INJ SC/IM: CPT | Performed by: FAMILY MEDICINE

## 2022-09-13 RX ORDER — CYANOCOBALAMIN 1000 UG/ML
1000 INJECTION INTRAMUSCULAR; SUBCUTANEOUS ONCE
Status: COMPLETED | OUTPATIENT
Start: 2022-09-13 | End: 2022-09-13

## 2022-09-13 RX ORDER — OXYCODONE HYDROCHLORIDE AND ACETAMINOPHEN 5; 325 MG/1; MG/1
1 TABLET ORAL EVERY 4 HOURS PRN
Qty: 42 TABLET | Refills: 0 | Status: SHIPPED
Start: 2022-09-13 | End: 2022-10-20 | Stop reason: SDUPTHER

## 2022-09-13 RX ADMIN — CYANOCOBALAMIN 1000 MCG: 1000 INJECTION INTRAMUSCULAR; SUBCUTANEOUS at 09:04

## 2022-09-20 ENCOUNTER — NURSE ONLY (OUTPATIENT)
Dept: FAMILY MEDICINE CLINIC | Age: 47
End: 2022-09-20
Payer: COMMERCIAL

## 2022-09-20 DIAGNOSIS — E53.8 B12 DEFICIENCY: Primary | ICD-10-CM

## 2022-09-20 PROCEDURE — 96372 THER/PROPH/DIAG INJ SC/IM: CPT | Performed by: FAMILY MEDICINE

## 2022-09-20 RX ORDER — CYANOCOBALAMIN 1000 UG/ML
1000 INJECTION INTRAMUSCULAR; SUBCUTANEOUS ONCE
Status: COMPLETED | OUTPATIENT
Start: 2022-09-20 | End: 2022-09-20

## 2022-09-20 RX ADMIN — CYANOCOBALAMIN 1000 MCG: 1000 INJECTION INTRAMUSCULAR; SUBCUTANEOUS at 15:23

## 2022-09-27 ENCOUNTER — NURSE ONLY (OUTPATIENT)
Dept: FAMILY MEDICINE CLINIC | Age: 47
End: 2022-09-27
Payer: COMMERCIAL

## 2022-09-27 DIAGNOSIS — E53.8 B12 DEFICIENCY: Primary | ICD-10-CM

## 2022-09-27 PROCEDURE — 96372 THER/PROPH/DIAG INJ SC/IM: CPT | Performed by: FAMILY MEDICINE

## 2022-09-27 RX ORDER — CYANOCOBALAMIN 1000 UG/ML
1000 INJECTION INTRAMUSCULAR; SUBCUTANEOUS ONCE
Status: COMPLETED | OUTPATIENT
Start: 2022-09-27 | End: 2022-09-27

## 2022-09-27 RX ADMIN — CYANOCOBALAMIN 1000 MCG: 1000 INJECTION INTRAMUSCULAR; SUBCUTANEOUS at 10:05

## 2022-10-04 ENCOUNTER — NURSE ONLY (OUTPATIENT)
Dept: FAMILY MEDICINE CLINIC | Age: 47
End: 2022-10-04
Payer: COMMERCIAL

## 2022-10-04 DIAGNOSIS — E53.8 B12 DEFICIENCY: Primary | ICD-10-CM

## 2022-10-04 PROCEDURE — 96372 THER/PROPH/DIAG INJ SC/IM: CPT | Performed by: FAMILY MEDICINE

## 2022-10-04 RX ORDER — CYANOCOBALAMIN 1000 UG/ML
1000 INJECTION INTRAMUSCULAR; SUBCUTANEOUS ONCE
Status: COMPLETED | OUTPATIENT
Start: 2022-10-04 | End: 2022-10-04

## 2022-10-04 RX ADMIN — CYANOCOBALAMIN 1000 MCG: 1000 INJECTION INTRAMUSCULAR; SUBCUTANEOUS at 09:19

## 2022-10-06 RX ORDER — OMEPRAZOLE 40 MG/1
40 CAPSULE, DELAYED RELEASE ORAL 2 TIMES DAILY
Qty: 180 CAPSULE | Refills: 1 | Status: SHIPPED | OUTPATIENT
Start: 2022-10-06 | End: 2023-04-04

## 2022-10-11 ENCOUNTER — OFFICE VISIT (OUTPATIENT)
Dept: FAMILY MEDICINE CLINIC | Age: 47
End: 2022-10-11
Payer: COMMERCIAL

## 2022-10-11 VITALS
HEIGHT: 68 IN | BODY MASS INDEX: 38.49 KG/M2 | TEMPERATURE: 97.8 F | RESPIRATION RATE: 18 BRPM | HEART RATE: 82 BPM | DIASTOLIC BLOOD PRESSURE: 82 MMHG | WEIGHT: 254 LBS | SYSTOLIC BLOOD PRESSURE: 138 MMHG | OXYGEN SATURATION: 97 %

## 2022-10-11 DIAGNOSIS — Z12.4 SCREENING FOR CERVICAL CANCER: ICD-10-CM

## 2022-10-11 DIAGNOSIS — J34.89 NASAL SORE: ICD-10-CM

## 2022-10-11 DIAGNOSIS — N92.0 MENORRHAGIA WITH REGULAR CYCLE: Primary | ICD-10-CM

## 2022-10-11 LAB
BILIRUBIN, POC: 0
BLOOD URINE, POC: 0
CLARITY, POC: NORMAL
COLOR, POC: NORMAL
GLUCOSE URINE, POC: 0
KETONES, POC: 0
LEUKOCYTE EST, POC: 0
NITRITE, POC: 0
PH, POC: 5.5
PROTEIN, POC: 0
SPECIFIC GRAVITY, POC: 1.03
UROBILINOGEN, POC: 0.2

## 2022-10-11 PROCEDURE — 99214 OFFICE O/P EST MOD 30 MIN: CPT | Performed by: NURSE PRACTITIONER

## 2022-10-11 PROCEDURE — 96372 THER/PROPH/DIAG INJ SC/IM: CPT | Performed by: NURSE PRACTITIONER

## 2022-10-11 PROCEDURE — 81002 URINALYSIS NONAUTO W/O SCOPE: CPT | Performed by: NURSE PRACTITIONER

## 2022-10-11 RX ORDER — CYANOCOBALAMIN 1000 UG/ML
1000 INJECTION INTRAMUSCULAR; SUBCUTANEOUS ONCE
Status: COMPLETED | OUTPATIENT
Start: 2022-10-11 | End: 2022-10-11

## 2022-10-11 RX ADMIN — CYANOCOBALAMIN 1000 MCG: 1000 INJECTION INTRAMUSCULAR; SUBCUTANEOUS at 09:25

## 2022-10-11 ASSESSMENT — ENCOUNTER SYMPTOMS
SORE THROAT: 0
BACK PAIN: 0
BLOOD IN STOOL: 0
SINUS PAIN: 0
ANAL BLEEDING: 0
EYE DISCHARGE: 0
CHOKING: 0
WHEEZING: 0
ABDOMINAL DISTENTION: 0
DIARRHEA: 0
EYE REDNESS: 0
SINUS PRESSURE: 0
EYE ITCHING: 0
VOMITING: 0
APNEA: 0
ABDOMINAL PAIN: 0
VOICE CHANGE: 0
CHEST TIGHTNESS: 0
EYE PAIN: 0
COLOR CHANGE: 0
NAUSEA: 0
CONSTIPATION: 0
STRIDOR: 0
FACIAL SWELLING: 0
RHINORRHEA: 0
TROUBLE SWALLOWING: 0
SHORTNESS OF BREATH: 0
PHOTOPHOBIA: 0
RECTAL PAIN: 0
COUGH: 0

## 2022-10-11 ASSESSMENT — PATIENT HEALTH QUESTIONNAIRE - PHQ9
10. IF YOU CHECKED OFF ANY PROBLEMS, HOW DIFFICULT HAVE THESE PROBLEMS MADE IT FOR YOU TO DO YOUR WORK, TAKE CARE OF THINGS AT HOME, OR GET ALONG WITH OTHER PEOPLE: 0
4. FEELING TIRED OR HAVING LITTLE ENERGY: 0
SUM OF ALL RESPONSES TO PHQ QUESTIONS 1-9: 0
SUM OF ALL RESPONSES TO PHQ QUESTIONS 1-9: 0
7. TROUBLE CONCENTRATING ON THINGS, SUCH AS READING THE NEWSPAPER OR WATCHING TELEVISION: 0
6. FEELING BAD ABOUT YOURSELF - OR THAT YOU ARE A FAILURE OR HAVE LET YOURSELF OR YOUR FAMILY DOWN: 0
3. TROUBLE FALLING OR STAYING ASLEEP: 0
2. FEELING DOWN, DEPRESSED OR HOPELESS: 0
5. POOR APPETITE OR OVEREATING: 0
8. MOVING OR SPEAKING SO SLOWLY THAT OTHER PEOPLE COULD HAVE NOTICED. OR THE OPPOSITE, BEING SO FIGETY OR RESTLESS THAT YOU HAVE BEEN MOVING AROUND A LOT MORE THAN USUAL: 0
1. LITTLE INTEREST OR PLEASURE IN DOING THINGS: 0
SUM OF ALL RESPONSES TO PHQ QUESTIONS 1-9: 0
SUM OF ALL RESPONSES TO PHQ QUESTIONS 1-9: 0
SUM OF ALL RESPONSES TO PHQ9 QUESTIONS 1 & 2: 0
9. THOUGHTS THAT YOU WOULD BE BETTER OFF DEAD, OR OF HURTING YOURSELF: 0

## 2022-10-11 NOTE — PROGRESS NOTES
10/11/22  Rachael Elizabeth : 1975 Sex: female  Age: 56 y.o. Chief Complaint   Patient presents with    Gynecologic Exam     Pap test no problems ,needs on a birth control    Other     Nose has sore in it want it checked        Patient here for gynecological examination. States she has been having terrible menses. Has been soaking through overnight pads 5-6 daily with heavy clotting and cramping. States regular cycle. Asking for birth control. Patient states she has not had PAP exam in 5-6 years. Patient also complains of sores inside her nose. Review of Systems   Constitutional:  Negative for activity change, appetite change, chills, diaphoresis, fatigue, fever and unexpected weight change. HENT:  Negative for congestion, dental problem, drooling, ear discharge, ear pain, facial swelling, hearing loss, mouth sores, nosebleeds, postnasal drip, rhinorrhea, sinus pressure, sinus pain, sneezing, sore throat, tinnitus, trouble swallowing and voice change. Eyes:  Negative for photophobia, pain, discharge, redness, itching and visual disturbance. Respiratory:  Negative for apnea, cough, choking, chest tightness, shortness of breath, wheezing and stridor. Cardiovascular:  Negative for chest pain, palpitations and leg swelling. Gastrointestinal:  Negative for abdominal distention, abdominal pain, anal bleeding, blood in stool, constipation, diarrhea, nausea, rectal pain and vomiting. Endocrine: Negative for cold intolerance, heat intolerance, polydipsia, polyphagia and polyuria. Genitourinary:  Positive for menstrual problem and pelvic pain. Negative for decreased urine volume, difficulty urinating, dysuria, enuresis, flank pain, frequency, genital sores, hematuria and urgency. Musculoskeletal:  Negative for arthralgias, back pain, gait problem, joint swelling, myalgias, neck pain and neck stiffness. Skin:  Negative for color change, pallor, rash and wound.    Allergic/Immunologic: Negative for environmental allergies, food allergies and immunocompromised state. Neurological:  Negative for dizziness, tremors, seizures, syncope, facial asymmetry, speech difficulty, weakness, light-headedness, numbness and headaches. Hematological:  Negative for adenopathy. Does not bruise/bleed easily. Psychiatric/Behavioral:  Negative for agitation, behavioral problems, confusion, decreased concentration, hallucinations, self-injury, sleep disturbance and suicidal ideas. The patient is not nervous/anxious and is not hyperactive. Current Outpatient Medications:     mupirocin (BACTROBAN NASAL) 2 % nasal ointment, Take by Nasal route 2 times daily. , Disp: 1 each, Rfl: 3    omeprazole (PRILOSEC) 40 MG delayed release capsule, Take 1 capsule by mouth 2 times daily, Disp: 180 capsule, Rfl: 1    ondansetron (ZOFRAN) 4 MG tablet, Take 1 tablet by mouth daily as needed for Nausea or Vomiting, Disp: 30 tablet, Rfl: 0    ondansetron (ZOFRAN) 4 MG tablet, Take 4 mg by mouth every 8 hours as needed for Nausea or Vomiting, Disp: , Rfl:     DULoxetine (CYMBALTA) 60 MG extended release capsule, take 1 capsule by mouth once daily, Disp: 30 capsule, Rfl: 5    mirtazapine (REMERON) 15 MG tablet, take 1/2 to 1 tablet by mouth at bedtime if needed, Disp: , Rfl:     cloNIDine (CATAPRES) 0.1 MG tablet, take 1 tablet by mouth twice a day if needed, Disp: , Rfl:     TRINTELLIX 20 MG TABS tablet, take 1 tablet by mouth once daily AT THE SAME TIME EACH DAY, Disp: , Rfl:     OZEMPIC, 0.25 OR 0.5 MG/DOSE, 2 MG/1.5ML SOPN, inject 0.25 milligrams subcutaneously every week, Disp: , Rfl:     lisinopril (PRINIVIL;ZESTRIL) 10 MG tablet, Take 1 tablet by mouth daily, Disp: 90 tablet, Rfl: 1    SUMAtriptan (IMITREX) 50 MG tablet, take 1 tablet by mouth if needed AT ONSET OF HEADACHE may repeat in 4 hours IF headache PERSISTS, Disp: 9 tablet, Rfl: 5    ferrous sulfate (IRON 325) 325 (65 Fe) MG tablet, Take 325 mg by mouth daily (with breakfast), Disp: , Rfl:     clotrimazole-betamethasone (LOTRISONE) 1-0.05 % cream, Apply topically 2 times daily. , Disp: 60 g, Rfl: 2    secukinumab (COSENTYX) 150 MG/ML SOSY, Inject 300 mg into the skin once, Disp: , Rfl:     Multiple Vitamin (MULTIVITAMIN PO), Take 1 tablet by mouth daily , Disp: , Rfl:   No Known Allergies    Past Medical History:   Diagnosis Date    Arthritis     psoriatic    Chronic back pain     Depression     Headache(784.0)     Hemorrhoids     Herpes genital     \"years ago\" ; no outbreaks    Irregular periods/menstrual cycles     Renal calculi     history of    Varicose veins      Past Surgical History:   Procedure Laterality Date    PAIN MANAGEMENT PROCEDURE N/A 12/16/2021    CERVICAL EPIDURAL STEROID INJECTION UNDER FLUORO AT C6 - C7 LEFT PARAMEDIAN performed by Geno Ray MD at [address] [place] [address] 2/7/2022    #2 CERVICAL EPIDURAL STEROID INJECTION UNDER FLUOROSCOPIC GUIDANCE AT C6-C7 LEFT PARAMEDIAN performed by Geno Ray MD at [address] [place] [address] 4/7/2022    CERVICAL EPIDURAL STEROID INJECTION C6-C7 LEFT PARAMEDIAN performed by Geno Ray MD at [address] [place] [address]  5/31/11    Lap    TUBAL LIGATION  2001    UPPER GASTROINTESTINAL ENDOSCOPY  2011     Family History   Family history unknown: Yes     Social History     Socioeconomic History    Marital status:      Spouse name: Not on file    Number of children: Not on file    Years of education: Not on file    Highest education level: Not on file   Occupational History    Not on file   Tobacco Use    Smoking status: Never    Smokeless tobacco: Never   Vaping Use    Vaping Use: Never used   Substance and Sexual Activity    Alcohol use: No    Drug use: No    Sexual activity: Not on file   Other Topics Concern    Not on file   Social History Narrative    Not on file     Social Determinants of Health     Financial Resource Strain: Low Risk Difficulty of Paying Living Expenses: Not hard at all   Food Insecurity: No Food Insecurity    Worried About Running Out of Food in the Last Year: Never true    Ran Out of Food in the Last Year: Never true   Transportation Needs: Not on file   Physical Activity: Not on file   Stress: Not on file   Social Connections: Not on file   Intimate Partner Violence: Not on file   Housing Stability: Not on file     Patient Active Problem List   Diagnosis    Chronic back pain    Hyperlipidemia    Indigestion    DDD (degenerative disc disease), cervical    Cervical radiculopathy        Vitals:    10/11/22 0906   BP: 138/82   Pulse: 82   Resp: 18   Temp: 97.8 °F (36.6 °C)   TempSrc: Temporal   SpO2: 97%   Weight: 254 lb (115.2 kg)   Height: 5' 7.5\" (1.715 m)       Physical Exam  Vitals and nursing note reviewed. Constitutional:       General: She is not in acute distress. Appearance: Normal appearance. She is normal weight. She is not ill-appearing, toxic-appearing or diaphoretic. HENT:      Head: Normocephalic and atraumatic. Right Ear: Tympanic membrane, ear canal and external ear normal. There is no impacted cerumen. Left Ear: Tympanic membrane, ear canal and external ear normal. There is no impacted cerumen. Nose: Nose normal. No congestion or rhinorrhea. Comments: Erythema to nares     Mouth/Throat:      Mouth: Mucous membranes are moist.      Pharynx: Oropharynx is clear. No oropharyngeal exudate or posterior oropharyngeal erythema. Eyes:      General: No scleral icterus. Right eye: No discharge. Left eye: No discharge. Extraocular Movements: Extraocular movements intact. Conjunctiva/sclera: Conjunctivae normal.      Pupils: Pupils are equal, round, and reactive to light. Neck:      Vascular: No carotid bruit. Cardiovascular:      Rate and Rhythm: Normal rate and regular rhythm. Pulses: Normal pulses. Heart sounds: Normal heart sounds. No murmur heard. No friction rub. No gallop. Pulmonary:      Effort: Pulmonary effort is normal. No respiratory distress. Breath sounds: No stridor. No wheezing, rhonchi or rales. Chest:      Chest wall: No tenderness. Abdominal:      General: Abdomen is flat. Bowel sounds are normal. There is no distension. Palpations: Abdomen is soft. There is no mass. Tenderness: There is no abdominal tenderness. There is no right CVA tenderness, left CVA tenderness, guarding or rebound. Hernia: No hernia is present. Genitourinary:     General: Normal vulva. Vagina: No vaginal discharge. Comments: Mild vaginal atrophy. Cervix pink and mobile. Patient obese, difficult to palpate adnexa and uterus. No masses are palpated. Patient refuses rectal exam  Musculoskeletal:         General: No swelling, tenderness, deformity or signs of injury. Normal range of motion. Cervical back: Normal range of motion and neck supple. No rigidity. No muscular tenderness. Right lower leg: No edema. Left lower leg: No edema. Lymphadenopathy:      Cervical: No cervical adenopathy. Skin:     General: Skin is warm and dry. Capillary Refill: Capillary refill takes less than 2 seconds. Coloration: Skin is not jaundiced or pale. Findings: No bruising, erythema, lesion or rash. Neurological:      General: No focal deficit present. Mental Status: She is alert and oriented to person, place, and time. Mental status is at baseline. Cranial Nerves: No cranial nerve deficit. Sensory: No sensory deficit. Motor: No weakness. Coordination: Coordination normal.      Gait: Gait normal.      Deep Tendon Reflexes: Reflexes normal.   Psychiatric:         Mood and Affect: Mood normal.         Behavior: Behavior normal.         Thought Content: Thought content normal.         Judgment: Judgment normal.       Assessment and Plan:  Annie Fletcher was seen today for gynecologic exam and other.     Diagnoses and all orders for this visit:    Menorrhagia with regular cycle  -     US NON OB TRANSVAGINAL; Future  -     POCT Urinalysis no Micro    Screening for cervical cancer  -     PAP SMEAR; Future  -     POCT Urinalysis no Micro    Nasal sore    Other orders  -     cyanocobalamin injection 1,000 mcg  -     mupirocin (BACTROBAN NASAL) 2 % nasal ointment; Take by Nasal route 2 times daily. Discussions/Education provided to patients during visit:  [] Discussed the importance to stop smoking. [] Advised to monitor eating habits. [] Reviewed and discussed Imaging results. [] Reviewed and discussed Lab results. [] Discussed the importance of drinking plenty of fluids. [] Cut down on Salt and Caffeine.  [] Exercise 2-3 times weekly, if not more. [] Cut down on Sugar and Fats. [x] Continue Medications as Discussed. [x] Communicated with patient any concerns, to phone office. [x] Follow up as directed. I am having patient follow up with her PCP Dr. Dulce Maria Hoskins after the transvaginal ultrasound. Return in about 1 week (around 10/18/2022).       Seen By:      LYNSEY Byrd - CNP

## 2022-10-14 ENCOUNTER — TELEPHONE (OUTPATIENT)
Dept: FAMILY MEDICINE CLINIC | Age: 47
End: 2022-10-14

## 2022-10-14 NOTE — TELEPHONE ENCOUNTER
10/11/22 Order for US non ob transvaginal placed. MH has tried 3 times to contact pt., unsuccessful. Pt. Not sure she wants to have it done. She will call and schedule if she decides she wants it.

## 2022-10-18 ENCOUNTER — NURSE ONLY (OUTPATIENT)
Dept: FAMILY MEDICINE CLINIC | Age: 47
End: 2022-10-18
Payer: COMMERCIAL

## 2022-10-18 DIAGNOSIS — E53.8 B12 DEFICIENCY: Primary | ICD-10-CM

## 2022-10-18 PROCEDURE — 96372 THER/PROPH/DIAG INJ SC/IM: CPT | Performed by: FAMILY MEDICINE

## 2022-10-18 RX ORDER — CYANOCOBALAMIN 1000 UG/ML
1000 INJECTION INTRAMUSCULAR; SUBCUTANEOUS ONCE
Status: COMPLETED | OUTPATIENT
Start: 2022-10-18 | End: 2022-10-18

## 2022-10-18 RX ADMIN — CYANOCOBALAMIN 1000 MCG: 1000 INJECTION INTRAMUSCULAR; SUBCUTANEOUS at 09:37

## 2022-10-20 DIAGNOSIS — M54.2 NECK PAIN: ICD-10-CM

## 2022-10-20 RX ORDER — OXYCODONE HYDROCHLORIDE AND ACETAMINOPHEN 5; 325 MG/1; MG/1
1 TABLET ORAL EVERY 4 HOURS PRN
Qty: 42 TABLET | Refills: 0 | Status: SHIPPED | OUTPATIENT
Start: 2022-10-20 | End: 2022-10-27

## 2022-10-25 ENCOUNTER — NURSE ONLY (OUTPATIENT)
Dept: FAMILY MEDICINE CLINIC | Age: 47
End: 2022-10-25
Payer: COMMERCIAL

## 2022-10-25 PROCEDURE — 96372 THER/PROPH/DIAG INJ SC/IM: CPT | Performed by: FAMILY MEDICINE

## 2022-10-25 RX ORDER — CYANOCOBALAMIN 1000 UG/ML
1000 INJECTION, SOLUTION INTRAMUSCULAR; SUBCUTANEOUS ONCE
Status: COMPLETED | OUTPATIENT
Start: 2022-10-25 | End: 2022-10-25

## 2022-10-25 RX ADMIN — CYANOCOBALAMIN 1000 MCG: 1000 INJECTION, SOLUTION INTRAMUSCULAR; SUBCUTANEOUS at 09:14

## 2022-11-01 ENCOUNTER — NURSE ONLY (OUTPATIENT)
Dept: FAMILY MEDICINE CLINIC | Age: 47
End: 2022-11-01
Payer: COMMERCIAL

## 2022-11-01 DIAGNOSIS — E53.8 B12 DEFICIENCY: Primary | ICD-10-CM

## 2022-11-01 PROCEDURE — 96372 THER/PROPH/DIAG INJ SC/IM: CPT | Performed by: FAMILY MEDICINE

## 2022-11-01 RX ORDER — CYANOCOBALAMIN 1000 UG/ML
1000 INJECTION, SOLUTION INTRAMUSCULAR; SUBCUTANEOUS ONCE
Status: COMPLETED | OUTPATIENT
Start: 2022-11-01 | End: 2022-11-01

## 2022-11-01 RX ADMIN — CYANOCOBALAMIN 1000 MCG: 1000 INJECTION, SOLUTION INTRAMUSCULAR; SUBCUTANEOUS at 08:59

## 2022-11-10 DIAGNOSIS — Z00.00 ROUTINE GENERAL MEDICAL EXAMINATION AT A HEALTH CARE FACILITY: ICD-10-CM

## 2022-11-10 DIAGNOSIS — M54.2 NECK PAIN: ICD-10-CM

## 2022-11-10 RX ORDER — OXYCODONE HYDROCHLORIDE AND ACETAMINOPHEN 5; 325 MG/1; MG/1
1 TABLET ORAL EVERY 4 HOURS PRN
Qty: 42 TABLET | Refills: 0 | Status: SHIPPED | OUTPATIENT
Start: 2022-11-10 | End: 2022-11-17

## 2022-11-10 RX ORDER — ONDANSETRON 4 MG/1
4 TABLET, FILM COATED ORAL DAILY PRN
Qty: 30 TABLET | Refills: 0 | Status: SHIPPED
Start: 2022-11-10 | End: 2022-12-01 | Stop reason: ALTCHOICE

## 2022-11-11 ENCOUNTER — NURSE ONLY (OUTPATIENT)
Dept: FAMILY MEDICINE CLINIC | Age: 47
End: 2022-11-11
Payer: COMMERCIAL

## 2022-11-11 DIAGNOSIS — E53.8 B12 DEFICIENCY: Primary | ICD-10-CM

## 2022-11-11 PROCEDURE — 96372 THER/PROPH/DIAG INJ SC/IM: CPT | Performed by: FAMILY MEDICINE

## 2022-11-11 RX ORDER — CYANOCOBALAMIN 1000 UG/ML
1000 INJECTION, SOLUTION INTRAMUSCULAR; SUBCUTANEOUS ONCE
Status: COMPLETED | OUTPATIENT
Start: 2022-11-11 | End: 2022-11-11

## 2022-11-11 RX ADMIN — CYANOCOBALAMIN 1000 MCG: 1000 INJECTION, SOLUTION INTRAMUSCULAR; SUBCUTANEOUS at 09:12

## 2022-11-18 ENCOUNTER — NURSE ONLY (OUTPATIENT)
Dept: FAMILY MEDICINE CLINIC | Age: 47
End: 2022-11-18
Payer: COMMERCIAL

## 2022-11-18 PROCEDURE — 96372 THER/PROPH/DIAG INJ SC/IM: CPT | Performed by: FAMILY MEDICINE

## 2022-11-18 RX ORDER — CYANOCOBALAMIN 1000 UG/ML
1000 INJECTION, SOLUTION INTRAMUSCULAR; SUBCUTANEOUS ONCE
Status: COMPLETED | OUTPATIENT
Start: 2022-11-18 | End: 2022-11-18

## 2022-11-18 RX ADMIN — CYANOCOBALAMIN 1000 MCG: 1000 INJECTION, SOLUTION INTRAMUSCULAR; SUBCUTANEOUS at 09:06

## 2022-11-21 ENCOUNTER — OFFICE VISIT (OUTPATIENT)
Dept: FAMILY MEDICINE CLINIC | Age: 47
End: 2022-11-21
Payer: COMMERCIAL

## 2022-11-21 VITALS
DIASTOLIC BLOOD PRESSURE: 82 MMHG | HEART RATE: 103 BPM | RESPIRATION RATE: 20 BRPM | HEIGHT: 68 IN | SYSTOLIC BLOOD PRESSURE: 136 MMHG | BODY MASS INDEX: 38.34 KG/M2 | TEMPERATURE: 97.5 F | WEIGHT: 253 LBS | OXYGEN SATURATION: 99 %

## 2022-11-21 DIAGNOSIS — N30.01 ACUTE CYSTITIS WITH HEMATURIA: ICD-10-CM

## 2022-11-21 DIAGNOSIS — G43.719 INTRACTABLE CHRONIC MIGRAINE WITHOUT AURA AND WITHOUT STATUS MIGRAINOSUS: ICD-10-CM

## 2022-11-21 DIAGNOSIS — R35.0 URINE FREQUENCY: ICD-10-CM

## 2022-11-21 DIAGNOSIS — M54.31 SCIATICA OF RIGHT SIDE: Primary | ICD-10-CM

## 2022-11-21 DIAGNOSIS — M62.838 MUSCLE SPASM: ICD-10-CM

## 2022-11-21 DIAGNOSIS — M54.31 SCIATICA OF RIGHT SIDE: ICD-10-CM

## 2022-11-21 LAB
BILIRUBIN, POC: 0
BLOOD URINE, POC: NORMAL
CLARITY, POC: NORMAL
COLOR, POC: NORMAL
GLUCOSE URINE, POC: 0
KETONES, POC: 0
LEUKOCYTE EST, POC: NORMAL
NITRITE, POC: POSITIVE
PH, POC: 5.5
PROTEIN, POC: 30
SPECIFIC GRAVITY, POC: 1.03
UROBILINOGEN, POC: 0.2

## 2022-11-21 PROCEDURE — 99214 OFFICE O/P EST MOD 30 MIN: CPT | Performed by: NURSE PRACTITIONER

## 2022-11-21 PROCEDURE — 96372 THER/PROPH/DIAG INJ SC/IM: CPT | Performed by: NURSE PRACTITIONER

## 2022-11-21 PROCEDURE — 81002 URINALYSIS NONAUTO W/O SCOPE: CPT | Performed by: NURSE PRACTITIONER

## 2022-11-21 RX ORDER — DEXAMETHASONE SODIUM PHOSPHATE 4 MG/ML
4 INJECTION, SOLUTION INTRA-ARTICULAR; INTRALESIONAL; INTRAMUSCULAR; INTRAVENOUS; SOFT TISSUE ONCE
Status: COMPLETED | OUTPATIENT
Start: 2022-11-21 | End: 2022-11-21

## 2022-11-21 RX ORDER — KETOROLAC TROMETHAMINE 30 MG/ML
60 INJECTION, SOLUTION INTRAMUSCULAR; INTRAVENOUS ONCE
Status: COMPLETED | OUTPATIENT
Start: 2022-11-21 | End: 2022-11-21

## 2022-11-21 RX ORDER — CIPROFLOXACIN 500 MG/1
500 TABLET, FILM COATED ORAL 2 TIMES DAILY
Qty: 20 TABLET | Refills: 0 | Status: SHIPPED | OUTPATIENT
Start: 2022-11-21 | End: 2022-12-01

## 2022-11-21 RX ORDER — PREDNISONE 20 MG/1
20 TABLET ORAL DAILY
Qty: 10 TABLET | Refills: 0 | Status: SHIPPED
Start: 2022-11-21 | End: 2022-11-28

## 2022-11-21 RX ORDER — TIZANIDINE 4 MG/1
TABLET ORAL
Qty: 270 TABLET | Refills: 1 | Status: SHIPPED | OUTPATIENT
Start: 2022-11-21

## 2022-11-21 RX ADMIN — KETOROLAC TROMETHAMINE 60 MG: 30 INJECTION, SOLUTION INTRAMUSCULAR; INTRAVENOUS at 15:40

## 2022-11-21 RX ADMIN — DEXAMETHASONE SODIUM PHOSPHATE 4 MG: 4 INJECTION, SOLUTION INTRA-ARTICULAR; INTRALESIONAL; INTRAMUSCULAR; INTRAVENOUS; SOFT TISSUE at 15:39

## 2022-11-21 RX ADMIN — DEXAMETHASONE SODIUM PHOSPHATE 4 MG: 4 INJECTION, SOLUTION INTRA-ARTICULAR; INTRALESIONAL; INTRAMUSCULAR; INTRAVENOUS; SOFT TISSUE at 15:42

## 2022-11-21 ASSESSMENT — ENCOUNTER SYMPTOMS
EYE ITCHING: 0
RECTAL PAIN: 0
CONSTIPATION: 0
APNEA: 0
PHOTOPHOBIA: 1
COUGH: 0
BACK PAIN: 1
ANAL BLEEDING: 0
SHORTNESS OF BREATH: 0
EYE DISCHARGE: 0
VOICE CHANGE: 0
ABDOMINAL DISTENTION: 0
FACIAL SWELLING: 0
CHOKING: 0
VOMITING: 0
BLOOD IN STOOL: 0
SINUS PAIN: 0
DIARRHEA: 0
ABDOMINAL PAIN: 0
RHINORRHEA: 0
COLOR CHANGE: 0
TROUBLE SWALLOWING: 0
SINUS PRESSURE: 0
NAUSEA: 1
STRIDOR: 0
SORE THROAT: 0
WHEEZING: 0
CHEST TIGHTNESS: 0
EYE PAIN: 0
EYE REDNESS: 0

## 2022-11-21 NOTE — PROGRESS NOTES
22  Shikha Solitario : 1975 Sex: female  Age: 56 y.o. Chief Complaint   Patient presents with    Headache     Has non stop every day this week,took migraine med not helping    Back Pain     In her sciatic is bothering her again pain is so bad making her nauseous        Patient here with complaints of bad migraine headache unrelieved by her Imitrex, Advil, and Tylenol  with nausea. She has pain in her neck. She is also having a flare up of right sciatica. States her pain is so bad it is making her nauseated. Patient further complains of urinary frequency. Review of Systems   Constitutional:  Negative for activity change, appetite change, chills, diaphoresis, fatigue, fever and unexpected weight change. HENT:  Negative for congestion, dental problem, drooling, ear discharge, ear pain, facial swelling, hearing loss, mouth sores, nosebleeds, postnasal drip, rhinorrhea, sinus pressure, sinus pain, sneezing, sore throat, tinnitus, trouble swallowing and voice change. Eyes:  Positive for photophobia. Negative for pain, discharge, redness, itching and visual disturbance. Respiratory:  Negative for apnea, cough, choking, chest tightness, shortness of breath, wheezing and stridor. Cardiovascular:  Negative for chest pain, palpitations and leg swelling. Gastrointestinal:  Positive for nausea. Negative for abdominal distention, abdominal pain, anal bleeding, blood in stool, constipation, diarrhea, rectal pain and vomiting. Endocrine: Negative for cold intolerance, heat intolerance, polydipsia, polyphagia and polyuria. Genitourinary:  Negative for decreased urine volume, difficulty urinating, dysuria, enuresis, flank pain, frequency, genital sores, hematuria and urgency. Musculoskeletal:  Positive for back pain, neck pain and neck stiffness. Negative for arthralgias, gait problem, joint swelling and myalgias. Skin:  Negative for color change, pallor, rash and wound. Allergic/Immunologic: Negative for environmental allergies, food allergies and immunocompromised state. Neurological:  Positive for headaches. Negative for dizziness, tremors, seizures, syncope, facial asymmetry, speech difficulty, weakness, light-headedness and numbness. Hematological:  Negative for adenopathy. Does not bruise/bleed easily. Psychiatric/Behavioral:  Negative for agitation, behavioral problems, confusion, decreased concentration, hallucinations, self-injury, sleep disturbance and suicidal ideas. The patient is not nervous/anxious and is not hyperactive. Current Outpatient Medications:     predniSONE (DELTASONE) 20 MG tablet, Take 1 tablet by mouth daily for 10 days, Disp: 10 tablet, Rfl: 0    ciprofloxacin (CIPRO) 500 MG tablet, Take 1 tablet by mouth 2 times daily for 10 days, Disp: 20 tablet, Rfl: 0    tiZANidine (ZANAFLEX) 4 MG tablet, take 1 tablet by mouth three times a day, Disp: 270 tablet, Rfl: 1    ondansetron (ZOFRAN) 4 MG tablet, Take 1 tablet by mouth daily as needed for Nausea or Vomiting, Disp: 30 tablet, Rfl: 0    mupirocin (BACTROBAN NASAL) 2 % nasal ointment, Take by Nasal route 2 times daily. , Disp: 1 each, Rfl: 3    omeprazole (PRILOSEC) 40 MG delayed release capsule, Take 1 capsule by mouth 2 times daily, Disp: 180 capsule, Rfl: 1    ondansetron (ZOFRAN) 4 MG tablet, Take 4 mg by mouth every 8 hours as needed for Nausea or Vomiting, Disp: , Rfl:     DULoxetine (CYMBALTA) 60 MG extended release capsule, take 1 capsule by mouth once daily, Disp: 30 capsule, Rfl: 5    mirtazapine (REMERON) 15 MG tablet, take 1/2 to 1 tablet by mouth at bedtime if needed, Disp: , Rfl:     cloNIDine (CATAPRES) 0.1 MG tablet, take 1 tablet by mouth twice a day if needed, Disp: , Rfl:     TRINTELLIX 20 MG TABS tablet, take 1 tablet by mouth once daily AT THE SAME TIME EACH DAY, Disp: , Rfl:     OZEMPIC, 0.25 OR 0.5 MG/DOSE, 2 MG/1.5ML SOPN, inject 0.25 milligrams subcutaneously every week, Disp: , Rfl:     lisinopril (PRINIVIL;ZESTRIL) 10 MG tablet, Take 1 tablet by mouth daily, Disp: 90 tablet, Rfl: 1    SUMAtriptan (IMITREX) 50 MG tablet, take 1 tablet by mouth if needed AT ONSET OF HEADACHE may repeat in 4 hours IF headache PERSISTS, Disp: 9 tablet, Rfl: 5    ferrous sulfate (IRON 325) 325 (65 Fe) MG tablet, Take 325 mg by mouth daily (with breakfast), Disp: , Rfl:     clotrimazole-betamethasone (LOTRISONE) 1-0.05 % cream, Apply topically 2 times daily. , Disp: 60 g, Rfl: 2    secukinumab (COSENTYX) 150 MG/ML SOSY, Inject 300 mg into the skin once, Disp: , Rfl:     Multiple Vitamin (MULTIVITAMIN PO), Take 1 tablet by mouth daily , Disp: , Rfl:   No Known Allergies    Past Medical History:   Diagnosis Date    Arthritis     psoriatic    Chronic back pain     Depression     Headache(784.0)     Hemorrhoids     Herpes genital     \"years ago\" ; no outbreaks    Irregular periods/menstrual cycles     Renal calculi     history of    Varicose veins      Past Surgical History:   Procedure Laterality Date    PAIN MANAGEMENT PROCEDURE N/A 12/16/2021    CERVICAL EPIDURAL STEROID INJECTION UNDER FLUORO AT C6 - C7 LEFT PARAMEDIAN performed by Terrance Degroot MD at [facility] 2/7/2022    #2 CERVICAL EPIDURAL STEROID INJECTION UNDER FLUOROSCOPIC GUIDANCE AT C6-C7 LEFT PARAMEDIAN performed by Terrance Degroot MD at [facility] 4/7/2022    CERVICAL EPIDURAL STEROID INJECTION C6-C7 LEFT PARAMEDIAN performed by Terrance Degroot MD at [address] [place] [address]  5/31/11    Lap    TUBAL LIGATION  2001    UPPER GASTROINTESTINAL ENDOSCOPY  2011     Family History   Family history unknown: Yes     Social History     Socioeconomic History    Marital status:      Spouse name: Not on file    Number of children: Not on file    Years of education: Not on file    Highest education level: Not on file   Occupational History    Not on file Tobacco Use    Smoking status: Never    Smokeless tobacco: Never   Vaping Use    Vaping Use: Never used   Substance and Sexual Activity    Alcohol use: No    Drug use: No    Sexual activity: Not on file   Other Topics Concern    Not on file   Social History Narrative    Not on file     Social Determinants of Health     Financial Resource Strain: Low Risk     Difficulty of Paying Living Expenses: Not hard at all   Food Insecurity: No Food Insecurity    Worried About Running Out of Food in the Last Year: Never true    Ran Out of Food in the Last Year: Never true   Transportation Needs: Not on file   Physical Activity: Not on file   Stress: Not on file   Social Connections: Not on file   Intimate Partner Violence: Not on file   Housing Stability: Not on file     Patient Active Problem List   Diagnosis    Chronic back pain    Hyperlipidemia    Indigestion    DDD (degenerative disc disease), cervical    Cervical radiculopathy        Vitals:    11/21/22 1528   BP: 136/82   Pulse: (!) 103   Resp: 20   Temp: 97.5 °F (36.4 °C)   TempSrc: Temporal   SpO2: 99%   Weight: 253 lb (114.8 kg)   Height: 5' 7.5\" (1.715 m)       Physical Exam  Vitals and nursing note reviewed. Constitutional:       General: She is not in acute distress. Appearance: Normal appearance. She is obese. She is not ill-appearing, toxic-appearing or diaphoretic. HENT:      Head: Normocephalic and atraumatic. Right Ear: Tympanic membrane, ear canal and external ear normal. There is no impacted cerumen. Left Ear: Tympanic membrane, ear canal and external ear normal. There is no impacted cerumen. Nose: Nose normal. No congestion or rhinorrhea. Mouth/Throat:      Mouth: Mucous membranes are moist.      Pharynx: Oropharynx is clear. No oropharyngeal exudate or posterior oropharyngeal erythema. Eyes:      General: No scleral icterus. Right eye: No discharge. Left eye: No discharge.       Extraocular Movements: Extraocular movements intact. Conjunctiva/sclera: Conjunctivae normal.      Pupils: Pupils are equal, round, and reactive to light. Neck:      Vascular: No carotid bruit. Cardiovascular:      Rate and Rhythm: Normal rate and regular rhythm. Pulses: Normal pulses. Heart sounds: Normal heart sounds. No murmur heard. No friction rub. No gallop. Pulmonary:      Effort: Pulmonary effort is normal. No respiratory distress. Breath sounds: No stridor. No wheezing, rhonchi or rales. Chest:      Chest wall: No tenderness. Abdominal:      General: Bowel sounds are normal. There is no distension. Palpations: Abdomen is soft. There is no mass. Tenderness: There is no abdominal tenderness. There is no right CVA tenderness, left CVA tenderness, guarding or rebound. Hernia: No hernia is present. Musculoskeletal:         General: Tenderness (right SI,lumbar,  cervical) present. No swelling, deformity or signs of injury. Normal range of motion. Cervical back: Normal range of motion and neck supple. No rigidity. No muscular tenderness. Right lower leg: No edema. Left lower leg: No edema. Comments: Muscles spasm noted to cervical area and across shoulders   Lymphadenopathy:      Cervical: No cervical adenopathy. Skin:     General: Skin is warm and dry. Capillary Refill: Capillary refill takes less than 2 seconds. Coloration: Skin is not jaundiced or pale. Findings: No bruising, erythema, lesion or rash. Neurological:      General: No focal deficit present. Mental Status: She is alert and oriented to person, place, and time. Mental status is at baseline. Cranial Nerves: No cranial nerve deficit. Sensory: No sensory deficit. Motor: No weakness.       Coordination: Coordination normal.      Gait: Gait normal.      Deep Tendon Reflexes: Reflexes normal.   Psychiatric:         Mood and Affect: Mood normal.         Behavior: Behavior normal.         Thought Content: Thought content normal.         Judgment: Judgment normal.       Assessment and Plan:  Randi Aceves was seen today for headache and back pain. Diagnoses and all orders for this visit:    Sciatica of right side  -     Culture, Urine; Future    Intractable chronic migraine without aura and without status migrainosus    Acute cystitis with hematuria    Muscle spasm    Urine frequency  -     Culture, Urine; Future  -     POCT Urinalysis no Micro    Other orders  -     ketorolac (TORADOL) injection 60 mg  -     dexamethasone (DECADRON) injection 4 mg  -     dexamethasone (DECADRON) injection 4 mg  -     predniSONE (DELTASONE) 20 MG tablet; Take 1 tablet by mouth daily for 10 days  -     ciprofloxacin (CIPRO) 500 MG tablet; Take 1 tablet by mouth 2 times daily for 10 days      Discussions/Education provided to patients during visit:  [] Discussed the importance to stop smoking. [] Advised to monitor eating habits. [] Reviewed and discussed Imaging results. [] Reviewed and discussed Lab results. [] Discussed the importance of drinking plenty of fluids. [] Cut down on Salt and Caffeine.  [] Exercise 2-3 times weekly, if not more. [] Cut down on Sugar and Fats. [x] Continue Medications as Discussed. [x] Communicated with patient any concerns, to phone office. [x] Follow up as directed. Return if symptoms worsen or fail to improve.       Seen By:      LYNSEY Hatfield - CNP

## 2022-11-22 RX ORDER — LISINOPRIL 10 MG/1
TABLET ORAL
Qty: 90 TABLET | Refills: 1 | Status: SHIPPED | OUTPATIENT
Start: 2022-11-22

## 2022-11-24 LAB
ORGANISM: ABNORMAL
URINE CULTURE, ROUTINE: ABNORMAL

## 2022-11-25 ENCOUNTER — NURSE ONLY (OUTPATIENT)
Dept: FAMILY MEDICINE CLINIC | Age: 47
End: 2022-11-25
Payer: COMMERCIAL

## 2022-11-25 PROCEDURE — 96372 THER/PROPH/DIAG INJ SC/IM: CPT | Performed by: FAMILY MEDICINE

## 2022-11-25 RX ORDER — CYANOCOBALAMIN 1000 UG/ML
1000 INJECTION, SOLUTION INTRAMUSCULAR; SUBCUTANEOUS ONCE
Status: COMPLETED | OUTPATIENT
Start: 2022-11-25 | End: 2022-11-25

## 2022-11-25 RX ADMIN — CYANOCOBALAMIN 1000 MCG: 1000 INJECTION, SOLUTION INTRAMUSCULAR; SUBCUTANEOUS at 14:10

## 2022-11-28 ENCOUNTER — HOSPITAL ENCOUNTER (OUTPATIENT)
Age: 47
Discharge: HOME | End: 2022-11-30
Payer: COMMERCIAL

## 2022-11-28 ENCOUNTER — HOSPITAL ENCOUNTER (OUTPATIENT)
Dept: GENERAL RADIOLOGY | Age: 47
Discharge: HOME | End: 2022-11-30
Payer: COMMERCIAL

## 2022-11-28 ENCOUNTER — PREP FOR PROCEDURE (OUTPATIENT)
Dept: PAIN MANAGEMENT | Age: 47
End: 2022-11-28

## 2022-11-28 ENCOUNTER — OFFICE VISIT (OUTPATIENT)
Dept: PAIN MANAGEMENT | Age: 47
End: 2022-11-28
Payer: COMMERCIAL

## 2022-11-28 VITALS
BODY MASS INDEX: 39.4 KG/M2 | DIASTOLIC BLOOD PRESSURE: 86 MMHG | TEMPERATURE: 98.3 F | OXYGEN SATURATION: 98 % | WEIGHT: 260 LBS | HEIGHT: 68 IN | SYSTOLIC BLOOD PRESSURE: 143 MMHG | HEART RATE: 94 BPM | RESPIRATION RATE: 16 BRPM

## 2022-11-28 DIAGNOSIS — M47.812 CERVICAL SPONDYLOSIS: ICD-10-CM

## 2022-11-28 DIAGNOSIS — M47.817 LUMBOSACRAL SPONDYLOSIS WITHOUT MYELOPATHY: ICD-10-CM

## 2022-11-28 DIAGNOSIS — M50.30 DDD (DEGENERATIVE DISC DISEASE), CERVICAL: ICD-10-CM

## 2022-11-28 DIAGNOSIS — M54.12 CERVICAL RADICULOPATHY: Primary | ICD-10-CM

## 2022-11-28 DIAGNOSIS — E66.9 OBESITY, UNSPECIFIED CLASSIFICATION, UNSPECIFIED OBESITY TYPE, UNSPECIFIED WHETHER SERIOUS COMORBIDITY PRESENT: ICD-10-CM

## 2022-11-28 DIAGNOSIS — M51.36 DDD (DEGENERATIVE DISC DISEASE), LUMBAR: ICD-10-CM

## 2022-11-28 DIAGNOSIS — M54.12 CERVICAL RADICULAR PAIN: ICD-10-CM

## 2022-11-28 PROCEDURE — 72110 X-RAY EXAM L-2 SPINE 4/>VWS: CPT

## 2022-11-28 PROCEDURE — 99214 OFFICE O/P EST MOD 30 MIN: CPT | Performed by: ANESTHESIOLOGY

## 2022-11-28 PROCEDURE — 99213 OFFICE O/P EST LOW 20 MIN: CPT | Performed by: ANESTHESIOLOGY

## 2022-11-28 RX ORDER — SODIUM CHLORIDE 0.9 % (FLUSH) 0.9 %
5-40 SYRINGE (ML) INJECTION PRN
OUTPATIENT
Start: 2022-11-28

## 2022-11-28 RX ORDER — QUETIAPINE FUMARATE 25 MG/1
TABLET, FILM COATED ORAL
COMMUNITY
Start: 2022-11-09 | End: 2022-12-01 | Stop reason: ALTCHOICE

## 2022-11-28 RX ORDER — SODIUM CHLORIDE 9 MG/ML
INJECTION, SOLUTION INTRAVENOUS PRN
OUTPATIENT
Start: 2022-11-28

## 2022-11-28 RX ORDER — BUSPIRONE HYDROCHLORIDE 10 MG/1
TABLET ORAL
COMMUNITY
Start: 2022-11-04 | End: 2022-12-01 | Stop reason: DRUGHIGH

## 2022-11-28 RX ORDER — SODIUM CHLORIDE 0.9 % (FLUSH) 0.9 %
5-40 SYRINGE (ML) INJECTION EVERY 12 HOURS SCHEDULED
OUTPATIENT
Start: 2022-11-28

## 2022-11-28 NOTE — PROGRESS NOTES
[facility] Pain Management        [address]  [facility], [address] [place]   Dept: [phone]      Follow up Note      Rachael Elizabeth     Date of Visit:  11/28/2022    CC:  Patient presents for follow up   Chief Complaint   Patient presents with    Neck Pain    Follow Up After Procedure     CERVICAL EPIDURAL STEROID INJECTION C6-C7 LEFT PARAMEDIAN       HPI:    Neck pain and left UE pain. Failed conservative treatment. Nursing notes and details of the pain history reviewed. Please see intake notes for details. Has been evaluated by [facility]- Dr. Janette Rutledge- referred for interventions. S/P DOMINICK excellent pain relief. NOTE:  H/o psoriatic arthritis- on Methotrexate  S/p Gastric bypass in 2009  H/o fibromyalgia- diffuse pain     Previous treatments:   Physical Therapy/ HEP : yes, has been doing HEP since the onset      Medications: - NSAID's : yes                        - Membrane stabilizers : yes - Gabapentin                       - Opioids : yes, short term                       - Adjuvants or Others : yes, oral steroids/ muscle relaxants     Surgeries: no C spine surgery     She has not been on anticoagulation medications      She is not diabetic. H/O Smoking: no  H/O alcohol abuse : no  H/O Illicit drug use : no     Employment: employed     Imaging:      MRI of Cervical spine: 10/11/2021:  FINDINGS:  Alignment is relatively anatomic. There is preservation of disc height and signal      intensity. Bone marrow signal intensity is normal.  The craniocervical junction appears intact. No abnormal signal is identified within the cord in the cervical region. The thyroid gland is      mildly heterogeneous with a enhancing 4 mm nodule in the left thyroid gland. Additional findings      and levels are as follows:             C5-6:  Minimal broad-based disc osteophyte complex. 4.5 mm nerve root sheath diverticula on the      left. C6-7:  Moderate broad-based disc osteophyte complex. Uncovertebral hypertrophy. Mild to moderate      central canal stenosis. 6.5mm AP canal diameter. Moderate to severe left neural foraminal      stenosis. Mild right neural foraminal stenosis. IMPRESSION 3T MRI Cervical Spine without and with intravenous contrast:      1. Disc disease and degenerative changes as discussed worse at C6-7. Correlate with dermatome      mapping. 2.  4 mm enhancing nodule in the left thyroid gland. OARRS report[de-identified] Reviewed    Past Medical History:   Diagnosis Date    Arthritis     psoriatic    Chronic back pain     Depression     Headache(784.0)     Hemorrhoids     Herpes genital     \"years ago\" ; no outbreaks    Irregular periods/menstrual cycles     Renal calculi     history of    Varicose veins        Past Surgical History:   Procedure Laterality Date    PAIN MANAGEMENT PROCEDURE N/A 12/16/2021    CERVICAL EPIDURAL STEROID INJECTION UNDER FLUORO AT C6 - C7 LEFT PARAMEDIAN performed by Ashwini Petit MD at [address] [place] [address] 2/7/2022    #2 CERVICAL EPIDURAL STEROID INJECTION UNDER FLUOROSCOPIC GUIDANCE AT C6-C7 LEFT PARAMEDIAN performed by Ashwini Petit MD at [address] [place] [address] 4/7/2022    CERVICAL EPIDURAL STEROID INJECTION C6-C7 LEFT PARAMEDIAN performed by Ashwini Petit MD at [address] [place] [address]  5/31/11    Lap    TUBAL LIGATION  2001    UPPER GASTROINTESTINAL ENDOSCOPY  2011       Prior to Admission medications    Medication Sig Start Date End Date Taking?  Authorizing Provider   QUEtiapine (SEROQUEL) 25 MG tablet take 1 tablet by mouth once daily 11/9/22  Yes Historical Provider, MD   busPIRone (BUSPAR) 10 MG tablet take 1 tablet by mouth three times a day 11/4/22  Yes Historical Provider, MD   lisinopril (PRINIVIL;ZESTRIL) 10 MG tablet take 1 tablet by mouth once daily 11/22/22  Yes Meggan Sanchez APRN - CNP   tiZANidine (ZANAFLEX) 4 MG tablet take 1 tablet by mouth three times a day 11/21/22  Yes LYNSEY Diaz CNP   ciprofloxacin (CIPRO) 500 MG tablet Take 1 tablet by mouth 2 times daily for 10 days 11/21/22 12/1/22 Yes LYNSEY Diaz CNP   ondansetron (ZOFRAN) 4 MG tablet Take 1 tablet by mouth daily as needed for Nausea or Vomiting 11/10/22  Yes Olimpia Alexander,    mupirocin [facility] NASAL) 2 % nasal ointment Take by Nasal route 2 times daily. 10/11/22  Yes LYNSEY Diaz CNP   omeprazole (PRILOSEC) 40 MG delayed release capsule Take 1 capsule by mouth 2 times daily 10/6/22 4/4/23 Yes Olimpia Alexander DO   DULoxetine (CYMBALTA) 60 MG extended release capsule take 1 capsule by mouth once daily 7/20/22  Yes Olimpia Alexander DO   cloNIDine (CATAPRES) 0.1 MG tablet take 1 tablet by mouth twice a day if needed 6/9/22  Yes Historical Provider, MD   TRINTELLIX 20 MG TABS tablet take 1 tablet by mouth once daily AT [place] [address] 6/22/22  Yes Historical Provider, MD   OZEMPIC, 0.25 OR 0.5 MG/DOSE, 2 MG/1.5ML SOPN inject 0.25 milligrams subcutaneously every week 5/11/22  Yes Historical Provider, MD   SUMAtriptan (IMITREX) 50 MG tablet take 1 tablet by mouth if needed  Holderrieth Theresa may repeat in 4 hours IF headache PERSISTS 2/21/22  Yes Olimpia Alexander,    ferrous sulfate (IRON 325) 325 (65 Fe) MG tablet Take 325 mg by mouth daily (with breakfast)   Yes Historical Provider, MD   clotrimazole-betamethasone (LOTRISONE) 1-0.05 % cream Apply topically 2 times daily.  1/31/22  Yes Cathryn Cowden Cohen, DO   secukinumab (COSENTYX) 150 MG/ML SOSY Inject 300 mg into the skin once   Yes Historical Provider, MD   Multiple Vitamin (MULTIVITAMIN PO) Take 1 tablet by mouth daily    Yes Historical Provider, MD   ondansetron (ZOFRAN) 4 MG tablet Take 4 mg by mouth every 8 hours as needed for Nausea or Vomiting  Patient not taking: Reported on 11/28/2022    Historical Provider, MD   mirtazapine (REMERON) 15 MG tablet take 1/2 to 1 tablet by mouth at bedtime if needed  Patient not taking: Reported on 11/28/2022 6/22/22   Historical Provider, MD       No Known Allergies    Social History     Socioeconomic History    Marital status:      Spouse name: Not on file    Number of children: Not on file    Years of education: Not on file    Highest education level: Not on file   Occupational History    Not on file   Tobacco Use    Smoking status: Never    Smokeless tobacco: Never   Vaping Use    Vaping Use: Never used   Substance and Sexual Activity    Alcohol use: No    Drug use: No    Sexual activity: Not on file   Other Topics Concern    Not on file   Social History Narrative    Not on file     Social Determinants of Health     Financial Resource Strain: Low Risk     Difficulty of Paying Living Expenses: Not hard at all   Food Insecurity: No Food Insecurity    Worried About Running Out of Food in the Last Year: Never true    Ran Out of Food in the Last Year: Never true   Transportation Needs: Not on file   Physical Activity: Not on file   Stress: Not on file   Social Connections: Not on file   Intimate Partner Violence: Not on file   Housing Stability: Not on file       Family History   Family history unknown: Yes       REVIEW OF SYSTEMS:     Glen Jaime denies fever/chills, chest pain, shortness of breath, new bowel or bladder complaints. All other review of systems was negative.     PHYSICAL EXAMINATION:      BP (!) 143/86   Pulse 94   Temp 98.3 °F (36.8 °C) (Infrared)   Resp 16   Ht 5' 8\" (1.727 m)   Wt 260 lb (117.9 kg)   SpO2 98%   BMI 39.53 kg/m²       General:       General appearance:  Pleasant and well-hydrated, in no distress and A & O x 3  Build:Overweight  Function: Rises from seated position easily and Moves about room without difficulty     HEENT:     Head:normocephalic, atraumatic     Lungs:     Breathing:normal breathing pattern      CVS:     RRR     Abdomen:     Shape:non-distended and normal     Cervical spine:     Inspection:normal  Palpation:tenderness paravertebral muscles, tenderness trapezium, left, right and positive. Range of motion:pain improved     Thoracic spine:                Spine inspection:normal      Lumbar spine:     Spine inspection: Normal   Palpation: Tenderness paravertebral muscles +  Range of motion: Decreased,  SLR : negative bilaterally     Musculoskeletal:     Tender points +     Extremities:     Tremors:None bilaterally upper and lower  Edema:no     Neurological:     Sensory: Normal to light touch      Motor:   No new changes     Gait:normal Yes       Assessment/Plan:   Diagnosis Orders   1. Cervical radiculopathy [M54.12 (ICD-10-CM)]        2. DDD (degenerative disc disease), cervical        3. Cervical spondylosis        4. Cervical radicular pain        5. DDD (degenerative disc disease), lumbar        6. Lumbosacral spondylosis without myelopathy        7. Obesity, unspecified classification, unspecified obesity type, unspecified whether serious comorbidity present           56 y.o.  female with h/o neck pain and left UE radicular pain. Failed conservative treatment. MRI: C6-7 DDD/ stenosis     Had been evaluated by NSG- recommended interventions. Past h/o gastric bypass/ h/o psoriatic arthritis. Has tried Gabapentin. S/P Cervical DOMINICK last one on 4/7/2022 with almost complete pain relief. Has noted recurrence of similar pain. Doing Physical therapy/ HEP. Will repeat Cervical DOMINICK C6-7 under fluoroscopy. RBA discussed. H/o Low back pain - features of facet pain. Will get Xray LS spine     PT. Consider MRI/ spine interventions. She takes percocet prn/ Zanaflex Prn- by her PCP. Instructed to take percocet only prn for severe pain- she apparently takes it for fibromyalgia. Discussed issues with long term opioid use. Recommend non opioid treatment options. She has been started on Cymbalta- which is helping her. Recommend adding Lyrica.      NOTE: Prior PT/ INR/ Platelets- normal.     Counseling : Patient encouraged to stay active and to watch/lose weight     Encouraged to continue Regular home exercise program as tolerated - stretching / strengthening. Treatment plan discussed with the patient including medication and procedure side effects.      Ashli Guardado MD    CC;  Cleo Bosworth, DO

## 2022-11-28 NOTE — PROGRESS NOTES
Do you currently have any of the following:    Fever: No  Headache:  No  Cough: No  Shortness of breath: No  Exposed to anyone with these symptoms: No         Shelbi Aaron presents to the [facility] on 11/28/2022. Zuly Clemente is complaining of pain neck. The pain is constant. The pain is described as aching. Pain is rated on her best day at a 4, on her worst day at a 8, and on average at a 5 on the VAS scale. She took her last dose of Percocet this morning. Any procedures since your last visit: Yes, with 100 % relief. Pacemaker or defibrillator: No .    She is not on NSAIDS and is not on anticoagulation medications. Medication Contract and Consent for Opioid Use Documents Filed        No documents found                    BP (!) 143/86   Pulse 94   Temp 98.3 °F (36.8 °C) (Infrared)   Resp 16   Ht 5' 8\" (1.727 m)   Wt 260 lb (117.9 kg)   SpO2 98%   BMI 39.53 kg/m²      No LMP recorded.

## 2022-11-28 NOTE — H&P (VIEW-ONLY)
[facility] Pain Management        [facility] [address]  [facility], [address] [place] [address]  Dept: [phone]      Follow up Note      Feng French     Date of Visit:  11/28/2022    CC:  Patient presents for follow up   Chief Complaint   Patient presents with    Neck Pain    Follow Up After Procedure     CERVICAL EPIDURAL STEROID INJECTION C6-C7 LEFT PARAMEDIAN       HPI:    Neck pain and left UE pain. Failed conservative treatment. Nursing notes and details of the pain history reviewed. Please see intake notes for details. Has been evaluated by [place] [facility]- Dr. Tony Hawkins- referred for interventions. S/P DOMINICK excellent pain relief. NOTE:  H/o psoriatic arthritis- on Methotrexate  S/p Gastric bypass in 2009  H/o fibromyalgia- diffuse pain     Previous treatments:   Physical Therapy/ HEP : yes, has been doing HEP since the onset      Medications: - NSAID's : yes                        - Membrane stabilizers : yes - Gabapentin                       - Opioids : yes, short term                       - Adjuvants or Others : yes, oral steroids/ muscle relaxants     Surgeries: no C spine surgery     She has not been on anticoagulation medications      She is not diabetic. H/O Smoking: no  H/O alcohol abuse : no  H/O Illicit drug use : no     Employment: employed     Imaging:      MRI of Cervical spine: 10/11/2021:  FINDINGS:  Alignment is relatively anatomic. There is preservation of disc height and signal      intensity. Bone marrow signal intensity is normal.  The craniocervical junction appears intact. No abnormal signal is identified within the cord in the cervical region. The thyroid gland is      mildly heterogeneous with a enhancing 4 mm nodule in the left thyroid gland. Additional findings      and levels are as follows:             C5-6:  Minimal broad-based disc osteophyte complex. 4.5 mm nerve root sheath diverticula on the      left. C6-7:  Moderate broad-based disc osteophyte complex. Uncovertebral hypertrophy. Mild to moderate      central canal stenosis. 6.5mm AP canal diameter. Moderate to severe left neural foraminal      stenosis. Mild right neural foraminal stenosis. IMPRESSION 3T MRI Cervical Spine without and with intravenous contrast:      1. Disc disease and degenerative changes as discussed worse at C6-7. Correlate with dermatome      mapping. 2.  4 mm enhancing nodule in the left thyroid gland. OARRS report[de-identified] Reviewed    Past Medical History:   Diagnosis Date    Arthritis     psoriatic    Chronic back pain     Depression     Headache(784.0)     Hemorrhoids     Herpes genital     \"years ago\" ; no outbreaks    Irregular periods/menstrual cycles     Renal calculi     history of    Varicose veins        Past Surgical History:   Procedure Laterality Date    PAIN MANAGEMENT PROCEDURE N/A 12/16/2021    CERVICAL EPIDURAL STEROID INJECTION UNDER FLUORO AT C6 - C7 LEFT PARAMEDIAN performed by Lazaro Monique MD at [facility] 2/7/2022    #2 CERVICAL EPIDURAL STEROID INJECTION UNDER FLUOROSCOPIC GUIDANCE AT C6-C7 LEFT PARAMEDIAN performed by Lazaro Monique MD at [facility] 4/7/2022    CERVICAL EPIDURAL STEROID INJECTION C6-C7 LEFT PARAMEDIAN performed by Lazaro Monique MD at [address] [place] [address] [employer]  5/31/11    Lap    TUBAL LIGATION  2001    UPPER GASTROINTESTINAL ENDOSCOPY  2011       Prior to Admission medications    Medication Sig Start Date End Date Taking?  Authorizing Provider   QUEtiapine (SEROQUEL) 25 MG tablet take 1 tablet by mouth once daily 11/9/22  Yes Historical Provider, MD   busPIRone (BUSPAR) 10 MG tablet take 1 tablet by mouth three times a day 11/4/22  Yes Historical Provider, MD   lisinopril (PRINIVIL;ZESTRIL) 10 MG tablet take 1 tablet by mouth once daily 11/22/22  Yes Meggan Sanchez, APRN - CNP   tiZANidine (ZANAFLEX) 4 MG tablet take 1 tablet by mouth three times a day 11/21/22  Yes Meggan Decker Reap, APRN - CNP   ciprofloxacin (CIPRO) 500 MG tablet Take 1 tablet by mouth 2 times daily for 10 days 11/21/22 12/1/22 Yes Meggan Pamula Reap, APRN - CNP   ondansetron (ZOFRAN) 4 MG tablet Take 1 tablet by mouth daily as needed for Nausea or Vomiting 11/10/22  Yes Antonieta Wheeler,    mupirocin [facility] NASAL) 2 % nasal ointment Take by Nasal route 2 times daily. 10/11/22  Yes Meggan Decker Reap, APRN - CNP   omeprazole (PRILOSEC) 40 MG delayed release capsule Take 1 capsule by mouth 2 times daily 10/6/22 4/4/23 Yes Antonieta Wheeler DO   DULoxetine (CYMBALTA) 60 MG extended release capsule take 1 capsule by mouth once daily 7/20/22  Yes Antonieta Wheeler DO   cloNIDine (CATAPRES) 0.1 MG tablet take 1 tablet by mouth twice a day if needed 6/9/22  Yes Historical Provider, MD   TRINTELLIX 20 MG TABS tablet take 1 tablet by mouth once daily AT [place] [address] 6/22/22  Yes Historical Provider, MD   OZEMPIC, 0.25 OR 0.5 MG/DOSE, 2 MG/1.5ML SOPN inject 0.25 milligrams subcutaneously every week 5/11/22  Yes Historical Provider, MD   SUMAtriptan (IMITREX) 50 MG tablet take 1 tablet by mouth if needed  Holderrieth Gratiot may repeat in 4 hours IF headache PERSISTS 2/21/22  Yes Antonieta Wheeler,    ferrous sulfate (IRON 325) 325 (65 Fe) MG tablet Take 325 mg by mouth daily (with breakfast)   Yes Historical Provider, MD   clotrimazole-betamethasone (LOTRISONE) 1-0.05 % cream Apply topically 2 times daily.  1/31/22  Yes Katlyn Truong, DO   secukinumab (COSENTYX) 150 MG/ML SOSY Inject 300 mg into the skin once   Yes Historical Provider, MD   Multiple Vitamin (MULTIVITAMIN PO) Take 1 tablet by mouth daily    Yes Historical Provider, MD   ondansetron (ZOFRAN) 4 MG tablet Take 4 mg by mouth every 8 hours as needed for Nausea or Vomiting  Patient not taking: Reported on 11/28/2022    Historical Provider, MD   mirtazapine (REMERON) 15 MG tablet take 1/2 to 1 tablet by mouth at bedtime if needed  Patient not taking: Reported on 11/28/2022 6/22/22   Historical Provider, MD       No Known Allergies    Social History     Socioeconomic History    Marital status:      Spouse name: Not on file    Number of children: Not on file    Years of education: Not on file    Highest education level: Not on file   Occupational History    Not on file   Tobacco Use    Smoking status: Never    Smokeless tobacco: Never   Vaping Use    Vaping Use: Never used   Substance and Sexual Activity    Alcohol use: No    Drug use: No    Sexual activity: Not on file   Other Topics Concern    Not on file   Social History Narrative    Not on file     Social Determinants of Health     Financial Resource Strain: Low Risk     Difficulty of Paying Living Expenses: Not hard at all   Food Insecurity: No Food Insecurity    Worried About Running Out of Food in the Last Year: Never true    Ran Out of Food in the Last Year: Never true   Transportation Needs: Not on file   Physical Activity: Not on file   Stress: Not on file   Social Connections: Not on file   Intimate Partner Violence: Not on file   Housing Stability: Not on file       Family History   Family history unknown: Yes       REVIEW OF SYSTEMS:     Jorden Spear denies fever/chills, chest pain, shortness of breath, new bowel or bladder complaints. All other review of systems was negative.     PHYSICAL EXAMINATION:      BP (!) 143/86   Pulse 94   Temp 98.3 °F (36.8 °C) (Infrared)   Resp 16   Ht 5' 8\" (1.727 m)   Wt 260 lb (117.9 kg)   SpO2 98%   BMI 39.53 kg/m²       General:       General appearance:  Pleasant and well-hydrated, in no distress and A & O x 3  Build:Overweight  Function: Rises from seated position easily and Moves about room without difficulty     HEENT:     Head:normocephalic, atraumatic     Lungs:     Breathing:normal breathing pattern      CVS:     RRR     Abdomen:     Shape:non-distended and normal     Cervical spine:     Inspection:normal  Palpation:tenderness paravertebral muscles, tenderness trapezium, left, right and positive. Range of motion:pain improved     Thoracic spine:                Spine inspection:normal      Lumbar spine:     Spine inspection: Normal   Palpation: Tenderness paravertebral muscles +  Range of motion: Decreased,  SLR : negative bilaterally     Musculoskeletal:     Tender points +     Extremities:     Tremors:None bilaterally upper and lower  Edema:no     Neurological:     Sensory: Normal to light touch      Motor:   No new changes     Gait:normal Yes       Assessment/Plan:   Diagnosis Orders   1. Cervical radiculopathy [M54.12 (ICD-10-CM)]        2. DDD (degenerative disc disease), cervical        3. Cervical spondylosis        4. Cervical radicular pain        5. DDD (degenerative disc disease), lumbar        6. Lumbosacral spondylosis without myelopathy        7. Obesity, unspecified classification, unspecified obesity type, unspecified whether serious comorbidity present           56 y.o.  female with h/o neck pain and left UE radicular pain. Failed conservative treatment. MRI: C6-7 DDD/ stenosis     Had been evaluated by NSG- recommended interventions. Past h/o gastric bypass/ h/o psoriatic arthritis. Has tried Gabapentin. S/P Cervical DOMINICK last one on 4/7/2022 with almost complete pain relief. Has noted recurrence of similar pain. Doing Physical therapy/ HEP. Will repeat Cervical DOMINICK C6-7 under fluoroscopy. RBA discussed. H/o Low back pain - features of facet pain. Will get Xray LS spine     PT. Consider MRI/ spine interventions. She takes percocet prn/ Zanaflex Prn- by her PCP. Instructed to take percocet only prn for severe pain- she apparently takes it for fibromyalgia. Discussed issues with long term opioid use. Recommend non opioid treatment options. She has been started on Cymbalta- which is helping her. Recommend adding Lyrica.      NOTE: Prior PT/ INR/ Platelets- normal.     Counseling : Patient encouraged to stay active and to watch/lose weight     Encouraged to continue Regular home exercise program as tolerated - stretching / strengthening. Treatment plan discussed with the patient including medication and procedure side effects.      Kiara Jordan MD    CC;  Reyne Aase, DO

## 2022-11-29 ENCOUNTER — TELEPHONE (OUTPATIENT)
Dept: PAIN MANAGEMENT | Age: 47
End: 2022-11-29

## 2022-11-29 NOTE — TELEPHONE ENCOUNTER
Call to Matt Jaeger that procedure was approved for 12/5/2022 and that Lucianorm should call her a few days before for the pre op call and between 2:00 PM and 4:00 PM  the business day before with the arrival time. Instructed Neha to hold ibuprofen for 24 hours, naprosyn for 4 days and any aspirin containing products or fish oil for 7 days. Instructed to call office back if any questions. Juan Jose Barrera verbalized understanding.     Electronically signed by aTo Mcfadden RN on 11/29/2022 at 2:50 PM

## 2022-12-01 RX ORDER — OXYCODONE HYDROCHLORIDE AND ACETAMINOPHEN 5; 325 MG/1; MG/1
1 TABLET ORAL EVERY 4 HOURS PRN
COMMUNITY

## 2022-12-01 RX ORDER — CYANOCOBALAMIN 1000 UG/ML
1000 INJECTION, SOLUTION INTRAMUSCULAR; SUBCUTANEOUS ONCE
COMMUNITY
End: 2022-12-09

## 2022-12-01 RX ORDER — PRAZOSIN HYDROCHLORIDE 1 MG/1
1 CAPSULE ORAL NIGHTLY
COMMUNITY

## 2022-12-01 RX ORDER — QUETIAPINE FUMARATE 50 MG/1
50 TABLET, EXTENDED RELEASE ORAL NIGHTLY
COMMUNITY

## 2022-12-01 NOTE — PROGRESS NOTES
[facility] PAIN MANAGEMENT  INSTRUCTIONS  . .......................................................................................................................................... [x] Parking the day of Surgery is located in the [facility].   Upon entering the door, make immediate right into the surgery reception room    [x]  Bring photo ID and insurance card     [x] You may have a light breakfast day of procedure    [x]  Wear loose comfortable clothing    [x]  Please follow instructions for medications as given per Dr's office    [x] You can expect a call the business day prior to procedure to notify you of your arrival time     [x] Please arrange for     []  Other instructions

## 2022-12-02 ENCOUNTER — NURSE ONLY (OUTPATIENT)
Dept: FAMILY MEDICINE CLINIC | Age: 47
End: 2022-12-02

## 2022-12-02 DIAGNOSIS — M54.2 NECK PAIN: ICD-10-CM

## 2022-12-02 DIAGNOSIS — E53.8 B12 DEFICIENCY: Primary | ICD-10-CM

## 2022-12-02 RX ORDER — OXYCODONE HYDROCHLORIDE AND ACETAMINOPHEN 5; 325 MG/1; MG/1
1 TABLET ORAL EVERY 4 HOURS PRN
Qty: 42 TABLET | Refills: 0 | Status: SHIPPED | OUTPATIENT
Start: 2022-12-02 | End: 2022-12-09

## 2022-12-02 RX ORDER — CYANOCOBALAMIN 1000 UG/ML
1000 INJECTION, SOLUTION INTRAMUSCULAR; SUBCUTANEOUS ONCE
Status: COMPLETED | OUTPATIENT
Start: 2022-12-02 | End: 2022-12-02

## 2022-12-02 RX ADMIN — CYANOCOBALAMIN 1000 MCG: 1000 INJECTION, SOLUTION INTRAMUSCULAR; SUBCUTANEOUS at 08:58

## 2022-12-02 NOTE — TELEPHONE ENCOUNTER
Patients last appointment 7/1/2022. Patients next scheduled appointment   Future Appointments   Date Time Provider Jimmie Ngo   12/2/2022  9:00 AM SCHEDULE, CARISSA TRACY [facility]   12/9/2022  9:00 AM SCHEDULE, CARISSA TRACY [facility]   12/16/2022 10:00 AM MAGY Rucker PT   1/16/2023  3:45 PM Juvencio Torres MD [facility]

## 2022-12-05 ENCOUNTER — HOSPITAL ENCOUNTER (OUTPATIENT)
Age: 47
Setting detail: OUTPATIENT SURGERY
Discharge: HOME | End: 2022-12-05
Attending: ANESTHESIOLOGY | Admitting: ANESTHESIOLOGY
Payer: COMMERCIAL

## 2022-12-05 ENCOUNTER — HOSPITAL ENCOUNTER (OUTPATIENT)
Dept: GENERAL RADIOLOGY | Age: 47
Setting detail: OUTPATIENT SURGERY
Discharge: HOME | End: 2022-12-07
Attending: ANESTHESIOLOGY
Payer: COMMERCIAL

## 2022-12-05 VITALS
TEMPERATURE: 97.8 F | RESPIRATION RATE: 18 BRPM | BODY MASS INDEX: 38.8 KG/M2 | WEIGHT: 256 LBS | DIASTOLIC BLOOD PRESSURE: 83 MMHG | SYSTOLIC BLOOD PRESSURE: 121 MMHG | HEART RATE: 79 BPM | OXYGEN SATURATION: 99 % | HEIGHT: 68 IN

## 2022-12-05 DIAGNOSIS — R52 PAIN MANAGEMENT: ICD-10-CM

## 2022-12-05 LAB
HCG, URINE, POC: NEGATIVE
Lab: NORMAL
METER GLUCOSE: 139 MG/DL (ref 74–99)
NEGATIVE QC PASS/FAIL: NORMAL
POSITIVE QC PASS/FAIL: NORMAL

## 2022-12-05 PROCEDURE — 62321 NJX INTERLAMINAR CRV/THRC: CPT | Performed by: ANESTHESIOLOGY

## 2022-12-05 PROCEDURE — 82962 GLUCOSE BLOOD TEST: CPT

## 2022-12-05 PROCEDURE — 3600000012 HC SURGERY LEVEL 2 ADDTL 15MIN: Performed by: ANESTHESIOLOGY

## 2022-12-05 PROCEDURE — 2709999900 HC NON-CHARGEABLE SUPPLY: Performed by: ANESTHESIOLOGY

## 2022-12-05 PROCEDURE — 7100000011 HC PHASE II RECOVERY - ADDTL 15 MIN: Performed by: ANESTHESIOLOGY

## 2022-12-05 PROCEDURE — 6360000002 HC RX W HCPCS: Performed by: ANESTHESIOLOGY

## 2022-12-05 PROCEDURE — A4216 STERILE WATER/SALINE, 10 ML: HCPCS | Performed by: ANESTHESIOLOGY

## 2022-12-05 PROCEDURE — 3209999900 FLUORO FOR SURGICAL PROCEDURES

## 2022-12-05 PROCEDURE — 6360000004 HC RX CONTRAST MEDICATION: Performed by: ANESTHESIOLOGY

## 2022-12-05 PROCEDURE — 3600000002 HC SURGERY LEVEL 2 BASE: Performed by: ANESTHESIOLOGY

## 2022-12-05 PROCEDURE — 7100000010 HC PHASE II RECOVERY - FIRST 15 MIN: Performed by: ANESTHESIOLOGY

## 2022-12-05 PROCEDURE — 2500000003 HC RX 250 WO HCPCS: Performed by: ANESTHESIOLOGY

## 2022-12-05 PROCEDURE — 2580000003 HC RX 258: Performed by: ANESTHESIOLOGY

## 2022-12-05 RX ORDER — SODIUM CHLORIDE 9 MG/ML
INJECTION INTRAVENOUS PRN
Status: DISCONTINUED | OUTPATIENT
Start: 2022-12-05 | End: 2022-12-05 | Stop reason: ALTCHOICE

## 2022-12-05 RX ORDER — METHYLPREDNISOLONE ACETATE 40 MG/ML
INJECTION, SUSPENSION INTRA-ARTICULAR; INTRALESIONAL; INTRAMUSCULAR; SOFT TISSUE PRN
Status: DISCONTINUED | OUTPATIENT
Start: 2022-12-05 | End: 2022-12-05 | Stop reason: ALTCHOICE

## 2022-12-05 RX ORDER — SODIUM CHLORIDE 0.9 % (FLUSH) 0.9 %
5-40 SYRINGE (ML) INJECTION EVERY 12 HOURS SCHEDULED
Status: DISCONTINUED | OUTPATIENT
Start: 2022-12-05 | End: 2022-12-05 | Stop reason: HOSPADM

## 2022-12-05 RX ORDER — SODIUM CHLORIDE 9 MG/ML
INJECTION, SOLUTION INTRAVENOUS PRN
Status: DISCONTINUED | OUTPATIENT
Start: 2022-12-05 | End: 2022-12-05 | Stop reason: HOSPADM

## 2022-12-05 RX ORDER — LIDOCAINE HYDROCHLORIDE 5 MG/ML
INJECTION, SOLUTION INFILTRATION; INTRAVENOUS PRN
Status: DISCONTINUED | OUTPATIENT
Start: 2022-12-05 | End: 2022-12-05 | Stop reason: ALTCHOICE

## 2022-12-05 RX ORDER — SODIUM CHLORIDE 0.9 % (FLUSH) 0.9 %
5-40 SYRINGE (ML) INJECTION PRN
Status: DISCONTINUED | OUTPATIENT
Start: 2022-12-05 | End: 2022-12-05 | Stop reason: HOSPADM

## 2022-12-05 ASSESSMENT — PAIN DESCRIPTION - DESCRIPTORS: DESCRIPTORS: DISCOMFORT

## 2022-12-05 ASSESSMENT — PAIN SCALES - GENERAL
PAINLEVEL_OUTOF10: 0

## 2022-12-05 ASSESSMENT — PAIN - FUNCTIONAL ASSESSMENT: PAIN_FUNCTIONAL_ASSESSMENT: 0-10

## 2022-12-05 NOTE — OP NOTE
Operative Note      Patient: Rachael Elizabeth  YOB: 1975  MRN: [id number]    Date of Procedure: 2022    Pre-Op Diagnosis: Cervical radiculopathy [M54.12]    Post-Op Diagnosis: Same       Procedure(s):  CERVICAL  EPIDURAL STEROID INJECTION UNDER FLUOROSCOPIC GUIDANCE AT C6-C7    Surgeon(s):  Ashwini Petit MD    Assistant:   * No surgical staff found *    Anesthesia: Local    Estimated Blood Loss (mL): Minimal    Complications: None    Specimens:   * No specimens in log *    Implants:  * No implants in log *      Drains: * No LDAs found *    Findings: good needle placement    Detailed Description of Procedure:   2022    Patient: Rachael Elizabeth  :  1975  Age:  56 y.o. Sex:  female     PRE-PROCEDURE DIAGNOSIS: Cervical degenerative disc disease, cervical radicular pain. POST-PROCEDURE DIAGNOSIS: Same. PROCEDURE: Fluoroscopic guided cervical epidural steroid injection,  at C6-7 level. SURGEON: Ashwini Petit MD    ANESTHESIA: Local    ESTIMATED BLOOD LOSS: None.  ______________________________________________________________________  BRIEF HISTORY:  Rachael Elizabeth comes in today for the   therapeutic cervical epidural injection under fluoroscopic guidance. The potential complications of this procedure were discussed with the her again today. She has elected to undergo the aforementioned procedure. Jean complete History & Physical examination were reviewed in depth, a copy of which is in the chart. DESCRIPTION OF PROCEDURE:    After confirming written and informed consent, a time-out was performed and Jean name and date of birth, the procedure to be performed as well as the plan for the location of the needle insertion were confirmed. The patient was brought into the procedure room and placed in the prone position with the head flexed midline on the fluoroscopy table. A pillow was placed under the patient's head to increase cervical interlaminar space. Standard monitors were placed, and vital signs were observed throughout the procedure. The area was prepped with chloraprep and the C6-7 interspace was marked under fluoroscopy. The skin and subcutaneous tissues at the above level were anesthestized with 0.5% lidocaine. An # 18 gauge 3 1/2 tuohy epidural needle was inserted and advanced toward the inferior lamina until bony contact was made. The needle was then advanced superiorly toward epidural space . From this point on loss of resistance technique with 5 cc glass syringe was used to confirm entrance of the needle into the epidural space under intermittent lateral fluoroscopy. Once in the epidural space , negative aspiration for blood and CSF was confirmed. Needle tip placement was confirmed by visualizing epidural spread of 0.5-1 ml of isovue M 300 visualized in both AP and lateral live fluoroscopic views. Then after negative aspiration, a solution of 0.9 % saline 3 ml and 40 mg DepoMedrol was easily injected. The needle was gently removed intact. The patient neck was cleaned and a Band-Aid was placed over the needle insertion point. Disposition the patient tolerated the procedure well and there were no complications . Vital signs remained stable throughout the procedure. The patient was escorted to the recovery area where they remained until discharge and written discharge instructions for the procedure were given. Plan: [place] will return to our pain management center as scheduled.      Antonino Coles MD

## 2022-12-05 NOTE — INTERVAL H&P NOTE
Update History & Physical    The patient's History and Physical of November 28, 2022 was reviewed with the patient and I examined the patient. There was no change. The surgical site was confirmed by the patient and me. Plan: Cervical DOMINICK. The risks, benefits, expected outcome, and alternative to the recommended procedure have been discussed with the patient. Patient understands and wants to proceed with the procedure.      Electronically signed by Reinier Huitron MD on 12/5/2022

## 2022-12-05 NOTE — DISCHARGE INSTRUCTIONS
Gaynelle Ahumada Dr. Darlyne Hollingshead Dr. Marilee Muck Block/Radiofrequency  Home Going Instructions    1-Go home, rest for the remainder of the day  2-Please do not lift over 20 pounds the day of the injection  3-If you received sedation No: alcohol, driving, operating lawn mowers, plows, tractors or other dangerous equipment until next morning. Do not make important decisions or sign legal documents for 24 hours. You may experience light headedness, dizziness, nausea or sleepiness after sedation. Do not stay alone. A responsible adult must be with you for 24 hours. You could be nauseated from the medications you have received. Your IV site may be sore and bruised. 4-No dietary restrictions     5-Resume all medications the same day, blood thinners to be resumed 24 hours after injection if you were instructed to stop any. 6-Keep the surgical site clean and dry, you may shower the next morning and remove the      dressing. 7- No sitz baths, tub baths or hot tubs/swimming for 24 hours. 8- If you have any pain at the injection site(s), application of an ice pack to the area should be       helpful, 20 minutes on/20 minutes off for next 48 hours. 9- Call [facility] immediately at if you develop.   Fever greater than 100.4 F  Have bleeding or drainage from the puncture site  Have progressive Leg/arm numbness and or weakness  Loss of control of bowel and or bladder (wet/soil yourself)  Severe headache with inability to lift head  10-You may return to work the next day

## 2022-12-09 ENCOUNTER — NURSE ONLY (OUTPATIENT)
Dept: FAMILY MEDICINE CLINIC | Age: 47
End: 2022-12-09

## 2022-12-09 DIAGNOSIS — E53.8 B12 DEFICIENCY: Primary | ICD-10-CM

## 2022-12-09 RX ORDER — SEMAGLUTIDE 1.34 MG/ML
INJECTION, SOLUTION SUBCUTANEOUS
Qty: 4 ADJUSTABLE DOSE PRE-FILLED PEN SYRINGE | Refills: 5 | Status: SHIPPED | OUTPATIENT
Start: 2022-12-09

## 2022-12-09 RX ORDER — CYANOCOBALAMIN 1000 UG/ML
1000 INJECTION, SOLUTION INTRAMUSCULAR; SUBCUTANEOUS ONCE
Status: COMPLETED | OUTPATIENT
Start: 2022-12-09 | End: 2022-12-09

## 2022-12-09 RX ADMIN — CYANOCOBALAMIN 1000 MCG: 1000 INJECTION, SOLUTION INTRAMUSCULAR; SUBCUTANEOUS at 16:16

## 2022-12-14 RX ORDER — CYANOCOBALAMIN 1000 UG/ML
INJECTION INTRAMUSCULAR; SUBCUTANEOUS
Qty: 4 ML | Refills: 5 | Status: SHIPPED | OUTPATIENT
Start: 2022-12-14

## 2022-12-16 ENCOUNTER — TELEPHONE (OUTPATIENT)
Dept: FAMILY MEDICINE CLINIC | Age: 47
End: 2022-12-16

## 2022-12-16 ENCOUNTER — NURSE ONLY (OUTPATIENT)
Dept: FAMILY MEDICINE CLINIC | Age: 47
End: 2022-12-16

## 2022-12-16 DIAGNOSIS — M54.2 NECK PAIN: ICD-10-CM

## 2022-12-16 RX ORDER — OXYCODONE HYDROCHLORIDE AND ACETAMINOPHEN 5; 325 MG/1; MG/1
1 TABLET ORAL EVERY 4 HOURS PRN
Qty: 180 TABLET | Refills: 0 | Status: CANCELLED | OUTPATIENT
Start: 2022-12-16 | End: 2023-01-15

## 2022-12-16 RX ORDER — CYANOCOBALAMIN 1000 UG/ML
1000 INJECTION, SOLUTION INTRAMUSCULAR; SUBCUTANEOUS ONCE
Status: COMPLETED | OUTPATIENT
Start: 2022-12-16 | End: 2022-12-16

## 2022-12-16 RX ORDER — OXYCODONE HYDROCHLORIDE AND ACETAMINOPHEN 5; 325 MG/1; MG/1
1 TABLET ORAL EVERY 4 HOURS PRN
Qty: 42 TABLET | Refills: 0 | Status: SHIPPED | OUTPATIENT
Start: 2022-12-16 | End: 2022-12-23

## 2022-12-16 RX ORDER — NALOXONE HYDROCHLORIDE 4 MG/.1ML
1 SPRAY NASAL PRN
Qty: 1 EACH | Refills: 5 | Status: SHIPPED | OUTPATIENT
Start: 2022-12-16

## 2022-12-16 RX ADMIN — CYANOCOBALAMIN 1000 MCG: 1000 INJECTION, SOLUTION INTRAMUSCULAR; SUBCUTANEOUS at 09:47

## 2022-12-22 ENCOUNTER — OFFICE VISIT (OUTPATIENT)
Dept: PAIN MANAGEMENT | Age: 47
End: 2022-12-22
Payer: COMMERCIAL

## 2022-12-22 VITALS
HEIGHT: 68 IN | DIASTOLIC BLOOD PRESSURE: 78 MMHG | HEART RATE: 99 BPM | SYSTOLIC BLOOD PRESSURE: 115 MMHG | TEMPERATURE: 97.3 F | BODY MASS INDEX: 38.8 KG/M2 | OXYGEN SATURATION: 97 % | RESPIRATION RATE: 16 BRPM | WEIGHT: 256 LBS

## 2022-12-22 DIAGNOSIS — M50.30 DDD (DEGENERATIVE DISC DISEASE), CERVICAL: Primary | ICD-10-CM

## 2022-12-22 DIAGNOSIS — M47.812 CERVICAL SPONDYLOSIS: ICD-10-CM

## 2022-12-22 DIAGNOSIS — M51.36 DDD (DEGENERATIVE DISC DISEASE), LUMBAR: ICD-10-CM

## 2022-12-22 DIAGNOSIS — M54.16 LUMBAR RADICULAR PAIN: ICD-10-CM

## 2022-12-22 DIAGNOSIS — M47.817 LUMBOSACRAL SPONDYLOSIS WITHOUT MYELOPATHY: ICD-10-CM

## 2022-12-22 DIAGNOSIS — E66.9 OBESITY, UNSPECIFIED CLASSIFICATION, UNSPECIFIED OBESITY TYPE, UNSPECIFIED WHETHER SERIOUS COMORBIDITY PRESENT: ICD-10-CM

## 2022-12-22 PROCEDURE — 99213 OFFICE O/P EST LOW 20 MIN: CPT | Performed by: ANESTHESIOLOGY

## 2022-12-22 PROCEDURE — 99214 OFFICE O/P EST MOD 30 MIN: CPT | Performed by: ANESTHESIOLOGY

## 2022-12-22 RX ORDER — DOXEPIN HYDROCHLORIDE 10 MG/1
CAPSULE ORAL
COMMUNITY
Start: 2022-12-01

## 2022-12-22 RX ORDER — CLONIDINE HYDROCHLORIDE 0.1 MG/1
TABLET ORAL
COMMUNITY
Start: 2022-12-20

## 2022-12-22 RX ORDER — METHYLPREDNISOLONE 4 MG/1
TABLET ORAL
Qty: 1 KIT | Refills: 0 | Status: SHIPPED | OUTPATIENT
Start: 2022-12-22 | End: 2022-12-28

## 2022-12-22 NOTE — PROGRESS NOTES
[facility]        [address]  [place], [address] [facility]  Dept: [phone]      Follow up Note      Kade Cortezuli     Date of Visit:  12/22/2022    CC:  Patient presents for follow up   Chief Complaint   Patient presents with    Follow Up After Procedure     CERVICAL  EPIDURAL STEROID INJECTION UNDER FLUOROSCOPIC GUIDANCE AT C6-C7    Neck Pain    Lower Back Pain       HPI:    Neck pain and left UE pain. Failed conservative treatment. Nursing notes and details of the pain history reviewed. Please see intake notes for details. Has been evaluated by Ashok Boateng- Dr. Mana Isaacs- referred for interventions. S/P ALDO excellent pain relief. Current pain in the low back and intermittent right LE symptoms. NOTE:  H/o psoriatic arthritis- on Methotrexate  S/p Gastric bypass in 2009  H/o fibromyalgia- diffuse pain     Previous treatments:   Physical Therapy/ HEP : yes, has been doing HEP since the onset      Medications: - NSAID's : yes                        - Membrane stabilizers : yes - Gabapentin                       - Opioids : yes, short term                       - Adjuvants or Others : yes, oral steroids/ muscle relaxants     Surgeries: no C spine surgery     She has not been on anticoagulation medications      She is not diabetic. H/O Smoking: no  H/O alcohol abuse : no  H/O Illicit drug use : no     Employment: employed     Imaging:     Xray LS spine: 11/28/2022:  FINDINGS:   There is pronounced degenerative facet arthropathy especially at L4-5. Degenerative disc disease is present at multiple levels and is mild. There   is mild degenerative retrolisthesis of L2 on L3. No acute fracture. Normal   lumbar soft tissues. Impression   Degenerative lumbar spondylosis. MRI of Cervical spine: 10/11/2021:  FINDINGS:  Alignment is relatively anatomic. There is preservation of disc height and signal      intensity.   Bone marrow signal intensity is normal.  The craniocervical junction appears intact. No abnormal signal is identified within the cord in the cervical region. The thyroid gland is      mildly heterogeneous with a enhancing 4 mm nodule in the left thyroid gland. Additional findings      and levels are as follows:             C5-6:  Minimal broad-based disc osteophyte complex. 4.5 mm nerve root sheath diverticula on the      left. C6-7:  Moderate broad-based disc osteophyte complex. Uncovertebral hypertrophy. Mild to moderate      central canal stenosis. 6.5mm AP canal diameter. Moderate to severe left neural foraminal      stenosis. Mild right neural foraminal stenosis. IMPRESSION 3T MRI Cervical Spine without and with intravenous contrast:      1. Disc disease and degenerative changes as discussed worse at C6-7. Correlate with dermatome      mapping. 2.  4 mm enhancing nodule in the left thyroid gland.         OARRS report[de-identified] Reviewed    Past Medical History:   Diagnosis Date    Arthritis     psoriatic    Chronic back pain     Depression     Headache(784.0)     Herpes genital     \"years ago\" ; no outbreaks    Varicose veins        Past Surgical History:   Procedure Laterality Date    PAIN MANAGEMENT PROCEDURE N/A 12/16/2021    CERVICAL EPIDURAL STEROID INJECTION UNDER FLUORO AT C6 - C7 LEFT PARAMEDIAN performed by Ramya Wilcox MD at [facility] OR    PAIN MANAGEMENT PROCEDURE Left 2/7/2022    #2 CERVICAL EPIDURAL STEROID INJECTION UNDER FLUOROSCOPIC GUIDANCE AT C6-C7 LEFT PARAMEDIAN performed by Ramya Wilcox MD at [address] Left 4/7/2022    CERVICAL EPIDURAL STEROID INJECTION C6-C7 LEFT PARAMEDIAN performed by Ramya Wilcox MD at [address] N/A 12/5/2022    CERVICAL  EPIDURAL STEROID INJECTION UNDER FLUOROSCOPIC GUIDANCE AT C6-C7 performed by Ramya Wilcox MD at [address] [facility]  5/31/11    Lap    TUBAL LIGATION  2001    UPPER GASTROINTESTINAL ENDOSCOPY  2011       Prior to Admission medications    Medication Sig Start Date End Date Taking? Authorizing Provider   cloNIDine (CATAPRES) 0.1 MG tablet  12/20/22  Yes Historical Provider, MD   doxepin (SINEQUAN) 10 MG capsule  12/1/22  Yes Historical Provider, MD   oxyCODONE-acetaminophen (PERCOCET) 5-325 MG per tablet Take 1 tablet by mouth every 4 hours as needed for Pain for up to 7 days. Intended supply: 7 days. Take lowest dose possible to manage pain 12/16/22 12/23/22 Yes Radha Oseguera DO   naloxone 4 MG/0.1ML LIQD nasal spray 1 spray by Nasal route as needed for Opioid Reversal 12/16/22  Yes Radha Oseguera DO   DODEX 1000 MCG/ML injection inject 1 milliliter ( 1000 MCG ) intramuscularly every week for 4 weeks 12/14/22  Yes Radha Oseguera DO   OZEMPIC, 0.25 OR 0.5 MG/DOSE, 2 MG/1.5ML SOPN inject 0.25 milligrams subcutaneously every week 12/9/22  Yes Radha Oseguera DO   QUEtiapine (SEROQUEL XR) 50 MG extended release tablet Take 50 mg by mouth nightly   Yes Historical Provider, MD   prazosin (MINIPRESS) 1 MG capsule Take 1 mg by mouth nightly   Yes Historical Provider, MD   lisinopril (PRINIVIL;ZESTRIL) 10 MG tablet take 1 tablet by mouth once daily 11/22/22  Yes LYNSEY Rogers CNP   tiZANidine (ZANAFLEX) 4 MG tablet take 1 tablet by mouth three times a day 11/21/22  Yes LYNSEY Rogers - CNP   mupirocin (BACTROBAN NASAL) 2 % nasal ointment Take by Nasal route 2 times daily.  10/11/22  Yes LYNSEY Watkins CNP   omeprazole (PRILOSEC) 40 MG delayed release capsule Take 1 capsule by mouth 2 times daily 10/6/22 4/4/23 Yes Tara Truong,    ondansetron (ZOFRAN) 4 MG tablet Take 4 mg by mouth every 8 hours as needed for Nausea or Vomiting   Yes Historical Provider, MD   DULoxetine (CYMBALTA) 60 MG extended release capsule take 1 capsule by mouth once daily 7/20/22  Yes Royal Truong, DO   TRINTELLIX 20 MG TABS tablet take 1 tablet by mouth once daily AT THE SAME TIME EACH DAY 6/22/22  Yes Historical Provider, MD   SUMAtriptan (IMITREX) 50 MG tablet take 1 tablet by mouth if needed  George Baptiste may repeat in 4 hours IF headache PERSISTS 2/21/22  Yes Antoni Newton, DO   ferrous sulfate (IRON 325) 325 (65 Fe) MG tablet Take 325 mg by mouth daily (with breakfast)   Yes Historical Provider, MD   clotrimazole-betamethasone (LOTRISONE) 1-0.05 % cream Apply topically 2 times daily. 1/31/22  Yes Jazmin Truong, DO   secukinumab (COSENTYX) 150 MG/ML SOSY Inject 300 mg into the skin once   Yes Historical Provider, MD   Multiple Vitamin (MULTIVITAMIN PO) Take 1 tablet by mouth daily    Yes Historical Provider, MD   oxyCODONE-acetaminophen (PERCOCET) 5-325 MG per tablet Take 1 tablet by mouth every 4 hours as needed for Pain.   Patient not taking: Reported on 12/22/2022    Historical Provider, MD       No Known Allergies    Social History     Socioeconomic History    Marital status:      Spouse name: Not on file    Number of children: Not on file    Years of education: Not on file    Highest education level: Not on file   Occupational History    Not on file   Tobacco Use    Smoking status: Never    Smokeless tobacco: Never   Vaping Use    Vaping Use: Never used   Substance and Sexual Activity    Alcohol use: No    Drug use: No    Sexual activity: Not on file   Other Topics Concern    Not on file   Social History Narrative    Not on file     Social Determinants of Health     Financial Resource Strain: Low Risk     Difficulty of Paying Living Expenses: Not hard at all   Food Insecurity: No Food Insecurity    Worried About Running Out of Food in the Last Year: Never true    Ran Out of Food in the Last Year: Never true   Transportation Needs: Not on file   Physical Activity: Not on file   Stress: Not on file   Social Connections: Not on file   Intimate Partner Violence: Not on file   Housing Stability: Not on file       Family History   Family history unknown: Yes       REVIEW OF SYSTEMS:     Adis Schaefer denies fever/chills, chest pain, shortness of breath, new bowel or bladder complaints. All other review of systems was negative. PHYSICAL EXAMINATION:      /78   Pulse 99   Temp 97.3 °F (36.3 °C) (Infrared)   Resp 16   Ht 5' 8\" (1.727 m)   Wt 256 lb (116.1 kg)   SpO2 97%   BMI 38.92 kg/m²       General:       General appearance:  Pleasant and well-hydrated, in no distress and A & O x 3  Build:Overweight  Function: Rises from seated position easily and Moves about room without difficulty     HEENT:     Head:normocephalic, atraumatic     Lungs:     Breathing:normal breathing pattern      CVS:     RRR     Abdomen:     Shape:non-distended and normal     Cervical spine:     Inspection:normal  Palpation:tenderness paravertebral muscles, tenderness trapezium, left, right and positive. Range of motion:pain improved     Thoracic spine:                Spine inspection:normal      Lumbar spine:     Spine inspection: Normal   Palpation: Tenderness paravertebral muscles +  Facet tenderness +  Range of motion: Decreased,  SLR : negative bilaterally     Musculoskeletal:     Tender points +     Extremities:     Tremors:None bilaterally upper and lower  Edema:no     Neurological:     Sensory: Normal to light touch      Motor:   No new changes     Gait:normal Yes       Assessment/Plan:   Diagnosis Orders   1. DDD (degenerative disc disease), cervical [M50.30 (ICD-10-CM)]        2. Cervical spondylosis        3. DDD (degenerative disc disease), lumbar        4. Lumbar radicular pain        5. Lumbosacral spondylosis without myelopathy        6. Obesity, unspecified classification, unspecified obesity type, unspecified whether serious comorbidity present           56 y.o.  female with h/o neck pain and left UE radicular pain. Failed conservative treatment. MRI: C6-7 DDD/ stenosis     Had been evaluated by NSG- recommended interventions. Past h/o gastric bypass/ h/o psoriatic arthritis. Has tried Gabapentin. S/P Cervical DOMINICK with excellent pain relief of the neck/ UE pain. H/o Low back pain and intermittent right LE pain- features of facet pain. Xray LS spine- reviewed and discussed. PT/ HEp    Will order MRI of LS spine    Medrol dose pack    Plan lumbar spine interventions after the MRI. She is consulting [facility] for ho easy bruising. Will review the records after her consultation. She takes percocet prn/ Zanaflex Prn- by her PCP. Instructed to take percocet only prn for severe pain- she apparently takes it for fibromyalgia. Discussed issues with long term opioid use. Recommend non opioid treatment options. She has been started on Cymbalta- which is helping her. Recommend adding Lyrica. NOTE: Prior PT/ INR/ Platelets- normal.     Counseling : Patient encouraged to stay active and to watch/lose weight     Encouraged to continue Regular home exercise program as tolerated - stretching / strengthening. Treatment plan discussed with the patient including medication and procedure side effects.      King Monik MD    CC;  Chasidy Rodriguez, DO

## 2022-12-22 NOTE — PROGRESS NOTES
Do you currently have any of the following:    Fever: No  Headache:  No  Cough: No  Shortness of breath: No  Exposed to anyone with these symptoms: No         Luci Maddox presents to the [facility] on 12/22/2022. Anjali Robins is complaining of pain lower back and neck. The pain is constant. The pain is described as aching, throbbing, and shooting. Pain is rated on her best day at a 10, on her worst day at a 10, and on average at a 10 on the VAS scale. She took her last dose of Percocet and xanaflex  this morning. Any procedures since your last visit: Yes, with 90 % relief. Pacemaker or defibrillator: No .    She is not on NSAIDS and is not on anticoagulation medications. Medication Contract and Consent for Opioid Use Documents Filed        No documents found                    There were no vitals taken for this visit. No LMP recorded.

## 2022-12-29 ENCOUNTER — OFFICE VISIT (OUTPATIENT)
Dept: FAMILY MEDICINE CLINIC | Age: 47
End: 2022-12-29
Payer: COMMERCIAL

## 2022-12-29 VITALS
TEMPERATURE: 98.8 F | HEART RATE: 79 BPM | OXYGEN SATURATION: 99 % | SYSTOLIC BLOOD PRESSURE: 130 MMHG | BODY MASS INDEX: 38.72 KG/M2 | DIASTOLIC BLOOD PRESSURE: 84 MMHG | RESPIRATION RATE: 17 BRPM | WEIGHT: 255.5 LBS | HEIGHT: 68 IN

## 2022-12-29 DIAGNOSIS — M54.2 NECK PAIN: ICD-10-CM

## 2022-12-29 DIAGNOSIS — T50.905A ADVERSE EFFECT OF DRUG, INITIAL ENCOUNTER: ICD-10-CM

## 2022-12-29 DIAGNOSIS — M54.12 CERVICAL RADICULOPATHY: ICD-10-CM

## 2022-12-29 DIAGNOSIS — E53.8 B12 DEFICIENCY: ICD-10-CM

## 2022-12-29 DIAGNOSIS — M50.30 DDD (DEGENERATIVE DISC DISEASE), CERVICAL: ICD-10-CM

## 2022-12-29 DIAGNOSIS — R07.9 CHEST PAIN, UNSPECIFIED TYPE: Primary | ICD-10-CM

## 2022-12-29 PROCEDURE — 96372 THER/PROPH/DIAG INJ SC/IM: CPT | Performed by: FAMILY MEDICINE

## 2022-12-29 PROCEDURE — 99214 OFFICE O/P EST MOD 30 MIN: CPT | Performed by: NURSE PRACTITIONER

## 2022-12-29 PROCEDURE — 93000 ELECTROCARDIOGRAM COMPLETE: CPT | Performed by: NURSE PRACTITIONER

## 2022-12-29 RX ORDER — CYANOCOBALAMIN 1000 UG/ML
1000 INJECTION, SOLUTION INTRAMUSCULAR; SUBCUTANEOUS ONCE
Status: COMPLETED | OUTPATIENT
Start: 2022-12-29 | End: 2022-12-29

## 2022-12-29 RX ORDER — QUETIAPINE FUMARATE 100 MG/1
TABLET, FILM COATED ORAL
COMMUNITY
Start: 2022-12-29

## 2022-12-29 RX ORDER — TIZANIDINE 4 MG/1
TABLET ORAL
Qty: 270 TABLET | Refills: 1 | Status: CANCELLED | OUTPATIENT
Start: 2022-12-29

## 2022-12-29 RX ADMIN — CYANOCOBALAMIN 1000 MCG: 1000 INJECTION, SOLUTION INTRAMUSCULAR; SUBCUTANEOUS at 11:40

## 2022-12-29 NOTE — LETTER
[facility] ([facility]3] [id number]  Phone: [phone]  Fax: [phone]    LYNSEY Liu CNP        December 29, 2022     Patient: Teofilo Melton   YOB: 1975   Date of Visit: 12/29/2022       To Whom It May Concern: It is my medical opinion that Dustin Winn may return to work on 2 Jan 2023. If you have any questions or concerns, please don't hesitate to call.     Sincerely,            LYNSEY Liu CNP

## 2022-12-29 NOTE — PROGRESS NOTES
Chief Complaint:       Chest Pain (\"Squeezing\", both arms painful, SOB, back pain doctor just put on a steroid) and Injections (B12)    Feels like she is being squeezed from the middle of her back down both arms. Started 10 days ago. No trauma had an cervical epidural injection 3 weeks ago on a Monday, this is the 4th injection and she did not have this with the other injections. Is currently on a medrol dose pack, given to her on Dec 22 nd which was a visit to be seen for scheduling of an MRI. During which time she told him she was hurting. Was not given anything else. No specific time it started progressively has gotten worse. Has not called the back specialist.  Has SOB with increased discomfort with movement and with activities and improves with rest.  No cough no congestion, No Vomiting     Has Zanaflex but only takes half of one three times a day because it makes her sleepy     History of Present Illness   Source of history provided by:  patient. Ayna Harmon is a 56 y.o. old female who presents to express care with complaints of chest pain x 10 days. States the pain is located over the back and lateral chest wall . States the pain does radiate to both the left/right shoulder and arms, no jaw pain. Pain is  worse with exertion. Pt has had pain like this before specifically after injections but not this bad. Pt took nothing prior to arrival with minimal improvement of symptoms. The CP does cause SOB with exertion. Denies any V, diaphoresis, HA, fever, cough, or recent illness. No dizziness, syncope, palpitations, or edema. Denies any hx of asthma, COPD, or tobacco use.       ROS    Unless otherwise stated in this report or unable to obtain because of the patient's clinical or mental status as evidenced by the medical record, this patients's positive and negative responses for Review of Systems, constitutional, psych, eyes, ENT, cardiovascular, respiratory, gastrointestinal, neurological, radiology results have been personally reviewed by myself)  Labs:  No results found for this visit on 12/29/22. Imaging: All Radiology results interpreted by Radiologist unless otherwise noted. No results found. EKG #1:  Interpreted by this provider unless otherwise noted. Time:  11:40    Rate: 73  Rhythm: NSR  Interpretation: Normal by Heaven Jett NP  Comparison: 5/27/2022    Assessment / Plan   Impression(s):  Annie Fletcher was seen today for chest pain and injections. Diagnoses and all orders for this visit:    Chest pain, unspecified type  -     EKG 12 Lead  -     XR CHEST (2 VW); Future    B12 deficiency  -     cyanocobalamin injection 1,000 mcg    Adverse effect of drug, initial encounter    Cervical radiculopathy    DDD (degenerative disc disease), cervical    Neck pain      Vitals reviewed which are stable. EKG revealed normal sinus rhythm without ST elevation, T inversion, or the presence of Q waves. However, pt advised that this cannot predict a future cardiac event. Pt advised close f/u with PCP in 2-3 days for recheck and further workup. ED sooner if symptoms worsen or change. ED immediately with worsening or persistent CP, dyspnea, palpitations, edema, diaphoresis, fever, calf pain/swelling, etc. Pt is in agreement with this care plan. All questions answered. Return in about 4 weeks (around 1/26/2023), or she needs a 3 month appt with Dr Erin Leigh,, for Routine Visit with Labs.        LYNSEY Vo - CNP

## 2022-12-30 ENCOUNTER — HOSPITAL ENCOUNTER (OUTPATIENT)
Dept: MRI IMAGING | Age: 47
Discharge: HOME | End: 2022-12-30
Payer: COMMERCIAL

## 2022-12-30 ENCOUNTER — HOSPITAL ENCOUNTER (OUTPATIENT)
Dept: GENERAL RADIOLOGY | Age: 47
End: 2022-12-30
Payer: COMMERCIAL

## 2022-12-30 DIAGNOSIS — R07.9 CHEST PAIN, UNSPECIFIED TYPE: ICD-10-CM

## 2022-12-30 DIAGNOSIS — M54.16 LUMBAR RADICULAR PAIN: ICD-10-CM

## 2022-12-30 DIAGNOSIS — M47.817 LUMBOSACRAL SPONDYLOSIS WITHOUT MYELOPATHY: ICD-10-CM

## 2022-12-30 DIAGNOSIS — M51.36 DDD (DEGENERATIVE DISC DISEASE), LUMBAR: ICD-10-CM

## 2022-12-30 PROCEDURE — 72148 MRI LUMBAR SPINE W/O DYE: CPT

## 2022-12-30 PROCEDURE — 71046 X-RAY EXAM CHEST 2 VIEWS: CPT

## 2023-01-03 RX ORDER — TIZANIDINE 4 MG/1
4 TABLET ORAL 3 TIMES DAILY
Qty: 270 TABLET | Refills: 1 | Status: SHIPPED | OUTPATIENT
Start: 2023-01-03 | End: 2023-04-03

## 2023-01-03 NOTE — TELEPHONE ENCOUNTER
Refill ,[facility] not have [facility] script[facility] to give her 80 days,she had some stolen and needs her 90 days script

## 2023-01-04 DIAGNOSIS — M54.12 CERVICAL RADICULOPATHY: Primary | ICD-10-CM

## 2023-01-04 NOTE — TELEPHONE ENCOUNTER
Refill    Per Dr. Camarillo Ask pt. Needs appt. With him first. He will not fill this rx until then. I left a message for the patient to return my call. Patient returned my call,she does have appt. 1/13/23. She is begging for just a few Percocet,she just cannot go without.  Just to get to her appt?!

## 2023-01-05 RX ORDER — OXYCODONE HYDROCHLORIDE AND ACETAMINOPHEN 5; 325 MG/1; MG/1
1 TABLET ORAL EVERY 4 HOURS PRN
Qty: 42 TABLET | Refills: 0 | Status: SHIPPED | OUTPATIENT
Start: 2023-01-05 | End: 2023-01-12

## 2023-01-05 RX ORDER — ONDANSETRON 4 MG/1
4 TABLET, FILM COATED ORAL EVERY 8 HOURS PRN
Qty: 21 TABLET | Refills: 0 | Status: SHIPPED | OUTPATIENT
Start: 2023-01-05 | End: 2023-01-12

## 2023-01-05 NOTE — TELEPHONE ENCOUNTER
Loyd Paniagua called 3 times yesterday about getting at least a few pain pills. She again called this morning. She is willing to see Meggan today if she can get pain pills AND still keep her appt. With Dr. Abi Acevedo 1/13/23. She is \"freaking out \" about not having any pain pills!

## 2023-01-06 ENCOUNTER — NURSE ONLY (OUTPATIENT)
Dept: FAMILY MEDICINE CLINIC | Age: 48
End: 2023-01-06
Payer: COMMERCIAL

## 2023-01-06 DIAGNOSIS — E53.8 B12 DEFICIENCY: Primary | ICD-10-CM

## 2023-01-06 PROCEDURE — 96372 THER/PROPH/DIAG INJ SC/IM: CPT | Performed by: FAMILY MEDICINE

## 2023-01-06 RX ORDER — CYANOCOBALAMIN 1000 UG/ML
1000 INJECTION, SOLUTION INTRAMUSCULAR; SUBCUTANEOUS ONCE
Status: COMPLETED | OUTPATIENT
Start: 2023-01-06 | End: 2023-01-06

## 2023-01-06 RX ADMIN — CYANOCOBALAMIN 1000 MCG: 1000 INJECTION, SOLUTION INTRAMUSCULAR; SUBCUTANEOUS at 09:07

## 2023-01-11 ENCOUNTER — PREP FOR PROCEDURE (OUTPATIENT)
Dept: PAIN MANAGEMENT | Age: 48
End: 2023-01-11

## 2023-01-11 ENCOUNTER — OFFICE VISIT (OUTPATIENT)
Dept: PAIN MANAGEMENT | Age: 48
End: 2023-01-11
Payer: COMMERCIAL

## 2023-01-11 VITALS
WEIGHT: 260 LBS | BODY MASS INDEX: 40.81 KG/M2 | RESPIRATION RATE: 16 BRPM | OXYGEN SATURATION: 96 % | HEART RATE: 77 BPM | TEMPERATURE: 96.8 F | HEIGHT: 67 IN | SYSTOLIC BLOOD PRESSURE: 107 MMHG | DIASTOLIC BLOOD PRESSURE: 79 MMHG

## 2023-01-11 DIAGNOSIS — M43.16 SPONDYLOLISTHESIS OF LUMBAR REGION: ICD-10-CM

## 2023-01-11 DIAGNOSIS — M51.36 DDD (DEGENERATIVE DISC DISEASE), LUMBAR: ICD-10-CM

## 2023-01-11 DIAGNOSIS — M47.817 LUMBOSACRAL SPONDYLOSIS WITHOUT MYELOPATHY: Primary | ICD-10-CM

## 2023-01-11 DIAGNOSIS — M50.30 DDD (DEGENERATIVE DISC DISEASE), CERVICAL: ICD-10-CM

## 2023-01-11 DIAGNOSIS — M47.812 CERVICAL SPONDYLOSIS: ICD-10-CM

## 2023-01-11 PROCEDURE — 99213 OFFICE O/P EST LOW 20 MIN: CPT | Performed by: ANESTHESIOLOGY

## 2023-01-11 PROCEDURE — 99214 OFFICE O/P EST MOD 30 MIN: CPT | Performed by: ANESTHESIOLOGY

## 2023-01-11 RX ORDER — SODIUM CHLORIDE 0.9 % (FLUSH) 0.9 %
5-40 SYRINGE (ML) INJECTION PRN
Status: CANCELLED | OUTPATIENT
Start: 2023-01-11

## 2023-01-11 RX ORDER — SODIUM CHLORIDE 0.9 % (FLUSH) 0.9 %
5-40 SYRINGE (ML) INJECTION EVERY 12 HOURS SCHEDULED
Status: CANCELLED | OUTPATIENT
Start: 2023-01-11

## 2023-01-11 RX ORDER — SODIUM CHLORIDE 9 MG/ML
INJECTION, SOLUTION INTRAVENOUS PRN
Status: CANCELLED | OUTPATIENT
Start: 2023-01-11

## 2023-01-11 NOTE — PROGRESS NOTES
Do you currently have any of the following:    Fever: No  Headache:  No  Cough: No  Shortness of breath: No  Exposed to anyone with these symptoms: No         Ying Ezequiel presents to the [address] [place] [address] on 1/11/2023. Tamia Hackett is complaining of pain lower back and right arm. The pain is constant. The pain is described as throbbing. Pain is rated on her best day at a 6, on her worst day at a 10, and on average at a 6 on the VAS scale. She took her last dose of Percocet today. Any procedures since your last visit: No.    Pacemaker or defibrillator: No    She is not on NSAIDS and is not on anticoagulation medications. Medication Contract and Consent for Opioid Use Documents Filed        No documents found                    /79   Pulse 77   Temp 96.8 °F (36 °C) (Infrared)   Resp 16   Ht 5' 7\" (1.702 m)   Wt 260 lb (117.9 kg)   SpO2 96%   BMI 40.72 kg/m²      No LMP recorded.

## 2023-01-11 NOTE — H&P (VIEW-ONLY)
[facility] Pain Management        [facility] [address]  [facility], [address] [place] [address]  Dept: [phone]      Follow up Note      Lm Shannon     Date of Visit:  1/11/2023    CC:  Patient presents for follow up   Chief Complaint   Patient presents with    Follow-up    Lower Back Pain    Arm Pain     Right arm       HPI:    Neck pain and left UE pain. Failed conservative treatment. Nursing notes and details of the pain history reviewed. Please see intake notes for details. Has been evaluated by Billie Alfaro- Dr. Rose Gomez- referred for interventions. S/P ALDO excellent pain relief. Current pain in the low back. Has undergone MRI of LS spine - is here to discuss the results and treatment plan. NOTE:  H/o psoriatic arthritis- on Methotrexate  S/p Gastric bypass in 2009  H/o fibromyalgia- diffuse pain     Previous treatments:   Physical Therapy/ HEP : yes, has been doing HEP since the onset      Medications: - NSAID's : yes                        - Membrane stabilizers : yes - Gabapentin                       - Opioids : yes, short term                       - Adjuvants or Others : yes, oral steroids/ muscle relaxants     Surgeries: no C spine surgery     She has not been on anticoagulation medications      She is not diabetic. H/O Smoking: no  H/O alcohol abuse : no  H/O Illicit drug use : no     Employment: employed     Imaging:     MRI of LS spine: 12/22/2022:  FINDINGS:   No evidence of lumbar spine fracture. There is retrolisthesis of L2 on L3 of   approximately 3 mm. Vertebral body height and interspaces appear well   maintained. Conus medullaris is visualized and appears unremarkable. No   evidence of epidural mass or hematoma. L1/L2: No central canal stenosis or neural foraminal narrowing. There is   moderate bilateral facet hypertrophy. L2/L3: Anterior disc bulge present with anterior osteophytosis. 3 mm of   retrolisthesis of L2 on L3.   Moderate bilateral facet hypertrophy and hypertrophy of ligamentum flavum. No significant central canal stenosis or   neural foraminal narrowing. L3/L4: Moderate facet hypertrophy. No central canal stenosis or neural   foraminal narrowing. L4/L5: Circumferential disc bulge with significant facet hypertrophy and   hypertrophy of ligamentum flavum results in mild circumferential central   canal stenosis. AP dimension of thecal sac measures approximately 10 mm. There is moderate left neural foraminal narrowing and mild right neural   foraminal narrowing. L5/S1: Broad-based central disc herniation is present. No significant   central canal stenosis. Mild bilateral neural foraminal narrowing. Impression   1. Lumbar spondylosis as described above with mild circumferential central   canal stenosis at L4/L5 and moderate left neural foraminal narrowing at this   level. 2. Retrolisthesis of L2 on L3 of approximately 3 mm. This finding appears   similar when correlated with view of the lumbar spine from prior CT abdomen   and pelvis performed August 9, 2013 and is likely degenerative in etiology. 3. Facet arthropathy at multiple levels as described above. 4. Broad-based central disc herniation at L5/S1. Xray LS spine: 11/28/2022:  FINDINGS:   There is pronounced degenerative facet arthropathy especially at L4-5. Degenerative disc disease is present at multiple levels and is mild. There   is mild degenerative retrolisthesis of L2 on L3. No acute fracture. Normal   lumbar soft tissues. Impression   Degenerative lumbar spondylosis. MRI of Cervical spine: 10/11/2021:  FINDINGS:  Alignment is relatively anatomic. There is preservation of disc height and signal      intensity. Bone marrow signal intensity is normal.  The craniocervical junction appears intact. No abnormal signal is identified within the cord in the cervical region.   The thyroid gland is      mildly heterogeneous with a enhancing 4 mm nodule in the left thyroid gland. Additional findings      and levels are as follows:             C5-6:  Minimal broad-based disc osteophyte complex. 4.5 mm nerve root sheath diverticula on the      left. C6-7:  Moderate broad-based disc osteophyte complex. Uncovertebral hypertrophy. Mild to moderate      central canal stenosis. 6.5mm AP canal diameter. Moderate to severe left neural foraminal      stenosis. Mild right neural foraminal stenosis. IMPRESSION 3T MRI Cervical Spine without and with intravenous contrast:      1. Disc disease and degenerative changes as discussed worse at C6-7. Correlate with dermatome      mapping. 2.  4 mm enhancing nodule in the left thyroid gland. OAR[facility] report[de-identified] Reviewed    Past Medical History:   Diagnosis Date    Arthritis     psoriatic    Chronic back pain     Depression     Headache(784.0)     Herpes genital     \"years ago\" ; no outbreaks    Varicose veins        Past Surgical History:   Procedure Laterality Date    PAIN MANAGEMENT PROCEDURE N/A 12/16/2021    CERVICAL EPIDURAL STEROID INJECTION UNDER FLUORO AT C6 - C7 LEFT PARAMEDIAN performed by Coleman Cleary MD at [facility] OR    PAIN MANAGEMENT PROCEDURE Left 2/7/2022    #2 CERVICAL EPIDURAL STEROID INJECTION UNDER FLUOROSCOPIC GUIDANCE AT C6-C7 LEFT PARAMEDIAN performed by Coleman Cleary MD at [address] Left 4/7/2022    CERVICAL EPIDURAL STEROID INJECTION C6-C7 LEFT PARAMEDIAN performed by Coleman Cleary MD at [address] N/A 12/5/2022    CERVICAL  EPIDURAL STEROID INJECTION UNDER FLUOROSCOPIC GUIDANCE AT C6-C7 performed by Coleman Cleary MD at [facility]  5/31/11    Lap    TUBAL LIGATION  2001    UPPER GASTROINTESTINAL ENDOSCOPY  2011       Prior to Admission medications    Medication Sig Start Date End Date Taking?  Authorizing Provider   oxyCODONE-acetaminophen (PERCOCET) 5-325 MG per tablet Take 1 tablet by mouth every 4 hours as needed for Pain for up to 7 days. Max Daily Amount: 6 tablets 1/5/23 1/12/23 Yes Hiral Batista DO   [facility]ron [facility]) 4 MG tablet Take 1 tablet by mouth every 8 hours as needed for Nausea or Vomiting 1/5/23 1/12/23 Yes Hiral Batista DO   tiZANidine (ZANAFLEX) 4 MG tablet Take 1 tablet by mouth in the morning, at noon, and at bedtime 1/3/23 4/3/23 Yes Hiral Batista DO   QUEtiapine (SEROQUEL) 100 MG tablet  12/29/22  Yes Historical Provider, MD   cloNIDine (CATAPRES) 0.1 MG tablet  12/20/22  Yes Historical Provider, MD   doxepin (SINEQUAN) 10 MG capsule  12/1/22  Yes Historical Provider, MD   naloxone 4 MG/0.1ML LIQD nasal spray 1 spray by Nasal route as needed for Opioid Reversal 12/16/22  Yes Hiral Batista DO   DODEX 1000 MCG/ML injection inject 1 milliliter ( 1000 MCG ) intramuscularly every week for 4 weeks 12/14/22  Yes Hiral Batista DO   OZEMPIC, 0.25 OR 0.5 MG/DOSE, 2 MG/1.5ML SOPN inject 0.25 milligrams subcutaneously every week 12/9/22  Yes Hiral Batista DO   prazosin (MINIPRESS) 1 MG capsule Take 1 mg by mouth nightly   Yes Historical Provider, MD   lisinopril (PRINIVIL;ZESTRIL) 10 MG tablet take 1 tablet by mouth once daily 11/22/22  Yes Meggan Camacho APRN - CNP   omeprazole (PRILOSEC) 40 MG delayed release capsule Take 1 capsule by mouth 2 times daily 10/6/22 4/4/23 Yes Hiral Batista DO   DULoxetine (CYMBALTA) 60 MG extended release capsule take 1 capsule by mouth once daily 7/20/22  Yes Royal Truong DO   TRINTELLIX 20 MG TABS tablet take 1 tablet by mouth once daily AT THE SAME TIME EACH DAY 6/22/22  Yes Historical Provider, MD   SUMAtriptan (IMITREX) 50 MG tablet take 1 tablet by mouth if needed  Holderrieth Tuttle may repeat in 4 hours IF headache PERSISTS 2/21/22  Yes Hiral Peaks, DO   clotrimazole-betamethasone (LOTRISONE) 1-0.05 % cream Apply topically 2 times daily.  1/31/22  Yes Hiral Peaks, DO secukinumab (COSENTYX) 150 MG/ML SOSY Inject 300 mg into the skin once   Yes Historical Provider, MD   Multiple Vitamin (MULTIVITAMIN PO) Take 1 tablet by mouth daily    Yes Historical Provider, MD   mupirocin (BACTROBAN NASAL) 2 % nasal ointment Take by Nasal route 2 times daily. 10/11/22   Meggan Stafford, APRN - CNP   ferrous sulfate (IRON 325) 325 (65 Fe) MG tablet Take 325 mg by mouth daily (with breakfast)  Patient not taking: Reported on 1/11/2023    Historical Provider, MD       No Known Allergies    Social History     Socioeconomic History    Marital status:      Spouse name: Not on file    Number of children: Not on file    Years of education: Not on file    Highest education level: Not on file   Occupational History    Not on file   Tobacco Use    Smoking status: Never    Smokeless tobacco: Never   Vaping Use    Vaping Use: Never used   Substance and Sexual Activity    Alcohol use: No    Drug use: No    Sexual activity: Not on file   Other Topics Concern    Not on file   Social History Narrative    Not on file     Social Determinants of Health     Financial Resource Strain: Low Risk     Difficulty of Paying Living Expenses: Not hard at all   Food Insecurity: No Food Insecurity    Worried About Running Out of Food in the Last Year: Never true    Ran Out of Food in the Last Year: Never true   Transportation Needs: Not on file   Physical Activity: Not on file   Stress: Not on file   Social Connections: Not on file   Intimate Partner Violence: Not on file   Housing Stability: Not on file       Family History   Family history unknown: Yes       REVIEW OF SYSTEMS:     Enoch Barrientos denies fever/chills, chest pain, shortness of breath, new bowel or bladder complaints. All other review of systems was negative.     PHYSICAL EXAMINATION:      /79   Pulse 77   Temp 96.8 °F (36 °C) (Infrared)   Resp 16   Ht 5' 7\" (1.702 m)   Wt 260 lb (117.9 kg)   SpO2 96%   BMI 40.72 kg/m²       General:       General appearance:  Pleasant and well-hydrated, in no distress and A & O x 3  Build:Overweight  Function: Rises from seated position easily and Moves about room without difficulty     HEENT:     Head:normocephalic, atraumatic     Lungs:     Breathing:normal breathing pattern      CVS:     RRR     Abdomen:     Shape:non-distended and normal     Cervical spine:     Inspection:normal  Palpation:tenderness paravertebral muscles, tenderness trapezium, left, right and positive. Range of motion:pain improved     Thoracic spine:                Spine inspection:normal      Lumbar spine:     Spine inspection: Normal   Palpation: Tenderness paravertebral muscles +  Facet tenderness +  Range of motion: Decreased,  SLR : negative bilaterally     Musculoskeletal:     Tender points +     Extremities:     Tremors:None bilaterally upper and lower  Edema:no     Neurological:     Sensory: Normal to light touch      Motor:   No new changes     Gait:normal Yes       Assessment/Plan:   Diagnosis Orders   1. Lumbosacral spondylosis without myelopathy        2. DDD (degenerative disc disease), lumbar        3. Spondylolisthesis of lumbar region        4. DDD (degenerative disc disease), cervical        5. Cervical spondylosis           56 y.o.  female with h/o neck pain and left UE radicular pain. Failed conservative treatment. MRI: C6-7 DDD/ stenosis     Had been evaluated by NSG- recommended interventions. Past h/o gastric bypass/ h/o psoriatic arthritis. Has tried Gabapentin. S/P Cervical DOMINICK with excellent pain relief of the neck/ UE pain. H/o Low back pain Predominantly axial in nature- features of facet pain. Xray LS spine- reviewed and discussed. MRI of LS spine reviewed personally- and discussed the findings. PT/ HEP. PLAN:    Xray f/ex/ ext to ro instability. Lumbar facet MBNB to address pain form L3-4, L4-5, L5-S1 under fluoroscopy. (MRI shows facet hypertrophy at those levels).     Moderate sedation due to anxiety of needles. If short term relief will do RFA. She is consulting [facility] for H/o easy bruising. Will review the records after her consultation. NOTE: Prior PT/ INR/ Platelets- normal.    She takes percocet prn/ Zanaflex Prn- by her PCP. Instructed to take percocet only prn for severe pain- she apparently takes it for fibromyalgia. Discussed issues with long term opioid use. Recommend non opioid treatment options. She has been started on Cymbalta- which is helping her. Recommend adding Lyrica. Counseling : Patient encouraged to stay active and to watch/lose weight     Encouraged to continue Regular home exercise program as tolerated - stretching / strengthening. Treatment plan discussed with the patient including medication and procedure side effects.      Sha Cole MD    CC;  Livia Braswell DO

## 2023-01-11 NOTE — PROGRESS NOTES
[facility] Pain Management        [facility] [address]  [facility], [address] [place] [address]  Dept: [phone]      Follow up Note      Alexandre Diallo     Date of Visit:  1/11/2023    CC:  Patient presents for follow up   Chief Complaint   Patient presents with    Follow-up    Lower Back Pain    Arm Pain     Right arm       HPI:    Neck pain and left UE pain. Failed conservative treatment. Nursing notes and details of the pain history reviewed. Please see intake notes for details. Has been evaluated by [facility]- Dr. Khadra Elizondo- referred for interventions. S/P ALDO excellent pain relief. Current pain in the low back. Has undergone MRI of LS spine - is here to discuss the results and treatment plan. NOTE:  H/o psoriatic arthritis- on Methotrexate  S/p Gastric bypass in 2009  H/o fibromyalgia- diffuse pain     Previous treatments:   Physical Therapy/ HEP : yes, has been doing HEP since the onset      Medications: - NSAID's : yes                        - Membrane stabilizers : yes - Gabapentin                       - Opioids : yes, short term                       - Adjuvants or Others : yes, oral steroids/ muscle relaxants     Surgeries: no C spine surgery     She has not been on anticoagulation medications      She is not diabetic. H/O Smoking: no  H/O alcohol abuse : no  H/O Illicit drug use : no     Employment: employed     Imaging:     MRI of LS spine: 12/22/2022:  FINDINGS:   No evidence of lumbar spine fracture. There is retrolisthesis of L2 on L3 of   approximately 3 mm. Vertebral body height and interspaces appear well   maintained. Conus medullaris is visualized and appears unremarkable. No   evidence of epidural mass or hematoma. L1/L2: No central canal stenosis or neural foraminal narrowing. There is   moderate bilateral facet hypertrophy. L2/L3: Anterior disc bulge present with anterior osteophytosis. 3 mm of   retrolisthesis of L2 on L3.   Moderate bilateral facet hypertrophy and hypertrophy of ligamentum flavum. No significant central canal stenosis or   neural foraminal narrowing. L3/L4: Moderate facet hypertrophy. No central canal stenosis or neural   foraminal narrowing. L4/L5: Circumferential disc bulge with significant facet hypertrophy and   hypertrophy of ligamentum flavum results in mild circumferential central   canal stenosis. AP dimension of thecal sac measures approximately 10 mm. There is moderate left neural foraminal narrowing and mild right neural   foraminal narrowing. L5/S1: Broad-based central disc herniation is present. No significant   central canal stenosis. Mild bilateral neural foraminal narrowing. Impression   1. Lumbar spondylosis as described above with mild circumferential central   canal stenosis at L4/L5 and moderate left neural foraminal narrowing at this   level. 2. Retrolisthesis of L2 on L3 of approximately 3 mm. This finding appears   similar when correlated with view of the lumbar spine from prior CT abdomen   and pelvis performed August 9, 2013 and is likely degenerative in etiology. 3. Facet arthropathy at multiple levels as described above. 4. Broad-based central disc herniation at L5/S1. Xray LS spine: 11/28/2022:  FINDINGS:   There is pronounced degenerative facet arthropathy especially at L4-5. Degenerative disc disease is present at multiple levels and is mild. There   is mild degenerative retrolisthesis of L2 on L3. No acute fracture. Normal   lumbar soft tissues. Impression   Degenerative lumbar spondylosis. MRI of Cervical spine: 10/11/2021:  FINDINGS:  Alignment is relatively anatomic. There is preservation of disc height and signal      intensity. Bone marrow signal intensity is normal.  The craniocervical junction appears intact. No abnormal signal is identified within the cord in the cervical region.   The thyroid gland is      mildly heterogeneous with a enhancing 4 mm nodule in the left thyroid gland. Additional findings      and levels are as follows:             C5-6:  Minimal broad-based disc osteophyte complex. 4.5 mm nerve root sheath diverticula on the      left. C6-7:  Moderate broad-based disc osteophyte complex. Uncovertebral hypertrophy. Mild to moderate      central canal stenosis. 6.5mm AP canal diameter. Moderate to severe left neural foraminal      stenosis. Mild right neural foraminal stenosis. IMPRESSION 3T MRI Cervical Spine without and with intravenous contrast:      1. Disc disease and degenerative changes as discussed worse at C6-7. Correlate with dermatome      mapping. 2.  4 mm enhancing nodule in the left thyroid gland. OARRS report[de-identified] Reviewed    Past Medical History:   Diagnosis Date    Arthritis     psoriatic    Chronic back pain     Depression     Headache(784.0)     Herpes genital     \"years ago\" ; no outbreaks    Varicose veins        Past Surgical History:   Procedure Laterality Date    PAIN MANAGEMENT PROCEDURE N/A 12/16/2021    CERVICAL EPIDURAL STEROID INJECTION UNDER FLUORO AT C6 - C7 LEFT PARAMEDIAN performed by Holly Rubio MD at [facility]    PAIN MANAGEMENT PROCEDURE Left 2/7/2022    #2 CERVICAL EPIDURAL STEROID INJECTION UNDER FLUOROSCOPIC GUIDANCE AT C6-C7 LEFT PARAMEDIAN performed by Holly Rubio MD at [address] Left 4/7/2022    CERVICAL EPIDURAL STEROID INJECTION C6-C7 LEFT PARAMEDIAN performed by Holly Rubio MD at [address] N/A 12/5/2022    CERVICAL  EPIDURAL STEROID INJECTION UNDER FLUOROSCOPIC GUIDANCE AT C6-C7 performed by Holly Rubio MD at [facility]  5/31/11    Lap    TUBAL LIGATION  2001    UPPER GASTROINTESTINAL ENDOSCOPY  2011       Prior to Admission medications    Medication Sig Start Date End Date Taking?  Authorizing Provider   oxyCODONE-acetaminophen (PERCOCET) 5-325 MG per tablet Take 1 tablet by mouth every 4 hours as needed for Pain for up to 7 days. Max Daily Amount: 6 tablets 1/5/23 1/12/23 Yes Roxene Pod, DO   ondansetron [facility]) 4 MG tablet Take 1 tablet by mouth every 8 hours as needed for Nausea or Vomiting 1/5/23 1/12/23 Yes Roxene Pod, DO   tiZANidine (ZANAFLEX) 4 MG tablet Take 1 tablet by mouth in the morning, at noon, and at bedtime 1/3/23 4/3/23 Yes Roxene Pod, DO   QUEtiapine (SEROQUEL) 100 MG tablet  12/29/22  Yes Historical Provider, MD   cloNIDine (CATAPRES) 0.1 MG tablet  12/20/22  Yes Historical Provider, MD   doxepin (SINEQUAN) 10 MG capsule  12/1/22  Yes Historical Provider, MD   naloxone 4 MG/0.1ML LIQD nasal spray 1 spray by Nasal route as needed for Opioid Reversal 12/16/22  Yes Roxene Pod, DO   DODEX 1000 MCG/ML injection inject 1 milliliter ( 1000 MCG ) intramuscularly every week for 4 weeks 12/14/22  Yes Roxene Pod, DO   OZEMPIC, 0.25 OR 0.5 MG/DOSE, 2 MG/1.5ML SOPN inject 0.25 milligrams subcutaneously every week 12/9/22  Yes Roxene Pod, DO   prazosin (MINIPRESS) 1 MG capsule Take 1 mg by mouth nightly   Yes Historical Provider, MD   lisinopril (PRINIVIL;ZESTRIL) 10 MG tablet take 1 tablet by mouth once daily 11/22/22  Yes Meggan Gibbs, APRN - CNP   omeprazole (PRILOSEC) 40 MG delayed release capsule Take 1 capsule by mouth 2 times daily 10/6/22 4/4/23 Yes Roxene Pod, DO   DULoxetine (CYMBALTA) 60 MG extended release capsule take 1 capsule by mouth once daily 7/20/22  Yes Royal Truong, DO   TRINTELLIX 20 MG TABS tablet take 1 tablet by mouth once daily AT THE SAME TIME EACH DAY 6/22/22  Yes Historical Provider, MD   SUMAtriptan (IMITREX) 50 MG tablet take 1 tablet by mouth if needed  Holderrieth Union may repeat in 4 hours IF headache PERSISTS 2/21/22  Yes Roxene Pod, DO   clotrimazole-betamethasone (LOTRISONE) 1-0.05 % cream Apply topically 2 times daily.  1/31/22  Yes Roxene Pod, DO secukinumab (COSENTYX) 150 MG/ML SOSY Inject 300 mg into the skin once   Yes Historical Provider, MD   Multiple Vitamin (MULTIVITAMIN PO) Take 1 tablet by mouth daily    Yes Historical Provider, MD   mupirocin (BACTROBAN NASAL) 2 % nasal ointment Take by Nasal route 2 times daily. 10/11/22   Meggan Clancy, APRN - CNP   ferrous sulfate (IRON 325) 325 (65 Fe) MG tablet Take 325 mg by mouth daily (with breakfast)  Patient not taking: Reported on 1/11/2023    Historical Provider, MD       No Known Allergies    Social History     Socioeconomic History    Marital status:      Spouse name: Not on file    Number of children: Not on file    Years of education: Not on file    Highest education level: Not on file   Occupational History    Not on file   Tobacco Use    Smoking status: Never    Smokeless tobacco: Never   Vaping Use    Vaping Use: Never used   Substance and Sexual Activity    Alcohol use: No    Drug use: No    Sexual activity: Not on file   Other Topics Concern    Not on file   Social History Narrative    Not on file     Social Determinants of Health     Financial Resource Strain: Low Risk     Difficulty of Paying Living Expenses: Not hard at all   Food Insecurity: No Food Insecurity    Worried About Running Out of Food in the Last Year: Never true    Ran Out of Food in the Last Year: Never true   Transportation Needs: Not on file   Physical Activity: Not on file   Stress: Not on file   Social Connections: Not on file   Intimate Partner Violence: Not on file   Housing Stability: Not on file       Family History   Family history unknown: Yes       REVIEW OF SYSTEMS:     Martín Rashid denies fever/chills, chest pain, shortness of breath, new bowel or bladder complaints. All other review of systems was negative.     PHYSICAL EXAMINATION:      /79   Pulse 77   Temp 96.8 °F (36 °C) (Infrared)   Resp 16   Ht 5' 7\" (1.702 m)   Wt 260 lb (117.9 kg)   SpO2 96%   BMI 40.72 kg/m²       General:       General appearance:  Pleasant and well-hydrated, in no distress and A & O x 3  Build:Overweight  Function: Rises from seated position easily and Moves about room without difficulty     HEENT:     Head:normocephalic, atraumatic     Lungs:     Breathing:normal breathing pattern      CVS:     RRR     Abdomen:     Shape:non-distended and normal     Cervical spine:     Inspection:normal  Palpation:tenderness paravertebral muscles, tenderness trapezium, left, right and positive. Range of motion:pain improved     Thoracic spine:                Spine inspection:normal      Lumbar spine:     Spine inspection: Normal   Palpation: Tenderness paravertebral muscles +  Facet tenderness +  Range of motion: Decreased,  SLR : negative bilaterally     Musculoskeletal:     Tender points +     Extremities:     Tremors:None bilaterally upper and lower  Edema:no     Neurological:     Sensory: Normal to light touch      Motor:   No new changes     Gait:normal Yes       Assessment/Plan:   Diagnosis Orders   1. Lumbosacral spondylosis without myelopathy        2. DDD (degenerative disc disease), lumbar        3. Spondylolisthesis of lumbar region        4. DDD (degenerative disc disease), cervical        5. Cervical spondylosis           56 y.o.  female with h/o neck pain and left UE radicular pain. Failed conservative treatment. MRI: C6-7 DDD/ stenosis     Had been evaluated by NSG- recommended interventions. Past h/o gastric bypass/ h/o psoriatic arthritis. Has tried Gabapentin. S/P Cervical DOMINICK with excellent pain relief of the neck/ UE pain. H/o Low back pain Predominantly axial in nature- features of facet pain. Xray LS spine- reviewed and discussed. MRI of LS spine reviewed personally- and discussed the findings. PT/ HEP. PLAN:    Xray f/ex/ ext to ro instability. Lumbar facet MBNB to address pain form L3-4, L4-5, L5-S1 under fluoroscopy. (MRI shows facet hypertrophy at those levels).     Moderate sedation due to anxiety of needles. If short term relief will do RFA. She is consulting [facility] for H/o easy bruising. Will review the records after her consultation. NOTE: Prior PT/ INR/ Platelets- normal.    She takes percocet prn/ Zanaflex Prn- by her PCP. Instructed to take percocet only prn for severe pain- she apparently takes it for fibromyalgia. Discussed issues with long term opioid use. Recommend non opioid treatment options. She has been started on Cymbalta- which is helping her. Recommend adding Lyrica. Counseling : Patient encouraged to stay active and to watch/lose weight     Encouraged to continue Regular home exercise program as tolerated - stretching / strengthening. Treatment plan discussed with the patient including medication and procedure side effects.      Madelyn Aleman MD    CC;  Marlena Oseguera DO

## 2023-01-13 ENCOUNTER — TELEPHONE (OUTPATIENT)
Dept: PAIN MANAGEMENT | Age: 48
End: 2023-01-13

## 2023-01-13 ENCOUNTER — OFFICE VISIT (OUTPATIENT)
Dept: FAMILY MEDICINE CLINIC | Age: 48
End: 2023-01-13
Payer: COMMERCIAL

## 2023-01-13 VITALS
BODY MASS INDEX: 40.53 KG/M2 | RESPIRATION RATE: 17 BRPM | OXYGEN SATURATION: 100 % | WEIGHT: 258.2 LBS | HEART RATE: 92 BPM | HEIGHT: 67 IN | TEMPERATURE: 98.5 F | SYSTOLIC BLOOD PRESSURE: 122 MMHG | DIASTOLIC BLOOD PRESSURE: 84 MMHG

## 2023-01-13 DIAGNOSIS — I10 PRIMARY HYPERTENSION: ICD-10-CM

## 2023-01-13 DIAGNOSIS — M50.30 DDD (DEGENERATIVE DISC DISEASE), CERVICAL: ICD-10-CM

## 2023-01-13 DIAGNOSIS — G89.29 CHRONIC BACK PAIN, UNSPECIFIED BACK LOCATION, UNSPECIFIED BACK PAIN LATERALITY: ICD-10-CM

## 2023-01-13 DIAGNOSIS — M54.12 CERVICAL NEUROPATHIC PAIN: ICD-10-CM

## 2023-01-13 DIAGNOSIS — M25.50 ARTHRALGIA, UNSPECIFIED JOINT: ICD-10-CM

## 2023-01-13 DIAGNOSIS — G43.719 INTRACTABLE CHRONIC MIGRAINE WITHOUT AURA AND WITHOUT STATUS MIGRAINOSUS: ICD-10-CM

## 2023-01-13 DIAGNOSIS — E78.2 MIXED HYPERLIPIDEMIA: Primary | ICD-10-CM

## 2023-01-13 DIAGNOSIS — M54.2 NECK PAIN: ICD-10-CM

## 2023-01-13 DIAGNOSIS — M54.12 CERVICAL RADICULOPATHY: ICD-10-CM

## 2023-01-13 DIAGNOSIS — M79.7 FIBROMYALGIA: ICD-10-CM

## 2023-01-13 DIAGNOSIS — E53.8 B12 DEFICIENCY: ICD-10-CM

## 2023-01-13 DIAGNOSIS — F32.A ANXIETY AND DEPRESSION: ICD-10-CM

## 2023-01-13 DIAGNOSIS — K21.00 GASTROESOPHAGEAL REFLUX DISEASE WITH ESOPHAGITIS, UNSPECIFIED WHETHER HEMORRHAGE: ICD-10-CM

## 2023-01-13 DIAGNOSIS — K30 INDIGESTION: ICD-10-CM

## 2023-01-13 DIAGNOSIS — M06.9 RHEUMATOID ARTHRITIS, INVOLVING UNSPECIFIED SITE, UNSPECIFIED WHETHER RHEUMATOID FACTOR PRESENT (HCC): ICD-10-CM

## 2023-01-13 DIAGNOSIS — M54.9 CHRONIC BACK PAIN, UNSPECIFIED BACK LOCATION, UNSPECIFIED BACK PAIN LATERALITY: ICD-10-CM

## 2023-01-13 DIAGNOSIS — R51.9 ACUTE NONINTRACTABLE HEADACHE, UNSPECIFIED HEADACHE TYPE: ICD-10-CM

## 2023-01-13 DIAGNOSIS — E04.1 THYROID NODULE: ICD-10-CM

## 2023-01-13 DIAGNOSIS — F41.9 ANXIETY AND DEPRESSION: ICD-10-CM

## 2023-01-13 DIAGNOSIS — M54.31 SCIATICA OF RIGHT SIDE: ICD-10-CM

## 2023-01-13 PROBLEM — M47.816 LUMBAR SPONDYLOSIS: Status: ACTIVE | Noted: 2023-01-13

## 2023-01-13 LAB
ALBUMIN SERPL-MCNC: 4.2 G/DL (ref 3.5–5.2)
ALP BLD-CCNC: 77 U/L (ref 35–104)
ALT SERPL-CCNC: 13 U/L (ref 0–32)
ANION GAP SERPL CALCULATED.3IONS-SCNC: 15 MMOL/L (ref 7–16)
AST SERPL-CCNC: 17 U/L (ref 0–31)
BASOPHILS ABSOLUTE: 0.04 E9/L (ref 0–0.2)
BASOPHILS RELATIVE PERCENT: 0.5 % (ref 0–2)
BILIRUB SERPL-MCNC: 0.5 MG/DL (ref 0–1.2)
BUN BLDV-MCNC: 15 MG/DL (ref 6–20)
CALCIUM SERPL-MCNC: 8.9 MG/DL (ref 8.6–10.2)
CHLORIDE BLD-SCNC: 105 MMOL/L (ref 98–107)
CHOLESTEROL, TOTAL: 210 MG/DL (ref 0–199)
CO2: 23 MMOL/L (ref 22–29)
CREAT SERPL-MCNC: 0.8 MG/DL (ref 0.5–1)
EOSINOPHILS ABSOLUTE: 0.16 E9/L (ref 0.05–0.5)
EOSINOPHILS RELATIVE PERCENT: 2.1 % (ref 0–6)
FOLATE: >20 NG/ML (ref 4.8–24.2)
GFR SERPL CREATININE-BSD FRML MDRD: >60 ML/MIN/1.73
GLUCOSE BLD-MCNC: 113 MG/DL (ref 74–99)
HCT VFR BLD CALC: 36.1 % (ref 34–48)
HDLC SERPL-MCNC: 43 MG/DL
HEMOGLOBIN: 12.8 G/DL (ref 11.5–15.5)
IMMATURE GRANULOCYTES #: 0.04 E9/L
IMMATURE GRANULOCYTES %: 0.5 % (ref 0–5)
LDL CHOLESTEROL CALCULATED: 103 MG/DL (ref 0–99)
LYMPHOCYTES ABSOLUTE: 1.86 E9/L (ref 1.5–4)
LYMPHOCYTES RELATIVE PERCENT: 24.6 % (ref 20–42)
MCH RBC QN AUTO: 32.9 PG (ref 26–35)
MCHC RBC AUTO-ENTMCNC: 35.5 % (ref 32–34.5)
MCV RBC AUTO: 92.8 FL (ref 80–99.9)
MONOCYTES ABSOLUTE: 0.41 E9/L (ref 0.1–0.95)
MONOCYTES RELATIVE PERCENT: 5.4 % (ref 2–12)
NEUTROPHILS ABSOLUTE: 5.04 E9/L (ref 1.8–7.3)
NEUTROPHILS RELATIVE PERCENT: 66.9 % (ref 43–80)
PDW BLD-RTO: 12.7 FL (ref 11.5–15)
PLATELET # BLD: 358 E9/L (ref 130–450)
PMV BLD AUTO: 9.6 FL (ref 7–12)
POTASSIUM SERPL-SCNC: 4.4 MMOL/L (ref 3.5–5)
RBC # BLD: 3.89 E12/L (ref 3.5–5.5)
SODIUM BLD-SCNC: 143 MMOL/L (ref 132–146)
T4 FREE: 0.99 NG/DL (ref 0.93–1.7)
TOTAL PROTEIN: 7 G/DL (ref 6.4–8.3)
TRIGL SERPL-MCNC: 321 MG/DL (ref 0–149)
TSH SERPL DL<=0.05 MIU/L-ACNC: 0.97 UIU/ML (ref 0.27–4.2)
VITAMIN B-12: 1891 PG/ML (ref 211–946)
VLDLC SERPL CALC-MCNC: 64 MG/DL
WBC # BLD: 7.6 E9/L (ref 4.5–11.5)

## 2023-01-13 PROCEDURE — 99214 OFFICE O/P EST MOD 30 MIN: CPT | Performed by: FAMILY MEDICINE

## 2023-01-13 PROCEDURE — 3078F DIAST BP <80 MM HG: CPT | Performed by: FAMILY MEDICINE

## 2023-01-13 PROCEDURE — 3074F SYST BP LT 130 MM HG: CPT | Performed by: FAMILY MEDICINE

## 2023-01-13 PROCEDURE — 96372 THER/PROPH/DIAG INJ SC/IM: CPT | Performed by: FAMILY MEDICINE

## 2023-01-13 RX ORDER — CYANOCOBALAMIN 1000 UG/ML
1000 INJECTION, SOLUTION INTRAMUSCULAR; SUBCUTANEOUS ONCE
Status: COMPLETED | OUTPATIENT
Start: 2023-01-13 | End: 2023-01-13

## 2023-01-13 RX ADMIN — CYANOCOBALAMIN 1000 MCG: 1000 INJECTION, SOLUTION INTRAMUSCULAR; SUBCUTANEOUS at 10:14

## 2023-01-13 ASSESSMENT — PATIENT HEALTH QUESTIONNAIRE - PHQ9
10. IF YOU CHECKED OFF ANY PROBLEMS, HOW DIFFICULT HAVE THESE PROBLEMS MADE IT FOR YOU TO DO YOUR WORK, TAKE CARE OF THINGS AT HOME, OR GET ALONG WITH OTHER PEOPLE: 0
SUM OF ALL RESPONSES TO PHQ QUESTIONS 1-9: 0
3. TROUBLE FALLING OR STAYING ASLEEP: 0
1. LITTLE INTEREST OR PLEASURE IN DOING THINGS: 0
6. FEELING BAD ABOUT YOURSELF - OR THAT YOU ARE A FAILURE OR HAVE LET YOURSELF OR YOUR FAMILY DOWN: 0
5. POOR APPETITE OR OVEREATING: 0
9. THOUGHTS THAT YOU WOULD BE BETTER OFF DEAD, OR OF HURTING YOURSELF: 0
SUM OF ALL RESPONSES TO PHQ QUESTIONS 1-9: 0
SUM OF ALL RESPONSES TO PHQ9 QUESTIONS 1 & 2: 0
SUM OF ALL RESPONSES TO PHQ QUESTIONS 1-9: 0
7. TROUBLE CONCENTRATING ON THINGS, SUCH AS READING THE NEWSPAPER OR WATCHING TELEVISION: 0
SUM OF ALL RESPONSES TO PHQ QUESTIONS 1-9: 0
8. MOVING OR SPEAKING SO SLOWLY THAT OTHER PEOPLE COULD HAVE NOTICED. OR THE OPPOSITE, BEING SO FIGETY OR RESTLESS THAT YOU HAVE BEEN MOVING AROUND A LOT MORE THAN USUAL: 0
4. FEELING TIRED OR HAVING LITTLE ENERGY: 0
2. FEELING DOWN, DEPRESSED OR HOPELESS: 0

## 2023-01-13 ASSESSMENT — ENCOUNTER SYMPTOMS: BACK PAIN: 1

## 2023-01-13 NOTE — PROGRESS NOTES
Lab draw Right AC 22 x 1 1/4\".  Venipuncture x 1    B12 injection given to the left Deltoid per the patient's request.

## 2023-01-13 NOTE — TELEPHONE ENCOUNTER
Call to Savana Villar that procedure was approved for 1/19/2023 and that Valeria should call her a few days before for the pre op call and between 2:00 PM and 4:00 PM  the business day before with the arrival time. Instructed Neha to hold ibuprofen for 24 hours, naprosyn for 4 days and any aspirin containing products or fish oil for 7 days. Instructed to call office back if any questions. Carrie Fine verbalized understanding.     Electronically signed by Nisha Shukla RN on 1/13/2023 at 12:19 PM

## 2023-01-13 NOTE — PROGRESS NOTES
23  Nancy Cruz : 1975 Sex: female  Age: 56 y.o. Chief Complaint   Patient presents with    Annual Exam     Wellness exam       HPI  yearly wellness exam     Review of Systems   Constitutional:  Negative for activity change, appetite change, chills, diaphoresis, fatigue, fever and unexpected weight change. HENT:  Negative for congestion, dental problem, drooling, ear discharge, ear pain, facial swelling, hearing loss, mouth sores, nosebleeds, postnasal drip, rhinorrhea, sinus pressure, sinus pain, sneezing, sore throat, tinnitus, trouble swallowing and voice change. Eyes:  Positive for photophobia. Negative for pain, discharge, redness, itching and visual disturbance. Respiratory:  Negative for apnea, cough, choking, chest tightness, shortness of breath, wheezing and stridor. Cardiovascular:  Negative for chest pain, palpitations and leg swelling. Gastrointestinal:  Positive for nausea. Negative for abdominal distention, abdominal pain, anal bleeding, blood in stool, constipation, diarrhea, rectal pain and vomiting. Endocrine: Negative for cold intolerance, heat intolerance, polydipsia, polyphagia and polyuria. Genitourinary:  Negative for decreased urine volume, difficulty urinating, dysuria, enuresis, flank pain, frequency, genital sores, hematuria and urgency. Musculoskeletal:  Positive for arthralgias (multiple jont pans), back pain, neck pain and neck stiffness. Negative for gait problem, joint swelling and myalgias. Multiple arthralgias secondary to with fibromyositis and fibromyalgia which she has on a chronic basis   Skin:  Negative for color change, pallor, rash and wound. Allergic/Immunologic: Negative for environmental allergies, food allergies and immunocompromised state. Neurological:  Positive for headaches. Negative for dizziness, tremors, seizures, syncope, facial asymmetry, speech difficulty, weakness, light-headedness and numbness.    Hematological: Negative for adenopathy. Does not bruise/bleed easily. Psychiatric/Behavioral:  Negative for agitation, behavioral problems, confusion, decreased concentration, hallucinations, self-injury, sleep disturbance and suicidal ideas. The patient is not nervous/anxious and is not hyperactive. Physical Exam  Vitals and nursing note reviewed. Constitutional:       General: She is not in acute distress. Appearance: Normal appearance. She is obese. She is not ill-appearing, toxic-appearing or diaphoretic. HENT:      Head: Normocephalic and atraumatic. Right Ear: Tympanic membrane, ear canal and external ear normal. There is no impacted cerumen. Left Ear: Tympanic membrane, ear canal and external ear normal. There is no impacted cerumen. Nose: Nose normal. No congestion or rhinorrhea. Mouth/Throat:      Mouth: Mucous membranes are moist.      Pharynx: Oropharynx is clear. No oropharyngeal exudate or posterior oropharyngeal erythema. Eyes:      General: No scleral icterus. Right eye: No discharge. Left eye: No discharge. Extraocular Movements: Extraocular movements intact. Conjunctiva/sclera: Conjunctivae normal.      Pupils: Pupils are equal, round, and reactive to light. Neck:      Vascular: No carotid bruit. Cardiovascular:      Rate and Rhythm: Normal rate and regular rhythm. Pulses: Normal pulses. Heart sounds: Normal heart sounds. No murmur heard. No friction rub. No gallop. Pulmonary:      Effort: Pulmonary effort is normal. No respiratory distress. Breath sounds: No stridor. No wheezing, rhonchi or rales. Chest:      Chest wall: No tenderness. Abdominal:      General: Bowel sounds are normal. There is no distension. Palpations: Abdomen is soft. There is no mass. Tenderness: There is no abdominal tenderness. There is no right CVA tenderness, left CVA tenderness, guarding or rebound. Hernia: No hernia is present. Musculoskeletal:         General: Tenderness (right SI,lumbar,  cervical) present. No swelling, deformity or signs of injury. Normal range of motion. Cervical back: Normal range of motion and neck supple. No rigidity. No muscular tenderness. Right lower leg: No edema. Left lower leg: No edema. Comments: Muscles spasm noted to cervical area and across shoulders   Lymphadenopathy:      Cervical: No cervical adenopathy. Skin:     General: Skin is warm and dry. Capillary Refill: Capillary refill takes less than 2 seconds. Coloration: Skin is not jaundiced or pale. Findings: No bruising, erythema, lesion or rash. Neurological:      General: No focal deficit present. Mental Status: She is alert and oriented to person, place, and time. Mental status is at baseline. Cranial Nerves: No cranial nerve deficit. Sensory: No sensory deficit. Motor: No weakness. Coordination: Coordination normal.      Gait: Gait normal.      Deep Tendon Reflexes: Reflexes normal.   Psychiatric:         Mood and Affect: Mood normal.         Behavior: Behavior normal.         Thought Content:  Thought content normal.         Judgment: Judgment normal.       Assessment and Plan:  John Cousin was seen today for annual exam.    Diagnoses and all orders for this visit:    Mixed hyperlipidemia    B12 deficiency  -     cyanocobalamin injection 1,000 mcg    Fibromyalgia    Arthralgia, unspecified joint    Cervical radiculopathy    Sciatica of right side    Primary hypertension    Cervical neuropathic pain    Anxiety and depression    Intractable chronic migraine without aura and without status migrainosus    Gastroesophageal reflux disease with esophagitis, unspecified whether hemorrhage    Rheumatoid arthritis, involving unspecified site, unspecified whether rheumatoid factor present ([facility] [address]    DDD (degenerative disc disease), cervical    Indigestion        Discussions/Education provided to patients during visit:  [] Discussed the importance to stop smoking. [] Advised to monitor eating habits. [] Reviewed and discussed Imaging results. [] Reviewed and discussed Lab results. [] Discussed the importance of drinking plenty of fluids. [] Cut down on Salt, Caffeine, and Sugar.  [] Non Compliant Patient   [x] Communicated with patient any concerns, to phone office. Using cosentx   Fibromyalgia is good days and bad day   Continue med narcotic   Is on folic acid 1 mg   Renewed the B12  Her medications as far as the pain medications she is not addicted or have any opioid dependence she only takes the pain medication when her pain from her rheumatoid arthritis and other problems that she has fibromyositis myositis etc. becomes worse we will stay at the same medication at the same dose. No follow-ups on file.       Seen by:     Cheri Walton DO

## 2023-01-14 DIAGNOSIS — Z00.00 ROUTINE GENERAL MEDICAL EXAMINATION AT A HEALTH CARE FACILITY: ICD-10-CM

## 2023-01-16 RX ORDER — DULOXETIN HYDROCHLORIDE 60 MG/1
CAPSULE, DELAYED RELEASE ORAL
Qty: 30 CAPSULE | Refills: 5 | Status: SHIPPED | OUTPATIENT
Start: 2023-01-16

## 2023-01-16 RX ORDER — OXYCODONE HYDROCHLORIDE AND ACETAMINOPHEN 5; 325 MG/1; MG/1
1 TABLET ORAL EVERY 4 HOURS PRN
COMMUNITY
End: 2023-01-27 | Stop reason: ALTCHOICE

## 2023-01-16 NOTE — PROGRESS NOTES
Nic PRE-ADMISSION TESTING INSTRUCTIONS    The Preadmission Testing patient is instructed accordingly using the following criteria (check applicable):    ARRIVAL INSTRUCTIONS:  [x] Parking the day of Surgery is located in the Main Entrance lot. Upon entering the door, make an immediate right to the surgery reception desk    [x] Bring photo ID and insurance card    [] Bring in a copy of Living will or Durable Power of  papers. [x] Please be sure to arrange for responsible adult to provide transportation to and from the hospital    [x] Please arrange for responsible adult to be with you for the 24 hour period post procedure due to having anesthesia    [x] If you awake am of surgery not feeling well or have temperature >100 please call [phone]    GENERAL INSTRUCTIONS:    [x] Nothing by mouth after midnight, including gum, candy, mints or water    [] You may brush your teeth, but do not swallow any water    [x] Take medications as instructed with 1-2 oz of water    [x] Stop herbal supplements and vitamins 5 days prior to procedure    [] Follow preop dosing of blood thinners per physician instructions    [] Take 1/2 dose of evening insulin, but no insulin after midnight    [x] No oral diabetic medications after midnight    [x] If diabetic and have low blood sugar or feel symptomatic, take 1-2oz apple juice only    [] Bring inhalers day of surgery    [] Bring C-PAP/ Bi-Pap day of surgery    [x] Bring urine specimen day of surgery    [x] Shower or bath with soap, lather and rinse well, AM of Surgery, no lotion, powders or creams to surgical site    [] Follow bowel prep as instructed per surgeon    [x] No tobacco products within 24 hours of surgery     [x] No alcohol or illegal drug use within 24 hours of surgery.     [x] Jewelry, body piercing's, eyeglasses, contact lenses and dentures are not permitted into surgery (bring cases)      [x] Please do not wear any nail polish, make up or hair products on the day of surgery    [x] You can expect a call the business day prior to procedure to notify you if your arrival time changes    [x] If you receive a survey after surgery we would greatly appreciate your comments    [] Parent/guardian of a minor must accompany their child and remain on the premises  the entire time they are under our care     [] Pediatric patients may bring favorite toy, blanket or comfort item with them    [] A caregiver or family member must remain with the patient during their stay if they are mentally handicapped, have dementia, disoriented or unable to use a call light or would be a safety concern if left unattended    [x] Please notify surgeon if you develop any illness between now and time of surgery (cold, cough, sore throat, fever, nausea, vomiting) or any signs of infections  including skin, wounds, and dental.    [x]  The Outpatient Pharmacy is available to fill your prescription here on your day of surgery, ask your preop nurse for details    [] Other instructions    EDUCATIONAL MATERIALS PROVIDED:    [] PAT Preoperative Education Packet/Booklet     [] Medication List    [] Transfusion bracelet applied with instructions    [] Shower with soap, lather and rinse well, and use CHG wipes provided the evening before surgery as instructed    [] Incentive spirometer with instructions

## 2023-01-19 ENCOUNTER — HOSPITAL ENCOUNTER (OUTPATIENT)
Age: 48
Setting detail: OUTPATIENT SURGERY
Discharge: HOME | End: 2023-01-19
Attending: ANESTHESIOLOGY | Admitting: ANESTHESIOLOGY
Payer: COMMERCIAL

## 2023-01-19 ENCOUNTER — ANESTHESIA EVENT (OUTPATIENT)
Dept: OPERATING ROOM | Age: 48
End: 2023-01-19
Payer: COMMERCIAL

## 2023-01-19 ENCOUNTER — HOSPITAL ENCOUNTER (OUTPATIENT)
Dept: GENERAL RADIOLOGY | Age: 48
Discharge: HOME | End: 2023-01-21
Attending: ANESTHESIOLOGY
Payer: COMMERCIAL

## 2023-01-19 ENCOUNTER — ANESTHESIA (OUTPATIENT)
Dept: OPERATING ROOM | Age: 48
End: 2023-01-19
Payer: COMMERCIAL

## 2023-01-19 VITALS
DIASTOLIC BLOOD PRESSURE: 74 MMHG | RESPIRATION RATE: 15 BRPM | TEMPERATURE: 97.5 F | HEIGHT: 67 IN | BODY MASS INDEX: 40.18 KG/M2 | OXYGEN SATURATION: 98 % | HEART RATE: 77 BPM | WEIGHT: 256 LBS | SYSTOLIC BLOOD PRESSURE: 149 MMHG

## 2023-01-19 DIAGNOSIS — R52 PAIN MANAGEMENT: ICD-10-CM

## 2023-01-19 DIAGNOSIS — M47.816 LUMBAR SPONDYLOSIS: ICD-10-CM

## 2023-01-19 LAB
HCG, URINE, POC: NEGATIVE
Lab: NORMAL
METER GLUCOSE: 111 MG/DL (ref 74–99)
NEGATIVE QC PASS/FAIL: NORMAL
POSITIVE QC PASS/FAIL: NORMAL

## 2023-01-19 PROCEDURE — 6360000002 HC RX W HCPCS

## 2023-01-19 PROCEDURE — 64495 INJ PARAVERT F JNT L/S 3 LEV: CPT | Performed by: ANESTHESIOLOGY

## 2023-01-19 PROCEDURE — 7100000010 HC PHASE II RECOVERY - FIRST 15 MIN: Performed by: ANESTHESIOLOGY

## 2023-01-19 PROCEDURE — 3600000002 HC SURGERY LEVEL 2 BASE: Performed by: ANESTHESIOLOGY

## 2023-01-19 PROCEDURE — 6360000002 HC RX W HCPCS: Performed by: ANESTHESIOLOGY

## 2023-01-19 PROCEDURE — 3700000001 HC ADD 15 MINUTES (ANESTHESIA): Performed by: ANESTHESIOLOGY

## 2023-01-19 PROCEDURE — 7100000011 HC PHASE II RECOVERY - ADDTL 15 MIN: Performed by: ANESTHESIOLOGY

## 2023-01-19 PROCEDURE — 3600000012 HC SURGERY LEVEL 2 ADDTL 15MIN: Performed by: ANESTHESIOLOGY

## 2023-01-19 PROCEDURE — 3209999900 FLUORO FOR SURGICAL PROCEDURES

## 2023-01-19 PROCEDURE — 64494 INJ PARAVERT F JNT L/S 2 LEV: CPT | Performed by: ANESTHESIOLOGY

## 2023-01-19 PROCEDURE — 2500000003 HC RX 250 WO HCPCS: Performed by: ANESTHESIOLOGY

## 2023-01-19 PROCEDURE — 2580000003 HC RX 258: Performed by: ANESTHESIOLOGY

## 2023-01-19 PROCEDURE — 3700000000 HC ANESTHESIA ATTENDED CARE: Performed by: ANESTHESIOLOGY

## 2023-01-19 PROCEDURE — 2709999900 HC NON-CHARGEABLE SUPPLY: Performed by: ANESTHESIOLOGY

## 2023-01-19 PROCEDURE — 64493 INJ PARAVERT F JNT L/S 1 LEV: CPT | Performed by: ANESTHESIOLOGY

## 2023-01-19 PROCEDURE — 6360000002 HC RX W HCPCS: Performed by: NURSE ANESTHETIST, CERTIFIED REGISTERED

## 2023-01-19 PROCEDURE — 82962 GLUCOSE BLOOD TEST: CPT

## 2023-01-19 RX ORDER — LIDOCAINE HYDROCHLORIDE 5 MG/ML
INJECTION, SOLUTION INFILTRATION; INTRAVENOUS PRN
Status: DISCONTINUED | OUTPATIENT
Start: 2023-01-19 | End: 2023-01-19 | Stop reason: ALTCHOICE

## 2023-01-19 RX ORDER — MIDAZOLAM HYDROCHLORIDE 1 MG/ML
INJECTION INTRAMUSCULAR; INTRAVENOUS PRN
Status: DISCONTINUED | OUTPATIENT
Start: 2023-01-19 | End: 2023-01-19 | Stop reason: SDUPTHER

## 2023-01-19 RX ORDER — FENTANYL CITRATE 50 UG/ML
INJECTION, SOLUTION INTRAMUSCULAR; INTRAVENOUS PRN
Status: DISCONTINUED | OUTPATIENT
Start: 2023-01-19 | End: 2023-01-19 | Stop reason: SDUPTHER

## 2023-01-19 RX ORDER — SODIUM CHLORIDE 9 MG/ML
INJECTION, SOLUTION INTRAVENOUS PRN
Status: DISCONTINUED | OUTPATIENT
Start: 2023-01-19 | End: 2023-01-19 | Stop reason: HOSPADM

## 2023-01-19 RX ORDER — SODIUM CHLORIDE 0.9 % (FLUSH) 0.9 %
5-40 SYRINGE (ML) INJECTION EVERY 12 HOURS SCHEDULED
Status: DISCONTINUED | OUTPATIENT
Start: 2023-01-19 | End: 2023-01-19 | Stop reason: HOSPADM

## 2023-01-19 RX ORDER — BUPIVACAINE HYDROCHLORIDE 5 MG/ML
INJECTION, SOLUTION EPIDURAL; INTRACAUDAL PRN
Status: DISCONTINUED | OUTPATIENT
Start: 2023-01-19 | End: 2023-01-19 | Stop reason: ALTCHOICE

## 2023-01-19 RX ORDER — METHYLPREDNISOLONE ACETATE 40 MG/ML
INJECTION, SUSPENSION INTRA-ARTICULAR; INTRALESIONAL; INTRAMUSCULAR; SOFT TISSUE PRN
Status: DISCONTINUED | OUTPATIENT
Start: 2023-01-19 | End: 2023-01-19 | Stop reason: ALTCHOICE

## 2023-01-19 RX ORDER — SODIUM CHLORIDE 0.9 % (FLUSH) 0.9 %
5-40 SYRINGE (ML) INJECTION PRN
Status: DISCONTINUED | OUTPATIENT
Start: 2023-01-19 | End: 2023-01-19 | Stop reason: HOSPADM

## 2023-01-19 RX ADMIN — MIDAZOLAM 1 MG: 1 INJECTION INTRAMUSCULAR; INTRAVENOUS at 12:17

## 2023-01-19 RX ADMIN — SODIUM CHLORIDE: 9 INJECTION, SOLUTION INTRAVENOUS at 12:10

## 2023-01-19 RX ADMIN — MIDAZOLAM 1 MG: 1 INJECTION INTRAMUSCULAR; INTRAVENOUS at 12:14

## 2023-01-19 RX ADMIN — FENTANYL CITRATE 50 MCG: 50 INJECTION, SOLUTION INTRAMUSCULAR; INTRAVENOUS at 12:14

## 2023-01-19 RX ADMIN — FENTANYL CITRATE 50 MCG: 50 INJECTION, SOLUTION INTRAMUSCULAR; INTRAVENOUS at 12:18

## 2023-01-19 ASSESSMENT — PAIN DESCRIPTION - FREQUENCY: FREQUENCY: CONTINUOUS

## 2023-01-19 ASSESSMENT — PAIN DESCRIPTION - DESCRIPTORS
DESCRIPTORS: ACHING
DESCRIPTORS: ACHING;THROBBING

## 2023-01-19 ASSESSMENT — PAIN DESCRIPTION - LOCATION: LOCATION: BACK;LEG

## 2023-01-19 ASSESSMENT — PAIN DESCRIPTION - PAIN TYPE: TYPE: CHRONIC PAIN

## 2023-01-19 ASSESSMENT — PAIN - FUNCTIONAL ASSESSMENT
PAIN_FUNCTIONAL_ASSESSMENT: 0-10
PAIN_FUNCTIONAL_ASSESSMENT: PREVENTS OR INTERFERES WITH ALL ACTIVE AND SOME PASSIVE ACTIVITIES

## 2023-01-19 ASSESSMENT — PAIN DESCRIPTION - ORIENTATION: ORIENTATION: RIGHT;LOWER

## 2023-01-19 ASSESSMENT — PAIN DESCRIPTION - ONSET: ONSET: ON-GOING

## 2023-01-19 ASSESSMENT — PAIN SCALES - GENERAL: PAINLEVEL_OUTOF10: 9

## 2023-01-19 NOTE — ANESTHESIA POSTPROCEDURE EVALUATION
Department of Anesthesiology  Postprocedure Note    Patient: Chris Gross  MRN: [id number]  [place]: 1975  Date of evaluation: 1/19/2023      Procedure Summary     Date: 01/19/23 Room / Location: [facility] PROCEDURE ROOM 02 / [facility]    Anesthesia Start: 1210 Anesthesia Stop:     Procedure: # 1 BILATERAL LUMBAR MEDIAL BRANCH NERVE BLOCK UNDER FLUOROSCOPIC GUIDANCE AT L2, L3, L4 & L5 DORSAL RAMI (Bilateral: Back) Diagnosis:       Lumbar spondylosis      (Lumbar spondylosis [D36.533])    Surgeons: Joni Lamb MD Responsible Provider: Real Francois DO    Anesthesia Type: MAC ASA Status: 3          Anesthesia Type: No value filed.     Enrique Phase I: Enrique Score: 10    Enrique Phase II:        Anesthesia Post Evaluation    Patient location during evaluation: PACU  Patient participation: complete - patient participated  Level of consciousness: awake and alert  Airway patency: patent  Nausea & Vomiting: no vomiting and no nausea  Complications: no  Cardiovascular status: hemodynamically stable  Respiratory status: spontaneous ventilation and room air  Hydration status: stable

## 2023-01-19 NOTE — ANESTHESIA PRE PROCEDURE
Department of Anesthesiology  Preprocedure Note       Name:  Yanira Larsen   Age:  56 y.o.  :  1975                                          MRN:  [id number]         Date:  2023      Surgeon: Braden Garcia):  Marilee Hdz MD    Procedure: Procedure(s):  # 1 BILATERAL LUMBAR MEDIAL BRANCH NERVE BLOCK UNDER FLUOROSCOPIC GUIDANCE AT L2, L3, L4 & L5 DORSAL RAMI    Medications prior to admission:   Prior to Admission medications    Medication Sig Start Date End Date Taking? Authorizing Provider   oxyCODONE-acetaminophen (PERCOCET) 5-325 MG per tablet Take 1 tablet by mouth every 4 hours as needed for Pain. Yes Historical Provider, MD   DULoxetine (CYMBALTA) 60 MG extended release capsule take 1 capsule by mouth once daily 23   Jeanette Sever, DO   tiZANidine (ZANAFLEX) 4 MG tablet Take 1 tablet by mouth in the morning, at noon, and at bedtime 1/3/23 4/3/23  Jeanette Sever, DO   QUEtiapine (SEROQUEL) 100 MG tablet Take 100 mg by mouth at bedtime 22   Historical Provider, MD   cloNIDine (CATAPRES) 0.1 MG tablet Take 0.1 mg by mouth 2 times daily 22   Historical Provider, MD   doxepin (SINEQUAN) 10 MG capsule Take 10 mg by mouth 3 times daily 22   Historical Provider, MD   naloxone 4 MG/0.1ML LIQD nasal spray 1 spray by Nasal route as needed for Opioid Reversal 22   Jeanette Sever, DO   DODEX 1000 MCG/ML injection inject 1 milliliter ( 1000 MCG ) intramuscularly every week for 4 weeks 22   Jeanette Sever, DO   OZEMPIC, 0.25 OR 0.5 MG/DOSE, 2 MG/1.5ML SOPN inject 0.25 milligrams subcutaneously every week  Patient not taking: Reported on 2023   Jeanette Sever, DO   prazosin (MINIPRESS) 1 MG capsule Take 1 mg by mouth nightly    Historical Provider, MD   lisinopril (PRINIVIL;ZESTRIL) 10 MG tablet take 1 tablet by mouth once daily 22   LYNSEY Rogers - CNP   mupirocin (BACTROBAN NASAL) 2 % nasal ointment Take by Nasal route 2 times daily.   Patient not taking: Reported on 1/16/2023 10/11/22   Andria De Jesus, APRN - CNP   omeprazole (PRILOSEC) 40 MG delayed release capsule Take 1 capsule by mouth 2 times daily 10/6/22 4/4/23  Ani Morgan DO   TRINTELLIX 20 MG TABS tablet Take 20 mg by mouth nightly 6/22/22   Historical Provider, MD   SUMAtriptan (IMITREX) 50 MG tablet take 1 tablet by mouth if needed  Holderrieth Alamo may repeat in 4 hours IF headache PERSISTS 2/21/22   Ani Morgan DO   clotrimazole-betamethasone (LOTRISONE) 1-0.05 % cream Apply topically 2 times daily.  1/31/22   Ani Morgan DO   secukinumab (COSENTYX) 150 MG/ML SOSY Inject 300 mg into the skin once    Historical Provider, MD   Multiple Vitamin (MULTIVITAMIN PO) Take 1 tablet by mouth daily     Historical Provider, MD       Current medications:    Current Facility-Administered Medications   Medication Dose Route Frequency Provider Last Rate Last Admin    sodium chloride flush 0.9 % injection 5-40 mL  5-40 mL IntraVENous 2 times per day Yanira Davis MD        sodium chloride flush 0.9 % injection 5-40 mL  5-40 mL IntraVENous PRN Yanira Davis MD        0.9 % sodium chloride infusion   IntraVENous PRN Yanira Davis MD           Allergies:  No Known Allergies    Problem List:    Patient Active Problem List   Diagnosis Code    Chronic back pain M54.9, G89.29    Hyperlipidemia E78.5    Indigestion K30    DDD (degenerative disc disease), cervical M50.30    Cervical radiculopathy M54.12    Lumbosacral spondylosis without myelopathy M47.817    Lumbar spondylosis M47.816       Past Medical History:        Diagnosis Date    Arthritis     psoriatic    Chronic back pain     Depression     Diabetes mellitus ([facility] [address]     GERD (gastroesophageal reflux disease)     Headache(784.0)     Herpes genital     \"years ago\" ; no outbreaks    Hypertension     Varicose veins        Past Surgical History:        Procedure Laterality Date    PAIN MANAGEMENT PROCEDURE N/A 12/16/2021    CERVICAL EPIDURAL STEROID INJECTION UNDER FLUORO AT C6 - C7 LEFT PARAMEDIAN performed by Howard Shearer MD at [facility] Left 02/07/2022    #2 CERVICAL EPIDURAL STEROID INJECTION UNDER FLUOROSCOPIC GUIDANCE AT C6-C7 LEFT PARAMEDIAN performed by Howard Shearer MD at [place] [address] PAIN MANAGEMENT PROCEDURE Left 04/07/2022    CERVICAL EPIDURAL STEROID INJECTION C6-C7 LEFT PARAMEDIAN performed by Howard Shearer MD at [facility] N/A 12/05/2022    CERVICAL  EPIDURAL STEROID INJECTION UNDER FLUOROSCOPIC GUIDANCE AT C6-C7 performed by Howard Shearer MD at [facility] [place]  05/31/2011    Lap    TUBAL LIGATION  01/01/2001    UPPER GASTROINTESTINAL ENDOSCOPY  01/01/2011    WISDOM TOOTH EXTRACTION         Social History:    Social History     Tobacco Use    Smoking status: Never    Smokeless tobacco: Never   Substance Use Topics    Alcohol use: No                                Counseling given: Not Answered      Vital Signs (Current):   Vitals:    01/16/23 1426 01/19/23 0932   BP:  134/76   Pulse:  84   Resp:  18   Temp:  97 °F (36.1 °C)   SpO2:  99%   Weight: 256 lb (116.1 kg) 256 lb (116.1 kg)   Height: 5' 7\" (1.702 m) 5' 7\" (1.702 m)                                              BP Readings from Last 3 Encounters:   01/19/23 134/76   01/13/23 122/84   01/11/23 107/79       NPO Status: Time of last liquid consumption: 0500 (sip with medications)                        Time of last solid consumption: 1700                        Date of last liquid consumption: 01/19/23                        Date of last solid food consumption: 01/18/23    BMI:   Wt Readings from Last 3 Encounters:   01/19/23 256 lb (116.1 kg)   01/13/23 258 lb 3.2 oz (117.1 kg)   01/11/23 260 lb (117.9 kg)     Body mass index is 40.1 kg/m².     CBC:   Lab Results   Component Value Date/Time    WBC 7.6 01/13/2023 10:05 AM    RBC 3.89 01/13/2023 10:05 AM    HGB 12.8 01/13/2023 10:05 AM    HCT 36.1 01/13/2023 10:05 AM    MCV 92.8 01/13/2023 10:05 AM    RDW 12.7 01/13/2023 10:05 AM     01/13/2023 10:05 AM       CMP:   Lab Results   Component Value Date/Time     01/13/2023 10:05 AM    K 4.4 01/13/2023 10:05 AM     01/13/2023 10:05 AM    CO2 23 01/13/2023 10:05 AM    BUN 15 01/13/2023 10:05 AM    CREATININE 0.8 01/13/2023 10:05 AM    GFRAA >60 05/27/2022 11:15 AM    LABGLOM >60 01/13/2023 10:05 AM    GLUCOSE 113 01/13/2023 10:05 AM    GLUCOSE 96 08/27/2011 10:55 AM    PROT 7.0 01/13/2023 10:05 AM    CALCIUM 8.9 01/13/2023 10:05 AM    BILITOT 0.5 01/13/2023 10:05 AM    ALKPHOS 77 01/13/2023 10:05 AM    AST 17 01/13/2023 10:05 AM    ALT 13 01/13/2023 10:05 AM       POC Tests: No results for input(s): POCGLU, POCNA, POCK, POCCL, POCBUN, POCHEMO, POCHCT in the last 72 hours.     Coags:   Lab Results   Component Value Date/Time    PROTIME 10.1 05/26/2022 12:26 PM    INR 0.9 05/26/2022 12:26 PM    APTT 35.4 05/26/2022 12:26 PM       HCG (If Applicable):   Lab Results   Component Value Date    PREGTESTUR NEGATIVE 08/09/2013        ABGs: No results found for: PHART, PO2ART, SJY0EGS, HDZ7GGG, BEART, Z7TBKBNG     Type & Screen (If Applicable):  No results found for: LABABO, LABRH    Drug/Infectious Status (If Applicable):  No results found for: HIV, HEPCAB    COVID-19 Screening (If Applicable):   Lab Results   Component Value Date/Time    COVID19 Not Detected 05/27/2022 11:15 AM           Anesthesia Evaluation  Patient summary reviewed no history of anesthetic complications:   Airway: Mallampati: III  TM distance: >3 FB   Neck ROM: full  Mouth opening: > = 3 FB   Dental: normal exam         Pulmonary: breath sounds clear to auscultation                             Cardiovascular:    (+) hypertension:,         Rhythm: regular                      Neuro/Psych:   (+) neuromuscular disease:, headaches:, psychiatric history:            GI/Hepatic/Renal: (+) GERD:,           Endo/Other:    (+) DiabetesType II DM, , : arthritis:., .                 Abdominal:             Vascular: Other Findings:           Anesthesia Plan      MAC     ASA 3       Induction: intravenous. MIPS: Prophylactic antiemetics administered. Anesthetic plan and risks discussed with patient. Plan discussed with CRNA. [address] Baptiste,    1/19/2023    Chart reviewed, patient seen and agree with above evaluation.   LYNSEY [place] - CRNA

## 2023-01-19 NOTE — DISCHARGE INSTRUCTIONS
Eric Galicia Block/Radiofrequency  Home Going Instructions    1-Go home, rest for the remainder of the day  2-Please do not lift over 20 pounds the day of the injection  3-If you received sedation No: alcohol, driving, operating lawn mowers, plows, tractors or other dangerous equipment until next morning. Do not make important decisions or sign legal documents for 24 hours. You may experience light headedness, dizziness, nausea or sleepiness after sedation. Do not stay alone. A responsible adult must be with you for 24 hours. You could be nauseated from the medications you have received. Your IV site may be sore and bruised. 4-No dietary restrictions     5-Resume all medications the same day, blood thinners to be resumed 24 hours after injection if you were instructed to stop any. 6-Keep the surgical site clean and dry, you may shower the next morning and remove the      dressing. 7- No sitz baths, tub baths or hot tubs/swimming for 24 hours. 8- If you have any pain at the injection site(s), application of an ice pack to the area should be       helpful, 20 minutes on/20 minutes off for next 48 hours. 9- Call [facility] immediately at if you develop.   Fever greater than 100.4 F  Have bleeding or drainage from the puncture site  Have progressive Leg/arm numbness and or weakness  Loss of control of bowel and or bladder (wet/soil yourself)  Severe headache with inability to lift head  10-You may return to work the next day

## 2023-01-19 NOTE — OP NOTE
Operative Note      Patient: Giles Quan  YOB: 1975  MRN: [id number]    Date of Procedure: 2023    Pre-Op Diagnosis: Lumbar spondylosis [M47.816]    Post-Op Diagnosis: Same       Procedure(s):  # 1 BILATERAL LUMBAR MEDIAL BRANCH NERVE BLOCK UNDER FLUOROSCOPIC GUIDANCE AT L2, L3, L4 & L5 DORSAL RAMI    Surgeon(s):  Tenzin Torres MD    Assistant:   * No surgical staff found *    Anesthesia: Monitor Anesthesia Care    Estimated Blood Loss (mL): Minimal    Complications: None    Specimens:   * No specimens in log *    Implants:  * No implants in log *      Drains: * No LDAs found *    Findings: good needle placement    Detailed Description of Procedure:   2023    Patient: Giles Quan  :  1975  Age:  56 y.o. Sex:  female     PRE-OPERATIVE DIAGNOSIS:  Lumbar spondylosis, lumbar facet syndrome. POST-OPERATIVE DIAGNOSIS: Same. PROCEDURE:  # 1 Fluoroscopic guided lumbar medial branch blocks Bilateral at facet Levels: L3-4, L4-5, L5-S1 ( L2, L3 L4 MB & L5DR)    SURGEON: Tenzin Torres MD    ANESTHESIA: MAC    ESTIMATED BLOOD LOSS: None.  ______________________________________________________________________  BRIEF HISTORY:  Giles Quan comes in today for 1 fluoroscopic guided lumbar medial branch blocks  Bilateral  at Levels: L3-4, L4-5, L5-S1. The potential complications of this procedure were discussed with her again today. She has elected to undergo the aforementioned procedure. Jean complete History & Physical examination were reviewed in depth, a copy of which is in the chart. DESCRIPTION OF PROCEDURE:   After confirming written and informed consent, a time-out was performed and Jean name and date of birth, the procedure to be performed as well as the plan for the location of the needle insertion were confirmed. The patient was brought into the procedure room and placed in the prone position on the fluoroscopy table.  Standard monitors were placed and vital signs were observed throughout the procedure. The area of the lumbar spine was prepped with chloraprep and draped in a sterile manner. AP fluoroscopy was used to identify and margret bartons point at the targeted levels. The skin and subcutaneous tissues in these identified areas were anesthetized with 0.5%Lidocaine. A 22 # gauge 5 inch spinal needle was advanced toward the junction of the superior articular process and the transverse process, along the course of the medial branch. Satisfactory needle placement was confirmed by AP and oblique projections. At the sacral alar level, the sacral alar region was visualized and the needle tip was positioned on the sacral alar at the base of the superior articulating process where the medial branch traverses under fluoroscopic guidance. Once bone was contacted and negative aspiration was confirmed. A solution of 0.5% marcaine with 5 mg DepoMedrol 1 cc was then injected at each level. Following the procedure Ketan Melton noted improvement of previous pain symptoms. Disposition the patient tolerated the procedure well and there were no complications . Vital signs remained stable throughout the procedure. The patient was escorted to the recovery area where they remained until discharge and written discharge instructions for the procedure were given. Plan: Ketan Melton will return to our pain management center as scheduled. Pre-procedure pain: 8/10    Post-procedure pain: Patient noticed excellent pain relief immediately after the procedure.      Vivian Mcgraw MD

## 2023-01-19 NOTE — INTERVAL H&P NOTE
Update History & Physical    The patient's History and Physical of January 11, 20232 was reviewed with the patient and I examined the patient. There was no change. The surgical site was confirmed by the patient and me. Plan: # 1 Lumbar MBNB. The risks, benefits, expected outcome, and alternative to the recommended procedure have been discussed with the patient. Patient understands and wants to proceed with the procedure.      Electronically signed by Taco Chen MD on 1/19/2023

## 2023-01-20 ENCOUNTER — NURSE ONLY (OUTPATIENT)
Dept: FAMILY MEDICINE CLINIC | Age: 48
End: 2023-01-20
Payer: COMMERCIAL

## 2023-01-20 DIAGNOSIS — M54.2 NECK PAIN: ICD-10-CM

## 2023-01-20 DIAGNOSIS — E53.8 B12 DEFICIENCY: Primary | ICD-10-CM

## 2023-01-20 PROCEDURE — 96372 THER/PROPH/DIAG INJ SC/IM: CPT | Performed by: FAMILY MEDICINE

## 2023-01-20 RX ORDER — OXYCODONE HYDROCHLORIDE AND ACETAMINOPHEN 5; 325 MG/1; MG/1
1 TABLET ORAL EVERY 4 HOURS PRN
Qty: 120 TABLET | Refills: 0 | Status: CANCELLED | OUTPATIENT
Start: 2023-01-20 | End: 2023-02-19

## 2023-01-20 RX ORDER — OXYCODONE HYDROCHLORIDE AND ACETAMINOPHEN 5; 325 MG/1; MG/1
1 TABLET ORAL EVERY 4 HOURS PRN
Qty: 42 TABLET | Refills: 0 | Status: SHIPPED | OUTPATIENT
Start: 2023-01-20 | End: 2023-01-27

## 2023-01-20 RX ORDER — ROSUVASTATIN CALCIUM 5 MG/1
5 TABLET, COATED ORAL DAILY
Qty: 90 TABLET | Refills: 1 | Status: SHIPPED | OUTPATIENT
Start: 2023-01-20

## 2023-01-20 RX ORDER — CYANOCOBALAMIN 1000 UG/ML
1000 INJECTION, SOLUTION INTRAMUSCULAR; SUBCUTANEOUS ONCE
Status: COMPLETED | OUTPATIENT
Start: 2023-01-20 | End: 2023-01-20

## 2023-01-20 RX ADMIN — CYANOCOBALAMIN 1000 MCG: 1000 INJECTION, SOLUTION INTRAMUSCULAR; SUBCUTANEOUS at 09:20

## 2023-01-20 NOTE — TELEPHONE ENCOUNTER
Patient agrees she would like to be started on something for high Cholesterol,the dieting isn't working for her.     Also wants a refill on percocet today

## 2023-01-27 ENCOUNTER — OFFICE VISIT (OUTPATIENT)
Dept: FAMILY MEDICINE CLINIC | Age: 48
End: 2023-01-27

## 2023-01-27 VITALS
TEMPERATURE: 97.3 F | OXYGEN SATURATION: 96 % | BODY MASS INDEX: 40.02 KG/M2 | SYSTOLIC BLOOD PRESSURE: 118 MMHG | HEIGHT: 67 IN | DIASTOLIC BLOOD PRESSURE: 72 MMHG | RESPIRATION RATE: 16 BRPM | HEART RATE: 92 BPM | WEIGHT: 255 LBS

## 2023-01-27 DIAGNOSIS — J02.9 SORE THROAT: Primary | ICD-10-CM

## 2023-01-27 DIAGNOSIS — J40 BRONCHITIS: ICD-10-CM

## 2023-01-27 LAB — S PYO AG THROAT QL: NORMAL

## 2023-01-27 RX ORDER — AZITHROMYCIN 250 MG/1
250 TABLET, FILM COATED ORAL SEE ADMIN INSTRUCTIONS
Qty: 6 TABLET | Refills: 0 | Status: SHIPPED | OUTPATIENT
Start: 2023-01-27 | End: 2023-02-01

## 2023-01-27 RX ORDER — ALBUTEROL SULFATE 90 UG/1
2 AEROSOL, METERED RESPIRATORY (INHALATION) 4 TIMES DAILY PRN
Qty: 18 G | Refills: 5 | Status: SHIPPED | OUTPATIENT
Start: 2023-01-27

## 2023-01-27 ASSESSMENT — ENCOUNTER SYMPTOMS
COUGH: 1
RHINORRHEA: 1
SINUS PAIN: 1

## 2023-01-27 NOTE — PROGRESS NOTES
23  Haylee Naidu : 1975 Sex: female  Age: 56 y.o. Chief Complaint   Patient presents with    Pharyngitis     Onset 2 days ago, afebrile, No N/V, No Diarrhea    Cough     Onset 2 days, mucus yellow productive , did at [address] [facility] [address] was NEGATIVE       HPI  cough and headaches     Review of Systems   Constitutional:  Negative for appetite change, chills, diaphoresis, fatigue, fever and unexpected weight change. HENT:  Positive for rhinorrhea and sinus pain. Negative for congestion, dental problem, ear discharge, ear pain, facial swelling, hearing loss, mouth sores, sore throat, tinnitus and voice change. Eyes:  Negative for photophobia and visual disturbance (no visual changes). Respiratory:  Positive for cough. Negative for apnea, chest tightness, shortness of breath and wheezing. Cardiovascular:  Positive for chest pain (from coughing). Negative for palpitations. Gastrointestinal:  Negative for abdominal distention, abdominal pain, constipation, diarrhea, nausea, rectal pain and vomiting. Endocrine: Negative for cold intolerance, heat intolerance and polyuria. Genitourinary:  Negative for dysuria, frequency, hematuria and urgency. Musculoskeletal:  Negative for arthralgias, back pain, gait problem, joint swelling, myalgias, neck pain and neck stiffness. Skin:  Negative for rash and wound. Allergic/Immunologic: Negative for environmental allergies. Neurological:  Positive for headaches. Negative for dizziness, tremors, seizures, syncope, facial asymmetry, speech difficulty, weakness, light-headedness and numbness. Hematological:  Does not bruise/bleed easily. Psychiatric/Behavioral:  Negative for agitation, hallucinations and suicidal ideas. The patient is not nervous/anxious. Physical Exam  Vitals reviewed. Constitutional:       General: She is not in acute distress. Appearance: She is not ill-appearing. HENT:      Head: Normocephalic.       Right Ear: Tympanic membrane, ear canal and external ear normal. There is no impacted cerumen. Left Ear: Tympanic membrane, ear canal and external ear normal. There is no impacted cerumen. Nose: No rhinorrhea. Mouth/Throat:      Pharynx: Posterior oropharyngeal erythema present. Eyes:      General:         Right eye: No discharge. Left eye: No discharge. Extraocular Movements: Extraocular movements intact. Pupils: Pupils are equal, round, and reactive to light. Neck:      Vascular: No carotid bruit. Cardiovascular:      Rate and Rhythm: Regular rhythm. Heart sounds: Normal heart sounds. No murmur heard. No gallop. Pulmonary:      Breath sounds: Rhonchi present. No wheezing or rales. Comments: Coughing   Abdominal:      Palpations: Abdomen is soft. There is no mass. Tenderness: There is no abdominal tenderness. There is no guarding or rebound. Hernia: No hernia is present. Musculoskeletal:         General: Normal range of motion. Cervical back: Neck supple. Lymphadenopathy:      Cervical: No cervical adenopathy. Skin:     General: Skin is warm and dry. Findings: No bruising or rash. Neurological:      General: No focal deficit present. Mental Status: She is alert and oriented to person, place, and time. Cranial Nerves: No cranial nerve deficit. Motor: No weakness. Coordination: Coordination normal.      Gait: Gait normal.   Psychiatric:         Mood and Affect: Mood normal.         Behavior: Behavior normal.       Assessment and Plan:  Esau Marquez was seen today for pharyngitis and cough. Diagnoses and all orders for this visit:    Sore throat  -     POCT rapid strep A          Discussions/Education provided to patients during visit:  [] Discussed the importance to stop smoking. [] Advised to monitor eating habits. [] Reviewed and discussed Imaging results. [] Reviewed and discussed Lab results.   [] Discussed the importance of drinking plenty of fluids. [] Cut down on Salt, Caffeine, and Sugar.  [] Non Compliant Patient   [x] Communicated with patient any concerns, to phone office. Zpack given  Medrol dose pack given  Recheck 1 week if not better COVID test at home was negative I felt to say that earlier  . No follow-ups on file.       Seen by:     Dave Veloz,

## 2023-01-29 ASSESSMENT — ENCOUNTER SYMPTOMS
WHEEZING: 0
FACIAL SWELLING: 0
SHORTNESS OF BREATH: 0
BACK PAIN: 0
ABDOMINAL PAIN: 0
DIARRHEA: 0
CHEST TIGHTNESS: 0
ABDOMINAL DISTENTION: 0
VOMITING: 0
PHOTOPHOBIA: 0
RECTAL PAIN: 0
CONSTIPATION: 0
APNEA: 0
VOICE CHANGE: 0
SORE THROAT: 0
NAUSEA: 0

## 2023-01-31 RX ORDER — ONDANSETRON 4 MG/1
4 TABLET, FILM COATED ORAL EVERY 8 HOURS PRN
Qty: 21 TABLET | Refills: 0 | Status: SHIPPED | OUTPATIENT
Start: 2023-01-31

## 2023-01-31 RX ORDER — ONDANSETRON 4 MG/1
4 TABLET, FILM COATED ORAL EVERY 8 HOURS PRN
COMMUNITY
End: 2023-01-31 | Stop reason: SDUPTHER

## 2023-01-31 NOTE — TELEPHONE ENCOUNTER
Patient c/o sore throat, hard time swallowing, no fever. She finished the z-wendy you gave her Friday. Is there somethine else she could take.   RA/EP  Also asking for refill on Zofran- pended

## 2023-01-31 NOTE — TELEPHONE ENCOUNTER
Dr. Ariana Felix advised warm salt water gargles, if sore throat persist patient needs seen in the office again.   Patient informed and understood

## 2023-02-03 ENCOUNTER — OFFICE VISIT (OUTPATIENT)
Dept: FAMILY MEDICINE CLINIC | Age: 48
End: 2023-02-03

## 2023-02-03 VITALS
OXYGEN SATURATION: 98 % | TEMPERATURE: 97.1 F | HEIGHT: 67 IN | BODY MASS INDEX: 41.28 KG/M2 | DIASTOLIC BLOOD PRESSURE: 96 MMHG | HEART RATE: 89 BPM | SYSTOLIC BLOOD PRESSURE: 150 MMHG | RESPIRATION RATE: 18 BRPM | WEIGHT: 263 LBS

## 2023-02-03 DIAGNOSIS — F32.A ANXIETY AND DEPRESSION: ICD-10-CM

## 2023-02-03 DIAGNOSIS — M54.12 CERVICAL NEUROPATHIC PAIN: ICD-10-CM

## 2023-02-03 DIAGNOSIS — F41.9 ANXIETY AND DEPRESSION: ICD-10-CM

## 2023-02-03 DIAGNOSIS — M50.30 DDD (DEGENERATIVE DISC DISEASE), CERVICAL: ICD-10-CM

## 2023-02-03 DIAGNOSIS — E04.1 THYROID NODULE: ICD-10-CM

## 2023-02-03 DIAGNOSIS — G43.719 INTRACTABLE CHRONIC MIGRAINE WITHOUT AURA AND WITHOUT STATUS MIGRAINOSUS: ICD-10-CM

## 2023-02-03 DIAGNOSIS — M06.9 RHEUMATOID ARTHRITIS, INVOLVING UNSPECIFIED SITE, UNSPECIFIED WHETHER RHEUMATOID FACTOR PRESENT (HCC): ICD-10-CM

## 2023-02-03 DIAGNOSIS — I10 PRIMARY HYPERTENSION: ICD-10-CM

## 2023-02-03 DIAGNOSIS — J02.9 SORE THROAT: Primary | ICD-10-CM

## 2023-02-03 DIAGNOSIS — E78.2 MIXED HYPERLIPIDEMIA: ICD-10-CM

## 2023-02-03 DIAGNOSIS — K21.00 GASTROESOPHAGEAL REFLUX DISEASE WITH ESOPHAGITIS, UNSPECIFIED WHETHER HEMORRHAGE: ICD-10-CM

## 2023-02-03 DIAGNOSIS — K30 INDIGESTION: ICD-10-CM

## 2023-02-03 DIAGNOSIS — M25.50 ARTHRALGIA, UNSPECIFIED JOINT: ICD-10-CM

## 2023-02-03 DIAGNOSIS — J02.9 SORE THROAT: ICD-10-CM

## 2023-02-03 DIAGNOSIS — M54.31 SCIATICA OF RIGHT SIDE: ICD-10-CM

## 2023-02-03 DIAGNOSIS — M54.9 CHRONIC BACK PAIN, UNSPECIFIED BACK LOCATION, UNSPECIFIED BACK PAIN LATERALITY: ICD-10-CM

## 2023-02-03 DIAGNOSIS — G89.29 CHRONIC BACK PAIN, UNSPECIFIED BACK LOCATION, UNSPECIFIED BACK PAIN LATERALITY: ICD-10-CM

## 2023-02-03 DIAGNOSIS — M54.12 CERVICAL RADICULOPATHY: ICD-10-CM

## 2023-02-03 DIAGNOSIS — M47.817 LUMBOSACRAL SPONDYLOSIS WITHOUT MYELOPATHY: ICD-10-CM

## 2023-02-03 DIAGNOSIS — M47.816 LUMBAR SPONDYLOSIS: ICD-10-CM

## 2023-02-03 RX ORDER — CYANOCOBALAMIN 1000 UG/ML
1000 INJECTION, SOLUTION INTRAMUSCULAR; SUBCUTANEOUS ONCE
Status: COMPLETED | OUTPATIENT
Start: 2023-02-03 | End: 2023-02-03

## 2023-02-03 RX ORDER — OXYCODONE HYDROCHLORIDE AND ACETAMINOPHEN 5; 325 MG/1; MG/1
1 TABLET ORAL EVERY 4 HOURS PRN
COMMUNITY
End: 2023-02-28 | Stop reason: SDUPTHER

## 2023-02-03 RX ORDER — FOLIC ACID 1 MG/1
TABLET ORAL
COMMUNITY
Start: 2023-01-28 | End: 2023-02-08 | Stop reason: SDUPTHER

## 2023-02-03 RX ORDER — AMOXICILLIN AND CLAVULANATE POTASSIUM 875; 125 MG/1; MG/1
1 TABLET, FILM COATED ORAL 2 TIMES DAILY
Qty: 20 TABLET | Refills: 0 | Status: SHIPPED | OUTPATIENT
Start: 2023-02-03 | End: 2023-02-13

## 2023-02-03 RX ORDER — GREEN TEA/HOODIA GORDONII 315-12.5MG
1 CAPSULE ORAL 2 TIMES DAILY
Qty: 60 TABLET | Refills: 0 | Status: SHIPPED
Start: 2023-02-03 | End: 2023-02-08 | Stop reason: SDUPTHER

## 2023-02-03 RX ORDER — NALOXONE HYDROCHLORIDE 4 MG/.1ML
1 SPRAY NASAL PRN
Qty: 1 EACH | Refills: 5 | Status: SHIPPED
Start: 2023-02-03 | End: 2023-02-08 | Stop reason: SDUPTHER

## 2023-02-03 RX ORDER — OXYCODONE HYDROCHLORIDE AND ACETAMINOPHEN 5; 325 MG/1; MG/1
1 TABLET ORAL EVERY 4 HOURS PRN
Qty: 42 TABLET | Refills: 0 | Status: SHIPPED
Start: 2023-02-03 | End: 2023-02-08 | Stop reason: SDUPTHER

## 2023-02-03 RX ADMIN — CYANOCOBALAMIN 1000 MCG: 1000 INJECTION, SOLUTION INTRAMUSCULAR; SUBCUTANEOUS at 09:01

## 2023-02-03 SDOH — ECONOMIC STABILITY: FOOD INSECURITY: WITHIN THE PAST 12 MONTHS, THE FOOD YOU BOUGHT JUST DIDN'T LAST AND YOU DIDN'T HAVE MONEY TO GET MORE.: NEVER TRUE

## 2023-02-03 SDOH — ECONOMIC STABILITY: INCOME INSECURITY: HOW HARD IS IT FOR YOU TO PAY FOR THE VERY BASICS LIKE FOOD, HOUSING, MEDICAL CARE, AND HEATING?: NOT HARD AT ALL

## 2023-02-03 SDOH — ECONOMIC STABILITY: HOUSING INSECURITY
IN THE LAST 12 MONTHS, WAS THERE A TIME WHEN YOU DID NOT HAVE A STEADY PLACE TO SLEEP OR SLEPT IN A SHELTER (INCLUDING NOW)?: NO

## 2023-02-03 SDOH — ECONOMIC STABILITY: FOOD INSECURITY: WITHIN THE PAST 12 MONTHS, YOU WORRIED THAT YOUR FOOD WOULD RUN OUT BEFORE YOU GOT MONEY TO BUY MORE.: NEVER TRUE

## 2023-02-03 ASSESSMENT — ENCOUNTER SYMPTOMS
VOMITING: 0
ABDOMINAL DISTENTION: 0
NAUSEA: 0
SHORTNESS OF BREATH: 0
VOICE CHANGE: 0
PHOTOPHOBIA: 0
DIARRHEA: 0
APNEA: 0
WHEEZING: 0
ABDOMINAL PAIN: 0
FACIAL SWELLING: 0
BACK PAIN: 0
COUGH: 1
RHINORRHEA: 0
CONSTIPATION: 0
CHEST TIGHTNESS: 0
SORE THROAT: 1
RECTAL PAIN: 0
SINUS PAIN: 0

## 2023-02-03 NOTE — PROGRESS NOTES
2/3/23  [facility] Class : 1975 Sex: female  Age: 56 y.o. Chief Complaint   Patient presents with    Swelling     Throat feels swollen still    Injections     B12       HPI severe sore throat worsening even though had the Z-Moo is taking it on still hurting feels like her throat is swelling somewhat    Review of Systems   Constitutional:  Negative for appetite change, chills, diaphoresis, fatigue, fever and unexpected weight change. HENT:  Positive for postnasal drip (DECREASED) and sore throat (SVERE WITH SWELLING). Negative for congestion, dental problem, ear discharge, ear pain, facial swelling, hearing loss, mouth sores, rhinorrhea, sinus pain, tinnitus and voice change. Eyes:  Negative for photophobia and visual disturbance (no visual changes). Respiratory:  Positive for cough (GONE). Negative for apnea, chest tightness, shortness of breath and wheezing. Cardiovascular:  Negative for chest pain and palpitations. Gastrointestinal:  Negative for abdominal distention, abdominal pain, constipation, diarrhea, nausea, rectal pain and vomiting. Endocrine: Negative for cold intolerance, heat intolerance and polyuria. Genitourinary:  Negative for dysuria, frequency, hematuria and urgency. Musculoskeletal:  Positive for arthralgias. Negative for back pain, gait problem, joint swelling, myalgias, neck pain and neck stiffness. Joint pains improved with medication aches but back pain and fibromyalgia continues   Skin:  Negative for rash and wound. Allergic/Immunologic: Negative for environmental allergies. Neurological:  Negative for dizziness, tremors, seizures, syncope, facial asymmetry, speech difficulty, weakness, light-headedness, numbness and headaches. Fibromyalgia continues   Hematological:  Does not bruise/bleed easily. Psychiatric/Behavioral:  Negative for agitation, hallucinations and suicidal ideas. The patient is not nervous/anxious.         Physical Exam  Vitals reviewed. Constitutional:       General: She is not in acute distress. Appearance: She is not ill-appearing. HENT:      Head: Normocephalic. Right Ear: Tympanic membrane, ear canal and external ear normal. There is no impacted cerumen. Left Ear: Tympanic membrane, ear canal and external ear normal. There is no impacted cerumen. Nose: No rhinorrhea. Mouth/Throat:      Pharynx: Posterior oropharyngeal erythema (SIGNIFICANT SWELLING AND REDNESS POSTERIOR PHARYN X) present. Eyes:      General:         Right eye: No discharge. Left eye: No discharge. Extraocular Movements: Extraocular movements intact. Pupils: Pupils are equal, round, and reactive to light. Neck:      Vascular: No carotid bruit. Cardiovascular:      Rate and Rhythm: Regular rhythm. Heart sounds: Normal heart sounds. No murmur heard. No gallop. Pulmonary:      Breath sounds: Rhonchi present. No wheezing or rales. Abdominal:      Palpations: Abdomen is soft. There is no mass. Tenderness: There is no abdominal tenderness. There is no guarding or rebound. Hernia: No hernia is present. Musculoskeletal:         General: Normal range of motion. Cervical back: Neck supple. Lymphadenopathy:      Cervical: No cervical adenopathy. Skin:     General: Skin is warm and dry. Findings: No bruising or rash. Neurological:      General: No focal deficit present. Mental Status: She is alert and oriented to person, place, and time. Cranial Nerves: No cranial nerve deficit. Motor: No weakness. Coordination: Coordination normal.      Gait: Gait normal.   Psychiatric:         Mood and Affect: Mood normal.         Behavior: Behavior normal.       Assessment and Plan:  Nata Phillips was seen today for swelling and injections. Diagnoses and all orders for this visit:    Sore throat  -     Culture, Throat;  Future    Cervical radiculopathy  -     Discontinue: oxyCODONE-acetaminophen (PERCOCET) 5-325 MG per tablet; Take 1 tablet by mouth every 4 hours as needed for Pain for up to 7 days. Take 1 tablet by mouth every 4 hours as needed for Pain. Max Daily Amount: 6 tablets    Anxiety and depression    DDD (degenerative disc disease), cervical    Thyroid nodule    Lumbosacral spondylosis without myelopathy    Mixed hyperlipidemia    Sciatica of right side    Intractable chronic migraine without aura and without status migrainosus    Indigestion    Lumbar spondylosis    Primary hypertension    Gastroesophageal reflux disease with esophagitis, unspecified whether hemorrhage    Chronic back pain, unspecified back location, unspecified back pain laterality    Arthralgia, unspecified joint    Cervical neuropathic pain    Rheumatoid arthritis, involving unspecified site, unspecified whether rheumatoid factor present ([facility] [address]    Other orders  -     cyanocobalamin injection 1,000 mcg  -     amoxicillin-clavulanate (AUGMENTIN) 875-125 MG per tablet; Take 1 tablet by mouth 2 times daily for 10 days  -     Discontinue: Probiotic Acidophilus (FLORANEX) TABS; Take 1 tablet by mouth 2 times daily  -     Discontinue: naloxone 4 MG/0.1ML LIQD nasal spray; 1 spray by Nasal route as needed for Opioid Reversal        Discussions/Education provided to patients during visit:  [] Discussed the importance to stop smoking. [] Advised to monitor eating habits. [] Reviewed and discussed Imaging results. [] Reviewed and discussed Lab results. [x] Discussed the importance of drinking plenty of fluids. [x] Cut down on Salt, Caffeine, and Sugar. [x] Non Compliant Patient   [x] Communicated with patient any concerns, to phone office. Throat culture done today  Patient placed on Augmentin 875 twice daily  Medication renewed  Follow-up 2 weeks if still having problems with the throat        No follow-ups on file.       Seen by:     Denia Ibanez, DO

## 2023-02-06 ENCOUNTER — OFFICE VISIT (OUTPATIENT)
Dept: PAIN MANAGEMENT | Age: 48
End: 2023-02-06
Payer: COMMERCIAL

## 2023-02-06 VITALS
TEMPERATURE: 97.6 F | BODY MASS INDEX: 41.28 KG/M2 | SYSTOLIC BLOOD PRESSURE: 128 MMHG | HEART RATE: 93 BPM | OXYGEN SATURATION: 98 % | DIASTOLIC BLOOD PRESSURE: 86 MMHG | HEIGHT: 67 IN | WEIGHT: 263 LBS | RESPIRATION RATE: 16 BRPM

## 2023-02-06 DIAGNOSIS — M47.812 CERVICAL SPONDYLOSIS: ICD-10-CM

## 2023-02-06 DIAGNOSIS — M50.30 DDD (DEGENERATIVE DISC DISEASE), CERVICAL: ICD-10-CM

## 2023-02-06 DIAGNOSIS — M47.816 LUMBAR SPONDYLOSIS: Primary | ICD-10-CM

## 2023-02-06 DIAGNOSIS — M51.36 DDD (DEGENERATIVE DISC DISEASE), LUMBAR: ICD-10-CM

## 2023-02-06 DIAGNOSIS — M47.817 LUMBOSACRAL SPONDYLOSIS WITHOUT MYELOPATHY: ICD-10-CM

## 2023-02-06 LAB — THROAT CULTURE: NORMAL

## 2023-02-06 PROCEDURE — 99213 OFFICE O/P EST LOW 20 MIN: CPT | Performed by: ANESTHESIOLOGY

## 2023-02-06 NOTE — PROGRESS NOTES
[facility] Pain Management        [facility] [address]  [facility], [address] [place] [address]  Dept: [phone]      Follow up Note      Judi French     Date of Visit:  2/6/2023    CC:  Patient presents for follow up   Chief Complaint   Patient presents with    Follow Up After Procedure     # 1 BILATERAL LUMBAR MEDIAL BRANCH NERVE BLOCK UNDER FLUOROSCOPIC GUIDANCE AT L2, L3, L4 & L5 DORSAL RAMI    Lower Back Pain       HPI:    Neck pain and left UE pain. Failed conservative treatment. Has been evaluated by Marcela Hastings- Dr. Mehdi Rondon- referred for interventions. S/P ALDO excellent pain relief. Current pain in the low back. Pain predominantly axial in nature. Denies significant lower extremity radicular complaints. Nursing notes and details of the pain history reviewed. Please see intake notes for details. NOTE:  H/o psoriatic arthritis- on Methotrexate  S/p Gastric bypass in 2009  H/o fibromyalgia- diffuse pain     Previous treatments:   Physical Therapy/ HEP : yes, has been doing HEP since the onset      Medications: - NSAID's : yes                        - Membrane stabilizers : yes - Gabapentin                       - Opioids : yes, short term                       - Adjuvants or Others : yes, oral steroids/ muscle relaxants     Surgeries: no C spine surgery     She has not been on anticoagulation medications      She is not diabetic. H/O Smoking: no  H/O alcohol abuse : no  H/O Illicit drug use : no     Employment: employed     Imaging:     MRI of LS spine: 12/22/2022:  FINDINGS:   No evidence of lumbar spine fracture. There is retrolisthesis of L2 on L3 of   approximately 3 mm. Vertebral body height and interspaces appear well   maintained. Conus medullaris is visualized and appears unremarkable. No   evidence of epidural mass or hematoma. L1/L2: No central canal stenosis or neural foraminal narrowing. There is   moderate bilateral facet hypertrophy.        L2/L3: Anterior disc bulge present with anterior osteophytosis. 3 mm of   retrolisthesis of L2 on L3. Moderate bilateral facet hypertrophy and   hypertrophy of ligamentum flavum. No significant central canal stenosis or   neural foraminal narrowing. L3/L4: Moderate facet hypertrophy. No central canal stenosis or neural   foraminal narrowing. L4/L5: Circumferential disc bulge with significant facet hypertrophy and   hypertrophy of ligamentum flavum results in mild circumferential central   canal stenosis. AP dimension of thecal sac measures approximately 10 mm. There is moderate left neural foraminal narrowing and mild right neural   foraminal narrowing. L5/S1: Broad-based central disc herniation is present. No significant   central canal stenosis. Mild bilateral neural foraminal narrowing. Impression   1. Lumbar spondylosis as described above with mild circumferential central   canal stenosis at L4/L5 and moderate left neural foraminal narrowing at this   level. 2. Retrolisthesis of L2 on L3 of approximately 3 mm. This finding appears   similar when correlated with view of the lumbar spine from prior CT abdomen   and pelvis performed August 9, 2013 and is likely degenerative in etiology. 3. Facet arthropathy at multiple levels as described above. 4. Broad-based central disc herniation at L5/S1. Xray LS spine: 11/28/2022:  FINDINGS:   There is pronounced degenerative facet arthropathy especially at L4-5. Degenerative disc disease is present at multiple levels and is mild. There   is mild degenerative retrolisthesis of L2 on L3. No acute fracture. Normal   lumbar soft tissues. Impression   Degenerative lumbar spondylosis. MRI of Cervical spine: 10/11/2021:  FINDINGS:  Alignment is relatively anatomic. There is preservation of disc height and signal      intensity. Bone marrow signal intensity is normal.  The craniocervical junction appears intact.       No abnormal signal is identified within the cord in the cervical region. The thyroid gland is      mildly heterogeneous with a enhancing 4 mm nodule in the left thyroid gland. Additional findings      and levels are as follows:             C5-6:  Minimal broad-based disc osteophyte complex. 4.5 mm nerve root sheath diverticula on the      left. C6-7:  Moderate broad-based disc osteophyte complex. Uncovertebral hypertrophy. Mild to moderate      central canal stenosis. 6.5mm AP canal diameter. Moderate to severe left neural foraminal      stenosis. Mild right neural foraminal stenosis. IMPRESSION 3T MRI Cervical Spine without and with intravenous contrast:      1. Disc disease and degenerative changes as discussed worse at C6-7. Correlate with dermatome      mapping. 2.  4 mm enhancing nodule in the left thyroid gland.         OARRS report[de-identified] Reviewed    Past Medical History:   Diagnosis Date    Arthritis     psoriatic    Chronic back pain     Depression     Diabetes mellitus (HCC)     GERD (gastroesophageal reflux disease)     Headache(784.0)     Herpes genital     \"years ago\" ; no outbreaks    Hypertension     Varicose veins        Past Surgical History:   Procedure Laterality Date    NERVE BLOCK Bilateral 1/19/2023    # 1 BILATERAL LUMBAR MEDIAL BRANCH NERVE BLOCK UNDER FLUOROSCOPIC GUIDANCE AT L2, L3, L4 & L5 DORSAL RAMI performed by Lurline Felty, MD at [facility] OR    PAIN MANAGEMENT PROCEDURE N/A 12/16/2021    CERVICAL EPIDURAL STEROID INJECTION UNDER FLUORO AT C6 - C7 LEFT PARAMEDIAN performed by Lurline Felty, MD at [address] Left 02/07/2022    #2 CERVICAL EPIDURAL STEROID INJECTION UNDER FLUOROSCOPIC GUIDANCE AT C6-C7 LEFT PARAMEDIAN performed by Lurline Felty, MD at [address] Left 04/07/2022    CERVICAL EPIDURAL STEROID INJECTION C6-C7 LEFT PARAMEDIAN performed by Lurline Felty, MD at [address] PROCEDURE N/A 12/05/2022    CERVICAL  EPIDURAL STEROID INJECTION UNDER FLUOROSCOPIC GUIDANCE AT C6-C7 performed by Michelle Paul MD at [address]  05/31/2011    Lap    TUBAL LIGATION  01/01/2001    UPPER GASTROINTESTINAL ENDOSCOPY  01/01/2011    WISDOM TOOTH EXTRACTION         Prior to Admission medications    Medication Sig Start Date End Date Taking? Authorizing Provider   folic acid (FOLVITE) 1 MG tablet  1/28/23  Yes Historical Provider, MD   oxyCODONE-acetaminophen (PERCOCET) 5-325 MG per tablet Take 1 tablet by mouth every 4 hours as needed for Pain for up to 7 days. Take 1 tablet by mouth every 4 hours as needed for Pain.  Max Daily Amount: 6 tablets 2/3/23 2/10/23 Yes Erin Whitman DO   amoxicillin-clavulanate (AUGMENTIN) 875-125 MG per tablet Take 1 tablet by mouth 2 times daily for 10 days 2/3/23 2/13/23 Yes Erin Whitman DO   Probiotic Acidophilus [facility]) TABS Take 1 tablet by mouth 2 times daily 2/3/23 3/5/23 Yes Erin Whitman DO   naloxone 4 MG/0.1ML LIQD nasal spray 1 spray by Nasal route as needed for Opioid Reversal 2/3/23  Yes Erin Whitman DO   ondansetron (ZOFRAN) 4 MG tablet Take 1 tablet by mouth every 8 hours as needed for Nausea or Vomiting 1/31/23  Yes Erin Whitman DO   albuterol sulfate HFA (VENTOLIN HFA) 108 (90 Base) MCG/ACT inhaler Inhale 2 puffs into the lungs 4 times daily as needed for Wheezing 1/27/23  Yes Erin Whitman DO   rosuvastatin (CRESTOR) 5 MG tablet Take 1 tablet by mouth daily 1/20/23  Yes Erin Whitman DO   DULoxetine (CYMBALTA) 60 MG extended release capsule take 1 capsule by mouth once daily 1/16/23  Yes Erin Whitman DO   tiZANidine (ZANAFLEX) 4 MG tablet Take 1 tablet by mouth in the morning, at noon, and at bedtime 1/3/23 4/3/23 Yes Erin Bame, DO   QUEtiapine (SEROQUEL) 100 MG tablet Take 100 mg by mouth at bedtime 12/29/22  Yes Historical Provider, MD   cloNIDine (CATAPRES) 0.1 MG tablet Take 0.1 mg by mouth 2 times daily 12/20/22  Yes Historical Provider, MD   doxepin (SINEQUAN) 10 MG capsule Take 10 mg by mouth 3 times daily 12/1/22  Yes Historical Provider, MD   naloxone 4 MG/0.1ML LIQD nasal spray 1 spray by Nasal route as needed for Opioid Reversal 12/16/22  Yes Ruthy Damon DO   DODEX 1000 MCG/ML injection inject 1 milliliter ( 1000 MCG ) intramuscularly every week for 4 weeks 12/14/22  Yes Ruthy Damon DO   OZEMPIC, 0.25 OR 0.5 MG/DOSE, 2 MG/1.5ML SOPN inject 0.25 milligrams subcutaneously every week 12/9/22  Yes Ruthy Damon DO   prazosin (MINIPRESS) 1 MG capsule Take 1 mg by mouth nightly   Yes Historical Provider, MD   lisinopril (PRINIVIL;ZESTRIL) 10 MG tablet take 1 tablet by mouth once daily 11/22/22  Yes LYNSEY Campbell - CNP   omeprazole (PRILOSEC) 40 MG delayed release capsule Take 1 capsule by mouth 2 times daily 10/6/22 4/4/23 Yes Connie Truong, DO   TRINTELLIX 20 MG TABS tablet Take 20 mg by mouth nightly 6/22/22  Yes Historical Provider, MD   SUMAtriptan (IMITREX) 50 MG tablet take 1 tablet by mouth if needed  Holderrieth [place] may repeat in 4 hours IF headache PERSISTS 2/21/22  Yes Ruthy Damon DO   clotrimazole-betamethasone (LOTRISONE) 1-0.05 % cream Apply topically 2 times daily. 1/31/22  Yes Connie Truong, DO   secukinumab (COSENTYX) 150 MG/ML SOSY Inject 300 mg into the skin once   Yes Historical Provider, MD   Multiple Vitamin (MULTIVITAMIN PO) Take 1 tablet by mouth daily    Yes Historical Provider, MD   oxyCODONE-acetaminophen (PERCOCET) 5-325 MG per tablet Take 1 tablet by mouth every 4 hours as needed for Pain.   Patient not taking: Reported on 2/6/2023    Historical Provider, MD       No Known Allergies    Social History     Socioeconomic History    Marital status:      Spouse name: Not on file    Number of children: Not on file    Years of education: Not on file    Highest education level: Not on file   Occupational History    Not on file   Tobacco Use Smoking status: Never    Smokeless tobacco: Never   Vaping Use    Vaping Use: Never used   Substance and Sexual Activity    Alcohol use: No    Drug use: No    Sexual activity: Not on file   Other Topics Concern    Not on file   Social History Narrative    Not on file     Social Determinants of Health     Financial Resource Strain: Low Risk     Difficulty of Paying Living Expenses: Not hard at all   Food Insecurity: No Food Insecurity    Worried About [address] in the Last Year: Never true    [address] in the Last Year: Never true   Transportation Needs: Unknown    Lack of Transportation (Medical): Not on file    Lack of Transportation (Non-Medical): No   Physical Activity: Not on file   Stress: Not on file   Social Connections: Not on file   Intimate Partner Violence: Not on file   Housing Stability: Unknown    Unable to Pay for Housing in the Last Year: Not on file    Number of Places Lived in the Last Year: Not on file    Unstable Housing in the Last Year: No       Family History   Family history unknown: Yes       REVIEW OF SYSTEMS:     Richard Olvera denies fever/chills, chest pain, shortness of breath, new bowel or bladder complaints. All other review of systems was negative. PHYSICAL EXAMINATION:      /86   Pulse 93   Temp 97.6 °F (36.4 °C) (Infrared)   Resp 16   Ht 5' 7\" (1.702 m)   Wt 263 lb (119.3 kg)   SpO2 98%   BMI 41.19 kg/m²       General:       General appearance:  Pleasant and well-hydrated, in no distress and A & O x 3  Build:Overweight  Function: Rises from seated position easily and Moves about room without difficulty     HEENT:     Head:normocephalic, atraumatic     Lungs:     Breathing:normal breathing pattern      CVS:     RRR     Abdomen:     Shape:non-distended and normal     Cervical spine:     Inspection:normal  Palpation:tenderness paravertebral muscles, tenderness trapezium, left, right and positive.   Range of motion:pain improved     Thoracic spine: Spine inspection:normal      Lumbar spine:     Spine inspection: Normal   Palpation: Tenderness paravertebral muscles +  Facet tenderness +  Range of motion: Decreased,  SLR : negative bilaterally     Musculoskeletal:     Tender points +     Extremities:     Tremors:None bilaterally upper and lower  Edema:no     Neurological:     Sensory: Normal to light touch      Motor:   No new changes     Gait:normal Yes       Assessment/Plan:   Diagnosis Orders   1. Lumbar spondylosis [id number] (ICD-10-CM)]        2. Lumbosacral spondylosis without myelopathy        3. DDD (degenerative disc disease), lumbar        4. DDD (degenerative disc disease), cervical        5. Cervical spondylosis           56 y.o.  female with h/o neck pain and left UE radicular pain. Failed conservative treatment. MRI: C6-7 DDD/ stenosis. Had been evaluated by NSG- recommended interventions. S/P Cervical DOMINICK with excellent pain relief of the neck/ UE pain. Past h/o gastric bypass/ h/o psoriatic arthritis. Has tried Gabapentin. H/o Low back pain Predominantly axial in nature- features of facet pain. MRI of LS spine & Xray LS spine- reviewed and discussed. S/P # 1 lumbar MBNB with > 90% releif. Still lasting. Doing HEP    PLAN:    Xray flex/ ext to RO instability- still pending. When pain recurs, Can set up for # 2 Lumbar facet MBNB to address pain form L3-4, L4-5, L5-S1 under fluoroscopy. (MRI shows facet hypertrophy at those levels). She can call to schedule the procedure when pain recurs. Moderate sedation due to anxiety of needles. If short term relief will do RFA. NOTE: Prior PT/ INR/ Platelets- normal.    She takes percocet prn/ Zanaflex Prn- by her PCP. Instructed to take percocet only prn for severe pain- she apparently takes it for fibromyalgia. Discussed issues with long term opioid use. She has been started on Cymbalta- which is helping her. Recommend adding Lyrica.       Counseling : Patient encouraged to stay active and to watch/lose weight     Encouraged to continue Regular home exercise program as tolerated - stretching / strengthening. Treatment plan discussed with the patient including medication and procedure side effects.      Sloane Cosby MD    CC;  Karen Benjamin DO

## 2023-02-06 NOTE — PROGRESS NOTES
Do you currently have any of the following:    Fever: No  Headache:  No  Cough: No  Shortness of breath: No  Exposed to anyone with these symptoms: No         Judi French presents to the [facility] on [facility]/6/2023. Althea Whitfield is complaining of pain lower back. The pain is intermittent. The pain is described as throbbing and burning. Pain is rated on her best day at a 1, on her worst day at a 7, and on average at a 4 on the VAS scale. She took her last dose of Percocet today. Any procedures since your last visit: Yes, with 90 % relief. Pacemaker or defibrillator: No .    She is not on NSAIDS and is not on anticoagulation medications. Medication Contract and Consent for Opioid Use Documents Filed       Patient Documents       Type of Document Status Date Received Received By Description    Medication Contract Received 1/19/2023 11:34 AM EZIO VILLANUEVA 1/6/23 Medication Contract                    /86   Pulse 93   Temp 97.6 °F (36.4 °C) (Infrared)   Resp 16   Ht 5' 7\" (1.702 m)   Wt 263 lb (119.3 kg)   SpO2 98%   BMI 41.19 kg/m²      No LMP recorded.

## 2023-02-08 DIAGNOSIS — M54.12 CERVICAL RADICULOPATHY: ICD-10-CM

## 2023-02-08 DIAGNOSIS — Z00.00 ROUTINE GENERAL MEDICAL EXAMINATION AT A HEALTH CARE FACILITY: ICD-10-CM

## 2023-02-08 RX ORDER — ALBUTEROL SULFATE 90 UG/1
2 AEROSOL, METERED RESPIRATORY (INHALATION) 4 TIMES DAILY PRN
Qty: 18 G | Refills: 5 | Status: SHIPPED | OUTPATIENT
Start: 2023-02-08

## 2023-02-08 RX ORDER — CYANOCOBALAMIN 1000 UG/ML
INJECTION, SOLUTION INTRAMUSCULAR; SUBCUTANEOUS
Qty: 4 ML | Refills: 5 | Status: SHIPPED | OUTPATIENT
Start: 2023-02-08

## 2023-02-08 RX ORDER — CLONIDINE HYDROCHLORIDE 0.1 MG/1
0.1 TABLET ORAL 2 TIMES DAILY
Qty: 60 TABLET | Refills: 0 | Status: SHIPPED | OUTPATIENT
Start: 2023-02-08

## 2023-02-08 RX ORDER — SEMAGLUTIDE 1.34 MG/ML
INJECTION, SOLUTION SUBCUTANEOUS
Qty: 4 ADJUSTABLE DOSE PRE-FILLED PEN SYRINGE | Refills: 5 | Status: SHIPPED | OUTPATIENT
Start: 2023-02-08

## 2023-02-08 RX ORDER — OMEPRAZOLE 40 MG/1
40 CAPSULE, DELAYED RELEASE ORAL 2 TIMES DAILY
Qty: 180 CAPSULE | Refills: 1 | Status: SHIPPED | OUTPATIENT
Start: 2023-02-08 | End: 2023-08-07

## 2023-02-08 RX ORDER — OXYCODONE HYDROCHLORIDE AND ACETAMINOPHEN 5; 325 MG/1; MG/1
1 TABLET ORAL EVERY 4 HOURS PRN
Qty: 42 TABLET | Refills: 0 | Status: SHIPPED | OUTPATIENT
Start: 2023-02-08 | End: 2023-02-15

## 2023-02-08 RX ORDER — ROSUVASTATIN CALCIUM 5 MG/1
5 TABLET, COATED ORAL DAILY
Qty: 90 TABLET | Refills: 1 | Status: SHIPPED | OUTPATIENT
Start: 2023-02-08

## 2023-02-08 RX ORDER — NALOXONE HYDROCHLORIDE 4 MG/.1ML
1 SPRAY NASAL PRN
Qty: 1 EACH | Refills: 5 | Status: SHIPPED | OUTPATIENT
Start: 2023-02-08

## 2023-02-08 RX ORDER — GREEN TEA/HOODIA GORDONII 315-12.5MG
1 CAPSULE ORAL 2 TIMES DAILY
Qty: 60 TABLET | Refills: 0 | Status: SHIPPED | OUTPATIENT
Start: 2023-02-08 | End: 2023-03-10

## 2023-02-08 RX ORDER — PRAZOSIN HYDROCHLORIDE 1 MG/1
1 CAPSULE ORAL NIGHTLY
Qty: 30 CAPSULE | Refills: 0 | Status: SHIPPED | OUTPATIENT
Start: 2023-02-08

## 2023-02-08 RX ORDER — SUMATRIPTAN 50 MG/1
TABLET, FILM COATED ORAL
Qty: 9 TABLET | Refills: 5 | Status: SHIPPED | OUTPATIENT
Start: 2023-02-08

## 2023-02-08 RX ORDER — TIZANIDINE 4 MG/1
4 TABLET ORAL 3 TIMES DAILY
Qty: 270 TABLET | Refills: 1 | Status: SHIPPED | OUTPATIENT
Start: 2023-02-08 | End: 2023-05-09

## 2023-02-08 RX ORDER — FOLIC ACID 1 MG/1
1 TABLET ORAL DAILY
Qty: 30 TABLET | Refills: 0 | Status: SHIPPED | OUTPATIENT
Start: 2023-02-08 | End: 2023-03-10

## 2023-02-08 RX ORDER — LISINOPRIL 10 MG/1
TABLET ORAL
Qty: 90 TABLET | Refills: 1 | Status: SHIPPED | OUTPATIENT
Start: 2023-02-08

## 2023-02-08 RX ORDER — QUETIAPINE FUMARATE 100 MG/1
100 TABLET, FILM COATED ORAL NIGHTLY
Qty: 60 TABLET | Refills: 0 | Status: SHIPPED | OUTPATIENT
Start: 2023-02-08

## 2023-02-08 RX ORDER — DULOXETIN HYDROCHLORIDE 60 MG/1
CAPSULE, DELAYED RELEASE ORAL
Qty: 30 CAPSULE | Refills: 5 | Status: SHIPPED | OUTPATIENT
Start: 2023-02-08

## 2023-02-08 RX ORDER — VORTIOXETINE 20 MG/1
20 TABLET, FILM COATED ORAL NIGHTLY
Qty: 30 TABLET | Refills: 0 | Status: SHIPPED | OUTPATIENT
Start: 2023-02-08

## 2023-02-08 RX ORDER — ONDANSETRON 4 MG/1
4 TABLET, FILM COATED ORAL EVERY 8 HOURS PRN
Qty: 21 TABLET | Refills: 0 | Status: SHIPPED | OUTPATIENT
Start: 2023-02-08

## 2023-02-08 RX ORDER — DOXEPIN HYDROCHLORIDE 10 MG/1
10 CAPSULE ORAL 3 TIMES DAILY
Qty: 30 CAPSULE | Refills: 0 | Status: SHIPPED | OUTPATIENT
Start: 2023-02-08

## 2023-02-11 ASSESSMENT — ENCOUNTER SYMPTOMS
TROUBLE SWALLOWING: 0
ABDOMINAL PAIN: 0
EYE PAIN: 0
FACIAL SWELLING: 0
ABDOMINAL DISTENTION: 0
VOMITING: 0
DIARRHEA: 0
BLOOD IN STOOL: 0
CHOKING: 0
PHOTOPHOBIA: 1
EYE DISCHARGE: 0
RECTAL PAIN: 0
VOICE CHANGE: 0
RHINORRHEA: 0
CHEST TIGHTNESS: 0
STRIDOR: 0
COUGH: 0
NAUSEA: 1
EYE REDNESS: 0
SHORTNESS OF BREATH: 0
EYE ITCHING: 0
CONSTIPATION: 0
SINUS PAIN: 0
SINUS PRESSURE: 0
ANAL BLEEDING: 0
COLOR CHANGE: 0
SORE THROAT: 0
APNEA: 0
WHEEZING: 0

## 2023-02-13 ENCOUNTER — NURSE ONLY (OUTPATIENT)
Dept: FAMILY MEDICINE CLINIC | Age: 48
End: 2023-02-13

## 2023-02-13 DIAGNOSIS — E53.8 B12 DEFICIENCY: Primary | ICD-10-CM

## 2023-02-13 RX ORDER — CYANOCOBALAMIN 1000 UG/ML
1000 INJECTION, SOLUTION INTRAMUSCULAR; SUBCUTANEOUS ONCE
Status: COMPLETED | OUTPATIENT
Start: 2023-02-13 | End: 2023-02-13

## 2023-02-13 RX ADMIN — CYANOCOBALAMIN 1000 MCG: 1000 INJECTION, SOLUTION INTRAMUSCULAR; SUBCUTANEOUS at 16:11

## 2023-02-20 ENCOUNTER — APPOINTMENT (OUTPATIENT)
Dept: GENERAL RADIOLOGY | Age: 48
End: 2023-02-20
Payer: COMMERCIAL

## 2023-02-20 ENCOUNTER — HOSPITAL ENCOUNTER (EMERGENCY)
Age: 48
Discharge: HOME | End: 2023-02-21
Attending: EMERGENCY MEDICINE
Payer: COMMERCIAL

## 2023-02-20 ENCOUNTER — APPOINTMENT (OUTPATIENT)
Dept: CT IMAGING | Age: 48
End: 2023-02-20
Payer: COMMERCIAL

## 2023-02-20 DIAGNOSIS — R07.9 CHEST PAIN, UNSPECIFIED TYPE: Primary | ICD-10-CM

## 2023-02-20 DIAGNOSIS — K59.00 CONSTIPATION, UNSPECIFIED CONSTIPATION TYPE: ICD-10-CM

## 2023-02-20 LAB
ALBUMIN SERPL-MCNC: 4 G/DL (ref 3.5–5.2)
ALP BLD-CCNC: 71 U/L (ref 35–104)
ALT SERPL-CCNC: 14 U/L (ref 0–32)
ANION GAP SERPL CALCULATED.3IONS-SCNC: 12 MMOL/L (ref 7–16)
AST SERPL-CCNC: 14 U/L (ref 0–31)
BACTERIA: ABNORMAL /HPF
BASOPHILS ABSOLUTE: 0.05 E9/L (ref 0–0.2)
BASOPHILS RELATIVE PERCENT: 0.4 % (ref 0–2)
BILIRUB SERPL-MCNC: 0.4 MG/DL (ref 0–1.2)
BILIRUBIN URINE: ABNORMAL
BLOOD, URINE: NEGATIVE
BUN BLDV-MCNC: 23 MG/DL (ref 6–20)
CALCIUM SERPL-MCNC: 9.2 MG/DL (ref 8.6–10.2)
CHLORIDE BLD-SCNC: 102 MMOL/L (ref 98–107)
CLARITY: ABNORMAL
CO2: 25 MMOL/L (ref 22–29)
COLOR: YELLOW
CREAT SERPL-MCNC: 1 MG/DL (ref 0.5–1)
EOSINOPHILS ABSOLUTE: 0.2 E9/L (ref 0.05–0.5)
EOSINOPHILS RELATIVE PERCENT: 1.6 % (ref 0–6)
EPITHELIAL CELLS, UA: ABNORMAL /HPF
GFR SERPL CREATININE-BSD FRML MDRD: >60 ML/MIN/1.73
GLUCOSE BLD-MCNC: 114 MG/DL (ref 74–99)
GLUCOSE URINE: NEGATIVE MG/DL
HCG, URINE, POC: NEGATIVE
HCT VFR BLD CALC: 36.7 % (ref 34–48)
HEMOGLOBIN: 12.1 G/DL (ref 11.5–15.5)
IMMATURE GRANULOCYTES #: 0.05 E9/L
IMMATURE GRANULOCYTES %: 0.4 % (ref 0–5)
KETONES, URINE: ABNORMAL MG/DL
LACTIC ACID: 1.3 MMOL/L (ref 0.5–2.2)
LEUKOCYTE ESTERASE, URINE: ABNORMAL
LIPASE: 24 U/L (ref 13–60)
LYMPHOCYTES ABSOLUTE: 2.27 E9/L (ref 1.5–4)
LYMPHOCYTES RELATIVE PERCENT: 18.1 % (ref 20–42)
Lab: NORMAL
MCH RBC QN AUTO: 31.1 PG (ref 26–35)
MCHC RBC AUTO-ENTMCNC: 33 % (ref 32–34.5)
MCV RBC AUTO: 94.3 FL (ref 80–99.9)
MONOCYTES ABSOLUTE: 0.94 E9/L (ref 0.1–0.95)
MONOCYTES RELATIVE PERCENT: 7.5 % (ref 2–12)
NEGATIVE QC PASS/FAIL: NORMAL
NEUTROPHILS ABSOLUTE: 9 E9/L (ref 1.8–7.3)
NEUTROPHILS RELATIVE PERCENT: 72 % (ref 43–80)
NITRITE, URINE: NEGATIVE
PDW BLD-RTO: 12.1 FL (ref 11.5–15)
PH UA: 5.5 (ref 5–9)
PLATELET # BLD: 286 E9/L (ref 130–450)
PMV BLD AUTO: 9.2 FL (ref 7–12)
POSITIVE QC PASS/FAIL: NORMAL
POTASSIUM REFLEX MAGNESIUM: 3.8 MMOL/L (ref 3.5–5)
PRO-BNP: 12 PG/ML (ref 0–125)
PROTEIN UA: ABNORMAL MG/DL
RBC # BLD: 3.89 E12/L (ref 3.5–5.5)
RBC UA: ABNORMAL /HPF (ref 0–2)
SODIUM BLD-SCNC: 139 MMOL/L (ref 132–146)
SPECIFIC GRAVITY UA: >=1.03 (ref 1–1.03)
TOTAL PROTEIN: 7.2 G/DL (ref 6.4–8.3)
TROPONIN, HIGH SENSITIVITY: <6 NG/L (ref 0–9)
UROBILINOGEN, URINE: 0.2 E.U./DL
WBC # BLD: 12.5 E9/L (ref 4.5–11.5)
WBC UA: ABNORMAL /HPF (ref 0–5)

## 2023-02-20 PROCEDURE — 99285 EMERGENCY DEPT VISIT HI MDM: CPT

## 2023-02-20 PROCEDURE — 85025 COMPLETE CBC W/AUTO DIFF WBC: CPT

## 2023-02-20 PROCEDURE — 83690 ASSAY OF LIPASE: CPT

## 2023-02-20 PROCEDURE — 84484 ASSAY OF TROPONIN QUANT: CPT

## 2023-02-20 PROCEDURE — 81001 URINALYSIS AUTO W/SCOPE: CPT

## 2023-02-20 PROCEDURE — 83880 ASSAY OF NATRIURETIC PEPTIDE: CPT

## 2023-02-20 PROCEDURE — 71045 X-RAY EXAM CHEST 1 VIEW: CPT

## 2023-02-20 PROCEDURE — 93005 ELECTROCARDIOGRAM TRACING: CPT | Performed by: STUDENT IN AN ORGANIZED HEALTH CARE EDUCATION/TRAINING PROGRAM

## 2023-02-20 PROCEDURE — 80053 COMPREHEN METABOLIC PANEL: CPT

## 2023-02-20 PROCEDURE — 83605 ASSAY OF LACTIC ACID: CPT

## 2023-02-20 PROCEDURE — 2580000003 HC RX 258: Performed by: STUDENT IN AN ORGANIZED HEALTH CARE EDUCATION/TRAINING PROGRAM

## 2023-02-20 RX ORDER — 0.9 % SODIUM CHLORIDE 0.9 %
1000 INTRAVENOUS SOLUTION INTRAVENOUS ONCE
Status: COMPLETED | OUTPATIENT
Start: 2023-02-20 | End: 2023-02-21

## 2023-02-20 RX ORDER — OMEPRAZOLE 40 MG/1
CAPSULE, DELAYED RELEASE ORAL
Qty: 90 CAPSULE | Refills: 3 | OUTPATIENT
Start: 2023-02-20

## 2023-02-20 RX ADMIN — SODIUM CHLORIDE 1000 ML: 9 INJECTION, SOLUTION INTRAVENOUS at 23:13

## 2023-02-20 ASSESSMENT — PAIN - FUNCTIONAL ASSESSMENT: PAIN_FUNCTIONAL_ASSESSMENT: 0-10

## 2023-02-20 ASSESSMENT — PAIN SCALES - GENERAL: PAINLEVEL_OUTOF10: 7

## 2023-02-21 ENCOUNTER — APPOINTMENT (OUTPATIENT)
Dept: CT IMAGING | Age: 48
End: 2023-02-21
Payer: COMMERCIAL

## 2023-02-21 VITALS
RESPIRATION RATE: 18 BRPM | HEART RATE: 80 BPM | WEIGHT: 260 LBS | DIASTOLIC BLOOD PRESSURE: 80 MMHG | TEMPERATURE: 98.1 F | SYSTOLIC BLOOD PRESSURE: 115 MMHG | OXYGEN SATURATION: 95 % | HEIGHT: 67 IN | BODY MASS INDEX: 40.81 KG/M2

## 2023-02-21 LAB
EKG ATRIAL RATE: 98 BPM
EKG P AXIS: 30 DEGREES
EKG P-R INTERVAL: 170 MS
EKG Q-T INTERVAL: 360 MS
EKG QRS DURATION: 84 MS
EKG QTC CALCULATION (BAZETT): 459 MS
EKG R AXIS: -10 DEGREES
EKG T AXIS: 13 DEGREES
EKG VENTRICULAR RATE: 98 BPM

## 2023-02-21 PROCEDURE — 93010 ELECTROCARDIOGRAM REPORT: CPT | Performed by: INTERNAL MEDICINE

## 2023-02-21 PROCEDURE — 6360000004 HC RX CONTRAST MEDICATION: Performed by: RADIOLOGY

## 2023-02-21 PROCEDURE — 74177 CT ABD & PELVIS W/CONTRAST: CPT

## 2023-02-21 RX ADMIN — IOPAMIDOL 20 ML: 755 INJECTION, SOLUTION INTRAVENOUS at 00:27

## 2023-02-21 RX ADMIN — IOPAMIDOL 75 ML: 755 INJECTION, SOLUTION INTRAVENOUS at 00:29

## 2023-02-21 NOTE — ED PROVIDER NOTES
Department of Emergency Medicine   ED  Provider Note  Admit Date/RoomTime: 2/20/2023 10:23 PM  ED Room: 14/14          History of Present Illness:  2/20/23, Time: 10:49 PM EST  Chief Complaint   Patient presents with    Chest Pain     Describes as pressure            Telma Hernandez is a 56 y.o. female presenting to the ED for shortness of breath, chest pain, and abdominal pain. Patient states has been ongoing for the past day and a half. She states that she has had symptoms similar to this previously however those related to her her fibromyalgia. She states that the pain feels as a pressure that goes up from her abdomen all the way through her chest.  She states that she does not have any other accompanying symptoms including but limited to fevers, nausea, vomiting, cough, changes to her urine or stool, or lower extremity swelling or pain. Patient denies any recent surgeries or hormone use. Review of Systems   All other systems reviewed and are negative.       --------------------------------------------- PAST HISTORY ---------------------------------------------  Past Medical History:  has a past medical history of Arthritis, Chronic back pain, Depression, Diabetes mellitus ([facility] [address] GERD (gastroesophageal reflux disease), Headache(784.0), Herpes genital, Hypertension, and Varicose veins. Past Surgical History:  has a past surgical history that includes Tubal ligation (01/01/2001); Upper gastrointestinal endoscopy (01/01/2011); Adam-en-Y Gastric Bypass (05/31/2011); Pain management procedure (N/A, 12/16/2021); Pain management procedure (Left, 02/07/2022); Pain management procedure (Left, 04/07/2022); Pain management procedure (N/A, 12/05/2022); Raymond tooth extraction; and Nerve Block (Bilateral, 1/19/2023). Social History:  reports that she has never smoked. She has never used smokeless tobacco. She reports that she does not drink alcohol and does not use drugs.     Family History: Family history is unknown by patient. . Unless otherwise noted, family history is non contributory    The patients home medications have been reviewed. Allergies: Patient has no known allergies. ---------------------------------------------------PHYSICAL EXAM--------------------------------------    Physical Exam  Vitals and nursing note reviewed. Constitutional:       General: She is not in acute distress. Appearance: She is well-developed. She is obese. HENT:      Head: Normocephalic and atraumatic. Eyes:      Conjunctiva/sclera: Conjunctivae normal.   Cardiovascular:      Rate and Rhythm: Normal rate and regular rhythm. Pulses: Normal pulses. Heart sounds: Normal heart sounds. No murmur heard. Pulmonary:      Effort: Pulmonary effort is normal. No respiratory distress. Breath sounds: Normal breath sounds. No wheezing, rhonchi or rales. Abdominal:      Palpations: Abdomen is soft. Tenderness: There is no abdominal tenderness. There is no right CVA tenderness, left CVA tenderness, guarding or rebound. Musculoskeletal:      Cervical back: Normal range of motion and neck supple. Right lower leg: No edema. Left lower leg: No edema. Skin:     General: Skin is warm and dry. Neurological:      Mental Status: She is alert and oriented to person, place, and time. Cranial Nerves: No cranial nerve deficit. Coordination: Coordination normal.          -------------------------------------------------- RESULTS -------------------------------------------------  I have personally reviewed all laboratory and imaging results for this patient. Results are listed below.      LABS: (Lab results interpreted by me)  Results for orders placed or performed during the hospital encounter of 02/20/23   CBC with Auto Differential   Result Value Ref Range    WBC 12.5 (H) 4.5 - 11.5 E9/L    RBC 3.89 3.50 - 5.50 E12/L    Hemoglobin 12.1 11.5 - 15.5 g/dL    Hematocrit 36.7 34.0 - 48.0 %    MCV 94.3 80.0 - 99.9 fL    MCH 31.1 26.0 - 35.0 pg    MCHC 33.0 32.0 - 34.5 %    RDW 12.1 11.5 - 15.0 fL    Platelets 392 813 - 465 E9/L    MPV 9.2 7.0 - 12.0 fL    Neutrophils % 72.0 43.0 - 80.0 %    Immature Granulocytes % 0.4 0.0 - 5.0 %    Lymphocytes % 18.1 (L) 20.0 - 42.0 %    Monocytes % 7.5 2.0 - 12.0 %    Eosinophils % 1.6 0.0 - 6.0 %    Basophils % 0.4 0.0 - 2.0 %    Neutrophils Absolute 9.00 (H) 1.80 - 7.30 E9/L    Immature Granulocytes # 0.05 E9/L    Lymphocytes Absolute 2.27 1.50 - 4.00 E9/L    Monocytes Absolute 0.94 0.10 - 0.95 E9/L    Eosinophils Absolute 0.20 0.05 - 0.50 E9/L    Basophils Absolute 0.05 0.00 - 0.20 E9/L   Comprehensive Metabolic Panel w/ Reflex to MG   Result Value Ref Range    Sodium 139 132 - 146 mmol/L    Potassium reflex Magnesium 3.8 3.5 - 5.0 mmol/L    Chloride 102 98 - 107 mmol/L    CO2 25 22 - 29 mmol/L    Anion Gap 12 7 - 16 mmol/L    Glucose 114 (H) 74 - 99 mg/dL    BUN 23 (H) 6 - 20 mg/dL    Creatinine 1.0 0.5 - 1.0 mg/dL    Est, Glom Filt Rate >60 >=60 mL/min/1.73    Calcium 9.2 8.6 - 10.2 mg/dL    Total Protein 7.2 6.4 - 8.3 g/dL    Albumin 4.0 3.5 - 5.2 g/dL    Total Bilirubin 0.4 0.0 - 1.2 mg/dL    Alkaline Phosphatase 71 35 - 104 U/L    ALT 14 0 - 32 U/L    AST 14 0 - 31 U/L   Troponin   Result Value Ref Range    Troponin, High Sensitivity <6 0 - 9 ng/L   Brain Natriuretic Peptide   Result Value Ref Range    Pro-BNP 12 0 - 125 pg/mL   Lipase   Result Value Ref Range    Lipase 24 13 - 60 U/L   Lactic Acid   Result Value Ref Range    Lactic Acid 1.3 0.5 - 2.2 mmol/L   Urinalysis with Microscopic   Result Value Ref Range    Color, UA Yellow Straw/Yellow    Clarity, UA SL CLOUDY Clear    Glucose, Ur Negative Negative mg/dL    Bilirubin Urine SMALL (A) Negative    Ketones, Urine TRACE (A) Negative mg/dL    Specific Gravity, UA >=1.030 1.005 - 1.030    Blood, Urine Negative Negative    pH, UA 5.5 5.0 - 9.0    Protein, UA TRACE Negative mg/dL    Urobilinogen, Urine 0.2 <2.0 E. U./dL    Nitrite, Urine Negative Negative    Leukocyte Esterase, Urine TRACE (A) Negative    WBC, UA 1-3 0 - 5 /HPF    RBC, UA NONE 0 - 2 /HPF    Epithelial Cells, UA MODERATE /HPF    Bacteria, UA FEW (A) None Seen /HPF   POC Pregnancy Urine Qual   Result Value Ref Range    HCG, Urine, POC Negative Negative    Lot Number [id number]     Positive QC Pass/Fail Pass     Negative QC Pass/Fail Pass    EKG 12 Lead   Result Value Ref Range    Ventricular Rate 98 BPM    Atrial Rate 98 BPM    P-R Interval 170 ms    QRS Duration 84 ms    Q-T Interval 360 ms    QTc Calculation (Bazett) 459 ms    P Axis 30 degrees    R Axis -10 degrees    T Axis 13 degrees   ,       RADIOLOGY:  Interpreted by Radiologist unless otherwise specified  CT ABDOMEN PELVIS W IV CONTRAST Additional Contrast? Oral   Final Result   Diffuse colonic fecal retention with significant stool burden. XR CHEST PORTABLE   Final Result   No acute process. ------------------------- NURSING NOTES AND VITALS REVIEWED ---------------------------   The nursing notes within the ED encounter and vital signs as below have been reviewed by myself  /80   Pulse 80   Temp 98.1 °F (36.7 °C)   Resp 18   Ht 5' 7\" (1.702 m)   Wt 260 lb (117.9 kg)   SpO2 95%   BMI 40.72 kg/m²     Oxygen Saturation Interpretation: Normal    The cardiac monitor revealed normal sinus rhythm with a heart rate in the 80s as interpreted by me. The cardiac monitor was ordered secondary to the patient's heart rate and to monitor the patient for dysrhythmia. CPT 37952    The patients available past medical records and past encounters were reviewed.         ------------------------------ ED COURSE/MEDICAL DECISION MAKING----------------------  Medications   0.9 % sodium chloride bolus (0 mLs IntraVENous Stopped 2/21/23 0112)   iopamidol (ISOVUE-370) 76 % injection 75 mL (75 mLs IntraVENous Given 2/21/23 0029)   iopamidol (ISOVUE-370) 76 % injection 20 mL (20 mLs Other Given 2/21/23 0027)          Medical Decision Making  Differential included but not limited to acute cholecystitis versus ACS versus constipation versus UTI. Amount and/or Complexity of Data Reviewed  Labs: ordered. Decision-making details documented in ED Course. Radiology: ordered and independent interpretation performed. Decision-making details documented in ED Course. ECG/medicine tests: ordered and independent interpretation performed. Decision-making details documented in ED Course. Risk  Prescription drug management. ED Course as of 02/21/23 2259   Mon Feb 20, 2023   2240 EKG: This EKG is signed and interpreted by me. Rate: 98  Rhythm: Sinus  Interpretation: no acute changes and non-specific EKG  Comparison: stable as compared to patient's most recent EKG   [TG]   Tue Feb 21, 2023   16 Criss Lemus:     I have personally performed and/or participated in the history, exam, medical decision making, and procedures and agree with all pertinent clinical information unless otherwise noted. I have also reviewed and agree with the past medical, family and social history unless otherwise noted. I have discussed this patient in detail with the resident and provided the instruction and education regarding the evidence-based evaluation and treatment of abdominal pain, chest pain. Any EKG that may have been performed has been personally reviewed by me and I agree with the documentation as noted by the resident. History: Patient reports almost 2 days of abdominal discomfort and chest pain discomfort. She describes both pain as worse with deep inspiration. She reports history of significant constipation. She reports taking stimulant laxatives frequently. My findings: Viviana Solares is a 56 y.o. female whom is in no distress. Physical exam reveals she is alert and oriented. Heart is regular, lungs are clear. Abdomen is obese, soft and nontender.   Extremities are intact without edema. Skin is warm and dry. My plan: Symptomatic and supportive care. Labs, CT. X-ray    Electronically signed by Swathi Matthews DO on 2/21/23 at 1:22 AM EST       [TG]   9473 Discussed results with the patient. Discussed home prognosis, treatment and follow-up plans. [TG]   5683 Patient presents with chest pain, abdominal pain, and shortness of breath. She is borderline tachycardic. Fluids were given with improvement. CBC did show a mild leukocytosis of 12.5. No anemia was noted. CMP did not show any ESTEFANIA or other electrolyte abnormalities. Transaminases were within normal limits. Low concern for acute cholecystitis. Lactic within normal limits. Troponin was negative. EKG did not meet STEMI criteria. Low concern for ACS. BNP was within normal limits. Low concern for congestive heart failure. Urinalysis was unconvincing of UTI. Chest x-ray did not show any opacity consistent with pneumonia. No pneumothorax was noted. CT of the abdomen pelvis was obtained and did show signs for constipation otherwise no emergent intra-abdominal pathology. Patient be discharged home with instructions to follow-up with her PCP for further evaluation and care. Vitals are stable. Patient discharged. [BB]      ED Course User Index  [BB] Nixon Mejia DO  [TG] Swathi Matthews DO        Re-Evaluations:  Patient was reevaluted and continue be stable. This patient's ED course included: a personal history and physicial examination, re-evaluation prior to disposition, and IV medications    This patient has remained hemodynamically stable during their ED course. Consultations:  None    Counseling: The emergency provider has spoken with the patient and discussed todays results, in addition to providing specific details for the plan of care and counseling regarding the diagnosis and prognosis. Questions are answered at this time and they are agreeable with the plan. --------------------------------- IMPRESSION AND DISPOSITION ---------------------------------    IMPRESSION  1. Chest pain, unspecified type    2. Constipation, unspecified constipation type        DISPOSITION  Disposition: Discharge to home  Patient condition is stable    Patient was seen and evaluated by both myself and Matias Padilla DO. NOTE: This report was transcribed using voice recognition software.  Every effort was made to ensure accuracy; however, inadvertent computerized transcription errors may be present           Emma Sicard, DO  Resident  02/21/23 3028

## 2023-02-24 ENCOUNTER — TELEPHONE (OUTPATIENT)
Dept: FAMILY MEDICINE CLINIC | Age: 48
End: 2023-02-24

## 2023-02-24 ENCOUNTER — NURSE ONLY (OUTPATIENT)
Dept: FAMILY MEDICINE CLINIC | Age: 48
End: 2023-02-24

## 2023-02-24 DIAGNOSIS — E53.8 VITAMIN B 12 DEFICIENCY: Primary | ICD-10-CM

## 2023-02-24 RX ORDER — CYANOCOBALAMIN 1000 UG/ML
1000 INJECTION, SOLUTION INTRAMUSCULAR; SUBCUTANEOUS ONCE
Status: COMPLETED | OUTPATIENT
Start: 2023-02-24 | End: 2023-02-24

## 2023-02-24 RX ADMIN — CYANOCOBALAMIN 1000 MCG: 1000 INJECTION, SOLUTION INTRAMUSCULAR; SUBCUTANEOUS at 08:52

## 2023-02-24 NOTE — TELEPHONE ENCOUNTER
Pt was in office this morning for her B12 injection and asked if she could leave a urine sample to be checked, that she had been in er on 2/20/23 and she looked at her labs in my chart and they are a mess. Advised pt that she could stay and see Meggan and have her urine checked  and pt refused appt, states she will just wait.

## 2023-02-27 ENCOUNTER — COMMUNITY OUTREACH (OUTPATIENT)
Dept: FAMILY MEDICINE CLINIC | Age: 48
End: 2023-02-27

## 2023-02-28 ENCOUNTER — OFFICE VISIT (OUTPATIENT)
Dept: FAMILY MEDICINE CLINIC | Age: 48
End: 2023-02-28

## 2023-02-28 VITALS
WEIGHT: 252.8 LBS | OXYGEN SATURATION: 93 % | SYSTOLIC BLOOD PRESSURE: 110 MMHG | TEMPERATURE: 98.7 F | HEART RATE: 94 BPM | DIASTOLIC BLOOD PRESSURE: 76 MMHG | HEIGHT: 67 IN | BODY MASS INDEX: 39.68 KG/M2 | RESPIRATION RATE: 17 BRPM

## 2023-02-28 DIAGNOSIS — R05.9 COUGH, UNSPECIFIED TYPE: ICD-10-CM

## 2023-02-28 DIAGNOSIS — U07.1 COVID-19: Primary | ICD-10-CM

## 2023-02-28 DIAGNOSIS — M50.30 DDD (DEGENERATIVE DISC DISEASE), CERVICAL: ICD-10-CM

## 2023-02-28 DIAGNOSIS — M47.816 LUMBAR SPONDYLOSIS: ICD-10-CM

## 2023-02-28 DIAGNOSIS — M47.817 LUMBOSACRAL SPONDYLOSIS WITHOUT MYELOPATHY: ICD-10-CM

## 2023-02-28 DIAGNOSIS — M54.12 CERVICAL RADICULOPATHY: ICD-10-CM

## 2023-02-28 LAB
INFLUENZA A ANTIGEN, POC: NEGATIVE
INFLUENZA B ANTIGEN, POC: NEGATIVE
LOT EXPIRE DATE: ABNORMAL
LOT KIT NUMBER: ABNORMAL
S PYO AG THROAT QL: NORMAL
SARS-COV-2, POC: DETECTED
VALID INTERNAL CONTROL: ABNORMAL
VENDOR AND KIT NAME POC: ABNORMAL

## 2023-02-28 RX ORDER — DULOXETIN HYDROCHLORIDE 30 MG/1
CAPSULE, DELAYED RELEASE ORAL
COMMUNITY
Start: 2023-02-23

## 2023-02-28 RX ORDER — BUSPIRONE HYDROCHLORIDE 5 MG/1
TABLET ORAL
COMMUNITY
Start: 2023-02-23

## 2023-02-28 RX ORDER — OXYCODONE HYDROCHLORIDE AND ACETAMINOPHEN 5; 325 MG/1; MG/1
1 TABLET ORAL EVERY 4 HOURS PRN
Qty: 42 TABLET | Refills: 0 | Status: SHIPPED | OUTPATIENT
Start: 2023-02-28 | End: 2023-03-07

## 2023-02-28 ASSESSMENT — ENCOUNTER SYMPTOMS
STRIDOR: 0
WHEEZING: 0
CHEST TIGHTNESS: 0
ANAL BLEEDING: 0
SINUS PAIN: 0
BACK PAIN: 0
CHOKING: 0
COUGH: 1
ABDOMINAL PAIN: 0
SORE THROAT: 0
ABDOMINAL DISTENTION: 0
CONSTIPATION: 0
SINUS PRESSURE: 0
SHORTNESS OF BREATH: 0
EYE REDNESS: 0
PHOTOPHOBIA: 0
APNEA: 0
COLOR CHANGE: 0
VOICE CHANGE: 0
NAUSEA: 0
DIARRHEA: 0
TROUBLE SWALLOWING: 0
EYE PAIN: 0
EYE DISCHARGE: 0
VOMITING: 0
BLOOD IN STOOL: 0
EYE ITCHING: 0
RHINORRHEA: 0
RECTAL PAIN: 0
FACIAL SWELLING: 0

## 2023-02-28 NOTE — PROGRESS NOTES
23  Edmar Garcia : 1975 Sex: female  Age: 56 y.o. Chief Complaint   Patient presents with    Cough     Just finished an antibiotic on , still coughing, and thinks it's due to the train derailment       Patient is seen today with complaints of continuing cough. She has been through 2 antibiotics. She states she is feeling better in all other aspects but the cough is not quite gone. It is noted today her  is here with positive COVID test.      Review of Systems   Constitutional:  Negative for activity change, appetite change, chills, diaphoresis, fatigue, fever and unexpected weight change. HENT:  Negative for congestion, dental problem, drooling, ear discharge, ear pain, facial swelling, hearing loss, mouth sores, nosebleeds, postnasal drip, rhinorrhea, sinus pressure, sinus pain, sneezing, sore throat, tinnitus, trouble swallowing and voice change. Eyes:  Negative for photophobia, pain, discharge, redness, itching and visual disturbance. Respiratory:  Positive for cough. Negative for apnea, choking, chest tightness, shortness of breath, wheezing and stridor. Cardiovascular:  Negative for chest pain, palpitations and leg swelling. Gastrointestinal:  Negative for abdominal distention, abdominal pain, anal bleeding, blood in stool, constipation, diarrhea, nausea, rectal pain and vomiting. Endocrine: Negative for cold intolerance, heat intolerance, polydipsia, polyphagia and polyuria. Genitourinary:  Negative for decreased urine volume, difficulty urinating, dysuria, enuresis, flank pain, frequency, genital sores, hematuria and urgency. Musculoskeletal:  Negative for arthralgias, back pain, gait problem, joint swelling, myalgias, neck pain and neck stiffness. Skin:  Negative for color change, pallor, rash and wound. Allergic/Immunologic: Negative for environmental allergies, food allergies and immunocompromised state.    Neurological:  Negative for dizziness, tremors, seizures, syncope, facial asymmetry, speech difficulty, weakness, light-headedness, numbness and headaches. Hematological:  Negative for adenopathy. Does not bruise/bleed easily. Psychiatric/Behavioral:  Negative for agitation, behavioral problems, confusion, decreased concentration, hallucinations, self-injury, sleep disturbance and suicidal ideas. The patient is not nervous/anxious and is not hyperactive. Current Outpatient Medications:     busPIRone (BUSPAR) 5 MG tablet, take 1 tablet by mouth three times a day, Disp: , Rfl:     DULoxetine (CYMBALTA) 30 MG extended release capsule, take 1 capsule by mouth every morning, Disp: , Rfl:     oxyCODONE-acetaminophen (PERCOCET) 5-325 MG per tablet, Take 1 tablet by mouth every 4 hours as needed for Pain for up to 7 days.  Max Daily Amount: 6 tablets, Disp: 42 tablet, Rfl: 0    albuterol sulfate HFA (VENTOLIN HFA) 108 (90 Base) MCG/ACT inhaler, Inhale 2 puffs into the lungs 4 times daily as needed for Wheezing, Disp: 18 g, Rfl: 5    cyanocobalamin (DODEX) 1000 MCG/ML injection, inject 1 milliliter ( 1000 MCG ) intramuscularly every week for 4 weeks, Disp: 4 mL, Rfl: 5    DULoxetine (CYMBALTA) 60 MG extended release capsule, take 1 capsule by mouth once daily (Patient taking differently: Take 60 mg by mouth daily take 1 capsule by mouth once daily), Disp: 30 capsule, Rfl: 5    lisinopril (PRINIVIL;ZESTRIL) 10 MG tablet, take 1 tablet by mouth once daily, Disp: 90 tablet, Rfl: 1    naloxone 4 MG/0.1ML LIQD nasal spray, 1 spray by Nasal route as needed for Opioid Reversal, Disp: 1 each, Rfl: 5    omeprazole (PRILOSEC) 40 MG delayed release capsule, Take 1 capsule by mouth 2 times daily, Disp: 180 capsule, Rfl: 1    ondansetron (ZOFRAN) 4 MG tablet, Take 1 tablet by mouth every 8 hours as needed for Nausea or Vomiting, Disp: 21 tablet, Rfl: 0    OZEMPIC, 0.25 OR 0.5 MG/DOSE, 2 MG/1.5ML SOPN, inject 0.25 milligrams subcutaneously every week, Disp: 4 Adjustable Dose Pre-filled Pen Syringe, Rfl: 5    rosuvastatin (CRESTOR) 5 MG tablet, Take 1 tablet by mouth daily, Disp: 90 tablet, Rfl: 1    SUMAtriptan (IMITREX) 50 MG tablet, take 1 tablet by mouth if needed AT ONSET OF HEADACHE may repeat in 4 hours IF headache PERSISTS, Disp: 9 tablet, Rfl: 5    tiZANidine (ZANAFLEX) 4 MG tablet, Take 1 tablet by mouth in the morning, at noon, and at bedtime, Disp: 270 tablet, Rfl: 1    cloNIDine (CATAPRES) 0.1 MG tablet, Take 1 tablet by mouth 2 times daily, Disp: 60 tablet, Rfl: 0    folic acid (FOLVITE) 1 MG tablet, Take 1 tablet by mouth daily, Disp: 30 tablet, Rfl: 0    QUEtiapine (SEROQUEL) 100 MG tablet, Take 1 tablet by mouth at bedtime, Disp: 60 tablet, Rfl: 0    naloxone 4 MG/0.1ML LIQD nasal spray, 1 spray by Nasal route as needed for Opioid Reversal, Disp: 1 each, Rfl: 5    clotrimazole-betamethasone (LOTRISONE) 1-0.05 % cream, Apply topically 2 times daily. , Disp: 60 g, Rfl: 2    secukinumab (COSENTYX) 150 MG/ML SOSY, Inject 300 mg into the skin once, Disp: , Rfl:     Multiple Vitamin (MULTIVITAMIN PO), Take 1 tablet by mouth daily , Disp: , Rfl:   No Known Allergies    Past Medical History:   Diagnosis Date    Arthritis     psoriatic    Chronic back pain     Depression     Diabetes mellitus (HCC)     GERD (gastroesophageal reflux disease)     Headache(784.0)     Herpes genital     \"years ago\" ; no outbreaks    Hypertension     Varicose veins      Past Surgical History:   Procedure Laterality Date    NERVE BLOCK Bilateral 1/19/2023    # 1 BILATERAL LUMBAR MEDIAL BRANCH NERVE BLOCK UNDER FLUOROSCOPIC GUIDANCE AT L2, L3, L4 & L5 DORSAL RAMI performed by Anitha Adams MD at [address] N/A 12/16/2021    CERVICAL EPIDURAL STEROID INJECTION UNDER FLUORO AT C6 - C7 LEFT PARAMEDIAN performed by Anitha Adams MD at [address] Left 02/07/2022    #2 CERVICAL EPIDURAL STEROID INJECTION UNDER FLUOROSCOPIC GUIDANCE AT C6-C7 LEFT PARAMEDIAN performed by Diogo Tam MD at [address] Left 04/07/2022    CERVICAL EPIDURAL STEROID INJECTION C6-C7 LEFT PARAMEDIAN performed by Diogo Tam MD at [address] N/A 12/05/2022    CERVICAL  EPIDURAL STEROID INJECTION UNDER FLUOROSCOPIC GUIDANCE AT C6-C7 performed by Diogo Tam MD at [address] [place] [address] Road  05/31/2011    Lap    TUBAL LIGATION  01/01/2001    UPPER GASTROINTESTINAL ENDOSCOPY  01/01/2011    WISDOM TOOTH EXTRACTION       Family History   Family history unknown: Yes     Social History     Socioeconomic History    Marital status:      Spouse name: Not on file    Number of children: Not on file    Years of education: Not on file    Highest education level: Not on file   Occupational History    Not on file   Tobacco Use    Smoking status: Never    Smokeless tobacco: Never   Vaping Use    Vaping Use: Never used   Substance and Sexual Activity    Alcohol use: No    Drug use: No    Sexual activity: Not on file   Other Topics Concern    Not on file   Social History Narrative    Not on file     Social Determinants of Health     Financial Resource Strain: Low Risk     Difficulty of Paying Living Expenses: Not hard at all   Food Insecurity: No Food Insecurity    Worried About Running Out of Food in the Last Year: Never true    [address] in the Last Year: Never true   Transportation Needs: Unknown    Lack of Transportation (Medical): Not on file    Lack of Transportation (Non-Medical):  No   Physical Activity: Not on file   Stress: Not on file   Social Connections: Not on file   Intimate Partner Violence: Not on file   Housing Stability: Unknown    Unable to Pay for Housing in the Last Year: Not on file    Number of Places Lived in the Last Year: Not on file    Unstable Housing in the Last Year: No     Patient Active Problem List   Diagnosis    Chronic back pain    Hyperlipidemia Indigestion    DDD (degenerative disc disease), cervical    Cervical radiculopathy    Lumbosacral spondylosis without myelopathy    Lumbar spondylosis        Vitals:    02/28/23 1328   BP: 110/76   Site: Left Upper Arm   Position: Sitting   Cuff Size: Large Adult   Pulse: 94   Resp: 17   Temp: 98.7 °F (37.1 °C)   TempSrc: Temporal   SpO2: 93%   Weight: 252 lb 12.8 oz (114.7 kg)   Height: 5' 7\" (1.702 m)       Physical Exam  Vitals and nursing note reviewed. Constitutional:       General: She is not in acute distress. Appearance: Normal appearance. She is normal weight. She is not ill-appearing, toxic-appearing or diaphoretic. HENT:      Head: Normocephalic and atraumatic. Right Ear: Tympanic membrane, ear canal and external ear normal. There is no impacted cerumen. Left Ear: Tympanic membrane, ear canal and external ear normal. There is no impacted cerumen. Nose: Nose normal. No congestion or rhinorrhea. Mouth/Throat:      Mouth: Mucous membranes are moist.      Pharynx: Oropharynx is clear. No oropharyngeal exudate or posterior oropharyngeal erythema. Eyes:      General: No scleral icterus. Right eye: No discharge. Left eye: No discharge. Extraocular Movements: Extraocular movements intact. Conjunctiva/sclera: Conjunctivae normal.      Pupils: Pupils are equal, round, and reactive to light. Neck:      Vascular: No carotid bruit. Cardiovascular:      Rate and Rhythm: Normal rate and regular rhythm. Pulses: Normal pulses. Heart sounds: Normal heart sounds. No murmur heard. No friction rub. No gallop. Pulmonary:      Effort: Pulmonary effort is normal. No respiratory distress. Breath sounds: No stridor. No wheezing, rhonchi or rales. Chest:      Chest wall: No tenderness. Abdominal:      General: Abdomen is flat. Bowel sounds are normal. There is no distension. Palpations: Abdomen is soft. There is no mass.       Tenderness: There is no abdominal tenderness. There is no right CVA tenderness, left CVA tenderness, guarding or rebound. Hernia: No hernia is present. Musculoskeletal:         General: No swelling, tenderness, deformity or signs of injury. Normal range of motion. Cervical back: Normal range of motion and neck supple. No rigidity. No muscular tenderness. Right lower leg: No edema. Left lower leg: No edema. Lymphadenopathy:      Cervical: No cervical adenopathy. Skin:     General: Skin is warm and dry. Capillary Refill: Capillary refill takes less than 2 seconds. Coloration: Skin is not jaundiced or pale. Findings: No bruising, erythema, lesion or rash. Neurological:      General: No focal deficit present. Mental Status: She is alert and oriented to person, place, and time. Mental status is at baseline. Cranial Nerves: No cranial nerve deficit. Sensory: No sensory deficit. Motor: No weakness. Coordination: Coordination normal.      Gait: Gait normal.      Deep Tendon Reflexes: Reflexes normal.   Psychiatric:         Mood and Affect: Mood normal.         Behavior: Behavior normal.         Thought Content: Thought content normal.         Judgment: Judgment normal.       Assessment and Plan:  Richard Olvera was seen today for cough. Diagnoses and all orders for this visit:    COVID-19    Cough, unspecified type  -     POCT COVID-19 & Influenza A/B  -     POCT rapid strep A    Cervical radiculopathy  -     oxyCODONE-acetaminophen (PERCOCET) 5-325 MG per tablet; Take 1 tablet by mouth every 4 hours as needed for Pain for up to 7 days. Max Daily Amount: 6 tablets    Lumbosacral spondylosis without myelopathy  -     oxyCODONE-acetaminophen (PERCOCET) 5-325 MG per tablet; Take 1 tablet by mouth every 4 hours as needed for Pain for up to 7 days.  Max Daily Amount: 6 tablets    DDD (degenerative disc disease), cervical  -     oxyCODONE-acetaminophen (PERCOCET) 5-325 MG per tablet; Take 1 tablet by mouth every 4 hours as needed for Pain for up to 7 days. Max Daily Amount: 6 tablets    Lumbar spondylosis  -     oxyCODONE-acetaminophen (PERCOCET) 5-325 MG per tablet; Take 1 tablet by mouth every 4 hours as needed for Pain for up to 7 days. Max Daily Amount: 6 tablets      Discussions/Education provided to patients during visit:  [] Discussed the importance to stop smoking. [] Advised to monitor eating habits. [] Reviewed and discussed Imaging results. [] Reviewed and discussed Lab results. [x] Discussed the importance of drinking plenty of fluids. [] Cut down on Salt and Caffeine.  [] Exercise 2-3 times weekly, if not more. [] Cut down on Sugar and Fats. [x] Continue Medications as Discussed. [x] Communicated with patient any concerns, to phone office. [x] Follow up as directed. Return if symptoms worsen or fail to improve.       Seen By:      LYNSEY Weems - CNP

## 2023-03-02 DIAGNOSIS — Z00.00 ROUTINE GENERAL MEDICAL EXAMINATION AT A HEALTH CARE FACILITY: ICD-10-CM

## 2023-03-02 RX ORDER — FOLIC ACID 1 MG/1
TABLET ORAL
Qty: 30 TABLET | Refills: 5 | Status: SHIPPED | OUTPATIENT
Start: 2023-03-02

## 2023-03-02 RX ORDER — DULOXETIN HYDROCHLORIDE 60 MG/1
CAPSULE, DELAYED RELEASE ORAL
Qty: 30 CAPSULE | Refills: 5 | Status: SHIPPED | OUTPATIENT
Start: 2023-03-02

## 2023-03-07 RX ORDER — VORTIOXETINE 20 MG/1
20 TABLET, FILM COATED ORAL NIGHTLY
Qty: 30 TABLET | Refills: 0 | OUTPATIENT
Start: 2023-03-07

## 2023-03-09 ENCOUNTER — NURSE ONLY (OUTPATIENT)
Dept: FAMILY MEDICINE CLINIC | Age: 48
End: 2023-03-09

## 2023-03-09 ENCOUNTER — TELEPHONE (OUTPATIENT)
Dept: PAIN MANAGEMENT | Age: 48
End: 2023-03-09

## 2023-03-09 DIAGNOSIS — M47.817 LUMBOSACRAL SPONDYLOSIS WITHOUT MYELOPATHY: Primary | ICD-10-CM

## 2023-03-09 RX ORDER — CYANOCOBALAMIN 1000 UG/ML
1000 INJECTION, SOLUTION INTRAMUSCULAR; SUBCUTANEOUS ONCE
Status: COMPLETED | OUTPATIENT
Start: 2023-03-09 | End: 2023-03-09

## 2023-03-09 RX ADMIN — CYANOCOBALAMIN 1000 MCG: 1000 INJECTION, SOLUTION INTRAMUSCULAR; SUBCUTANEOUS at 13:32

## 2023-03-09 NOTE — TELEPHONE ENCOUNTER
Jefry Thompson called in because she is starting to have pain. She wanted to schedule for another MBB. Please advise. Thanks.

## 2023-03-10 ENCOUNTER — PREP FOR PROCEDURE (OUTPATIENT)
Dept: PAIN MANAGEMENT | Age: 48
End: 2023-03-10

## 2023-03-10 DIAGNOSIS — M54.12 CERVICAL RADICULOPATHY: ICD-10-CM

## 2023-03-10 DIAGNOSIS — M47.817 LUMBOSACRAL SPONDYLOSIS WITHOUT MYELOPATHY: Primary | ICD-10-CM

## 2023-03-10 RX ORDER — SODIUM CHLORIDE 9 MG/ML
INJECTION, SOLUTION INTRAVENOUS PRN
Status: CANCELLED | OUTPATIENT
Start: 2023-03-10

## 2023-03-10 RX ORDER — OXYCODONE HYDROCHLORIDE AND ACETAMINOPHEN 5; 325 MG/1; MG/1
1 TABLET ORAL EVERY 4 HOURS PRN
Qty: 42 TABLET | Refills: 0 | Status: SHIPPED | OUTPATIENT
Start: 2023-03-10 | End: 2023-03-17

## 2023-03-10 RX ORDER — SODIUM CHLORIDE 0.9 % (FLUSH) 0.9 %
5-40 SYRINGE (ML) INJECTION PRN
Status: CANCELLED | OUTPATIENT
Start: 2023-03-10

## 2023-03-10 RX ORDER — SODIUM CHLORIDE 0.9 % (FLUSH) 0.9 %
5-40 SYRINGE (ML) INJECTION EVERY 12 HOURS SCHEDULED
Status: CANCELLED | OUTPATIENT
Start: 2023-03-10

## 2023-03-10 NOTE — TELEPHONE ENCOUNTER
Patient has recurrence of similar pain causing functional limitations. Please schedule for # 2 Lumbar MBNB- order placed    - Follow up in 3-4 weeks after the procedure.     Etta Durand MD

## 2023-03-17 ENCOUNTER — NURSE ONLY (OUTPATIENT)
Dept: FAMILY MEDICINE CLINIC | Age: 48
End: 2023-03-17

## 2023-03-17 DIAGNOSIS — E53.8 VITAMIN B 12 DEFICIENCY: Primary | ICD-10-CM

## 2023-03-17 RX ORDER — CYANOCOBALAMIN 1000 UG/ML
1000 INJECTION, SOLUTION INTRAMUSCULAR; SUBCUTANEOUS ONCE
Status: COMPLETED | OUTPATIENT
Start: 2023-03-17 | End: 2023-03-17

## 2023-03-17 RX ADMIN — CYANOCOBALAMIN 1000 MCG: 1000 INJECTION, SOLUTION INTRAMUSCULAR; SUBCUTANEOUS at 09:30

## 2023-03-21 ENCOUNTER — TELEPHONE (OUTPATIENT)
Dept: PAIN MANAGEMENT | Age: 48
End: 2023-03-21

## 2023-03-21 RX ORDER — NABUMETONE 750 MG/1
750 TABLET, FILM COATED ORAL 2 TIMES DAILY PRN
Qty: 30 TABLET | Refills: 1 | Status: SHIPPED | OUTPATIENT
Start: 2023-03-21

## 2023-03-21 NOTE — TELEPHONE ENCOUNTER
Oleg Aguila called said she is scheduled for a procedure on 4/6/2023, but is in a lot of pain now. She would like to know if she is able to come in to get a shot for her pain to hold her over until she gets the procedure. Or if there are other options for her. Please advise.

## 2023-03-22 DIAGNOSIS — M54.12 CERVICAL RADICULOPATHY: ICD-10-CM

## 2023-03-22 RX ORDER — OXYCODONE HYDROCHLORIDE AND ACETAMINOPHEN 5; 325 MG/1; MG/1
1 TABLET ORAL EVERY 4 HOURS PRN
Qty: 42 TABLET | Refills: 0 | Status: SHIPPED | OUTPATIENT
Start: 2023-03-22 | End: 2023-03-29

## 2023-03-22 NOTE — TELEPHONE ENCOUNTER
Shasha Purcell called in and left a message regarding her message she left. I called Vasyl Listen back and let her know doctor e-scribed relafen.

## 2023-03-24 ENCOUNTER — NURSE ONLY (OUTPATIENT)
Dept: FAMILY MEDICINE CLINIC | Age: 48
End: 2023-03-24

## 2023-03-24 RX ORDER — CYANOCOBALAMIN 1000 UG/ML
1000 INJECTION, SOLUTION INTRAMUSCULAR; SUBCUTANEOUS ONCE
Status: COMPLETED | OUTPATIENT
Start: 2023-03-24 | End: 2023-03-24

## 2023-03-24 RX ADMIN — CYANOCOBALAMIN 1000 MCG: 1000 INJECTION, SOLUTION INTRAMUSCULAR; SUBCUTANEOUS at 10:28

## 2023-03-27 ENCOUNTER — OFFICE VISIT (OUTPATIENT)
Dept: FAMILY MEDICINE CLINIC | Age: 48
End: 2023-03-27
Payer: COMMERCIAL

## 2023-03-27 VITALS
SYSTOLIC BLOOD PRESSURE: 132 MMHG | BODY MASS INDEX: 38.84 KG/M2 | DIASTOLIC BLOOD PRESSURE: 86 MMHG | WEIGHT: 248 LBS | HEART RATE: 91 BPM | RESPIRATION RATE: 16 BRPM | OXYGEN SATURATION: 100 % | TEMPERATURE: 97 F

## 2023-03-27 DIAGNOSIS — M54.31 SCIATICA OF RIGHT SIDE: ICD-10-CM

## 2023-03-27 DIAGNOSIS — M62.838 MUSCLE SPASM: ICD-10-CM

## 2023-03-27 DIAGNOSIS — M54.12 CERVICAL NEUROPATHIC PAIN: ICD-10-CM

## 2023-03-27 DIAGNOSIS — M50.30 DDD (DEGENERATIVE DISC DISEASE), CERVICAL: ICD-10-CM

## 2023-03-27 DIAGNOSIS — M54.12 CERVICAL RADICULOPATHY: ICD-10-CM

## 2023-03-27 DIAGNOSIS — M47.817 LUMBOSACRAL SPONDYLOSIS WITHOUT MYELOPATHY: Primary | ICD-10-CM

## 2023-03-27 PROCEDURE — 99214 OFFICE O/P EST MOD 30 MIN: CPT | Performed by: NURSE PRACTITIONER

## 2023-03-27 PROCEDURE — 96372 THER/PROPH/DIAG INJ SC/IM: CPT | Performed by: NURSE PRACTITIONER

## 2023-03-27 RX ORDER — KETOROLAC TROMETHAMINE 30 MG/ML
60 INJECTION, SOLUTION INTRAMUSCULAR; INTRAVENOUS ONCE
Status: COMPLETED | OUTPATIENT
Start: 2023-03-27 | End: 2023-03-27

## 2023-03-27 RX ORDER — OXYCODONE HYDROCHLORIDE AND ACETAMINOPHEN 5; 325 MG/1; MG/1
1 TABLET ORAL EVERY 4 HOURS PRN
Qty: 42 TABLET | Refills: 0 | Status: SHIPPED | OUTPATIENT
Start: 2023-03-27 | End: 2023-04-03

## 2023-03-27 RX ORDER — CARIPRAZINE 3 MG/1
3 CAPSULE, GELATIN COATED ORAL DAILY
COMMUNITY
Start: 2023-03-24

## 2023-03-27 RX ORDER — DEXAMETHASONE SODIUM PHOSPHATE 10 MG/ML
10 INJECTION INTRAMUSCULAR; INTRAVENOUS ONCE
Status: COMPLETED | OUTPATIENT
Start: 2023-03-27 | End: 2023-03-27

## 2023-03-27 RX ADMIN — DEXAMETHASONE SODIUM PHOSPHATE 10 MG: 10 INJECTION INTRAMUSCULAR; INTRAVENOUS at 15:40

## 2023-03-27 RX ADMIN — KETOROLAC TROMETHAMINE 60 MG: 30 INJECTION, SOLUTION INTRAMUSCULAR; INTRAVENOUS at 15:41

## 2023-03-27 ASSESSMENT — ENCOUNTER SYMPTOMS
ANAL BLEEDING: 0
STRIDOR: 0
COUGH: 0
RHINORRHEA: 0
SHORTNESS OF BREATH: 0
CHEST TIGHTNESS: 0
CHOKING: 0
DIARRHEA: 0
EYE DISCHARGE: 0
PHOTOPHOBIA: 0
SINUS PAIN: 0
ABDOMINAL PAIN: 0
EYE ITCHING: 0
BACK PAIN: 1
TROUBLE SWALLOWING: 0
EYE REDNESS: 0
ABDOMINAL DISTENTION: 0
APNEA: 0
RECTAL PAIN: 0
WHEEZING: 0
COLOR CHANGE: 0
VOMITING: 0
SORE THROAT: 0
NAUSEA: 0
CONSTIPATION: 0
BLOOD IN STOOL: 0
VOICE CHANGE: 0
SINUS PRESSURE: 0
EYE PAIN: 0
FACIAL SWELLING: 0

## 2023-03-27 NOTE — PROGRESS NOTES
3/27/23  Neha Rios : 1975 Sex: female  Age: 47 y.o.    Chief Complaint   Patient presents with    Lower Back Pain     Lower back radiates down right leg        Neha is complaining of severe pain to her lumbar area radiating down her right leg.  She states that she was to have an injection on April fifth by pain management.  She tried to call their office to see if they can get it moved up however the physician is out of town at this time.  She has been taking her Percocet for pain and it is not relieving it.  She states she is at her wits end with the pain.  At this point she says nothing is relieving the pain.  She has tried Bengay, Relafen, Percocet, Cymbalta, heat, ice.      Review of Systems   Constitutional:  Negative for activity change, appetite change, chills, diaphoresis, fatigue, fever and unexpected weight change.   HENT:  Negative for congestion, dental problem, drooling, ear discharge, ear pain, facial swelling, hearing loss, mouth sores, nosebleeds, postnasal drip, rhinorrhea, sinus pressure, sinus pain, sneezing, sore throat, tinnitus, trouble swallowing and voice change.    Eyes:  Negative for photophobia, pain, discharge, redness, itching and visual disturbance.   Respiratory:  Negative for apnea, cough, choking, chest tightness, shortness of breath, wheezing and stridor.    Cardiovascular:  Negative for chest pain, palpitations and leg swelling.   Gastrointestinal:  Negative for abdominal distention, abdominal pain, anal bleeding, blood in stool, constipation, diarrhea, nausea, rectal pain and vomiting.   Endocrine: Negative for cold intolerance, heat intolerance, polydipsia, polyphagia and polyuria.   Genitourinary:  Negative for decreased urine volume, difficulty urinating, dysuria, enuresis, flank pain, frequency, genital sores, hematuria and urgency.   Musculoskeletal:  Positive for arthralgias, back pain, gait problem and myalgias. Negative for joint swelling, neck pain and

## 2023-03-28 ENCOUNTER — TELEPHONE (OUTPATIENT)
Dept: PAIN MANAGEMENT | Age: 48
End: 2023-03-28

## 2023-03-28 NOTE — TELEPHONE ENCOUNTER
Call to Tomás Batch that procedure was approved for 4/6/2023 and that Valeria should call her a few days before for the pre op call and between 2:00 PM and 4:00 PM  the business day before with the arrival time. Instructed Neha to hold ibuprofen for 24 hours, naprosyn for 4 days and any aspirin containing products or fish oil for 7 days. Instructed to call office back if any questions. Pricila Winkler verbalized understanding.     Electronically signed by Raymon Henson RN on 3/28/2023 at 9:41 AM

## 2023-03-30 ENCOUNTER — OFFICE VISIT (OUTPATIENT)
Dept: FAMILY MEDICINE CLINIC | Age: 48
End: 2023-03-30
Payer: COMMERCIAL

## 2023-03-30 VITALS
WEIGHT: 245 LBS | TEMPERATURE: 97.9 F | HEIGHT: 67 IN | RESPIRATION RATE: 18 BRPM | DIASTOLIC BLOOD PRESSURE: 75 MMHG | SYSTOLIC BLOOD PRESSURE: 128 MMHG | OXYGEN SATURATION: 98 % | HEART RATE: 78 BPM | BODY MASS INDEX: 38.45 KG/M2

## 2023-03-30 DIAGNOSIS — M79.7 FIBROMYALGIA: Primary | ICD-10-CM

## 2023-03-30 DIAGNOSIS — M50.30 DDD (DEGENERATIVE DISC DISEASE), CERVICAL: ICD-10-CM

## 2023-03-30 DIAGNOSIS — M06.9 RHEUMATOID ARTHRITIS, INVOLVING UNSPECIFIED SITE, UNSPECIFIED WHETHER RHEUMATOID FACTOR PRESENT (HCC): ICD-10-CM

## 2023-03-30 DIAGNOSIS — M47.817 LUMBOSACRAL SPONDYLOSIS WITHOUT MYELOPATHY: ICD-10-CM

## 2023-03-30 DIAGNOSIS — M54.31 SCIATICA OF RIGHT SIDE: ICD-10-CM

## 2023-03-30 DIAGNOSIS — M54.12 CERVICAL NEUROPATHIC PAIN: ICD-10-CM

## 2023-03-30 DIAGNOSIS — M62.838 MUSCLE SPASM: ICD-10-CM

## 2023-03-30 DIAGNOSIS — M25.50 ARTHRALGIA, UNSPECIFIED JOINT: ICD-10-CM

## 2023-03-30 DIAGNOSIS — F31.9 BIPOLAR 1 DISORDER (HCC): ICD-10-CM

## 2023-03-30 DIAGNOSIS — M54.12 CERVICAL RADICULOPATHY: ICD-10-CM

## 2023-03-30 PROCEDURE — 99214 OFFICE O/P EST MOD 30 MIN: CPT | Performed by: NURSE PRACTITIONER

## 2023-03-30 RX ORDER — PREGABALIN 75 MG/1
75 CAPSULE ORAL 2 TIMES DAILY
Qty: 60 CAPSULE | Refills: 1 | Status: SHIPPED | OUTPATIENT
Start: 2023-03-30 | End: 2023-04-29

## 2023-03-30 RX ORDER — CYCLOBENZAPRINE HCL 5 MG
5 TABLET ORAL 3 TIMES DAILY PRN
Qty: 30 TABLET | Refills: 0 | Status: SHIPPED | OUTPATIENT
Start: 2023-03-30 | End: 2023-04-09

## 2023-03-30 SDOH — ECONOMIC STABILITY: INCOME INSECURITY: HOW HARD IS IT FOR YOU TO PAY FOR THE VERY BASICS LIKE FOOD, HOUSING, MEDICAL CARE, AND HEATING?: NOT HARD AT ALL

## 2023-03-30 SDOH — ECONOMIC STABILITY: FOOD INSECURITY: WITHIN THE PAST 12 MONTHS, YOU WORRIED THAT YOUR FOOD WOULD RUN OUT BEFORE YOU GOT MONEY TO BUY MORE.: NEVER TRUE

## 2023-03-30 SDOH — ECONOMIC STABILITY: FOOD INSECURITY: WITHIN THE PAST 12 MONTHS, THE FOOD YOU BOUGHT JUST DIDN'T LAST AND YOU DIDN'T HAVE MONEY TO GET MORE.: NEVER TRUE

## 2023-03-30 ASSESSMENT — PATIENT HEALTH QUESTIONNAIRE - PHQ9
SUM OF ALL RESPONSES TO PHQ QUESTIONS 1-9: 9
SUM OF ALL RESPONSES TO PHQ QUESTIONS 1-9: 9
7. TROUBLE CONCENTRATING ON THINGS, SUCH AS READING THE NEWSPAPER OR WATCHING TELEVISION: 0
9. THOUGHTS THAT YOU WOULD BE BETTER OFF DEAD, OR OF HURTING YOURSELF: 0
1. LITTLE INTEREST OR PLEASURE IN DOING THINGS: 0
8. MOVING OR SPEAKING SO SLOWLY THAT OTHER PEOPLE COULD HAVE NOTICED. OR THE OPPOSITE, BEING SO FIGETY OR RESTLESS THAT YOU HAVE BEEN MOVING AROUND A LOT MORE THAN USUAL: 0
10. IF YOU CHECKED OFF ANY PROBLEMS, HOW DIFFICULT HAVE THESE PROBLEMS MADE IT FOR YOU TO DO YOUR WORK, TAKE CARE OF THINGS AT HOME, OR GET ALONG WITH OTHER PEOPLE: 0
SUM OF ALL RESPONSES TO PHQ QUESTIONS 1-9: 9
2. FEELING DOWN, DEPRESSED OR HOPELESS: 3
4. FEELING TIRED OR HAVING LITTLE ENERGY: 3
SUM OF ALL RESPONSES TO PHQ9 QUESTIONS 1 & 2: 3
6. FEELING BAD ABOUT YOURSELF - OR THAT YOU ARE A FAILURE OR HAVE LET YOURSELF OR YOUR FAMILY DOWN: 0
SUM OF ALL RESPONSES TO PHQ QUESTIONS 1-9: 9
5. POOR APPETITE OR OVEREATING: 0
3. TROUBLE FALLING OR STAYING ASLEEP: 3

## 2023-03-30 ASSESSMENT — ENCOUNTER SYMPTOMS
EYE ITCHING: 0
PHOTOPHOBIA: 0
TROUBLE SWALLOWING: 0
EYE REDNESS: 0
CHEST TIGHTNESS: 0
BLOOD IN STOOL: 0
COUGH: 0
RHINORRHEA: 0
COLOR CHANGE: 0
SHORTNESS OF BREATH: 0
STRIDOR: 0
RECTAL PAIN: 0
SINUS PRESSURE: 0
NAUSEA: 0
CONSTIPATION: 0
BACK PAIN: 1
WHEEZING: 0
DIARRHEA: 0
VOMITING: 0
ABDOMINAL DISTENTION: 0
SINUS PAIN: 0
CHOKING: 0
EYE DISCHARGE: 0
FACIAL SWELLING: 0
ANAL BLEEDING: 0
SORE THROAT: 0
EYE PAIN: 0
VOICE CHANGE: 0
APNEA: 0
ABDOMINAL PAIN: 0

## 2023-04-03 NOTE — PROGRESS NOTES
polish, make up or hair products on the day of surgery    [x] You can expect a call the business day prior to procedure to notify you if your arrival time changes    [x] If you receive a survey after surgery we would greatly appreciate your comments    [] Parent/guardian of a minor must accompany their child and remain on the premises  the entire time they are under our care     [] Pediatric patients may bring favorite toy, blanket or comfort item with them    [] A caregiver or family member must remain with the patient during their stay if they are mentally handicapped, have dementia, disoriented or unable to use a call light or would be a safety concern if left unattended    [x] Please notify surgeon if you develop any illness between now and time of surgery (cold, cough, sore throat, fever, nausea, vomiting) or any signs of infections  including skin, wounds, and dental.    [x]  The Outpatient Pharmacy is available to fill your prescription here on your day of surgery, ask your preop nurse for details    [] Other instructions    EDUCATIONAL MATERIALS PROVIDED:    [] PAT Preoperative Education Packet/Booklet     [] Medication List    [] Transfusion bracelet applied with instructions    [] Shower with soap, lather and rinse well, and use CHG wipes provided the evening before surgery as instructed    [] Incentive spirometer with instructions

## 2023-04-06 ENCOUNTER — ANESTHESIA EVENT (OUTPATIENT)
Dept: OPERATING ROOM | Age: 48
End: 2023-04-06
Payer: COMMERCIAL

## 2023-04-06 ENCOUNTER — HOSPITAL ENCOUNTER (OUTPATIENT)
Dept: GENERAL RADIOLOGY | Age: 48
Discharge: HOME | End: 2023-04-08
Attending: ANESTHESIOLOGY
Payer: COMMERCIAL

## 2023-04-06 ENCOUNTER — ANESTHESIA (OUTPATIENT)
Dept: OPERATING ROOM | Age: 48
End: 2023-04-06
Payer: COMMERCIAL

## 2023-04-06 ENCOUNTER — HOSPITAL ENCOUNTER (OUTPATIENT)
Age: 48
Setting detail: OUTPATIENT SURGERY
Discharge: HOME | End: 2023-04-06
Attending: ANESTHESIOLOGY | Admitting: ANESTHESIOLOGY
Payer: COMMERCIAL

## 2023-04-06 VITALS
DIASTOLIC BLOOD PRESSURE: 85 MMHG | HEIGHT: 67 IN | WEIGHT: 245 LBS | SYSTOLIC BLOOD PRESSURE: 149 MMHG | HEART RATE: 91 BPM | OXYGEN SATURATION: 96 % | TEMPERATURE: 98 F | BODY MASS INDEX: 38.45 KG/M2 | RESPIRATION RATE: 18 BRPM

## 2023-04-06 DIAGNOSIS — M54.12 CERVICAL RADICULOPATHY: ICD-10-CM

## 2023-04-06 DIAGNOSIS — R52 PAIN MANAGEMENT: ICD-10-CM

## 2023-04-06 LAB
HCG, URINE, POC: NEGATIVE
Lab: NORMAL
METER GLUCOSE: 109 MG/DL (ref 74–99)
NEGATIVE QC PASS/FAIL: NORMAL
POSITIVE QC PASS/FAIL: NORMAL

## 2023-04-06 PROCEDURE — 2709999900 HC NON-CHARGEABLE SUPPLY: Performed by: ANESTHESIOLOGY

## 2023-04-06 PROCEDURE — 7100000010 HC PHASE II RECOVERY - FIRST 15 MIN: Performed by: ANESTHESIOLOGY

## 2023-04-06 PROCEDURE — 3209999900 FLUORO FOR SURGICAL PROCEDURES

## 2023-04-06 PROCEDURE — 6360000002 HC RX W HCPCS: Performed by: NURSE ANESTHETIST, CERTIFIED REGISTERED

## 2023-04-06 PROCEDURE — 2500000003 HC RX 250 WO HCPCS: Performed by: ANESTHESIOLOGY

## 2023-04-06 PROCEDURE — 3600000012 HC SURGERY LEVEL 2 ADDTL 15MIN: Performed by: ANESTHESIOLOGY

## 2023-04-06 PROCEDURE — 7100000011 HC PHASE II RECOVERY - ADDTL 15 MIN: Performed by: ANESTHESIOLOGY

## 2023-04-06 PROCEDURE — 2580000003 HC RX 258: Performed by: NURSE ANESTHETIST, CERTIFIED REGISTERED

## 2023-04-06 PROCEDURE — 3700000000 HC ANESTHESIA ATTENDED CARE: Performed by: ANESTHESIOLOGY

## 2023-04-06 PROCEDURE — 6360000002 HC RX W HCPCS: Performed by: ANESTHESIOLOGY

## 2023-04-06 PROCEDURE — 3600000002 HC SURGERY LEVEL 2 BASE: Performed by: ANESTHESIOLOGY

## 2023-04-06 PROCEDURE — 6360000002 HC RX W HCPCS

## 2023-04-06 PROCEDURE — 82962 GLUCOSE BLOOD TEST: CPT

## 2023-04-06 PROCEDURE — 3700000001 HC ADD 15 MINUTES (ANESTHESIA): Performed by: ANESTHESIOLOGY

## 2023-04-06 RX ORDER — LIDOCAINE HYDROCHLORIDE 5 MG/ML
INJECTION, SOLUTION INFILTRATION; INTRAVENOUS PRN
Status: DISCONTINUED | OUTPATIENT
Start: 2023-04-06 | End: 2023-04-06 | Stop reason: ALTCHOICE

## 2023-04-06 RX ORDER — OXYCODONE HYDROCHLORIDE AND ACETAMINOPHEN 5; 325 MG/1; MG/1
1 TABLET ORAL EVERY 4 HOURS PRN
Qty: 42 TABLET | Refills: 0 | Status: SHIPPED | OUTPATIENT
Start: 2023-04-06 | End: 2023-04-13

## 2023-04-06 RX ORDER — FENTANYL CITRATE 50 UG/ML
INJECTION, SOLUTION INTRAMUSCULAR; INTRAVENOUS PRN
Status: DISCONTINUED | OUTPATIENT
Start: 2023-04-06 | End: 2023-04-06 | Stop reason: SDUPTHER

## 2023-04-06 RX ORDER — BUPIVACAINE HYDROCHLORIDE 5 MG/ML
INJECTION, SOLUTION EPIDURAL; INTRACAUDAL PRN
Status: DISCONTINUED | OUTPATIENT
Start: 2023-04-06 | End: 2023-04-06 | Stop reason: ALTCHOICE

## 2023-04-06 RX ORDER — SODIUM CHLORIDE 9 MG/ML
INJECTION, SOLUTION INTRAVENOUS CONTINUOUS PRN
Status: DISCONTINUED | OUTPATIENT
Start: 2023-04-06 | End: 2023-04-06 | Stop reason: SDUPTHER

## 2023-04-06 RX ORDER — SODIUM CHLORIDE 0.9 % (FLUSH) 0.9 %
5-40 SYRINGE (ML) INJECTION EVERY 12 HOURS SCHEDULED
Status: DISCONTINUED | OUTPATIENT
Start: 2023-04-06 | End: 2023-04-06 | Stop reason: HOSPADM

## 2023-04-06 RX ORDER — MIDAZOLAM HYDROCHLORIDE 1 MG/ML
INJECTION INTRAMUSCULAR; INTRAVENOUS PRN
Status: DISCONTINUED | OUTPATIENT
Start: 2023-04-06 | End: 2023-04-06 | Stop reason: SDUPTHER

## 2023-04-06 RX ORDER — SODIUM CHLORIDE 9 MG/ML
INJECTION, SOLUTION INTRAVENOUS PRN
Status: DISCONTINUED | OUTPATIENT
Start: 2023-04-06 | End: 2023-04-06 | Stop reason: HOSPADM

## 2023-04-06 RX ORDER — METHYLPREDNISOLONE ACETATE 40 MG/ML
INJECTION, SUSPENSION INTRA-ARTICULAR; INTRALESIONAL; INTRAMUSCULAR; SOFT TISSUE PRN
Status: DISCONTINUED | OUTPATIENT
Start: 2023-04-06 | End: 2023-04-06 | Stop reason: ALTCHOICE

## 2023-04-06 RX ORDER — SODIUM CHLORIDE 0.9 % (FLUSH) 0.9 %
5-40 SYRINGE (ML) INJECTION PRN
Status: DISCONTINUED | OUTPATIENT
Start: 2023-04-06 | End: 2023-04-06 | Stop reason: HOSPADM

## 2023-04-06 RX ADMIN — SODIUM CHLORIDE: 9 INJECTION, SOLUTION INTRAVENOUS at 12:57

## 2023-04-06 RX ADMIN — MIDAZOLAM 2 MG: 1 INJECTION INTRAMUSCULAR; INTRAVENOUS at 13:00

## 2023-04-06 RX ADMIN — FENTANYL CITRATE 100 MCG: 50 INJECTION, SOLUTION INTRAMUSCULAR; INTRAVENOUS at 13:00

## 2023-04-06 ASSESSMENT — LIFESTYLE VARIABLES: SMOKING_STATUS: 0

## 2023-04-06 ASSESSMENT — PAIN DESCRIPTION - DESCRIPTORS: DESCRIPTORS: SHOOTING;ACHING

## 2023-04-06 ASSESSMENT — PAIN - FUNCTIONAL ASSESSMENT: PAIN_FUNCTIONAL_ASSESSMENT: 0-10

## 2023-04-06 NOTE — OP NOTE
Operative Note      Patient: Jefry Thompson  YOB: 1975  MRN: [id number]    Date of Procedure: 2023    Pre-Op Diagnosis: Spondylosis of lumbar region without myelopathy or radiculopathy [M47.816]    Post-Op Diagnosis: Same       Procedure(s):  # 2 BILATERAL LUMBAR MEDIAL BRANCH NERVE BLOCK UNDER FLUOROSCOPIC GUIDANCE AT L2, L3, L4 MB & L5 DORSAL RAMI    Surgeon(s):  Geovanna Hernandez MD    Assistant:   * No surgical staff found *    Anesthesia: Monitor Anesthesia Care    Estimated Blood Loss (mL): Minimal    Complications: None    Specimens:   * No specimens in log *    Implants:  * No implants in log *      Drains: * No LDAs found *    Findings: GOOD NEEDLE PLACEMENT    Detailed Description of Procedure:   2023    Patient: Jefry Thompson  :  1975  Age:  56 y.o. Sex:  female     PRE-OPERATIVE DIAGNOSIS: Bilateral Lumbar spondylosis, lumbar facet syndrome. POST-OPERATIVE DIAGNOSIS: Same. PROCEDURE:  # 2 Fluoroscopic guided lumbar medial branch blocks Bilateral at FACET Levels: L3-4, L4-5, L5-S1 ( L2, L3 L4 MB & L5DR)    SURGEON: Geovanna Hernandez MD    ANESTHESIA: MAC    ESTIMATED BLOOD LOSS: None.  ______________________________________________________________________  BRIEF HISTORY:  Jefry Thompson comes in today for 2 fluoroscopic guided lumbar medial branch blocks  Bilateral  at Levels: L3-4, L4-5, L5-S1. The potential complications of this procedure were discussed with her again today. She has elected to undergo the aforementioned procedure. Jean complete History & Physical examination were reviewed in depth, a copy of which is in the chart. DESCRIPTION OF PROCEDURE:   After confirming written and informed consent, a time-out was performed and Jean name and date of birth, the procedure to be performed as well as the plan for the location of the needle insertion were confirmed.     The patient was brought into the procedure room and placed in the prone position on

## 2023-04-06 NOTE — ANESTHESIA POSTPROCEDURE EVALUATION
Department of Anesthesiology  Postprocedure Note    Patient: Bonnie Bowers  MRN: [id number]  [place]: 1975  Date of evaluation: 4/6/2023      Procedure Summary     Date: 04/06/23 Room / Location: [facility] PROCEDURE ROOM 02 / [facility]    Anesthesia Start: 4357 Anesthesia Stop: 1314    Procedure: # 2 BILATERAL LUMBAR MEDIAL BRANCH NERVE BLOCK UNDER FLUOROSCOPIC GUIDANCE AT L2, L3, L4 MB & L5 DORSAL RAMI (Bilateral: Back) Diagnosis:       Spondylosis of lumbar region without myelopathy or radiculopathy      (Spondylosis of lumbar region without myelopathy or radiculopathy [X70.333])    Surgeons: Do Mendoza MD Responsible Provider: Tamar Riggs MD    Anesthesia Type: MAC ASA Status: 3          Anesthesia Type: No value filed.     Enrique Phase I: Enrique Score: 10    Enrique Phase II:        Anesthesia Post Evaluation    Patient location during evaluation: bedside  Patient participation: complete - patient participated  Level of consciousness: awake and alert  Pain score: 0  Airway patency: patent  Nausea & Vomiting: no nausea and no vomiting  Complications: no  Cardiovascular status: blood pressure returned to baseline  Respiratory status: acceptable  Hydration status: euvolemic

## 2023-04-06 NOTE — ANESTHESIA PRE PROCEDURE
(90 Base) MCG/ACT inhaler Inhale 2 puffs into the lungs 4 times daily as needed for Wheezing 18 g 5    cyanocobalamin (DODEX) 1000 MCG/ML injection inject 1 milliliter ( 1000 MCG ) intramuscularly every week for 4 weeks 4 mL 5    lisinopril (PRINIVIL;ZESTRIL) 10 MG tablet take 1 tablet by mouth once daily 90 tablet 1    naloxone 4 MG/0.1ML LIQD nasal spray 1 spray by Nasal route as needed for Opioid Reversal 1 each 5    omeprazole (PRILOSEC) 40 MG delayed release capsule Take 1 capsule by mouth 2 times daily 180 capsule 1    ondansetron (ZOFRAN) 4 MG tablet Take 1 tablet by mouth every 8 hours as needed for Nausea or Vomiting 21 tablet 0    OZEMPIC, 0.25 OR 0.5 MG/DOSE, 2 MG/1.5ML SOPN inject 0.25 milligrams subcutaneously every week 4 Adjustable Dose Pre-filled Pen Syringe 5    rosuvastatin (CRESTOR) 5 MG tablet Take 1 tablet by mouth daily 90 tablet 1    SUMAtriptan (IMITREX) 50 MG tablet take 1 tablet by mouth if needed AT ONSET OF HEADACHE may repeat in 4 hours IF headache PERSISTS 9 tablet 5    cloNIDine (CATAPRES) 0.1 MG tablet Take 1 tablet by mouth 2 times daily 60 tablet 0    naloxone 4 MG/0.1ML LIQD nasal spray 1 spray by Nasal route as needed for Opioid Reversal 1 each 5    clotrimazole-betamethasone (LOTRISONE) 1-0.05 % cream Apply topically 2 times daily. 60 g 2    secukinumab (COSENTYX) 150 MG/ML SOSY Inject 300 mg into the skin once      Multiple Vitamin (MULTIVITAMIN PO) Take 1 tablet by mouth daily        No current facility-administered medications for this visit.        Allergies:  No Known Allergies    Problem List:    Patient Active Problem List   Diagnosis Code    Chronic back pain M54.9, G89.29    Hyperlipidemia E78.5    Indigestion K30    DDD (degenerative disc disease), cervical M50.30    Cervical radiculopathy M54.12    Lumbosacral spondylosis without myelopathy M47.817    Lumbar spondylosis M47.816       Past Medical History:        Diagnosis Date    Arthritis

## 2023-04-06 NOTE — DISCHARGE INSTRUCTIONS
[place] [facility] Dr. Oneil Necessary  Dr. Go Menendez Block/Radiofrequency  Home Going Instructions    1-Go home, rest for the remainder of the day  2-Please do not lift over 20 pounds the day of the injection  3-If you received sedation No: alcohol, driving, operating lawn mowers, plows, tractors or other dangerous equipment until next morning. Do not make important decisions or sign legal documents for 24 hours. You may experience light headedness, dizziness, nausea or sleepiness after sedation. Do not stay alone. A responsible adult must be with you for 24 hours. You could be nauseated from the medications you have received. Your IV site may be sore and bruised. 4-No dietary restrictions     5-Resume all medications the same day, blood thinners to be resumed 24 hours after injection if you were instructed to stop any. 6-Keep the surgical site clean and dry, you may shower the next morning and remove the      dressing. 7- No sitz baths, tub baths or hot tubs/swimming for 24 hours. 8- If you have any pain at the injection site(s), application of an ice pack to the area should be       helpful, 20 minutes on/20 minutes off for next 48 hours. 9- Call [facility] immediately at if you develop.   Fever greater than 100.4 F  Have bleeding or drainage from the puncture site  Have progressive Leg/arm numbness and or weakness  Loss of control of bowel and or bladder (wet/soil yourself)  Severe headache with inability to lift head  10-You may return to work the next day

## 2023-04-06 NOTE — H&P
tablet Take 1 tablet by mouth 3 times daily as needed for Muscle spasms 3/30/23 4/9/23  Meggan Sanchez, APRN - CNP   VRAYLAR 3 MG CAPS capsule Take 1.5 mg by mouth Daily with lunch 3/24/23   Historical Provider, MD   nabumetone (RELAFEN) 750 MG tablet Take 1 tablet by mouth 2 times daily as needed for Pain 3/21/23   Yoni Mays MD   folic acid (FOLVITE) 1 MG tablet TAKE 1 TABLET BY MOUTH EVERY DAY 3/2/23   Danna Alejandra DO   DULoxetine (CYMBALTA) 60 MG extended release capsule TAKE 1 CAPSULE BY MOUTH ONCE DAILY 3/2/23   Danna Alejandra DO   busPIRone (BUSPAR) 5 MG tablet Take 1 tablet by mouth at bedtime 2/23/23   Historical Provider, MD   albuterol sulfate HFA (VENTOLIN HFA) 108 (90 Base) MCG/ACT inhaler Inhale 2 puffs into the lungs 4 times daily as needed for Wheezing 2/8/23   Danna Alejandra, DO   cyanocobalamin (DODEX) 1000 MCG/ML injection inject 1 milliliter ( 1000 MCG ) intramuscularly every week for 4 weeks 2/8/23   Danna Alejandra DO   lisinopril (PRINIVIL;ZESTRIL) 10 MG tablet take 1 tablet by mouth once daily 2/8/23   Danna Alejandra, DO   naloxone 4 MG/0.1ML LIQD nasal spray 1 spray by Nasal route as needed for Opioid Reversal 2/8/23   Danna Alejandra DO   omeprazole (PRILOSEC) 40 MG delayed release capsule Take 1 capsule by mouth 2 times daily 2/8/23 8/7/23  Danna Alejandra DO   ondansetron (ZOFRAN) 4 MG tablet Take 1 tablet by mouth every 8 hours as needed for Nausea or Vomiting 2/8/23   Danna Alejandra, DO   OZEMPIC, 0.25 OR 0.5 MG/DOSE, 2 MG/1.5ML SOPN inject 0.25 milligrams subcutaneously every week 2/8/23   Danna Alejandra DO   rosuvastatin (CRESTOR) 5 MG tablet Take 1 tablet by mouth daily 2/8/23   Danna Alejandra DO   SUMAtriptan (IMITREX) 50 MG tablet take 1 tablet by mouth if needed AT ONSET OF HEADACHE may repeat in 4 hours IF headache PERSISTS 2/8/23   Danna Alejandra DO   cloNIDine (CATAPRES) 0.1 MG tablet Take 1 tablet by mouth 2 times daily 2/8/23   Danna Alejandra DO

## 2023-04-07 ENCOUNTER — OFFICE VISIT (OUTPATIENT)
Dept: FAMILY MEDICINE CLINIC | Age: 48
End: 2023-04-07

## 2023-04-07 VITALS
WEIGHT: 243.3 LBS | TEMPERATURE: 99.8 F | RESPIRATION RATE: 16 BRPM | BODY MASS INDEX: 38.19 KG/M2 | HEIGHT: 67 IN | OXYGEN SATURATION: 95 % | SYSTOLIC BLOOD PRESSURE: 122 MMHG | HEART RATE: 76 BPM | DIASTOLIC BLOOD PRESSURE: 78 MMHG

## 2023-04-07 DIAGNOSIS — R73.01 IFG (IMPAIRED FASTING GLUCOSE): ICD-10-CM

## 2023-04-07 DIAGNOSIS — M06.9 RHEUMATOID ARTHRITIS, INVOLVING UNSPECIFIED SITE, UNSPECIFIED WHETHER RHEUMATOID FACTOR PRESENT (HCC): ICD-10-CM

## 2023-04-07 DIAGNOSIS — M47.817 LUMBOSACRAL SPONDYLOSIS WITHOUT MYELOPATHY: ICD-10-CM

## 2023-04-07 DIAGNOSIS — T14.8XXA BRUISING: ICD-10-CM

## 2023-04-07 DIAGNOSIS — M54.9 CHRONIC BACK PAIN, UNSPECIFIED BACK LOCATION, UNSPECIFIED BACK PAIN LATERALITY: ICD-10-CM

## 2023-04-07 DIAGNOSIS — M79.7 FIBROMYALGIA: ICD-10-CM

## 2023-04-07 DIAGNOSIS — E53.8 VITAMIN B 12 DEFICIENCY: ICD-10-CM

## 2023-04-07 DIAGNOSIS — G89.29 CHRONIC BACK PAIN, UNSPECIFIED BACK LOCATION, UNSPECIFIED BACK PAIN LATERALITY: ICD-10-CM

## 2023-04-07 DIAGNOSIS — M54.12 CERVICAL RADICULOPATHY: ICD-10-CM

## 2023-04-07 DIAGNOSIS — N30.01 ACUTE CYSTITIS WITH HEMATURIA: ICD-10-CM

## 2023-04-07 DIAGNOSIS — R63.4 WEIGHT LOSS: ICD-10-CM

## 2023-04-07 DIAGNOSIS — E53.8 VITAMIN B 12 DEFICIENCY: Primary | ICD-10-CM

## 2023-04-07 DIAGNOSIS — K30 INDIGESTION: ICD-10-CM

## 2023-04-07 DIAGNOSIS — F31.9 BIPOLAR 1 DISORDER (HCC): ICD-10-CM

## 2023-04-07 DIAGNOSIS — Z00.00 ROUTINE GENERAL MEDICAL EXAMINATION AT A HEALTH CARE FACILITY: ICD-10-CM

## 2023-04-07 DIAGNOSIS — M54.31 SCIATICA OF RIGHT SIDE: ICD-10-CM

## 2023-04-07 DIAGNOSIS — M50.30 DDD (DEGENERATIVE DISC DISEASE), CERVICAL: ICD-10-CM

## 2023-04-07 LAB
ALBUMIN SERPL-MCNC: 4.2 G/DL (ref 3.5–5.2)
ALP SERPL-CCNC: 55 U/L (ref 35–104)
ALT SERPL-CCNC: 14 U/L (ref 0–32)
ANION GAP SERPL CALCULATED.3IONS-SCNC: 9 MMOL/L (ref 7–16)
AST SERPL-CCNC: 19 U/L (ref 0–31)
BASOPHILS # BLD: 0.05 E9/L (ref 0–0.2)
BASOPHILS NFR BLD: 0.5 % (ref 0–2)
BILIRUB SERPL-MCNC: 0.4 MG/DL (ref 0–1.2)
BUN SERPL-MCNC: 17 MG/DL (ref 6–20)
CALCIUM SERPL-MCNC: 9 MG/DL (ref 8.6–10.2)
CHLORIDE SERPL-SCNC: 106 MMOL/L (ref 98–107)
CO2 SERPL-SCNC: 24 MMOL/L (ref 22–29)
CREAT SERPL-MCNC: 0.8 MG/DL (ref 0.5–1)
EOSINOPHIL # BLD: 0.12 E9/L (ref 0.05–0.5)
EOSINOPHIL NFR BLD: 1.3 % (ref 0–6)
ERYTHROCYTE [DISTWIDTH] IN BLOOD BY AUTOMATED COUNT: 12.2 FL (ref 11.5–15)
GLUCOSE SERPL-MCNC: 103 MG/DL (ref 74–99)
HBA1C MFR BLD: 5.1 % (ref 4–5.6)
HCT VFR BLD AUTO: 40.5 % (ref 34–48)
HGB BLD-MCNC: 12.6 G/DL (ref 11.5–15.5)
IMM GRANULOCYTES # BLD: 0.04 E9/L
IMM GRANULOCYTES NFR BLD: 0.4 % (ref 0–5)
INR BLD: 1
LYMPHOCYTES # BLD: 2.02 E9/L (ref 1.5–4)
LYMPHOCYTES NFR BLD: 21.3 % (ref 20–42)
MCH RBC QN AUTO: 31 PG (ref 26–35)
MCHC RBC AUTO-ENTMCNC: 31.1 % (ref 32–34.5)
MCV RBC AUTO: 99.8 FL (ref 80–99.9)
MONOCYTES # BLD: 0.56 E9/L (ref 0.1–0.95)
MONOCYTES NFR BLD: 5.9 % (ref 2–12)
NEUTROPHILS # BLD: 6.7 E9/L (ref 1.8–7.3)
NEUTS SEG NFR BLD: 70.6 % (ref 43–80)
PLATELET # BLD AUTO: 319 E9/L (ref 130–450)
PMV BLD AUTO: 10.3 FL (ref 7–12)
POTASSIUM SERPL-SCNC: 4.3 MMOL/L (ref 3.5–5)
PROT SERPL-MCNC: 7 G/DL (ref 6.4–8.3)
PROTHROMBIN TIME: 10.6 SEC (ref 9.3–12.4)
RBC # BLD AUTO: 4.06 E12/L (ref 3.5–5.5)
SODIUM SERPL-SCNC: 139 MMOL/L (ref 132–146)
WBC # BLD: 9.5 E9/L (ref 4.5–11.5)

## 2023-04-07 RX ORDER — CYANOCOBALAMIN 1000 UG/ML
1000 INJECTION, SOLUTION INTRAMUSCULAR; SUBCUTANEOUS ONCE
Status: COMPLETED | OUTPATIENT
Start: 2023-04-07 | End: 2023-04-07

## 2023-04-07 RX ORDER — SEMAGLUTIDE 1.34 MG/ML
INJECTION, SOLUTION SUBCUTANEOUS
Qty: 4 ADJUSTABLE DOSE PRE-FILLED PEN SYRINGE | Refills: 5 | Status: SHIPPED | OUTPATIENT
Start: 2023-04-07

## 2023-04-07 RX ORDER — CARIPRAZINE 3 MG/1
1.5 CAPSULE, GELATIN COATED ORAL
Qty: 30 CAPSULE | Status: CANCELLED | OUTPATIENT
Start: 2023-04-07

## 2023-04-07 RX ORDER — CYCLOBENZAPRINE HCL 5 MG
5 TABLET ORAL 3 TIMES DAILY PRN
Qty: 30 TABLET | Refills: 0 | Status: SHIPPED
Start: 2023-04-07 | End: 2023-04-17

## 2023-04-07 RX ORDER — TIZANIDINE 4 MG/1
TABLET ORAL
COMMUNITY
Start: 2023-04-03

## 2023-04-07 RX ORDER — ONDANSETRON 4 MG/1
4 TABLET, ORALLY DISINTEGRATING ORAL 3 TIMES DAILY PRN
Qty: 60 TABLET | Refills: 1 | Status: SHIPPED | OUTPATIENT
Start: 2023-04-07 | End: 2023-05-07

## 2023-04-07 RX ADMIN — CYANOCOBALAMIN 1000 MCG: 1000 INJECTION, SOLUTION INTRAMUSCULAR; SUBCUTANEOUS at 10:11

## 2023-04-17 RX ORDER — CYCLOBENZAPRINE HCL 5 MG
TABLET ORAL
Qty: 30 TABLET | Refills: 0 | Status: SHIPPED | OUTPATIENT
Start: 2023-04-17

## 2023-04-19 ENCOUNTER — PREP FOR PROCEDURE (OUTPATIENT)
Dept: PAIN MANAGEMENT | Age: 48
End: 2023-04-19

## 2023-04-19 ENCOUNTER — OFFICE VISIT (OUTPATIENT)
Dept: PAIN MANAGEMENT | Age: 48
End: 2023-04-19
Payer: COMMERCIAL

## 2023-04-19 VITALS
HEART RATE: 16 BPM | HEIGHT: 67 IN | RESPIRATION RATE: 16 BRPM | SYSTOLIC BLOOD PRESSURE: 122 MMHG | TEMPERATURE: 97.8 F | BODY MASS INDEX: 37.67 KG/M2 | DIASTOLIC BLOOD PRESSURE: 82 MMHG | OXYGEN SATURATION: 95 % | WEIGHT: 240 LBS

## 2023-04-19 DIAGNOSIS — M50.30 DDD (DEGENERATIVE DISC DISEASE), CERVICAL: ICD-10-CM

## 2023-04-19 DIAGNOSIS — M51.36 DDD (DEGENERATIVE DISC DISEASE), LUMBAR: Primary | ICD-10-CM

## 2023-04-19 DIAGNOSIS — M47.817 LUMBOSACRAL SPONDYLOSIS WITHOUT MYELOPATHY: Primary | ICD-10-CM

## 2023-04-19 DIAGNOSIS — M47.817 LUMBOSACRAL SPONDYLOSIS WITHOUT MYELOPATHY: ICD-10-CM

## 2023-04-19 DIAGNOSIS — E66.9 OBESITY, UNSPECIFIED CLASSIFICATION, UNSPECIFIED OBESITY TYPE, UNSPECIFIED WHETHER SERIOUS COMORBIDITY PRESENT: ICD-10-CM

## 2023-04-19 PROCEDURE — 99213 OFFICE O/P EST LOW 20 MIN: CPT | Performed by: ANESTHESIOLOGY

## 2023-04-19 RX ORDER — SODIUM CHLORIDE 0.9 % (FLUSH) 0.9 %
5-40 SYRINGE (ML) INJECTION EVERY 12 HOURS SCHEDULED
Status: CANCELLED | OUTPATIENT
Start: 2023-04-19

## 2023-04-19 RX ORDER — SODIUM CHLORIDE 0.9 % (FLUSH) 0.9 %
5-40 SYRINGE (ML) INJECTION PRN
Status: CANCELLED | OUTPATIENT
Start: 2023-04-19

## 2023-04-19 RX ORDER — SODIUM CHLORIDE 9 MG/ML
INJECTION, SOLUTION INTRAVENOUS PRN
Status: CANCELLED | OUTPATIENT
Start: 2023-04-19

## 2023-04-19 NOTE — PROGRESS NOTES
Nicholas Patino presents to the [facility] on 4/19/2023. Prince Noel is complaining of pain in her low back. The pain is constant. The pain is described as aching. Pain is rated on her best day at a 6, on her worst day at a 10, and on average at a 8 on the VAS scale. She took her last dose of Percocet today. Any procedures since your last visit: Yes, with 80 % relief. Pacemaker or defibrillator: No     She is  on NSAIDS and is not on anticoagulation medications. Medication Contract and Consent for Opioid Use Documents Filed       Patient Documents       Type of Document Status Date Received Received By Description    Medication Contract Received 1/19/2023 11:34 AM EZIO VILLANUEVA 1/6/23 Medication Contract                    LMP 03/16/2023      Patient's last menstrual period was 03/16/2023.

## 2023-04-19 NOTE — PROGRESS NOTES
[facility] Pain Management        [facility] [address]  [facility], [address] [place] [address]  Dept: [phone]      Follow up Note      Anamika Louis     Date of Visit:  4/19/2023    CC:  Patient presents for follow up   Chief Complaint   Patient presents with    Follow-up     # 2 BILATERAL LUMBAR MEDIAL BRANCH NERVE BLOCK UNDER FLUOROSCOPIC GUIDANCE AT L2, L3, L4 MB & L5 DORSAL RAMI       HPI:    Neck pain and left UE pain. Failed conservative treatment. Has been evaluated by Ratna Broderick- Dr. Candelaria Owens- referred for interventions. S/P ALDO excellent pain relief. Current pain in the low back. Pain predominantly axial in nature. Denies significant lower extremity radicular complaints. Nursing notes and details of the pain history reviewed. Please see intake notes for details. NOTE:  H/o psoriatic arthritis- on Methotrexate  S/p Gastric bypass in 2009  H/o fibromyalgia- diffuse pain     Previous treatments:   Physical Therapy/ HEP : yes, has been doing HEP since the onset      Medications: - NSAID's : yes                        - Membrane stabilizers : yes - Gabapentin                       - Opioids : yes, short term                       - Adjuvants or Others : yes, oral steroids/ muscle relaxants     Surgeries: no C spine surgery     She has not been on anticoagulation medications      She is not diabetic. H/O Smoking: no  H/O alcohol abuse : no  H/O Illicit drug use : no     Employment: employed     Imaging:     MRI of LS spine: 12/22/2022:  FINDINGS:   No evidence of lumbar spine fracture. There is retrolisthesis of L2 on L3 of   approximately 3 mm. Vertebral body height and interspaces appear well   maintained. Conus medullaris is visualized and appears unremarkable. No   evidence of epidural mass or hematoma. L1/L2: No central canal stenosis or neural foraminal narrowing. There is   moderate bilateral facet hypertrophy. L2/L3: Anterior disc bulge present with anterior osteophytosis.   3 mm of

## 2023-04-21 ENCOUNTER — NURSE ONLY (OUTPATIENT)
Dept: FAMILY MEDICINE CLINIC | Age: 48
End: 2023-04-21

## 2023-04-21 DIAGNOSIS — E53.8 VITAMIN B 12 DEFICIENCY: Primary | ICD-10-CM

## 2023-04-21 RX ORDER — CYANOCOBALAMIN 1000 UG/ML
1000 INJECTION, SOLUTION INTRAMUSCULAR; SUBCUTANEOUS ONCE
Status: COMPLETED | OUTPATIENT
Start: 2023-04-21 | End: 2023-04-21

## 2023-04-21 RX ADMIN — CYANOCOBALAMIN 1000 MCG: 1000 INJECTION, SOLUTION INTRAMUSCULAR; SUBCUTANEOUS at 10:56

## 2023-04-24 ENCOUNTER — TELEPHONE (OUTPATIENT)
Dept: FAMILY MEDICINE CLINIC | Age: 48
End: 2023-04-24

## 2023-04-24 DIAGNOSIS — M54.12 CERVICAL RADICULOPATHY: ICD-10-CM

## 2023-04-24 RX ORDER — OXYCODONE HYDROCHLORIDE AND ACETAMINOPHEN 5; 325 MG/1; MG/1
1 TABLET ORAL EVERY 4 HOURS PRN
Qty: 42 TABLET | Refills: 0 | Status: SHIPPED | OUTPATIENT
Start: 2023-04-24 | End: 2023-05-01

## 2023-04-24 NOTE — TELEPHONE ENCOUNTER
Patients last appointment 4/7/2023. Patients next scheduled appointment   Future Appointments   Date Time Provider Jimmie Ngo   4/28/2023 11:00 AM SCHEDULE, [facility]   6/8/2023  2:00 PM Lazaro Monique MD [facility] PAIN [facility]

## 2023-04-24 NOTE — TELEPHONE ENCOUNTER
----- Message from [facility] sent at 4/24/2023  4:01 PM EDT -----  Subject: Message to Provider    QUESTIONS  Information for Provider? Mars  from insurance called wanting   office to have her info on file if any further questions or issues   [phone]  ---------------------------------------------------------------------------  --------------  [address]  [phone]; Do not leave any message, patient will call back for answer  ---------------------------------------------------------------------------  --------------  SCRIPT ANSWERS  Relationship to Patient? Covered Entity  Covered Entity Type? Health Insurance?   Representative Name? Rozetta Leventhal

## 2023-04-26 ENCOUNTER — TELEPHONE (OUTPATIENT)
Dept: PAIN MANAGEMENT | Age: 48
End: 2023-04-26

## 2023-04-28 ENCOUNTER — NURSE ONLY (OUTPATIENT)
Dept: FAMILY MEDICINE CLINIC | Age: 48
End: 2023-04-28

## 2023-04-28 DIAGNOSIS — E53.8 VITAMIN B 12 DEFICIENCY: Primary | ICD-10-CM

## 2023-04-28 RX ORDER — CYANOCOBALAMIN 1000 UG/ML
1000 INJECTION, SOLUTION INTRAMUSCULAR; SUBCUTANEOUS ONCE
Status: COMPLETED | OUTPATIENT
Start: 2023-04-28 | End: 2023-04-28

## 2023-04-28 RX ADMIN — CYANOCOBALAMIN 1000 MCG: 1000 INJECTION, SOLUTION INTRAMUSCULAR; SUBCUTANEOUS at 12:41

## 2023-04-29 ASSESSMENT — ENCOUNTER SYMPTOMS
COUGH: 0
EYE REDNESS: 0
ABDOMINAL PAIN: 0
FACIAL SWELLING: 0
BLOOD IN STOOL: 0
APNEA: 0
SHORTNESS OF BREATH: 0
WHEEZING: 0
STRIDOR: 0
SINUS PRESSURE: 0
PHOTOPHOBIA: 0
TROUBLE SWALLOWING: 0
RHINORRHEA: 0
EYE PAIN: 0
RECTAL PAIN: 0
NAUSEA: 0
EYE ITCHING: 0
SORE THROAT: 0
CHEST TIGHTNESS: 0
ANAL BLEEDING: 0
VOICE CHANGE: 0
SINUS PAIN: 0
VOMITING: 0
EYE DISCHARGE: 0
CHOKING: 0
COLOR CHANGE: 0
ABDOMINAL DISTENTION: 0
CONSTIPATION: 0
BACK PAIN: 1
DIARRHEA: 0

## 2023-05-03 DIAGNOSIS — M54.12 CERVICAL RADICULOPATHY: ICD-10-CM

## 2023-05-03 RX ORDER — OXYCODONE HYDROCHLORIDE AND ACETAMINOPHEN 5; 325 MG/1; MG/1
1 TABLET ORAL EVERY 4 HOURS PRN
Qty: 42 TABLET | Refills: 0 | Status: SHIPPED | OUTPATIENT
Start: 2023-05-03 | End: 2023-05-10

## 2023-05-04 ENCOUNTER — HOSPITAL ENCOUNTER (OUTPATIENT)
Dept: GENERAL RADIOLOGY | Age: 48
Setting detail: OUTPATIENT SURGERY
Discharge: HOME | End: 2023-05-06
Attending: ANESTHESIOLOGY
Payer: COMMERCIAL

## 2023-05-04 ENCOUNTER — ANESTHESIA (OUTPATIENT)
Dept: OPERATING ROOM | Age: 48
End: 2023-05-04
Payer: COMMERCIAL

## 2023-05-04 ENCOUNTER — ANESTHESIA EVENT (OUTPATIENT)
Dept: OPERATING ROOM | Age: 48
End: 2023-05-04
Payer: COMMERCIAL

## 2023-05-04 ENCOUNTER — HOSPITAL ENCOUNTER (OUTPATIENT)
Age: 48
Setting detail: OUTPATIENT SURGERY
Discharge: HOME | End: 2023-05-04
Attending: ANESTHESIOLOGY | Admitting: ANESTHESIOLOGY
Payer: COMMERCIAL

## 2023-05-04 VITALS
WEIGHT: 240 LBS | BODY MASS INDEX: 37.67 KG/M2 | HEART RATE: 76 BPM | TEMPERATURE: 97 F | HEIGHT: 67 IN | DIASTOLIC BLOOD PRESSURE: 78 MMHG | SYSTOLIC BLOOD PRESSURE: 162 MMHG | RESPIRATION RATE: 18 BRPM | OXYGEN SATURATION: 97 %

## 2023-05-04 DIAGNOSIS — R52 PAIN MANAGEMENT: ICD-10-CM

## 2023-05-04 LAB
HCG, URINE, POC: NEGATIVE
Lab: NORMAL
METER GLUCOSE: 86 MG/DL (ref 74–99)
NEGATIVE QC PASS/FAIL: NORMAL
POSITIVE QC PASS/FAIL: NORMAL

## 2023-05-04 PROCEDURE — 64636 DESTROY L/S FACET JNT ADDL: CPT | Performed by: ANESTHESIOLOGY

## 2023-05-04 PROCEDURE — 3209999900 FLUORO FOR SURGICAL PROCEDURES

## 2023-05-04 PROCEDURE — 64635 DESTROY LUMB/SAC FACET JNT: CPT | Performed by: ANESTHESIOLOGY

## 2023-05-04 PROCEDURE — 6360000002 HC RX W HCPCS: Performed by: ANESTHESIOLOGY

## 2023-05-04 PROCEDURE — 2709999900 HC NON-CHARGEABLE SUPPLY: Performed by: ANESTHESIOLOGY

## 2023-05-04 PROCEDURE — 3700000000 HC ANESTHESIA ATTENDED CARE: Performed by: ANESTHESIOLOGY

## 2023-05-04 PROCEDURE — 3600000012 HC SURGERY LEVEL 2 ADDTL 15MIN: Performed by: ANESTHESIOLOGY

## 2023-05-04 PROCEDURE — 3600000002 HC SURGERY LEVEL 2 BASE: Performed by: ANESTHESIOLOGY

## 2023-05-04 PROCEDURE — 82962 GLUCOSE BLOOD TEST: CPT

## 2023-05-04 PROCEDURE — 7100000010 HC PHASE II RECOVERY - FIRST 15 MIN: Performed by: ANESTHESIOLOGY

## 2023-05-04 PROCEDURE — 2580000003 HC RX 258: Performed by: ANESTHESIOLOGY

## 2023-05-04 PROCEDURE — 7100000011 HC PHASE II RECOVERY - ADDTL 15 MIN: Performed by: ANESTHESIOLOGY

## 2023-05-04 PROCEDURE — 3700000001 HC ADD 15 MINUTES (ANESTHESIA): Performed by: ANESTHESIOLOGY

## 2023-05-04 PROCEDURE — 2500000003 HC RX 250 WO HCPCS: Performed by: ANESTHESIOLOGY

## 2023-05-04 PROCEDURE — 6360000002 HC RX W HCPCS

## 2023-05-04 RX ORDER — BUPIVACAINE HYDROCHLORIDE 2.5 MG/ML
INJECTION, SOLUTION EPIDURAL; INFILTRATION; INTRACAUDAL PRN
Status: DISCONTINUED | OUTPATIENT
Start: 2023-05-04 | End: 2023-05-04 | Stop reason: ALTCHOICE

## 2023-05-04 RX ORDER — LIDOCAINE HYDROCHLORIDE 10 MG/ML
INJECTION, SOLUTION EPIDURAL; INFILTRATION; INTRACAUDAL; PERINEURAL PRN
Status: DISCONTINUED | OUTPATIENT
Start: 2023-05-04 | End: 2023-05-04 | Stop reason: ALTCHOICE

## 2023-05-04 RX ORDER — SODIUM CHLORIDE 0.9 % (FLUSH) 0.9 %
5-40 SYRINGE (ML) INJECTION EVERY 12 HOURS SCHEDULED
Status: DISCONTINUED | OUTPATIENT
Start: 2023-05-04 | End: 2023-05-04 | Stop reason: HOSPADM

## 2023-05-04 RX ORDER — LIDOCAINE HYDROCHLORIDE 5 MG/ML
INJECTION, SOLUTION INFILTRATION; INTRAVENOUS PRN
Status: DISCONTINUED | OUTPATIENT
Start: 2023-05-04 | End: 2023-05-04 | Stop reason: ALTCHOICE

## 2023-05-04 RX ORDER — SODIUM CHLORIDE 0.9 % (FLUSH) 0.9 %
5-40 SYRINGE (ML) INJECTION PRN
Status: DISCONTINUED | OUTPATIENT
Start: 2023-05-04 | End: 2023-05-04 | Stop reason: HOSPADM

## 2023-05-04 RX ORDER — MIDAZOLAM HYDROCHLORIDE 1 MG/ML
INJECTION INTRAMUSCULAR; INTRAVENOUS PRN
Status: DISCONTINUED | OUTPATIENT
Start: 2023-05-04 | End: 2023-05-04 | Stop reason: SDUPTHER

## 2023-05-04 RX ORDER — METHYLPREDNISOLONE ACETATE 40 MG/ML
INJECTION, SUSPENSION INTRA-ARTICULAR; INTRALESIONAL; INTRAMUSCULAR; SOFT TISSUE PRN
Status: DISCONTINUED | OUTPATIENT
Start: 2023-05-04 | End: 2023-05-04 | Stop reason: ALTCHOICE

## 2023-05-04 RX ORDER — SODIUM CHLORIDE 9 MG/ML
INJECTION, SOLUTION INTRAVENOUS PRN
Status: DISCONTINUED | OUTPATIENT
Start: 2023-05-04 | End: 2023-05-04 | Stop reason: HOSPADM

## 2023-05-04 RX ORDER — FENTANYL CITRATE 50 UG/ML
INJECTION, SOLUTION INTRAMUSCULAR; INTRAVENOUS PRN
Status: DISCONTINUED | OUTPATIENT
Start: 2023-05-04 | End: 2023-05-04 | Stop reason: SDUPTHER

## 2023-05-04 RX ADMIN — SODIUM CHLORIDE: 9 INJECTION, SOLUTION INTRAVENOUS at 12:29

## 2023-05-04 RX ADMIN — MIDAZOLAM 2 MG: 1 INJECTION INTRAMUSCULAR; INTRAVENOUS at 12:32

## 2023-05-04 RX ADMIN — FENTANYL CITRATE 50 MCG: 50 INJECTION, SOLUTION INTRAMUSCULAR; INTRAVENOUS at 12:35

## 2023-05-04 RX ADMIN — FENTANYL CITRATE 50 MCG: 50 INJECTION, SOLUTION INTRAMUSCULAR; INTRAVENOUS at 12:33

## 2023-05-04 ASSESSMENT — PAIN - FUNCTIONAL ASSESSMENT: PAIN_FUNCTIONAL_ASSESSMENT: 0-10

## 2023-05-04 ASSESSMENT — PAIN DESCRIPTION - DESCRIPTORS: DESCRIPTORS: ACHING

## 2023-05-04 NOTE — DISCHARGE INSTRUCTIONS
Lexx De La [place]re Block/Radiofrequency  Home Going Instructions    1-Go home, rest for the remainder of the day  2-Please do not lift over 20 pounds the day of the injection  3-If you received sedation No: alcohol, driving, operating lawn mowers, plows, tractors or other dangerous equipment until next morning. Do not make important decisions or sign legal documents for 24 hours. You may experience light headedness, dizziness, nausea or sleepiness after sedation. Do not stay alone. A responsible adult must be with you for 24 hours. You could be nauseated from the medications you have received. Your IV site may be sore and bruised. 4-No dietary restrictions     5-Resume all medications the same day, blood thinners to be resumed 24 hours after injection if you were instructed to stop any. 6-Keep the surgical site clean and dry, you may shower the next morning and remove the      dressing. 7- No sitz baths, tub baths or hot tubs/swimming for 24 hours. 8- If you have any pain at the injection site(s), application of an ice pack to the area should be       helpful, 20 minutes on/20 minutes off for next 48 hours. 9- Call [facility] immediately at if you develop.   Fever greater than 100.4 F  Have bleeding or drainage from the puncture site  Have progressive Leg/arm numbness and or weakness  Loss of control of bowel and or bladder (wet/soil yourself)  Severe headache with inability to lift head  10-You may return to work the next day

## 2023-05-04 NOTE — INTERVAL H&P NOTE
Update History & Physical    The patient's History and Physical of April 19, 2023 was reviewed with the patient and I examined the patient. There was no change. The surgical site was confirmed by the patient and me. Plan: The risks, benefits, expected outcome, and alternative to the recommended procedure have been discussed with the patient. Patient understands and wants to proceed with the procedure.      Electronically signed by Sloane Cosby MD on 5/4/2023

## 2023-05-04 NOTE — OP NOTE
Operative Note      Patient: Zoe Eaton  YOB: 1975  MRN: [id number]    Date of Procedure: 2023    Pre-Op Diagnosis Codes: * Spondylosis of lumbosacral region without myelopathy or radiculopathy [M47.817]    Post-Op Diagnosis: Same       Procedure(s):  BILATERAL LUMBAR RADIOFREQUENCY ABLATION AT L2, L3, L4 MB & L5 DORSAL RAMI UNDER FLUOROSCOPY    Surgeon(s):  Judy Amin MD    Assistant:   * No surgical staff found *    Anesthesia: Monitor Anesthesia Care    Estimated Blood Loss (mL): Minimal    Complications: None    Specimens:   * No specimens in log *    Implants:  * No implants in log *      Drains: * No LDAs found *    Findings: God needle placement      Detailed Description of Procedure:   2023    Patient: Zoe Eaton  :  1975  Age:  56 y.o. Sex:  female     PRE-OPERATIVE DIAGNOSIS: Bilateral Lumbar spondylosis, lumbar facet arthropathy. POST-OPERATIVE DIAGNOSIS: Same. PROCEDURE:  Fluoroscopic-guided Bilateral  Lumbar facet medial branch radiofrequency ablation at facet levels L3-4, L4-5, L5-S1 ( L2, L3 L4 MB & L5DR)    SURGEON:  Judy Amin MD    ANESTHESIA: MAC    ESTIMATED BLOOD LOSS: None.  ______________________________________________________________________  HISTORY & PHYSICAL: Zeo Eaton presents today for fluoroscopic-guided Bilateral lumbar facet medial branch radiofrequency ablation at levels L3-4, L4-5, L5-S1. The potential complications of the procedure were explained to [facility] again today and she has elected to undergo the aforementioned procedure. Jean complete History & Physical examination were reviewed in depth, a copy of which is in the chart. DESCRIPTION OF PROCEDURE:    After confirming written and informed consent, a time-out was performed and Jean name and date of birth, the procedure to be performed as well as the plan for the location of the needle insertion were confirmed.     The patient was brought into the

## 2023-05-04 NOTE — ANESTHESIA POSTPROCEDURE EVALUATION
Department of Anesthesiology  Postprocedure Note    Patient: Van Arthur  MRN: [id number]  [place]: 1975  Date of evaluation: 5/4/2023      Procedure Summary     Date: 05/04/23 Room / Location: [facility] PROCEDURE ROOM 02 / [facility]    Anesthesia Start: 3990 Anesthesia Stop: 1    Procedure: BILATERAL LUMBAR RADIOFREQUENCY ABLATION AT L2, L3, L4 MB & L5 DORSAL RAMI UNDER FLUOROSCOPY (Bilateral) Diagnosis:       Spondylosis of lumbosacral region without myelopathy or radiculopathy      (Spondylosis of lumbosacral region without myelopathy or radiculopathy [M55.625])    Surgeons: Diogo Tam MD Responsible Provider: Iliana Mendez MD    Anesthesia Type: MAC ASA Status: 3          Anesthesia Type: No value filed.     Enrique Phase I: Enrique Score: 10    Enrique Phase II:        Anesthesia Post Evaluation    Patient location during evaluation: bedside  Patient participation: complete - patient participated  Level of consciousness: awake and alert  Airway patency: patent  Nausea & Vomiting: no nausea and no vomiting  Complications: no  Cardiovascular status: blood pressure returned to baseline  Respiratory status: acceptable  Hydration status: euvolemic

## 2023-05-04 NOTE — ANESTHESIA PRE PROCEDURE
seen and chart reviewed. No interval change in history or exam.   Anesthesia plan discussed, risk/benefits addressed. Patient's concerns and questions answered. Minerva Blake MD  May 4, 2023  9:16 AM  Patient seen; chart reviewed and agree with above.     LYNSEY Delarosa - CRNA

## 2023-05-11 DIAGNOSIS — M54.12 CERVICAL RADICULOPATHY: ICD-10-CM

## 2023-05-11 RX ORDER — TIZANIDINE 4 MG/1
4 TABLET ORAL EVERY 8 HOURS PRN
Qty: 90 TABLET | Refills: 0 | Status: CANCELLED | OUTPATIENT
Start: 2023-05-11 | End: 2023-06-10

## 2023-05-11 RX ORDER — OXYCODONE HYDROCHLORIDE AND ACETAMINOPHEN 5; 325 MG/1; MG/1
1 TABLET ORAL EVERY 4 HOURS PRN
Qty: 42 TABLET | Refills: 0 | Status: CANCELLED | OUTPATIENT
Start: 2023-05-11 | End: 2023-05-18

## 2023-05-11 RX ORDER — OXYCODONE HYDROCHLORIDE AND ACETAMINOPHEN 5; 325 MG/1; MG/1
1 TABLET ORAL EVERY 4 HOURS PRN
Qty: 42 TABLET | Refills: 0 | Status: SHIPPED | OUTPATIENT
Start: 2023-05-11 | End: 2023-05-18

## 2023-05-11 RX ORDER — TIZANIDINE 4 MG/1
4 TABLET ORAL EVERY 6 HOURS PRN
Status: CANCELLED | OUTPATIENT
Start: 2023-05-11

## 2023-05-11 NOTE — TELEPHONE ENCOUNTER
Refills  Per Dr. Asif Pink called pt. To verify with her how she is taking:  Percocet, she is taking it q 4 hours since her back surgery done 5/4/23. Prior to that 42 tabs last her 8-10 days. FYI the month of April she was given 168 Percocet. In March she had 168 also. 2. Tizanidine 4 mg, she is to take 3 times a day BUT that does NOT work, she is taking them q 4 hours . So she has taken 270 pills in 45 days. She does not take Flexeril as it does not help at all. Advised she needs to start cutting back on both rx to get to 3 times a day maximum.

## 2023-05-11 NOTE — TELEPHONE ENCOUNTER
Refill. Adán Sparrow asked for Tizanidine refill, you never gave this, you just gave her [facility] 4/17/23. So I started the Tizanidine rx but did not complete. Please complete or deny the rx.

## 2023-05-12 ENCOUNTER — NURSE ONLY (OUTPATIENT)
Dept: FAMILY MEDICINE CLINIC | Age: 48
End: 2023-05-12

## 2023-05-12 DIAGNOSIS — E53.8 VITAMIN B 12 DEFICIENCY: Primary | ICD-10-CM

## 2023-05-12 RX ORDER — CYANOCOBALAMIN 1000 UG/ML
1000 INJECTION, SOLUTION INTRAMUSCULAR; SUBCUTANEOUS ONCE
Status: COMPLETED | OUTPATIENT
Start: 2023-05-12 | End: 2023-05-12

## 2023-05-12 RX ORDER — TIZANIDINE 4 MG/1
4 TABLET ORAL EVERY 8 HOURS PRN
Qty: 90 TABLET | Refills: 0 | Status: SHIPPED | OUTPATIENT
Start: 2023-05-12 | End: 2023-06-11

## 2023-05-12 RX ADMIN — CYANOCOBALAMIN 1000 MCG: 1000 INJECTION, SOLUTION INTRAMUSCULAR; SUBCUTANEOUS at 16:50

## 2023-05-15 RX ORDER — TIZANIDINE 4 MG/1
4 TABLET ORAL EVERY 8 HOURS PRN
Qty: 90 TABLET | Refills: 0 | OUTPATIENT
Start: 2023-05-15 | End: 2023-06-14

## 2023-05-19 ENCOUNTER — NURSE ONLY (OUTPATIENT)
Dept: FAMILY MEDICINE CLINIC | Age: 48
End: 2023-05-19

## 2023-05-19 DIAGNOSIS — E53.8 VITAMIN B 12 DEFICIENCY: Primary | ICD-10-CM

## 2023-05-19 RX ORDER — CYANOCOBALAMIN 1000 UG/ML
1000 INJECTION, SOLUTION INTRAMUSCULAR; SUBCUTANEOUS ONCE
Status: COMPLETED | OUTPATIENT
Start: 2023-05-19 | End: 2023-05-19

## 2023-05-19 RX ADMIN — CYANOCOBALAMIN 1000 MCG: 1000 INJECTION, SOLUTION INTRAMUSCULAR; SUBCUTANEOUS at 09:09

## 2023-05-22 DIAGNOSIS — M54.12 CERVICAL RADICULOPATHY: ICD-10-CM

## 2023-05-22 RX ORDER — OXYCODONE HYDROCHLORIDE AND ACETAMINOPHEN 5; 325 MG/1; MG/1
1 TABLET ORAL EVERY 4 HOURS PRN
Qty: 42 TABLET | Refills: 0 | Status: SHIPPED | OUTPATIENT
Start: 2023-05-22 | End: 2023-05-29

## 2023-05-22 NOTE — TELEPHONE ENCOUNTER
Patients last appointment 5/15/2023. Patients next scheduled appointment   Future Appointments   Date Time Provider Jimmie Ngo   5/26/2023  9:00 AM SCHEDULE, [facility]   6/8/2023  2:00 PM Judi Yang MD [facility] PAIN [facility]

## 2023-05-24 ENCOUNTER — OFFICE VISIT (OUTPATIENT)
Dept: FAMILY MEDICINE CLINIC | Age: 48
End: 2023-05-24
Payer: COMMERCIAL

## 2023-05-24 VITALS
BODY MASS INDEX: 37.06 KG/M2 | TEMPERATURE: 97.2 F | HEART RATE: 106 BPM | WEIGHT: 236.6 LBS | OXYGEN SATURATION: 99 % | RESPIRATION RATE: 17 BRPM | DIASTOLIC BLOOD PRESSURE: 74 MMHG | SYSTOLIC BLOOD PRESSURE: 118 MMHG

## 2023-05-24 DIAGNOSIS — R10.31 RIGHT LOWER QUADRANT ABDOMINAL PAIN: ICD-10-CM

## 2023-05-24 DIAGNOSIS — R10.31 RLQ ABDOMINAL PAIN: ICD-10-CM

## 2023-05-24 DIAGNOSIS — R10.31 RIGHT LOWER QUADRANT ABDOMINAL PAIN: Primary | ICD-10-CM

## 2023-05-24 LAB
ALBUMIN SERPL-MCNC: 4 G/DL (ref 3.5–5.2)
ALP SERPL-CCNC: 62 U/L (ref 35–104)
ALT SERPL-CCNC: 17 U/L (ref 0–32)
ANION GAP SERPL CALCULATED.3IONS-SCNC: 10 MMOL/L (ref 7–16)
AST SERPL-CCNC: 21 U/L (ref 0–31)
BASOPHILS # BLD: 0.05 E9/L (ref 0–0.2)
BASOPHILS NFR BLD: 0.7 % (ref 0–2)
BILIRUB SERPL-MCNC: 0.5 MG/DL (ref 0–1.2)
BILIRUBIN, POC: NORMAL
BLOOD URINE, POC: NORMAL
BUN SERPL-MCNC: 20 MG/DL (ref 6–20)
CALCIUM SERPL-MCNC: 9 MG/DL (ref 8.6–10.2)
CHLORIDE SERPL-SCNC: 105 MMOL/L (ref 98–107)
CLARITY, POC: CLEAR
CO2 SERPL-SCNC: 28 MMOL/L (ref 22–29)
COLOR, POC: NORMAL
CREAT SERPL-MCNC: 0.9 MG/DL (ref 0.5–1)
CRP SERPL HS-MCNC: <0.3 MG/DL (ref 0–0.4)
EOSINOPHIL # BLD: 0.15 E9/L (ref 0.05–0.5)
EOSINOPHIL NFR BLD: 2.1 % (ref 0–6)
ERYTHROCYTE [DISTWIDTH] IN BLOOD BY AUTOMATED COUNT: 12.7 FL (ref 11.5–15)
GLUCOSE SERPL-MCNC: 93 MG/DL (ref 74–99)
GLUCOSE URINE, POC: NORMAL
HCT VFR BLD AUTO: 39.7 % (ref 34–48)
HGB BLD-MCNC: 12.4 G/DL (ref 11.5–15.5)
IMM GRANULOCYTES # BLD: 0.03 E9/L
IMM GRANULOCYTES NFR BLD: 0.4 % (ref 0–5)
KETONES, POC: NORMAL
LEUKOCYTE EST, POC: NORMAL
LYMPHOCYTES # BLD: 2.4 E9/L (ref 1.5–4)
LYMPHOCYTES NFR BLD: 33.5 % (ref 20–42)
MCH RBC QN AUTO: 31.6 PG (ref 26–35)
MCHC RBC AUTO-ENTMCNC: 31.2 % (ref 32–34.5)
MCV RBC AUTO: 101.3 FL (ref 80–99.9)
MONOCYTES # BLD: 0.68 E9/L (ref 0.1–0.95)
MONOCYTES NFR BLD: 9.5 % (ref 2–12)
NEUTROPHILS # BLD: 3.85 E9/L (ref 1.8–7.3)
NEUTS SEG NFR BLD: 53.8 % (ref 43–80)
NITRITE, POC: NORMAL
PH, POC: 5.5
PLATELET # BLD AUTO: 275 E9/L (ref 130–450)
PMV BLD AUTO: 10 FL (ref 7–12)
POTASSIUM SERPL-SCNC: 4.3 MMOL/L (ref 3.5–5)
PROT SERPL-MCNC: 6.5 G/DL (ref 6.4–8.3)
PROTEIN, POC: NORMAL
RBC # BLD AUTO: 3.92 E12/L (ref 3.5–5.5)
SODIUM SERPL-SCNC: 143 MMOL/L (ref 132–146)
SPECIFIC GRAVITY, POC: >=1.03
UROBILINOGEN, POC: 1
WBC # BLD: 7.2 E9/L (ref 4.5–11.5)

## 2023-05-24 PROCEDURE — 81002 URINALYSIS NONAUTO W/O SCOPE: CPT | Performed by: FAMILY MEDICINE

## 2023-05-24 PROCEDURE — 99213 OFFICE O/P EST LOW 20 MIN: CPT | Performed by: FAMILY MEDICINE

## 2023-05-24 RX ORDER — ONDANSETRON 4 MG/1
TABLET, ORALLY DISINTEGRATING ORAL
COMMUNITY
Start: 2023-05-24

## 2023-05-24 ASSESSMENT — ENCOUNTER SYMPTOMS
SINUS PAIN: 0
APNEA: 0
PHOTOPHOBIA: 0
BLOOD IN STOOL: 0
COLOR CHANGE: 0
SHORTNESS OF BREATH: 0
RHINORRHEA: 0
VOMITING: 0
FACIAL SWELLING: 0
CHEST TIGHTNESS: 0
DIARRHEA: 0
CONSTIPATION: 0
SINUS PRESSURE: 0
COUGH: 0
ABDOMINAL DISTENTION: 0

## 2023-05-24 NOTE — PROGRESS NOTES
Bob Cano is a 56 y.o. female accompanied by herself  CC:   Chief Complaint   Patient presents with    Chills     Nausea-vomiting-abdominal pain- RLQ- achy pain  Chills-cold sweats  Shaking  Pt started new recent med's   Started x3 wks ago        Nausea/vomiting/abdominal pain:  Right lower quadrant abdominal pain  Mild to moderate intensity  Dull achy pain  Has been going on for 2 to 3 weeks now  Does not radiate  Is not getting worse  No associated fevers  Does have associated chills with nausea and vomiting. Of note: The patient did start a new psychiatric medications including Tarah Later as well as Lamictal    Patient's past medical, surgical, social and/or family history reviewed, updated in chart, and are non-contributory (unless otherwise stated). Medications and allergies also reviewed and updated in chart. /74   Pulse (!) 106   Temp 97.2 °F (36.2 °C) (Temporal)   Resp 17   Wt 236 lb 9.6 oz (107.3 kg)   LMP 05/03/2023 (Approximate)   SpO2 99%   BMI 37.06 kg/m²     Review of Systems   Constitutional:  Negative for chills, fatigue and fever. HENT:  Negative for congestion, ear pain, facial swelling, rhinorrhea, sinus pressure and sinus pain. Eyes:  Negative for photophobia and visual disturbance. Respiratory:  Negative for apnea, cough, chest tightness and shortness of breath. Cardiovascular:  Negative for chest pain and palpitations. Gastrointestinal:  Negative for abdominal distention, blood in stool, constipation, diarrhea and vomiting. Endocrine: Negative for cold intolerance, polydipsia, polyphagia and polyuria. Genitourinary:  Negative for decreased urine volume, frequency and urgency. Musculoskeletal:  Negative for arthralgias and gait problem. Skin:  Negative for color change. Allergic/Immunologic: Negative for environmental allergies and food allergies. Neurological:  Negative for dizziness, light-headedness and headaches.    Hematological:  Negative

## 2023-05-25 LAB — ERYTHROCYTE [SEDIMENTATION RATE] IN BLOOD BY WESTERGREN METHOD: 2 MM/HR (ref 0–20)

## 2023-05-26 ENCOUNTER — OFFICE VISIT (OUTPATIENT)
Dept: FAMILY MEDICINE CLINIC | Age: 48
End: 2023-05-26
Payer: COMMERCIAL

## 2023-05-26 VITALS
HEIGHT: 67 IN | TEMPERATURE: 98.6 F | HEART RATE: 84 BPM | OXYGEN SATURATION: 92 % | DIASTOLIC BLOOD PRESSURE: 84 MMHG | RESPIRATION RATE: 16 BRPM | SYSTOLIC BLOOD PRESSURE: 136 MMHG | BODY MASS INDEX: 37.25 KG/M2 | WEIGHT: 237.3 LBS

## 2023-05-26 DIAGNOSIS — E53.8 VITAMIN B 12 DEFICIENCY: ICD-10-CM

## 2023-05-26 DIAGNOSIS — F41.0 PANIC ATTACKS: Primary | ICD-10-CM

## 2023-05-26 DIAGNOSIS — F41.9 ANXIETY: ICD-10-CM

## 2023-05-26 PROCEDURE — 96372 THER/PROPH/DIAG INJ SC/IM: CPT | Performed by: NURSE PRACTITIONER

## 2023-05-26 PROCEDURE — 99213 OFFICE O/P EST LOW 20 MIN: CPT | Performed by: NURSE PRACTITIONER

## 2023-05-26 RX ORDER — CARIPRAZINE 1.5 MG/1
1.5 CAPSULE, GELATIN COATED ORAL DAILY
COMMUNITY
Start: 2023-05-10

## 2023-05-26 RX ORDER — LORAZEPAM 0.5 MG/1
0.5 TABLET ORAL 3 TIMES DAILY PRN
Qty: 12 TABLET | Refills: 0 | Status: SHIPPED | OUTPATIENT
Start: 2023-05-26 | End: 2023-06-25

## 2023-05-26 RX ORDER — CYANOCOBALAMIN 1000 UG/ML
1000 INJECTION, SOLUTION INTRAMUSCULAR; SUBCUTANEOUS ONCE
Status: COMPLETED | OUTPATIENT
Start: 2023-05-26 | End: 2023-05-26

## 2023-05-26 RX ADMIN — CYANOCOBALAMIN 1000 MCG: 1000 INJECTION, SOLUTION INTRAMUSCULAR; SUBCUTANEOUS at 13:17

## 2023-05-26 ASSESSMENT — ENCOUNTER SYMPTOMS
EYE PAIN: 0
EYE ITCHING: 0
CHEST TIGHTNESS: 0
APNEA: 0
BLOOD IN STOOL: 0
EYE REDNESS: 0
EYE DISCHARGE: 0
DIARRHEA: 0
RHINORRHEA: 0
SORE THROAT: 0
ANAL BLEEDING: 0
COLOR CHANGE: 0
WHEEZING: 0
CONSTIPATION: 0
SINUS PAIN: 0
FACIAL SWELLING: 0
TROUBLE SWALLOWING: 0
ABDOMINAL DISTENTION: 0
COUGH: 0
SHORTNESS OF BREATH: 0
NAUSEA: 0
ABDOMINAL PAIN: 0
RECTAL PAIN: 0
VOMITING: 0
VOICE CHANGE: 0
BACK PAIN: 0
SINUS PRESSURE: 0
STRIDOR: 0
PHOTOPHOBIA: 0
CHOKING: 0

## 2023-05-26 NOTE — PROGRESS NOTES
B12 injection given to the left arm
UNDER FLUORO AT C6 - C7 LEFT PARAMEDIAN performed by Richard Rushing MD at [address] Left 02/07/2022    #2 CERVICAL EPIDURAL STEROID INJECTION UNDER FLUOROSCOPIC GUIDANCE AT C6-C7 LEFT PARAMEDIAN performed by Richard Rushing MD at [address] Left 04/07/2022    CERVICAL EPIDURAL STEROID INJECTION C6-C7 LEFT PARAMEDIAN performed by Richard Rushing MD at [address] N/A 12/05/2022    CERVICAL  EPIDURAL STEROID INJECTION UNDER FLUOROSCOPIC GUIDANCE AT C6-C7 performed by Richard Rushing MD at [address] Bilateral 5/4/2023    BILATERAL LUMBAR RADIOFREQUENCY ABLATION AT L2, L3, L4 MB & L5 DORSAL RAMI UNDER FLUOROSCOPY performed by Richard Rushing MD at [address]  05/31/2011    Lap    TUBAL LIGATION  01/01/2001    UPPER GASTROINTESTINAL ENDOSCOPY  01/01/2011    WISDOM TOOTH EXTRACTION       Family History   Family history unknown: Yes     Social History     Socioeconomic History    Marital status:      Spouse name: Not on file    Number of children: Not on file    Years of education: Not on file    Highest education level: Not on file   Occupational History    Not on file   Tobacco Use    Smoking status: Never    Smokeless tobacco: Never   Vaping Use    Vaping Use: Never used   Substance and Sexual Activity    Alcohol use: No    Drug use: No    Sexual activity: Not on file   Other Topics Concern    Not on file   Social History Narrative    Not on file     Social Determinants of Health     Financial Resource Strain: Low Risk     Difficulty of Paying Living Expenses: Not hard at all   Food Insecurity: No Food Insecurity    Worried About Running Out of Food in the Last Year: Never true    [address] in the Last Year: Never true   Transportation Needs: Unknown    Lack of Transportation (Medical): Not on file    Lack of Transportation (Non-Medical):  No   Physical

## 2023-05-31 DIAGNOSIS — M79.7 FIBROMYALGIA: ICD-10-CM

## 2023-05-31 RX ORDER — PREGABALIN 75 MG/1
75 CAPSULE ORAL 2 TIMES DAILY
Qty: 60 CAPSULE | Refills: 1 | Status: SHIPPED | OUTPATIENT
Start: 2023-05-31 | End: 2023-06-30

## 2023-06-02 ENCOUNTER — NURSE ONLY (OUTPATIENT)
Dept: FAMILY MEDICINE CLINIC | Age: 48
End: 2023-06-02

## 2023-06-02 DIAGNOSIS — E53.8 VITAMIN B 12 DEFICIENCY: Primary | ICD-10-CM

## 2023-06-02 DIAGNOSIS — M54.12 CERVICAL RADICULOPATHY: ICD-10-CM

## 2023-06-02 RX ORDER — OXYCODONE HYDROCHLORIDE AND ACETAMINOPHEN 5; 325 MG/1; MG/1
1 TABLET ORAL EVERY 4 HOURS PRN
Qty: 42 TABLET | Refills: 0 | Status: SHIPPED | OUTPATIENT
Start: 2023-06-02 | End: 2023-06-09

## 2023-06-02 RX ORDER — CYANOCOBALAMIN 1000 UG/ML
1000 INJECTION, SOLUTION INTRAMUSCULAR; SUBCUTANEOUS ONCE
Status: COMPLETED | OUTPATIENT
Start: 2023-06-02 | End: 2023-06-02

## 2023-06-02 RX ADMIN — CYANOCOBALAMIN 1000 MCG: 1000 INJECTION, SOLUTION INTRAMUSCULAR; SUBCUTANEOUS at 10:14

## 2023-06-09 ENCOUNTER — NURSE ONLY (OUTPATIENT)
Dept: FAMILY MEDICINE CLINIC | Age: 48
End: 2023-06-09

## 2023-06-09 DIAGNOSIS — E53.8 VITAMIN B 12 DEFICIENCY: Primary | ICD-10-CM

## 2023-06-09 RX ORDER — CYANOCOBALAMIN 1000 UG/ML
1000 INJECTION, SOLUTION INTRAMUSCULAR; SUBCUTANEOUS ONCE
Status: COMPLETED | OUTPATIENT
Start: 2023-06-09 | End: 2023-06-09

## 2023-06-09 RX ADMIN — CYANOCOBALAMIN 1000 MCG: 1000 INJECTION, SOLUTION INTRAMUSCULAR; SUBCUTANEOUS at 10:32

## 2023-06-16 ENCOUNTER — OFFICE VISIT (OUTPATIENT)
Dept: FAMILY MEDICINE CLINIC | Age: 48
End: 2023-06-16
Payer: COMMERCIAL

## 2023-06-16 VITALS
TEMPERATURE: 99.2 F | HEIGHT: 67 IN | RESPIRATION RATE: 16 BRPM | WEIGHT: 244.8 LBS | BODY MASS INDEX: 38.42 KG/M2 | DIASTOLIC BLOOD PRESSURE: 86 MMHG | HEART RATE: 72 BPM | SYSTOLIC BLOOD PRESSURE: 132 MMHG | OXYGEN SATURATION: 98 %

## 2023-06-16 DIAGNOSIS — G89.29 CHRONIC BACK PAIN, UNSPECIFIED BACK LOCATION, UNSPECIFIED BACK PAIN LATERALITY: ICD-10-CM

## 2023-06-16 DIAGNOSIS — F41.0 PANIC ATTACKS: ICD-10-CM

## 2023-06-16 DIAGNOSIS — F31.9 BIPOLAR 1 DISORDER (HCC): ICD-10-CM

## 2023-06-16 DIAGNOSIS — M50.30 DDD (DEGENERATIVE DISC DISEASE), CERVICAL: ICD-10-CM

## 2023-06-16 DIAGNOSIS — M62.838 MUSCLE SPASM: ICD-10-CM

## 2023-06-16 DIAGNOSIS — E53.8 VITAMIN B 12 DEFICIENCY: Primary | ICD-10-CM

## 2023-06-16 DIAGNOSIS — M54.12 CERVICAL RADICULOPATHY: ICD-10-CM

## 2023-06-16 DIAGNOSIS — M54.12 CERVICAL NEUROPATHIC PAIN: ICD-10-CM

## 2023-06-16 DIAGNOSIS — M54.9 CHRONIC BACK PAIN, UNSPECIFIED BACK LOCATION, UNSPECIFIED BACK PAIN LATERALITY: ICD-10-CM

## 2023-06-16 DIAGNOSIS — M54.31 SCIATICA OF RIGHT SIDE: ICD-10-CM

## 2023-06-16 DIAGNOSIS — M47.817 LUMBOSACRAL SPONDYLOSIS WITHOUT MYELOPATHY: ICD-10-CM

## 2023-06-16 DIAGNOSIS — R73.01 IFG (IMPAIRED FASTING GLUCOSE): ICD-10-CM

## 2023-06-16 DIAGNOSIS — M06.9 RHEUMATOID ARTHRITIS, INVOLVING UNSPECIFIED SITE, UNSPECIFIED WHETHER RHEUMATOID FACTOR PRESENT (HCC): ICD-10-CM

## 2023-06-16 DIAGNOSIS — G25.81 RESTLESS LEG SYNDROME: ICD-10-CM

## 2023-06-16 PROCEDURE — 99213 OFFICE O/P EST LOW 20 MIN: CPT | Performed by: FAMILY MEDICINE

## 2023-06-16 PROCEDURE — 96372 THER/PROPH/DIAG INJ SC/IM: CPT | Performed by: FAMILY MEDICINE

## 2023-06-16 RX ORDER — OXYCODONE HYDROCHLORIDE AND ACETAMINOPHEN 5; 325 MG/1; MG/1
1 TABLET ORAL EVERY 4 HOURS PRN
Qty: 42 TABLET | Refills: 0 | Status: SHIPPED
Start: 2023-06-16 | End: 2023-06-30 | Stop reason: SDUPTHER

## 2023-06-16 RX ORDER — OXCARBAZEPINE 150 MG/1
150 TABLET, FILM COATED ORAL 2 TIMES DAILY
COMMUNITY
Start: 2023-05-30

## 2023-06-16 RX ORDER — CLONIDINE HYDROCHLORIDE 0.1 MG/1
0.1 TABLET ORAL 2 TIMES DAILY PRN
COMMUNITY
Start: 2023-06-12

## 2023-06-16 RX ORDER — ROPINIROLE 0.5 MG/1
0.5 TABLET, FILM COATED ORAL DAILY
Qty: 30 TABLET | Refills: 3 | Status: SHIPPED | OUTPATIENT
Start: 2023-06-16 | End: 2023-10-14

## 2023-06-16 RX ORDER — CYANOCOBALAMIN 1000 UG/ML
1000 INJECTION, SOLUTION INTRAMUSCULAR; SUBCUTANEOUS ONCE
Status: COMPLETED | OUTPATIENT
Start: 2023-06-16 | End: 2023-06-16

## 2023-06-16 RX ORDER — CITALOPRAM 20 MG/1
20 TABLET ORAL DAILY
COMMUNITY
Start: 2023-05-30 | End: 2023-06-30

## 2023-06-16 RX ORDER — TRAZODONE HYDROCHLORIDE 50 MG/1
TABLET ORAL
COMMUNITY
Start: 2023-06-12 | End: 2023-06-30

## 2023-06-16 RX ADMIN — CYANOCOBALAMIN 1000 MCG: 1000 INJECTION, SOLUTION INTRAMUSCULAR; SUBCUTANEOUS at 15:10

## 2023-06-21 RX ORDER — OMEPRAZOLE 40 MG/1
CAPSULE, DELAYED RELEASE ORAL
Qty: 180 CAPSULE | Refills: 1 | Status: SHIPPED | OUTPATIENT
Start: 2023-06-21

## 2023-06-30 ENCOUNTER — OFFICE VISIT (OUTPATIENT)
Dept: FAMILY MEDICINE CLINIC | Age: 48
End: 2023-06-30

## 2023-06-30 VITALS
RESPIRATION RATE: 16 BRPM | TEMPERATURE: 98.7 F | BODY MASS INDEX: 36.79 KG/M2 | SYSTOLIC BLOOD PRESSURE: 134 MMHG | HEART RATE: 91 BPM | HEIGHT: 67 IN | WEIGHT: 234.4 LBS | OXYGEN SATURATION: 97 % | DIASTOLIC BLOOD PRESSURE: 84 MMHG

## 2023-06-30 DIAGNOSIS — M47.816 LUMBAR SPONDYLOSIS: ICD-10-CM

## 2023-06-30 DIAGNOSIS — F32.A ANXIETY AND DEPRESSION: ICD-10-CM

## 2023-06-30 DIAGNOSIS — M54.12 CERVICAL RADICULOPATHY: ICD-10-CM

## 2023-06-30 DIAGNOSIS — G25.81 RESTLESS LEGS: Primary | ICD-10-CM

## 2023-06-30 DIAGNOSIS — E53.8 VITAMIN B 12 DEFICIENCY: ICD-10-CM

## 2023-06-30 DIAGNOSIS — M06.9 RHEUMATOID ARTHRITIS, INVOLVING UNSPECIFIED SITE, UNSPECIFIED WHETHER RHEUMATOID FACTOR PRESENT (HCC): ICD-10-CM

## 2023-06-30 DIAGNOSIS — M47.817 LUMBOSACRAL SPONDYLOSIS WITHOUT MYELOPATHY: ICD-10-CM

## 2023-06-30 DIAGNOSIS — M62.838 MUSCLE SPASM: ICD-10-CM

## 2023-06-30 DIAGNOSIS — F41.0 PANIC ATTACKS: ICD-10-CM

## 2023-06-30 DIAGNOSIS — M54.12 CERVICAL NEUROPATHIC PAIN: ICD-10-CM

## 2023-06-30 DIAGNOSIS — M25.50 ARTHRALGIA, UNSPECIFIED JOINT: ICD-10-CM

## 2023-06-30 DIAGNOSIS — F41.9 ANXIETY AND DEPRESSION: ICD-10-CM

## 2023-06-30 DIAGNOSIS — F31.9 BIPOLAR 1 DISORDER (HCC): ICD-10-CM

## 2023-06-30 DIAGNOSIS — M54.31 SCIATICA OF RIGHT SIDE: ICD-10-CM

## 2023-06-30 DIAGNOSIS — I10 PRIMARY HYPERTENSION: ICD-10-CM

## 2023-06-30 DIAGNOSIS — M54.9 CHRONIC BACK PAIN, UNSPECIFIED BACK LOCATION, UNSPECIFIED BACK PAIN LATERALITY: ICD-10-CM

## 2023-06-30 DIAGNOSIS — M50.30 DDD (DEGENERATIVE DISC DISEASE), CERVICAL: ICD-10-CM

## 2023-06-30 DIAGNOSIS — R73.01 IFG (IMPAIRED FASTING GLUCOSE): ICD-10-CM

## 2023-06-30 DIAGNOSIS — E78.2 MIXED HYPERLIPIDEMIA: ICD-10-CM

## 2023-06-30 DIAGNOSIS — G89.29 CHRONIC BACK PAIN, UNSPECIFIED BACK LOCATION, UNSPECIFIED BACK PAIN LATERALITY: ICD-10-CM

## 2023-06-30 RX ORDER — CYANOCOBALAMIN 1000 UG/ML
1000 INJECTION, SOLUTION INTRAMUSCULAR; SUBCUTANEOUS ONCE
Status: COMPLETED | OUTPATIENT
Start: 2023-06-30 | End: 2023-06-30

## 2023-06-30 RX ORDER — OXYCODONE HYDROCHLORIDE AND ACETAMINOPHEN 5; 325 MG/1; MG/1
1 TABLET ORAL EVERY 4 HOURS PRN
Qty: 42 TABLET | Refills: 0 | Status: SHIPPED | OUTPATIENT
Start: 2023-06-30 | End: 2023-07-07

## 2023-06-30 RX ORDER — ROPINIROLE 0.5 MG/1
TABLET, FILM COATED ORAL
Qty: 120 TABLET | Refills: 3 | Status: SHIPPED | OUTPATIENT
Start: 2023-06-30

## 2023-06-30 RX ORDER — TRAZODONE HYDROCHLORIDE 100 MG/1
TABLET ORAL
COMMUNITY
Start: 2023-06-29

## 2023-06-30 RX ORDER — CARIPRAZINE 3 MG/1
CAPSULE, GELATIN COATED ORAL
COMMUNITY
Start: 2023-06-28

## 2023-06-30 RX ADMIN — CYANOCOBALAMIN 1000 MCG: 1000 INJECTION, SOLUTION INTRAMUSCULAR; SUBCUTANEOUS at 14:43

## 2023-06-30 ASSESSMENT — ENCOUNTER SYMPTOMS
APNEA: 0
CHOKING: 0
COLOR CHANGE: 0
COUGH: 0
EYE REDNESS: 0
WHEEZING: 0
SHORTNESS OF BREATH: 0
TROUBLE SWALLOWING: 0
NAUSEA: 0
EYE DISCHARGE: 0
FACIAL SWELLING: 0
SINUS PAIN: 0
CONSTIPATION: 0
BLOOD IN STOOL: 0
RHINORRHEA: 0
DIARRHEA: 0
SINUS PRESSURE: 0
PHOTOPHOBIA: 0
VOICE CHANGE: 0
EYE ITCHING: 0
STRIDOR: 0
CHEST TIGHTNESS: 0
BACK PAIN: 0
ABDOMINAL PAIN: 0
RECTAL PAIN: 0
ABDOMINAL DISTENTION: 0
SORE THROAT: 0
ANAL BLEEDING: 0
EYE PAIN: 0
VOMITING: 0

## 2023-07-03 RX ORDER — CYANOCOBALAMIN 1000 UG/ML
INJECTION, SOLUTION INTRAMUSCULAR; SUBCUTANEOUS
Qty: 4 ML | Refills: 5 | Status: SHIPPED | OUTPATIENT
Start: 2023-07-03

## 2023-07-03 NOTE — TELEPHONE ENCOUNTER
Patients last appointment 6/30/2023. Patients next scheduled appointment   Future Appointments   Date Time Provider [address]   7/7/2023  9:00 AM SCHEDULE, [facility]   8/28/2023 10:15 AM Josefa Neal MD [facility] PAIN [facility]

## 2023-07-07 ENCOUNTER — NURSE ONLY (OUTPATIENT)
Dept: FAMILY MEDICINE CLINIC | Age: 48
End: 2023-07-07

## 2023-07-07 DIAGNOSIS — E53.8 VITAMIN B 12 DEFICIENCY: Primary | ICD-10-CM

## 2023-07-07 RX ORDER — CYANOCOBALAMIN 1000 UG/ML
1000 INJECTION, SOLUTION INTRAMUSCULAR; SUBCUTANEOUS ONCE
Status: COMPLETED | OUTPATIENT
Start: 2023-07-07 | End: 2023-07-07

## 2023-07-07 RX ADMIN — CYANOCOBALAMIN 1000 MCG: 1000 INJECTION, SOLUTION INTRAMUSCULAR; SUBCUTANEOUS at 10:07

## 2023-07-09 ASSESSMENT — ENCOUNTER SYMPTOMS
CHEST TIGHTNESS: 0
CONSTIPATION: 0
DIARRHEA: 0
ABDOMINAL DISTENTION: 0
ABDOMINAL PAIN: 0
SORE THROAT: 0
PHOTOPHOBIA: 0
EYE ITCHING: 0
SINUS PAIN: 0
EYE PAIN: 0
COUGH: 0
SINUS PRESSURE: 0
CHOKING: 0
COLOR CHANGE: 0
VOMITING: 0
STRIDOR: 0
ANAL BLEEDING: 0
TROUBLE SWALLOWING: 0
VOICE CHANGE: 0
RHINORRHEA: 0
WHEEZING: 0
RECTAL PAIN: 0
EYE DISCHARGE: 0
FACIAL SWELLING: 0
EYE REDNESS: 0
NAUSEA: 0
BLOOD IN STOOL: 0
BACK PAIN: 0
SHORTNESS OF BREATH: 0
APNEA: 0

## 2023-07-12 DIAGNOSIS — M54.12 CERVICAL NEUROPATHIC PAIN: ICD-10-CM

## 2023-07-12 DIAGNOSIS — M54.12 CERVICAL RADICULOPATHY: ICD-10-CM

## 2023-07-12 RX ORDER — OXYCODONE HYDROCHLORIDE AND ACETAMINOPHEN 5; 325 MG/1; MG/1
1 TABLET ORAL EVERY 4 HOURS PRN
Qty: 42 TABLET | Refills: 0 | Status: SHIPPED | OUTPATIENT
Start: 2023-07-12 | End: 2023-07-19

## 2023-07-21 ENCOUNTER — NURSE ONLY (OUTPATIENT)
Dept: FAMILY MEDICINE CLINIC | Age: 48
End: 2023-07-21

## 2023-07-21 DIAGNOSIS — E53.8 VITAMIN B 12 DEFICIENCY: Primary | ICD-10-CM

## 2023-07-21 RX ORDER — CYANOCOBALAMIN 1000 UG/ML
1000 INJECTION, SOLUTION INTRAMUSCULAR; SUBCUTANEOUS ONCE
Status: COMPLETED | OUTPATIENT
Start: 2023-07-21 | End: 2023-07-21

## 2023-07-21 RX ADMIN — CYANOCOBALAMIN 1000 MCG: 1000 INJECTION, SOLUTION INTRAMUSCULAR; SUBCUTANEOUS at 14:30

## 2023-07-24 ENCOUNTER — OFFICE VISIT (OUTPATIENT)
Dept: FAMILY MEDICINE CLINIC | Age: 48
End: 2023-07-24
Payer: COMMERCIAL

## 2023-07-24 VITALS
WEIGHT: 223.7 LBS | BODY MASS INDEX: 35.04 KG/M2 | SYSTOLIC BLOOD PRESSURE: 128 MMHG | DIASTOLIC BLOOD PRESSURE: 84 MMHG | OXYGEN SATURATION: 97 % | RESPIRATION RATE: 17 BRPM | TEMPERATURE: 97.8 F | HEART RATE: 107 BPM

## 2023-07-24 DIAGNOSIS — F32.A ANXIETY AND DEPRESSION: ICD-10-CM

## 2023-07-24 DIAGNOSIS — M54.12 CERVICAL RADICULOPATHY: Primary | ICD-10-CM

## 2023-07-24 DIAGNOSIS — M50.30 DDD (DEGENERATIVE DISC DISEASE), CERVICAL: ICD-10-CM

## 2023-07-24 DIAGNOSIS — F41.0 PANIC ATTACKS: ICD-10-CM

## 2023-07-24 DIAGNOSIS — M79.7 FIBROMYALGIA: ICD-10-CM

## 2023-07-24 DIAGNOSIS — M47.816 LUMBAR SPONDYLOSIS: ICD-10-CM

## 2023-07-24 DIAGNOSIS — M06.9 RHEUMATOID ARTHRITIS, INVOLVING UNSPECIFIED SITE, UNSPECIFIED WHETHER RHEUMATOID FACTOR PRESENT (HCC): ICD-10-CM

## 2023-07-24 DIAGNOSIS — F41.9 ANXIETY AND DEPRESSION: ICD-10-CM

## 2023-07-24 DIAGNOSIS — R73.01 IFG (IMPAIRED FASTING GLUCOSE): ICD-10-CM

## 2023-07-24 PROCEDURE — 99214 OFFICE O/P EST MOD 30 MIN: CPT | Performed by: FAMILY MEDICINE

## 2023-07-24 RX ORDER — VENLAFAXINE 75 MG/1
75 TABLET ORAL 2 TIMES DAILY
Qty: 60 TABLET | Refills: 3 | Status: SHIPPED | OUTPATIENT
Start: 2023-07-24 | End: 2023-08-23

## 2023-07-24 RX ORDER — ROPINIROLE 1 MG/1
1 TABLET, FILM COATED ORAL 3 TIMES DAILY
Qty: 90 TABLET | Refills: 3 | Status: SHIPPED | OUTPATIENT
Start: 2023-07-24

## 2023-07-24 RX ORDER — OXYCODONE HYDROCHLORIDE AND ACETAMINOPHEN 5; 325 MG/1; MG/1
1 TABLET ORAL EVERY 4 HOURS PRN
Qty: 42 TABLET | Refills: 0 | Status: SHIPPED | OUTPATIENT
Start: 2023-07-24 | End: 2023-07-31

## 2023-07-24 RX ORDER — PREGABALIN 75 MG/1
75 CAPSULE ORAL 2 TIMES DAILY
Qty: 60 CAPSULE | Refills: 1 | Status: SHIPPED | OUTPATIENT
Start: 2023-07-24 | End: 2023-09-22

## 2023-07-24 RX ORDER — TIZANIDINE 4 MG/1
TABLET ORAL
COMMUNITY
Start: 2023-07-09

## 2023-07-24 ASSESSMENT — PATIENT HEALTH QUESTIONNAIRE - PHQ9
7. TROUBLE CONCENTRATING ON THINGS, SUCH AS READING THE NEWSPAPER OR WATCHING TELEVISION: 3
3. TROUBLE FALLING OR STAYING ASLEEP: 3
SUM OF ALL RESPONSES TO PHQ QUESTIONS 1-9: 24
SUM OF ALL RESPONSES TO PHQ QUESTIONS 1-9: 24
9. THOUGHTS THAT YOU WOULD BE BETTER OFF DEAD, OR OF HURTING YOURSELF: 0
10. IF YOU CHECKED OFF ANY PROBLEMS, HOW DIFFICULT HAVE THESE PROBLEMS MADE IT FOR YOU TO DO YOUR WORK, TAKE CARE OF THINGS AT HOME, OR GET ALONG WITH OTHER PEOPLE: 3
2. FEELING DOWN, DEPRESSED OR HOPELESS: 3
1. LITTLE INTEREST OR PLEASURE IN DOING THINGS: 3
5. POOR APPETITE OR OVEREATING: 3
SUM OF ALL RESPONSES TO PHQ QUESTIONS 1-9: 24
4. FEELING TIRED OR HAVING LITTLE ENERGY: 3
SUM OF ALL RESPONSES TO PHQ9 QUESTIONS 1 & 2: 6
8. MOVING OR SPEAKING SO SLOWLY THAT OTHER PEOPLE COULD HAVE NOTICED. OR THE OPPOSITE, BEING SO FIGETY OR RESTLESS THAT YOU HAVE BEEN MOVING AROUND A LOT MORE THAN USUAL: 3
SUM OF ALL RESPONSES TO PHQ QUESTIONS 1-9: 24
6. FEELING BAD ABOUT YOURSELF - OR THAT YOU ARE A FAILURE OR HAVE LET YOURSELF OR YOUR FAMILY DOWN: 3

## 2023-07-24 ASSESSMENT — COLUMBIA-SUICIDE SEVERITY RATING SCALE - C-SSRS
1. WITHIN THE PAST MONTH, HAVE YOU WISHED YOU WERE DEAD OR WISHED YOU COULD GO TO SLEEP AND NOT WAKE UP?: YES
6. HAVE YOU EVER DONE ANYTHING, STARTED TO DO ANYTHING, OR PREPARED TO DO ANYTHING TO END YOUR LIFE?: NO
2. HAVE YOU ACTUALLY HAD ANY THOUGHTS OF KILLING YOURSELF?: NO

## 2023-07-24 NOTE — PROGRESS NOTES
23  Yola Arch : 1975 Sex: female  Age: 56 y.o. Chief Complaint   Patient presents with    Depression     Patient sees physiatrist for mental health: she states that since she's been going and taking the medication they put her one she's feeling more down and depressed now than what she was before she was going. No motivation, Sleeping all the time        HPI    overall check up   Wants med changes     Review of Systems   Constitutional:  Negative for activity change, appetite change, chills, diaphoresis, fatigue, fever and unexpected weight change. HENT:  Negative for congestion, dental problem, drooling, ear discharge, ear pain, facial swelling, hearing loss, mouth sores, nosebleeds, postnasal drip, rhinorrhea, sinus pressure, sinus pain, sneezing, sore throat, tinnitus, trouble swallowing and voice change. Eyes:  Negative for photophobia, pain, discharge, redness, itching and visual disturbance. Respiratory:  Negative for apnea, cough, choking, chest tightness, shortness of breath, wheezing and stridor. Cardiovascular:  Negative for chest pain, palpitations and leg swelling. Gastrointestinal:  Negative for abdominal distention, abdominal pain, anal bleeding, blood in stool, constipation, diarrhea, nausea, rectal pain and vomiting. Endocrine: Negative for cold intolerance, heat intolerance, polydipsia, polyphagia and polyuria. Genitourinary:  Negative for decreased urine volume, difficulty urinating, dysuria, enuresis, flank pain, frequency, genital sores, hematuria and urgency. Musculoskeletal:  Negative for arthralgias, back pain, gait problem, joint swelling, myalgias, neck pain and neck stiffness. Skin:  Negative for color change, pallor, rash and wound. Allergic/Immunologic: Negative for environmental allergies, food allergies and immunocompromised state.    Neurological:  Negative for dizziness, tremors, seizures, syncope, facial asymmetry, speech difficulty,

## 2023-07-30 ASSESSMENT — ENCOUNTER SYMPTOMS
VOICE CHANGE: 0
EYE REDNESS: 0
APNEA: 0
CHEST TIGHTNESS: 0
SINUS PRESSURE: 0
ABDOMINAL PAIN: 0
WHEEZING: 0
SINUS PAIN: 0
BLOOD IN STOOL: 0
SHORTNESS OF BREATH: 0
RHINORRHEA: 0
ANAL BLEEDING: 0
PHOTOPHOBIA: 0
FACIAL SWELLING: 0
SORE THROAT: 0
CHOKING: 0
EYE DISCHARGE: 0
STRIDOR: 0
ABDOMINAL DISTENTION: 0
VOMITING: 0
COLOR CHANGE: 0
NAUSEA: 0
RECTAL PAIN: 0
EYE PAIN: 0
COUGH: 0
BACK PAIN: 0
CONSTIPATION: 0
TROUBLE SWALLOWING: 0
DIARRHEA: 0
EYE ITCHING: 0

## 2023-08-08 DIAGNOSIS — M54.12 CERVICAL RADICULOPATHY: ICD-10-CM

## 2023-08-08 DIAGNOSIS — M54.12 CERVICAL NEUROPATHIC PAIN: ICD-10-CM

## 2023-08-08 RX ORDER — OXYCODONE HYDROCHLORIDE AND ACETAMINOPHEN 5; 325 MG/1; MG/1
1 TABLET ORAL EVERY 4 HOURS PRN
Qty: 42 TABLET | Refills: 0 | Status: SHIPPED | OUTPATIENT
Start: 2023-08-08 | End: 2023-08-15

## 2023-08-08 NOTE — TELEPHONE ENCOUNTER
Patients last appointment 7/24/2023.   Patients next scheduled appointment   Future Appointments   Date Time Provider [address]   8/28/2023 10:15 AM MD OTILIO Briceno [facility]

## 2023-08-11 ENCOUNTER — NURSE ONLY (OUTPATIENT)
Dept: FAMILY MEDICINE CLINIC | Age: 48
End: 2023-08-11
Payer: COMMERCIAL

## 2023-08-11 ENCOUNTER — OFFICE VISIT (OUTPATIENT)
Age: 48
End: 2023-08-11
Payer: COMMERCIAL

## 2023-08-11 VITALS
TEMPERATURE: 98.6 F | BODY MASS INDEX: 36.3 KG/M2 | SYSTOLIC BLOOD PRESSURE: 138 MMHG | WEIGHT: 231.3 LBS | HEART RATE: 91 BPM | RESPIRATION RATE: 16 BRPM | HEIGHT: 67 IN | OXYGEN SATURATION: 96 % | DIASTOLIC BLOOD PRESSURE: 88 MMHG

## 2023-08-11 DIAGNOSIS — E53.8 VITAMIN B 12 DEFICIENCY: Primary | ICD-10-CM

## 2023-08-11 DIAGNOSIS — R31.9 HEMATURIA, UNSPECIFIED TYPE: ICD-10-CM

## 2023-08-11 DIAGNOSIS — R30.0 DYSURIA: Primary | ICD-10-CM

## 2023-08-11 LAB
BILIRUBIN, POC: NORMAL
BLOOD URINE, POC: NORMAL
CLARITY, POC: CLEAR
COLOR, POC: NORMAL
CONTROL: NORMAL
GLUCOSE URINE, POC: NORMAL
KETONES, POC: NORMAL
LEUKOCYTE EST, POC: NORMAL
NITRITE, POC: NORMAL
PH, POC: 5.5
PREGNANCY TEST URINE, POC: NORMAL
PROTEIN, POC: NORMAL
SPECIFIC GRAVITY, POC: >=1.03
UROBILINOGEN, POC: 1

## 2023-08-11 PROCEDURE — 81002 URINALYSIS NONAUTO W/O SCOPE: CPT | Performed by: FAMILY MEDICINE

## 2023-08-11 PROCEDURE — 96372 THER/PROPH/DIAG INJ SC/IM: CPT | Performed by: FAMILY MEDICINE

## 2023-08-11 PROCEDURE — 99213 OFFICE O/P EST LOW 20 MIN: CPT | Performed by: FAMILY MEDICINE

## 2023-08-11 PROCEDURE — 81025 URINE PREGNANCY TEST: CPT | Performed by: FAMILY MEDICINE

## 2023-08-11 RX ORDER — CYANOCOBALAMIN 1000 UG/ML
1000 INJECTION, SOLUTION INTRAMUSCULAR; SUBCUTANEOUS ONCE
Status: COMPLETED | OUTPATIENT
Start: 2023-08-11 | End: 2023-08-11

## 2023-08-11 RX ADMIN — CYANOCOBALAMIN 1000 MCG: 1000 INJECTION, SOLUTION INTRAMUSCULAR; SUBCUTANEOUS at 12:59

## 2023-08-11 NOTE — PROGRESS NOTES
elected to wait at this particular time and I believe she had some type of a follow-up with urologist and she was given to that if there is any further problems getting it back to us                   No follow-ups on file.       Seen by:     Laura Banuelos, DO

## 2023-08-18 ENCOUNTER — NURSE ONLY (OUTPATIENT)
Dept: FAMILY MEDICINE CLINIC | Age: 48
End: 2023-08-18

## 2023-08-18 DIAGNOSIS — E53.8 VITAMIN B 12 DEFICIENCY: Primary | ICD-10-CM

## 2023-08-18 RX ORDER — CYANOCOBALAMIN 1000 UG/ML
1000 INJECTION, SOLUTION INTRAMUSCULAR; SUBCUTANEOUS ONCE
Status: COMPLETED | OUTPATIENT
Start: 2023-08-18 | End: 2023-08-18

## 2023-08-18 RX ADMIN — CYANOCOBALAMIN 1000 MCG: 1000 INJECTION, SOLUTION INTRAMUSCULAR; SUBCUTANEOUS at 12:03

## 2023-08-23 DIAGNOSIS — M54.12 CERVICAL NEUROPATHIC PAIN: ICD-10-CM

## 2023-08-23 DIAGNOSIS — M54.12 CERVICAL RADICULOPATHY: ICD-10-CM

## 2023-08-23 ASSESSMENT — ENCOUNTER SYMPTOMS
COLOR CHANGE: 0
BACK PAIN: 0
EYE REDNESS: 0
TROUBLE SWALLOWING: 0
SORE THROAT: 0
EYE PAIN: 0
NAUSEA: 0
CHOKING: 0
RECTAL PAIN: 0
SINUS PRESSURE: 0
STRIDOR: 0
CONSTIPATION: 0
ABDOMINAL PAIN: 0
RHINORRHEA: 0
SHORTNESS OF BREATH: 0
WHEEZING: 0
APNEA: 0
COUGH: 0
EYE DISCHARGE: 0
DIARRHEA: 0
BLOOD IN STOOL: 0
ABDOMINAL DISTENTION: 0
CHEST TIGHTNESS: 0
FACIAL SWELLING: 0
EYE ITCHING: 0
PHOTOPHOBIA: 0
ANAL BLEEDING: 0
SINUS PAIN: 0
VOICE CHANGE: 0
VOMITING: 0

## 2023-08-24 RX ORDER — OXYCODONE HYDROCHLORIDE AND ACETAMINOPHEN 5; 325 MG/1; MG/1
1 TABLET ORAL EVERY 4 HOURS PRN
Qty: 42 TABLET | Refills: 0 | Status: SHIPPED | OUTPATIENT
Start: 2023-08-24 | End: 2023-08-31

## 2023-08-24 RX ORDER — OXCARBAZEPINE 150 MG/1
150 TABLET, FILM COATED ORAL 2 TIMES DAILY
Qty: 60 TABLET | Refills: 0 | Status: SHIPPED | OUTPATIENT
Start: 2023-08-24 | End: 2023-09-23

## 2023-08-25 ENCOUNTER — NURSE ONLY (OUTPATIENT)
Dept: FAMILY MEDICINE CLINIC | Age: 48
End: 2023-08-25

## 2023-08-25 DIAGNOSIS — E53.8 VITAMIN B 12 DEFICIENCY: Primary | ICD-10-CM

## 2023-08-25 RX ORDER — CYANOCOBALAMIN 1000 UG/ML
1000 INJECTION, SOLUTION INTRAMUSCULAR; SUBCUTANEOUS ONCE
Status: COMPLETED | OUTPATIENT
Start: 2023-08-25 | End: 2023-08-25

## 2023-08-25 RX ADMIN — CYANOCOBALAMIN 1000 MCG: 1000 INJECTION, SOLUTION INTRAMUSCULAR; SUBCUTANEOUS at 14:33

## 2023-09-01 ENCOUNTER — NURSE ONLY (OUTPATIENT)
Dept: FAMILY MEDICINE CLINIC | Age: 48
End: 2023-09-01

## 2023-09-01 DIAGNOSIS — E53.8 VITAMIN B 12 DEFICIENCY: Primary | ICD-10-CM

## 2023-09-01 PROCEDURE — 99999 PR OFFICE/OUTPT VISIT,PROCEDURE ONLY: CPT | Performed by: FAMILY MEDICINE

## 2023-09-01 RX ORDER — CYANOCOBALAMIN 1000 UG/ML
1000 INJECTION, SOLUTION INTRAMUSCULAR; SUBCUTANEOUS ONCE
Status: COMPLETED | OUTPATIENT
Start: 2023-09-01 | End: 2023-09-01

## 2023-09-01 RX ADMIN — CYANOCOBALAMIN 1000 MCG: 1000 INJECTION, SOLUTION INTRAMUSCULAR; SUBCUTANEOUS at 14:45

## 2023-09-06 DIAGNOSIS — M54.12 CERVICAL NEUROPATHIC PAIN: ICD-10-CM

## 2023-09-06 DIAGNOSIS — M54.12 CERVICAL RADICULOPATHY: ICD-10-CM

## 2023-09-06 RX ORDER — OXYCODONE HYDROCHLORIDE AND ACETAMINOPHEN 5; 325 MG/1; MG/1
1 TABLET ORAL EVERY 4 HOURS PRN
Qty: 42 TABLET | Refills: 0 | Status: SHIPPED | OUTPATIENT
Start: 2023-09-06 | End: 2023-09-13

## 2023-09-13 ENCOUNTER — OFFICE VISIT (OUTPATIENT)
Age: 48
End: 2023-09-13
Payer: COMMERCIAL

## 2023-09-13 ENCOUNTER — NURSE ONLY (OUTPATIENT)
Dept: FAMILY MEDICINE CLINIC | Age: 48
End: 2023-09-13

## 2023-09-13 VITALS
DIASTOLIC BLOOD PRESSURE: 94 MMHG | HEIGHT: 67 IN | BODY MASS INDEX: 36.26 KG/M2 | SYSTOLIC BLOOD PRESSURE: 144 MMHG | WEIGHT: 231 LBS | HEART RATE: 95 BPM | TEMPERATURE: 98 F | OXYGEN SATURATION: 95 %

## 2023-09-13 DIAGNOSIS — R30.0 DYSURIA: Primary | ICD-10-CM

## 2023-09-13 DIAGNOSIS — E53.8 VITAMIN B 12 DEFICIENCY: Primary | ICD-10-CM

## 2023-09-13 DIAGNOSIS — N30.01 ACUTE CYSTITIS WITH HEMATURIA: ICD-10-CM

## 2023-09-13 DIAGNOSIS — R30.0 DYSURIA: ICD-10-CM

## 2023-09-13 LAB
BILIRUBIN, POC: ABNORMAL
BLOOD URINE, POC: ABNORMAL
CLARITY, POC: ABNORMAL
COLOR, POC: ABNORMAL
GLUCOSE URINE, POC: ABNORMAL
KETONES, POC: NEGATIVE
LEUKOCYTE EST, POC: NEGATIVE
NITRITE, POC: POSITIVE
PH, POC: 5.5
PROTEIN, POC: ABNORMAL
SPECIFIC GRAVITY, POC: >=1.03
UROBILINOGEN, POC: 2

## 2023-09-13 PROCEDURE — 81002 URINALYSIS NONAUTO W/O SCOPE: CPT | Performed by: FAMILY MEDICINE

## 2023-09-13 PROCEDURE — 99214 OFFICE O/P EST MOD 30 MIN: CPT | Performed by: FAMILY MEDICINE

## 2023-09-13 RX ORDER — NITROFURANTOIN 25; 75 MG/1; MG/1
100 CAPSULE ORAL 2 TIMES DAILY
Qty: 10 CAPSULE | Refills: 0 | Status: SHIPPED | OUTPATIENT
Start: 2023-09-13 | End: 2023-09-18

## 2023-09-13 RX ORDER — CYANOCOBALAMIN 1000 UG/ML
1000 INJECTION, SOLUTION INTRAMUSCULAR; SUBCUTANEOUS ONCE
Status: COMPLETED | OUTPATIENT
Start: 2023-09-13 | End: 2023-09-13

## 2023-09-13 RX ADMIN — CYANOCOBALAMIN 1000 MCG: 1000 INJECTION, SOLUTION INTRAMUSCULAR; SUBCUTANEOUS at 14:33

## 2023-09-13 ASSESSMENT — ENCOUNTER SYMPTOMS
SINUS PRESSURE: 0
APNEA: 0
CONSTIPATION: 0
CHEST TIGHTNESS: 0
DIARRHEA: 0
COUGH: 0
SINUS PAIN: 0
RHINORRHEA: 0
PHOTOPHOBIA: 0
COLOR CHANGE: 0
VOMITING: 0
BLOOD IN STOOL: 0
SHORTNESS OF BREATH: 0
ABDOMINAL DISTENTION: 0
FACIAL SWELLING: 0

## 2023-09-14 LAB
BACTERIA: ABNORMAL
BILIRUBIN URINE: ABNORMAL
COLOR: ABNORMAL
GLUCOSE URINE: 100 MG/DL
KETONES, URINE: ABNORMAL MG/DL
LEUKOCYTE ESTERASE, URINE: ABNORMAL
NITRITE, URINE: POSITIVE
PH UA: 5.5 (ref 5–9)
PROTEIN UA: 100 MG/DL
RBC UA: ABNORMAL /HPF
SPECIFIC GRAVITY UA: >1.03 (ref 1–1.03)
TURBIDITY: ABNORMAL
URINE HGB: ABNORMAL
UROBILINOGEN, URINE: 2 EU/DL (ref 0–1)
WBC UA: ABNORMAL /HPF

## 2023-09-15 LAB
CULTURE: NORMAL
SPECIMEN DESCRIPTION: NORMAL

## 2023-09-18 RX ORDER — LISINOPRIL 10 MG/1
TABLET ORAL
Qty: 90 TABLET | Refills: 1 | Status: SHIPPED
Start: 2023-09-18 | End: 2023-09-22 | Stop reason: SDUPTHER

## 2023-09-18 RX ORDER — OMEPRAZOLE 40 MG/1
CAPSULE, DELAYED RELEASE ORAL
Qty: 180 CAPSULE | Refills: 1 | Status: SHIPPED | OUTPATIENT
Start: 2023-09-18

## 2023-09-18 RX ORDER — OXCARBAZEPINE 150 MG/1
150 TABLET, FILM COATED ORAL 2 TIMES DAILY
Qty: 60 TABLET | Refills: 0 | Status: SHIPPED | OUTPATIENT
Start: 2023-09-18

## 2023-09-20 DIAGNOSIS — M54.12 CERVICAL NEUROPATHIC PAIN: ICD-10-CM

## 2023-09-20 DIAGNOSIS — M54.12 CERVICAL RADICULOPATHY: ICD-10-CM

## 2023-09-20 RX ORDER — OXYCODONE HYDROCHLORIDE AND ACETAMINOPHEN 5; 325 MG/1; MG/1
1 TABLET ORAL EVERY 4 HOURS PRN
Qty: 42 TABLET | Refills: 0 | Status: SHIPPED | OUTPATIENT
Start: 2023-09-20 | End: 2023-09-27

## 2023-09-22 RX ORDER — LISINOPRIL 10 MG/1
TABLET ORAL
Qty: 90 TABLET | Refills: 1 | Status: SHIPPED | OUTPATIENT
Start: 2023-09-22

## 2023-09-25 ENCOUNTER — TELEPHONE (OUTPATIENT)
Dept: FAMILY MEDICINE CLINIC | Age: 48
End: 2023-09-25

## 2023-09-25 DIAGNOSIS — M79.7 FIBROMYALGIA: ICD-10-CM

## 2023-09-25 RX ORDER — TIZANIDINE 4 MG/1
TABLET ORAL
Qty: 120 TABLET | Refills: 0 | Status: SHIPPED | OUTPATIENT
Start: 2023-09-25

## 2023-09-25 RX ORDER — PREGABALIN 75 MG/1
75 CAPSULE ORAL 2 TIMES DAILY
Qty: 60 CAPSULE | Refills: 1 | Status: SHIPPED | OUTPATIENT
Start: 2023-09-25 | End: 2023-11-24

## 2023-09-25 NOTE — TELEPHONE ENCOUNTER
Pt saw Aramis Sinha on 9/13/23 for a uti and she finished her antibiotic but is still having frequency and back pain. She was wanting to know if something else could be called in to [facility] in  ? She also needs these refills.

## 2023-09-25 NOTE — TELEPHONE ENCOUNTER
Pt informed she would like to recheck her urine one more time because she states it doesn't feel like a yeast infection

## 2023-10-05 ENCOUNTER — NURSE ONLY (OUTPATIENT)
Dept: FAMILY MEDICINE CLINIC | Age: 48
End: 2023-10-05

## 2023-10-05 DIAGNOSIS — E53.8 VITAMIN B 12 DEFICIENCY: ICD-10-CM

## 2023-10-05 DIAGNOSIS — M54.12 CERVICAL RADICULOPATHY: Primary | ICD-10-CM

## 2023-10-05 RX ORDER — CYANOCOBALAMIN 1000 UG/ML
1000 INJECTION, SOLUTION INTRAMUSCULAR; SUBCUTANEOUS ONCE
Status: COMPLETED | OUTPATIENT
Start: 2023-10-05 | End: 2023-10-05

## 2023-10-05 RX ORDER — OXYCODONE HYDROCHLORIDE AND ACETAMINOPHEN 5; 325 MG/1; MG/1
1 TABLET ORAL EVERY 4 HOURS PRN
Qty: 42 TABLET | Refills: 0 | Status: SHIPPED | OUTPATIENT
Start: 2023-10-05 | End: 2023-10-12

## 2023-10-05 RX ADMIN — CYANOCOBALAMIN 1000 MCG: 1000 INJECTION, SOLUTION INTRAMUSCULAR; SUBCUTANEOUS at 13:53

## 2023-10-16 RX ORDER — OXCARBAZEPINE 150 MG/1
150 TABLET, FILM COATED ORAL 2 TIMES DAILY
Qty: 60 TABLET | Refills: 0 | Status: SHIPPED | OUTPATIENT
Start: 2023-10-16

## 2023-10-19 DIAGNOSIS — M54.12 CERVICAL RADICULOPATHY: ICD-10-CM

## 2023-10-19 RX ORDER — OXYCODONE HYDROCHLORIDE AND ACETAMINOPHEN 5; 325 MG/1; MG/1
1 TABLET ORAL EVERY 4 HOURS PRN
Qty: 42 TABLET | Refills: 0 | Status: SHIPPED | OUTPATIENT
Start: 2023-10-19 | End: 2023-10-26

## 2023-10-19 RX ORDER — TIZANIDINE 4 MG/1
TABLET ORAL
Qty: 120 TABLET | Refills: 0 | Status: SHIPPED | OUTPATIENT
Start: 2023-10-19

## 2023-10-30 RX ORDER — ROSUVASTATIN CALCIUM 5 MG/1
5 TABLET, COATED ORAL DAILY
Qty: 90 TABLET | Refills: 1 | OUTPATIENT
Start: 2023-10-30

## 2023-10-30 RX ORDER — FOLIC ACID 1 MG/1
TABLET ORAL
Qty: 90 TABLET | Refills: 1 | Status: SHIPPED | OUTPATIENT
Start: 2023-10-30

## 2023-10-30 RX ORDER — ROSUVASTATIN CALCIUM 5 MG/1
5 TABLET, COATED ORAL DAILY
Qty: 90 TABLET | Refills: 1 | Status: SHIPPED | OUTPATIENT
Start: 2023-10-30

## 2023-11-01 DIAGNOSIS — M54.12 CERVICAL RADICULOPATHY: ICD-10-CM

## 2023-11-01 RX ORDER — OXYCODONE HYDROCHLORIDE AND ACETAMINOPHEN 5; 325 MG/1; MG/1
1 TABLET ORAL EVERY 4 HOURS PRN
Qty: 42 TABLET | Refills: 0 | Status: SHIPPED | OUTPATIENT
Start: 2023-11-01 | End: 2023-11-08

## 2023-11-01 NOTE — TELEPHONE ENCOUNTER
Refill. Last office visit 6/30/23. She has been in several times for same day visits but no a routine office visit. So she needs to come in. Last labs 4/7/23  I left a message for the patient to return my call or just call in and set up lab and visit appt.

## 2023-11-03 ENCOUNTER — OFFICE VISIT (OUTPATIENT)
Dept: FAMILY MEDICINE CLINIC | Age: 48
End: 2023-11-03
Payer: COMMERCIAL

## 2023-11-03 VITALS
HEART RATE: 94 BPM | HEIGHT: 67 IN | BODY MASS INDEX: 37.97 KG/M2 | SYSTOLIC BLOOD PRESSURE: 142 MMHG | RESPIRATION RATE: 16 BRPM | TEMPERATURE: 98.4 F | OXYGEN SATURATION: 97 % | DIASTOLIC BLOOD PRESSURE: 100 MMHG | WEIGHT: 241.9 LBS

## 2023-11-03 DIAGNOSIS — M25.50 ARTHRALGIA, UNSPECIFIED JOINT: ICD-10-CM

## 2023-11-03 DIAGNOSIS — F32.A ANXIETY AND DEPRESSION: ICD-10-CM

## 2023-11-03 DIAGNOSIS — K30 INDIGESTION: ICD-10-CM

## 2023-11-03 DIAGNOSIS — M47.817 LUMBOSACRAL SPONDYLOSIS WITHOUT MYELOPATHY: Primary | ICD-10-CM

## 2023-11-03 DIAGNOSIS — M54.12 CERVICAL RADICULOPATHY: ICD-10-CM

## 2023-11-03 DIAGNOSIS — M47.816 LUMBAR SPONDYLOSIS: ICD-10-CM

## 2023-11-03 DIAGNOSIS — M79.7 FIBROMYALGIA: ICD-10-CM

## 2023-11-03 DIAGNOSIS — E78.2 MIXED HYPERLIPIDEMIA: ICD-10-CM

## 2023-11-03 DIAGNOSIS — F41.9 ANXIETY AND DEPRESSION: ICD-10-CM

## 2023-11-03 PROCEDURE — 99213 OFFICE O/P EST LOW 20 MIN: CPT | Performed by: FAMILY MEDICINE

## 2023-11-03 ASSESSMENT — ENCOUNTER SYMPTOMS
SHORTNESS OF BREATH: 0
SINUS PAIN: 0
COLOR CHANGE: 0
BLOOD IN STOOL: 0
BACK PAIN: 0
CHOKING: 0
NAUSEA: 0
ANAL BLEEDING: 0
EYE DISCHARGE: 0
TROUBLE SWALLOWING: 0
PHOTOPHOBIA: 0
FACIAL SWELLING: 0
ABDOMINAL DISTENTION: 0
SORE THROAT: 0
APNEA: 0
RECTAL PAIN: 0
CONSTIPATION: 0
ABDOMINAL PAIN: 0
EYE PAIN: 0
SINUS PRESSURE: 0
WHEEZING: 0
STRIDOR: 0
VOMITING: 0
CHEST TIGHTNESS: 0
EYE ITCHING: 0
EYE REDNESS: 0
RHINORRHEA: 0
COUGH: 0
DIARRHEA: 0
VOICE CHANGE: 0

## 2023-11-07 ENCOUNTER — OFFICE VISIT (OUTPATIENT)
Age: 48
End: 2023-11-07
Payer: COMMERCIAL

## 2023-11-07 VITALS
WEIGHT: 241 LBS | TEMPERATURE: 97.8 F | RESPIRATION RATE: 18 BRPM | HEIGHT: 67 IN | BODY MASS INDEX: 37.83 KG/M2 | DIASTOLIC BLOOD PRESSURE: 85 MMHG | HEART RATE: 97 BPM | SYSTOLIC BLOOD PRESSURE: 135 MMHG | OXYGEN SATURATION: 98 %

## 2023-11-07 DIAGNOSIS — L30.9 DERMATITIS: ICD-10-CM

## 2023-11-07 DIAGNOSIS — R35.0 URINARY FREQUENCY: ICD-10-CM

## 2023-11-07 DIAGNOSIS — N30.00 ACUTE CYSTITIS WITHOUT HEMATURIA: Primary | ICD-10-CM

## 2023-11-07 LAB
BILIRUBIN, POC: NEGATIVE
BLOOD URINE, POC: NORMAL
CLARITY, POC: NORMAL
COLOR, POC: NORMAL
GLUCOSE URINE, POC: NEGATIVE
KETONES, POC: NEGATIVE
LEUKOCYTE EST, POC: NORMAL
NITRITE, POC: NEGATIVE
PH, POC: 6.5
PROTEIN, POC: NORMAL
SPECIFIC GRAVITY, POC: >=1.03
UROBILINOGEN, POC: 0.2

## 2023-11-07 PROCEDURE — 81002 URINALYSIS NONAUTO W/O SCOPE: CPT | Performed by: NURSE PRACTITIONER

## 2023-11-07 PROCEDURE — 99213 OFFICE O/P EST LOW 20 MIN: CPT | Performed by: NURSE PRACTITIONER

## 2023-11-07 RX ORDER — CLOBETASOL PROPIONATE 0.5 MG/G
OINTMENT TOPICAL
Qty: 1 EACH | Refills: 2 | Status: SHIPPED | OUTPATIENT
Start: 2023-11-07

## 2023-11-07 RX ORDER — SULFAMETHOXAZOLE AND TRIMETHOPRIM 800; 160 MG/1; MG/1
1 TABLET ORAL 2 TIMES DAILY
Qty: 14 TABLET | Refills: 0 | Status: SHIPPED | OUTPATIENT
Start: 2023-11-07 | End: 2023-11-14

## 2023-11-07 RX ORDER — ROPINIROLE 1 MG/1
1 TABLET, FILM COATED ORAL 3 TIMES DAILY
Qty: 90 TABLET | Refills: 3 | Status: SHIPPED | OUTPATIENT
Start: 2023-11-07

## 2023-11-07 ASSESSMENT — ENCOUNTER SYMPTOMS
COUGH: 0
ABDOMINAL DISTENTION: 0
SINUS PAIN: 0
EYE DISCHARGE: 0
VOMITING: 0
RHINORRHEA: 0
VOICE CHANGE: 0
EYE REDNESS: 0
RECTAL PAIN: 0
COLOR CHANGE: 0
EYE PAIN: 0
SHORTNESS OF BREATH: 0
STRIDOR: 0
ANAL BLEEDING: 0
ABDOMINAL PAIN: 0
NAUSEA: 0
FACIAL SWELLING: 0
CHOKING: 0
BLOOD IN STOOL: 0
SINUS PRESSURE: 0
WHEEZING: 0
EYE ITCHING: 0
APNEA: 0
CHEST TIGHTNESS: 0
TROUBLE SWALLOWING: 0
DIARRHEA: 0
SORE THROAT: 0
BACK PAIN: 0
CONSTIPATION: 0
PHOTOPHOBIA: 0

## 2023-11-07 NOTE — PROGRESS NOTES
normal. No respiratory distress. Breath sounds: No stridor. No wheezing, rhonchi or rales. Chest:      Chest wall: No tenderness. Abdominal:      General: Bowel sounds are normal. There is no distension. Palpations: Abdomen is soft. There is no mass. Tenderness: There is abdominal tenderness (suprapubic). There is no right CVA tenderness, left CVA tenderness, guarding or rebound. Hernia: No hernia is present. Musculoskeletal:         General: No swelling, tenderness, deformity or signs of injury. Normal range of motion. Cervical back: Normal range of motion and neck supple. No rigidity. No muscular tenderness. Right lower leg: No edema. Left lower leg: No edema. Lymphadenopathy:      Cervical: No cervical adenopathy. Skin:     General: Skin is warm and dry. Capillary Refill: Capillary refill takes less than 2 seconds. Coloration: Skin is not jaundiced or pale. Findings: Rash (left hand) present. No bruising, erythema or lesion. Neurological:      General: No focal deficit present. Mental Status: She is alert and oriented to person, place, and time. Mental status is at baseline. Cranial Nerves: No cranial nerve deficit. Sensory: No sensory deficit. Motor: No weakness. Coordination: Coordination normal.      Gait: Gait normal.      Deep Tendon Reflexes: Reflexes normal.   Psychiatric:         Mood and Affect: Mood normal.         Behavior: Behavior normal.         Thought Content: Thought content normal.         Judgment: Judgment normal.         Assessment and Plan:  Franchesca Toussaint was seen today for urinary tract infection and rash. Diagnoses and all orders for this visit:    Acute cystitis without hematuria    Urinary frequency  -     POCT Urinalysis no Micro  -     Cancel: Culture, Urine; Future  -     Cancel: Culture, Urine  -     Culture, Urine;  Future  -     Culture, Urine    Dermatitis    Other orders  -

## 2023-11-09 LAB
CULTURE: NO GROWTH
SPECIMEN DESCRIPTION: NORMAL

## 2023-11-09 RX ORDER — VENLAFAXINE 75 MG/1
75 TABLET ORAL 2 TIMES DAILY
Qty: 60 TABLET | Refills: 3 | Status: SHIPPED | OUTPATIENT
Start: 2023-11-09

## 2023-11-09 RX ORDER — OMEPRAZOLE 40 MG/1
40 CAPSULE, DELAYED RELEASE ORAL 2 TIMES DAILY
Qty: 180 CAPSULE | Refills: 1 | Status: CANCELLED | OUTPATIENT
Start: 2023-11-09

## 2023-11-13 RX ORDER — OXCARBAZEPINE 150 MG/1
150 TABLET, FILM COATED ORAL 2 TIMES DAILY
Qty: 60 TABLET | Refills: 0 | Status: SHIPPED | OUTPATIENT
Start: 2023-11-13

## 2023-11-14 DIAGNOSIS — M54.12 CERVICAL RADICULOPATHY: ICD-10-CM

## 2023-11-14 RX ORDER — OXYCODONE HYDROCHLORIDE AND ACETAMINOPHEN 5; 325 MG/1; MG/1
1 TABLET ORAL EVERY 4 HOURS PRN
Qty: 42 TABLET | Refills: 0 | Status: SHIPPED | OUTPATIENT
Start: 2023-11-14 | End: 2023-11-21

## 2023-11-14 RX ORDER — TIZANIDINE 4 MG/1
TABLET ORAL
Qty: 120 TABLET | Refills: 0 | Status: SHIPPED | OUTPATIENT
Start: 2023-11-14

## 2023-11-25 DIAGNOSIS — M79.7 FIBROMYALGIA: ICD-10-CM

## 2023-11-27 DIAGNOSIS — M54.12 CERVICAL RADICULOPATHY: ICD-10-CM

## 2023-11-27 RX ORDER — PREGABALIN 75 MG/1
75 CAPSULE ORAL 2 TIMES DAILY
Qty: 60 CAPSULE | Refills: 1 | Status: SHIPPED | OUTPATIENT
Start: 2023-11-27 | End: 2024-01-26

## 2023-11-27 RX ORDER — OXYCODONE HYDROCHLORIDE AND ACETAMINOPHEN 5; 325 MG/1; MG/1
1 TABLET ORAL EVERY 4 HOURS PRN
Qty: 42 TABLET | Refills: 0 | Status: SHIPPED | OUTPATIENT
Start: 2023-11-27 | End: 2023-12-04

## 2023-11-28 DIAGNOSIS — M79.7 FIBROMYALGIA: ICD-10-CM

## 2023-11-28 RX ORDER — PREGABALIN 75 MG/1
75 CAPSULE ORAL 2 TIMES DAILY
Qty: 60 CAPSULE | Refills: 1 | OUTPATIENT
Start: 2023-11-28 | End: 2024-01-27

## 2023-12-11 DIAGNOSIS — M54.12 CERVICAL RADICULOPATHY: ICD-10-CM

## 2023-12-11 RX ORDER — OXCARBAZEPINE 150 MG/1
150 TABLET, FILM COATED ORAL 2 TIMES DAILY
Qty: 60 TABLET | Refills: 0 | Status: SHIPPED | OUTPATIENT
Start: 2023-12-11

## 2023-12-11 RX ORDER — OXYCODONE HYDROCHLORIDE AND ACETAMINOPHEN 5; 325 MG/1; MG/1
1 TABLET ORAL EVERY 4 HOURS PRN
Qty: 42 TABLET | Refills: 0 | Status: SHIPPED | OUTPATIENT
Start: 2023-12-11 | End: 2023-12-18

## 2023-12-15 RX ORDER — TIZANIDINE 4 MG/1
TABLET ORAL
Qty: 120 TABLET | Refills: 0 | Status: SHIPPED | OUTPATIENT
Start: 2023-12-15

## 2023-12-15 RX ORDER — FOLIC ACID 1 MG/1
1000 TABLET ORAL DAILY
Qty: 90 TABLET | Refills: 1 | Status: SHIPPED | OUTPATIENT
Start: 2023-12-15

## 2024-01-03 RX ORDER — OXCARBAZEPINE 150 MG/1
150 TABLET, FILM COATED ORAL 2 TIMES DAILY
Qty: 60 TABLET | Refills: 0 | Status: SHIPPED | OUTPATIENT
Start: 2024-01-03

## 2024-01-03 NOTE — TELEPHONE ENCOUNTER
Patients last appointment 11/3/2023.  Patients next scheduled appointment No future appointments.

## 2024-01-05 DIAGNOSIS — M54.12 CERVICAL RADICULOPATHY: ICD-10-CM

## 2024-01-05 RX ORDER — OXYCODONE HYDROCHLORIDE AND ACETAMINOPHEN 5; 325 MG/1; MG/1
1 TABLET ORAL EVERY 4 HOURS PRN
Qty: 42 TABLET | Refills: 0 | Status: SHIPPED | OUTPATIENT
Start: 2024-01-05 | End: 2024-01-12

## 2024-01-05 RX ORDER — ONDANSETRON 4 MG/1
4 TABLET, ORALLY DISINTEGRATING ORAL EVERY 8 HOURS PRN
Qty: 21 TABLET | Refills: 0 | Status: SHIPPED | OUTPATIENT
Start: 2024-01-05 | End: 2024-01-12

## 2024-01-11 RX ORDER — TIZANIDINE 4 MG/1
TABLET ORAL
Qty: 120 TABLET | Refills: 0 | Status: SHIPPED | OUTPATIENT
Start: 2024-01-11

## 2024-01-19 DIAGNOSIS — M54.12 CERVICAL RADICULOPATHY: ICD-10-CM

## 2024-01-19 RX ORDER — OXYCODONE HYDROCHLORIDE AND ACETAMINOPHEN 5; 325 MG/1; MG/1
1 TABLET ORAL EVERY 4 HOURS PRN
Qty: 42 TABLET | Refills: 0 | Status: SHIPPED | OUTPATIENT
Start: 2024-01-19 | End: 2024-01-26

## 2024-02-02 DIAGNOSIS — M54.12 CERVICAL NEUROPATHIC PAIN: ICD-10-CM

## 2024-02-02 DIAGNOSIS — M54.12 CERVICAL RADICULOPATHY: ICD-10-CM

## 2024-02-02 RX ORDER — ROSUVASTATIN CALCIUM 5 MG/1
5 TABLET, COATED ORAL DAILY
Qty: 90 TABLET | Refills: 1 | Status: SHIPPED | OUTPATIENT
Start: 2024-02-02

## 2024-02-03 RX ORDER — OXYCODONE HYDROCHLORIDE AND ACETAMINOPHEN 5; 325 MG/1; MG/1
1 TABLET ORAL EVERY 4 HOURS PRN
Qty: 42 TABLET | Refills: 0 | Status: SHIPPED | OUTPATIENT
Start: 2024-02-03 | End: 2024-02-10

## 2024-02-06 RX ORDER — OXCARBAZEPINE 150 MG/1
150 TABLET, FILM COATED ORAL 2 TIMES DAILY
Qty: 60 TABLET | Refills: 2 | Status: SHIPPED | OUTPATIENT
Start: 2024-02-06

## 2024-02-11 DIAGNOSIS — M79.7 FIBROMYALGIA: ICD-10-CM

## 2024-02-11 RX ORDER — PREGABALIN 75 MG/1
75 CAPSULE ORAL 2 TIMES DAILY
Qty: 60 CAPSULE | Refills: 1 | Status: CANCELLED | OUTPATIENT
Start: 2024-02-11 | End: 2024-04-11

## 2024-02-12 DIAGNOSIS — M79.7 FIBROMYALGIA: ICD-10-CM

## 2024-02-12 RX ORDER — TIZANIDINE 4 MG/1
TABLET ORAL
Qty: 120 TABLET | Refills: 0 | Status: SHIPPED | OUTPATIENT
Start: 2024-02-12

## 2024-02-12 RX ORDER — PREGABALIN 75 MG/1
75 CAPSULE ORAL 2 TIMES DAILY
Qty: 60 CAPSULE | Refills: 1 | Status: SHIPPED | OUTPATIENT
Start: 2024-02-12 | End: 2024-04-12

## 2024-02-12 NOTE — TELEPHONE ENCOUNTER
Patients last appointment 11/7/2023.  Patients next scheduled appointment No future appointments.

## 2024-02-16 DIAGNOSIS — M54.12 CERVICAL RADICULOPATHY: ICD-10-CM

## 2024-02-16 RX ORDER — OXYCODONE HYDROCHLORIDE AND ACETAMINOPHEN 5; 325 MG/1; MG/1
1 TABLET ORAL EVERY 4 HOURS PRN
Qty: 42 TABLET | Refills: 0 | Status: SHIPPED | OUTPATIENT
Start: 2024-02-16 | End: 2024-02-23

## 2024-03-01 DIAGNOSIS — M54.12 CERVICAL RADICULOPATHY: ICD-10-CM

## 2024-03-01 RX ORDER — OXYCODONE HYDROCHLORIDE AND ACETAMINOPHEN 5; 325 MG/1; MG/1
1 TABLET ORAL EVERY 4 HOURS PRN
Qty: 42 TABLET | Refills: 0 | Status: SHIPPED | OUTPATIENT
Start: 2024-03-01 | End: 2024-03-08

## 2024-03-15 DIAGNOSIS — M54.12 CERVICAL RADICULOPATHY: ICD-10-CM

## 2024-03-15 DIAGNOSIS — M54.12 CERVICAL NEUROPATHIC PAIN: ICD-10-CM

## 2024-03-15 RX ORDER — OXYCODONE HYDROCHLORIDE AND ACETAMINOPHEN 5; 325 MG/1; MG/1
1 TABLET ORAL EVERY 4 HOURS PRN
Qty: 42 TABLET | Refills: 0 | Status: SHIPPED | OUTPATIENT
Start: 2024-03-15 | End: 2024-03-22

## 2024-03-15 NOTE — TELEPHONE ENCOUNTER
Refill    Last office visit  11/3/24. Way over due for office visit med check.    I called Neha, she is getting labs this Monday, so appt. Given for same day. She is due for med check appt., she mentioned she is due for a wellness visit.

## 2024-03-18 ENCOUNTER — OFFICE VISIT (OUTPATIENT)
Dept: FAMILY MEDICINE CLINIC | Age: 49
End: 2024-03-18
Payer: COMMERCIAL

## 2024-03-18 VITALS
HEART RATE: 91 BPM | BODY MASS INDEX: 38.3 KG/M2 | HEIGHT: 67 IN | RESPIRATION RATE: 17 BRPM | OXYGEN SATURATION: 98 % | DIASTOLIC BLOOD PRESSURE: 80 MMHG | SYSTOLIC BLOOD PRESSURE: 138 MMHG | WEIGHT: 244 LBS | TEMPERATURE: 97 F

## 2024-03-18 DIAGNOSIS — M79.7 FIBROMYALGIA: ICD-10-CM

## 2024-03-18 DIAGNOSIS — M54.12 CERVICAL NEUROPATHIC PAIN: ICD-10-CM

## 2024-03-18 DIAGNOSIS — E78.2 MIXED HYPERLIPIDEMIA: Primary | ICD-10-CM

## 2024-03-18 DIAGNOSIS — M54.12 CERVICAL RADICULOPATHY: ICD-10-CM

## 2024-03-18 DIAGNOSIS — Z00.00 ROUTINE GENERAL MEDICAL EXAMINATION AT A HEALTH CARE FACILITY: ICD-10-CM

## 2024-03-18 DIAGNOSIS — E53.8 VITAMIN B 12 DEFICIENCY: ICD-10-CM

## 2024-03-18 DIAGNOSIS — R73.01 IFG (IMPAIRED FASTING GLUCOSE): ICD-10-CM

## 2024-03-18 DIAGNOSIS — M06.9 RHEUMATOID ARTHRITIS, INVOLVING UNSPECIFIED SITE, UNSPECIFIED WHETHER RHEUMATOID FACTOR PRESENT (HCC): ICD-10-CM

## 2024-03-18 DIAGNOSIS — I10 PRIMARY HYPERTENSION: ICD-10-CM

## 2024-03-18 LAB
HCT VFR BLD CALC: 38.6 % (ref 34–48)
HEMOGLOBIN: 12.3 G/DL (ref 11.5–15.5)
MCH RBC QN AUTO: 32.1 PG (ref 26–35)
MCHC RBC AUTO-ENTMCNC: 31.9 G/DL (ref 32–34.5)
MCV RBC AUTO: 100.8 FL (ref 80–99.9)
PDW BLD-RTO: 12.3 % (ref 11.5–15)
PLATELET # BLD: 291 K/UL (ref 130–450)
PMV BLD AUTO: 10.1 FL (ref 7–12)
RBC # BLD: 3.83 M/UL (ref 3.5–5.5)
WBC # BLD: 7.2 K/UL (ref 4.5–11.5)

## 2024-03-18 PROCEDURE — 3075F SYST BP GE 130 - 139MM HG: CPT | Performed by: FAMILY MEDICINE

## 2024-03-18 PROCEDURE — 3079F DIAST BP 80-89 MM HG: CPT | Performed by: FAMILY MEDICINE

## 2024-03-18 PROCEDURE — 99214 OFFICE O/P EST MOD 30 MIN: CPT | Performed by: FAMILY MEDICINE

## 2024-03-18 RX ORDER — ROPINIROLE 1 MG/1
1 TABLET, FILM COATED ORAL 3 TIMES DAILY
Qty: 90 TABLET | Refills: 3 | Status: CANCELLED | OUTPATIENT
Start: 2024-03-18

## 2024-03-18 RX ORDER — OXCARBAZEPINE 150 MG/1
150 TABLET, FILM COATED ORAL 2 TIMES DAILY
Qty: 60 TABLET | Refills: 2 | Status: CANCELLED | OUTPATIENT
Start: 2024-03-18

## 2024-03-18 RX ORDER — VENLAFAXINE 75 MG/1
75 TABLET ORAL 2 TIMES DAILY
Qty: 60 TABLET | Refills: 3 | Status: SHIPPED | OUTPATIENT
Start: 2024-03-18

## 2024-03-18 RX ORDER — SUMATRIPTAN 50 MG/1
TABLET, FILM COATED ORAL
Qty: 9 TABLET | Refills: 5 | Status: CANCELLED | OUTPATIENT
Start: 2024-03-18

## 2024-03-18 RX ORDER — PREGABALIN 75 MG/1
75 CAPSULE ORAL 2 TIMES DAILY
Qty: 60 CAPSULE | Refills: 1 | Status: CANCELLED | OUTPATIENT
Start: 2024-03-18 | End: 2024-05-17

## 2024-03-18 RX ORDER — LISINOPRIL 10 MG/1
TABLET ORAL
Qty: 90 TABLET | Refills: 1 | Status: CANCELLED | OUTPATIENT
Start: 2024-03-18

## 2024-03-18 RX ORDER — OXYCODONE HYDROCHLORIDE AND ACETAMINOPHEN 5; 325 MG/1; MG/1
1 TABLET ORAL EVERY 4 HOURS PRN
Qty: 42 TABLET | Refills: 0 | Status: CANCELLED | OUTPATIENT
Start: 2024-03-18 | End: 2024-03-25

## 2024-03-18 ASSESSMENT — PATIENT HEALTH QUESTIONNAIRE - PHQ9
4. FEELING TIRED OR HAVING LITTLE ENERGY: NOT AT ALL
SUM OF ALL RESPONSES TO PHQ QUESTIONS 1-9: 0
9. THOUGHTS THAT YOU WOULD BE BETTER OFF DEAD, OR OF HURTING YOURSELF: NOT AT ALL
10. IF YOU CHECKED OFF ANY PROBLEMS, HOW DIFFICULT HAVE THESE PROBLEMS MADE IT FOR YOU TO DO YOUR WORK, TAKE CARE OF THINGS AT HOME, OR GET ALONG WITH OTHER PEOPLE: NOT DIFFICULT AT ALL
SUM OF ALL RESPONSES TO PHQ QUESTIONS 1-9: 0
SUM OF ALL RESPONSES TO PHQ9 QUESTIONS 1 & 2: 0
5. POOR APPETITE OR OVEREATING: NOT AT ALL
1. LITTLE INTEREST OR PLEASURE IN DOING THINGS: NOT AT ALL
6. FEELING BAD ABOUT YOURSELF - OR THAT YOU ARE A FAILURE OR HAVE LET YOURSELF OR YOUR FAMILY DOWN: NOT AT ALL
7. TROUBLE CONCENTRATING ON THINGS, SUCH AS READING THE NEWSPAPER OR WATCHING TELEVISION: NOT AT ALL
8. MOVING OR SPEAKING SO SLOWLY THAT OTHER PEOPLE COULD HAVE NOTICED. OR THE OPPOSITE, BEING SO FIGETY OR RESTLESS THAT YOU HAVE BEEN MOVING AROUND A LOT MORE THAN USUAL: NOT AT ALL
SUM OF ALL RESPONSES TO PHQ QUESTIONS 1-9: 0
SUM OF ALL RESPONSES TO PHQ QUESTIONS 1-9: 0
7. TROUBLE CONCENTRATING ON THINGS, SUCH AS READING THE NEWSPAPER OR WATCHING TELEVISION: NOT AT ALL
1. LITTLE INTEREST OR PLEASURE IN DOING THINGS: NOT AT ALL
5. POOR APPETITE OR OVEREATING: NOT AT ALL
10. IF YOU CHECKED OFF ANY PROBLEMS, HOW DIFFICULT HAVE THESE PROBLEMS MADE IT FOR YOU TO DO YOUR WORK, TAKE CARE OF THINGS AT HOME, OR GET ALONG WITH OTHER PEOPLE: NOT DIFFICULT AT ALL
SUM OF ALL RESPONSES TO PHQ QUESTIONS 1-9: 0
4. FEELING TIRED OR HAVING LITTLE ENERGY: NOT AT ALL
2. FEELING DOWN, DEPRESSED OR HOPELESS: NOT AT ALL
6. FEELING BAD ABOUT YOURSELF - OR THAT YOU ARE A FAILURE OR HAVE LET YOURSELF OR YOUR FAMILY DOWN: NOT AT ALL
3. TROUBLE FALLING OR STAYING ASLEEP: NOT AT ALL
3. TROUBLE FALLING OR STAYING ASLEEP: NOT AT ALL
8. MOVING OR SPEAKING SO SLOWLY THAT OTHER PEOPLE COULD HAVE NOTICED. OR THE OPPOSITE - BEING SO FIDGETY OR RESTLESS THAT YOU HAVE BEEN MOVING AROUND A LOT MORE THAN USUAL: NOT AT ALL
2. FEELING DOWN, DEPRESSED OR HOPELESS: NOT AT ALL
9. THOUGHTS THAT YOU WOULD BE BETTER OFF DEAD, OR OF HURTING YOURSELF: NOT AT ALL

## 2024-03-18 NOTE — TELEPHONE ENCOUNTER
Patients last appointment 11/3/2023.  Patients next scheduled appointment   Future Appointments   Date Time Provider Department Center   3/18/2024  3:00 PM Royal Truong DO E. [facility]

## 2024-03-18 NOTE — PROGRESS NOTES
Subjective:      Patient ID: Neha Rios is a 48 y.o. female.    Medication Refill  Pertinent negatives include no abdominal pain, arthralgias, chest pain, chills, congestion, coughing, diaphoresis, fatigue, fever, headaches, joint swelling, myalgias, nausea, neck pain, numbness, rash, sore throat, vomiting or weakness.       Review of Systems   Constitutional:  Negative for activity change, appetite change, chills, diaphoresis, fatigue, fever and unexpected weight change.   HENT:  Negative for congestion, dental problem, drooling, ear discharge, ear pain, facial swelling, hearing loss, mouth sores, nosebleeds, postnasal drip, rhinorrhea, sinus pressure, sinus pain, sneezing, sore throat, tinnitus, trouble swallowing and voice change.    Eyes:  Negative for photophobia, pain, discharge, redness, itching and visual disturbance.   Respiratory:  Negative for apnea, cough, choking, chest tightness, shortness of breath, wheezing and stridor.    Cardiovascular:  Negative for chest pain, palpitations and leg swelling.   Gastrointestinal:  Negative for abdominal distention, abdominal pain, anal bleeding, blood in stool, constipation, diarrhea, nausea, rectal pain and vomiting.   Endocrine: Negative for cold intolerance, heat intolerance, polydipsia, polyphagia and polyuria.   Genitourinary:  Negative for decreased urine volume, difficulty urinating, dysuria, enuresis, frequency, genital sores and urgency. Flank pain: not severe.  Musculoskeletal:  Negative for arthralgias, back pain, gait problem, joint swelling, myalgias, neck pain and neck stiffness.   Skin:  Negative for color change, pallor, rash and wound.   Allergic/Immunologic: Negative for environmental allergies, food allergies and immunocompromised state.   Neurological:  Negative for dizziness, seizures, syncope, facial asymmetry, speech difficulty, weakness, light-headedness, numbness and headaches.   Hematological:  Negative for adenopathy. Does not 
Reviewed and discussed Imaging results.  [] Reviewed and discussed Lab results.  [] Discussed the importance of drinking plenty of fluids.  [] Cut down on Salt, Caffeine, and Sugar.  [] Non Compliant Patient   [x] Communicated with patient any concerns, to phone office.  Labs drawn   Wellness visit   Doing good overall        Time spent face to face with patient discussing medications and labs: 30 minutes.        No follow-ups on file.      Seen by:     Royal Truong, DO      Lab draw Left AC 22 x 1 1/4\". Venipuncture x 1

## 2024-03-19 LAB
ALBUMIN SERPL-MCNC: 4.3 G/DL (ref 3.5–5.2)
ALP BLD-CCNC: 79 U/L (ref 35–104)
ALT SERPL-CCNC: 20 U/L (ref 0–32)
ANION GAP SERPL CALCULATED.3IONS-SCNC: 17 MMOL/L (ref 7–16)
AST SERPL-CCNC: 20 U/L (ref 0–31)
BILIRUB SERPL-MCNC: 0.4 MG/DL (ref 0–1.2)
BUN BLDV-MCNC: 20 MG/DL (ref 6–20)
CALCIUM SERPL-MCNC: 8.7 MG/DL (ref 8.6–10.2)
CHLORIDE BLD-SCNC: 107 MMOL/L (ref 98–107)
CHOLESTEROL: 155 MG/DL
CO2: 19 MMOL/L (ref 22–29)
CREAT SERPL-MCNC: 1 MG/DL (ref 0.5–1)
FOLATE: >20 NG/ML (ref 4.8–24.2)
GFR SERPL CREATININE-BSD FRML MDRD: >60 ML/MIN/1.73M2
GLUCOSE BLD-MCNC: 99 MG/DL (ref 74–99)
HDLC SERPL-MCNC: 45 MG/DL
LDL CHOLESTEROL: 76 MG/DL
POTASSIUM SERPL-SCNC: 4.6 MMOL/L (ref 3.5–5)
SODIUM BLD-SCNC: 143 MMOL/L (ref 132–146)
T4 FREE: 0.9 NG/DL (ref 0.9–1.7)
TOTAL PROTEIN: 6.4 G/DL (ref 6.4–8.3)
TRIGL SERPL-MCNC: 169 MG/DL
TSH SERPL DL<=0.05 MIU/L-ACNC: 1.25 UIU/ML (ref 0.27–4.2)
VITAMIN B-12: 673 PG/ML (ref 211–946)
VLDLC SERPL CALC-MCNC: 34 MG/DL

## 2024-03-20 DIAGNOSIS — M79.7 FIBROMYALGIA: ICD-10-CM

## 2024-03-20 DIAGNOSIS — Z00.00 ROUTINE GENERAL MEDICAL EXAMINATION AT A HEALTH CARE FACILITY: ICD-10-CM

## 2024-03-20 NOTE — TELEPHONE ENCOUNTER
Refill. Neha said she was suppose to get ALL meds refilled. They are not at the pharmacy.      Last office visit 3/18/24.

## 2024-03-21 RX ORDER — SUMATRIPTAN 50 MG/1
TABLET, FILM COATED ORAL
Qty: 9 TABLET | Refills: 5 | Status: SHIPPED | OUTPATIENT
Start: 2024-03-21

## 2024-03-21 RX ORDER — PREGABALIN 75 MG/1
75 CAPSULE ORAL 2 TIMES DAILY
Qty: 60 CAPSULE | Refills: 1 | Status: SHIPPED | OUTPATIENT
Start: 2024-03-21 | End: 2024-05-20

## 2024-03-21 RX ORDER — ROPINIROLE 1 MG/1
1 TABLET, FILM COATED ORAL 3 TIMES DAILY
Qty: 90 TABLET | Refills: 3 | Status: SHIPPED | OUTPATIENT
Start: 2024-03-21

## 2024-03-21 RX ORDER — NALOXONE HYDROCHLORIDE 4 MG/.1ML
1 SPRAY NASAL PRN
Qty: 1 EACH | Refills: 5 | Status: SHIPPED | OUTPATIENT
Start: 2024-03-21

## 2024-03-21 RX ORDER — LISINOPRIL 10 MG/1
TABLET ORAL
Qty: 90 TABLET | Refills: 1 | Status: SHIPPED | OUTPATIENT
Start: 2024-03-21

## 2024-03-21 RX ORDER — OXCARBAZEPINE 150 MG/1
150 TABLET, FILM COATED ORAL 2 TIMES DAILY
Qty: 60 TABLET | Refills: 2 | Status: SHIPPED | OUTPATIENT
Start: 2024-03-21

## 2024-03-21 RX ORDER — TIZANIDINE 4 MG/1
TABLET ORAL
Qty: 120 TABLET | Refills: 0 | Status: SHIPPED | OUTPATIENT
Start: 2024-03-21

## 2024-03-22 RX ORDER — ONDANSETRON 4 MG/1
4 TABLET, ORALLY DISINTEGRATING ORAL EVERY 8 HOURS PRN
Qty: 21 TABLET | Refills: 1 | Status: SHIPPED | OUTPATIENT
Start: 2024-03-22 | End: 2024-04-05

## 2024-03-25 RX ORDER — OMEPRAZOLE 40 MG/1
40 CAPSULE, DELAYED RELEASE ORAL 2 TIMES DAILY
Qty: 180 CAPSULE | Refills: 1 | Status: SHIPPED | OUTPATIENT
Start: 2024-03-25

## 2024-03-25 RX ORDER — ROSUVASTATIN CALCIUM 5 MG/1
5 TABLET, COATED ORAL DAILY
Qty: 90 TABLET | Refills: 1 | Status: SHIPPED | OUTPATIENT
Start: 2024-03-25

## 2024-03-25 RX ORDER — ONDANSETRON 4 MG/1
4 TABLET, FILM COATED ORAL EVERY 8 HOURS PRN
Qty: 60 TABLET | Refills: 2 | Status: SHIPPED | OUTPATIENT
Start: 2024-03-25

## 2024-03-25 RX ORDER — TIZANIDINE 4 MG/1
TABLET ORAL
Qty: 120 TABLET | Refills: 0 | Status: SHIPPED | OUTPATIENT
Start: 2024-03-25

## 2024-03-25 RX ORDER — FOLIC ACID 1 MG/1
1000 TABLET ORAL DAILY
Qty: 90 TABLET | Refills: 1 | Status: SHIPPED | OUTPATIENT
Start: 2024-03-25

## 2024-03-25 RX ORDER — ALBUTEROL SULFATE 90 UG/1
2 AEROSOL, METERED RESPIRATORY (INHALATION) 4 TIMES DAILY PRN
Qty: 18 G | Refills: 5 | Status: SHIPPED | OUTPATIENT
Start: 2024-03-25

## 2024-03-28 DIAGNOSIS — M54.12 CERVICAL NEUROPATHIC PAIN: ICD-10-CM

## 2024-03-28 DIAGNOSIS — M54.12 CERVICAL RADICULOPATHY: ICD-10-CM

## 2024-03-28 RX ORDER — OXYCODONE HYDROCHLORIDE AND ACETAMINOPHEN 5; 325 MG/1; MG/1
1 TABLET ORAL EVERY 4 HOURS PRN
Qty: 42 TABLET | Refills: 0 | Status: SHIPPED | OUTPATIENT
Start: 2024-03-28 | End: 2024-04-04

## 2024-04-01 ENCOUNTER — TELEPHONE (OUTPATIENT)
Dept: FAMILY MEDICINE CLINIC | Age: 49
End: 2024-04-01

## 2024-04-01 NOTE — TELEPHONE ENCOUNTER
Insurance [employer] case management called stated Neha has denied the services and they are reaching out to offer her multiple services if she may need help with pharmacy,her health conditions. To have her call her case management nurse through her insurance.Carmen [phone] ex.  [phone]

## 2024-04-09 DIAGNOSIS — M54.12 CERVICAL RADICULOPATHY: ICD-10-CM

## 2024-04-09 DIAGNOSIS — M54.12 CERVICAL NEUROPATHIC PAIN: ICD-10-CM

## 2024-04-09 RX ORDER — OXYCODONE HYDROCHLORIDE AND ACETAMINOPHEN 5; 325 MG/1; MG/1
1 TABLET ORAL EVERY 4 HOURS PRN
Qty: 42 TABLET | Refills: 0 | Status: SHIPPED | OUTPATIENT
Start: 2024-04-09 | End: 2024-04-16

## 2024-04-09 NOTE — TELEPHONE ENCOUNTER
Patients last appointment 3/18/2024.  Patients next scheduled appointment   Future Appointments   Date Time Provider Department Center   4/29/2024  3:15 PM Joel Sanchez MD [facility]

## 2024-04-20 DIAGNOSIS — M79.7 FIBROMYALGIA: ICD-10-CM

## 2024-04-22 DIAGNOSIS — M54.12 CERVICAL RADICULOPATHY: Primary | ICD-10-CM

## 2024-04-22 DIAGNOSIS — M47.817 LUMBOSACRAL SPONDYLOSIS WITHOUT MYELOPATHY: ICD-10-CM

## 2024-04-22 RX ORDER — OXYCODONE HYDROCHLORIDE AND ACETAMINOPHEN 5; 325 MG/1; MG/1
1 TABLET ORAL EVERY 4 HOURS PRN
Qty: 42 TABLET | Refills: 0 | Status: SHIPPED | OUTPATIENT
Start: 2024-04-22 | End: 2024-04-29

## 2024-04-22 RX ORDER — PREGABALIN 75 MG/1
75 CAPSULE ORAL 2 TIMES DAILY
Qty: 60 CAPSULE | Refills: 1 | Status: SHIPPED | OUTPATIENT
Start: 2024-04-22 | End: 2024-06-21

## 2024-04-22 NOTE — TELEPHONE ENCOUNTER
Nhea Rios is calling to request a refill on the following medication(s):  Requested Prescriptions     Pending Prescriptions Disp Refills    pregabalin (LYRICA) 75 MG capsule 60 capsule 1     Sig: Take 1 capsule by mouth 2 times daily for 60 days. Max Daily Amount: 150 mg       They are a patient at:  [facility]     Future Appointment:  Future Appointments   Date Time Provider Department Center   4/29/2024  3:15 PM Joel Sancehz MD [facility]IN [facility]        Patient Active Problem List   Diagnosis    Chronic back pain    Hyperlipidemia    Indigestion    DDD (degenerative disc disease), cervical    Cervical radiculopathy    Lumbosacral spondylosis without myelopathy    Lumbar spondylosis

## 2024-04-29 ENCOUNTER — PREP FOR PROCEDURE (OUTPATIENT)
Dept: PAIN MANAGEMENT | Age: 49
End: 2024-04-29

## 2024-04-29 ENCOUNTER — OFFICE VISIT (OUTPATIENT)
Dept: PAIN MANAGEMENT | Age: 49
End: 2024-04-29
Payer: COMMERCIAL

## 2024-04-29 VITALS
WEIGHT: 244 LBS | OXYGEN SATURATION: 95 % | HEART RATE: 84 BPM | HEIGHT: 67 IN | RESPIRATION RATE: 16 BRPM | SYSTOLIC BLOOD PRESSURE: 120 MMHG | TEMPERATURE: 98.2 F | BODY MASS INDEX: 38.3 KG/M2 | DIASTOLIC BLOOD PRESSURE: 74 MMHG

## 2024-04-29 DIAGNOSIS — M50.30 DDD (DEGENERATIVE DISC DISEASE), CERVICAL: ICD-10-CM

## 2024-04-29 DIAGNOSIS — M47.817 LUMBOSACRAL SPONDYLOSIS WITHOUT MYELOPATHY: Primary | ICD-10-CM

## 2024-04-29 DIAGNOSIS — M47.812 CERVICAL SPONDYLOSIS: ICD-10-CM

## 2024-04-29 DIAGNOSIS — M51.36 DDD (DEGENERATIVE DISC DISEASE), LUMBAR: ICD-10-CM

## 2024-04-29 DIAGNOSIS — M79.7 FIBROMYALGIA: ICD-10-CM

## 2024-04-29 DIAGNOSIS — E66.9 OBESITY, UNSPECIFIED CLASSIFICATION, UNSPECIFIED OBESITY TYPE, UNSPECIFIED WHETHER SERIOUS COMORBIDITY PRESENT: ICD-10-CM

## 2024-04-29 PROCEDURE — 99214 OFFICE O/P EST MOD 30 MIN: CPT | Performed by: ANESTHESIOLOGY

## 2024-04-29 PROCEDURE — 99214 OFFICE O/P EST MOD 30 MIN: CPT

## 2024-04-29 RX ORDER — SODIUM CHLORIDE 0.9 % (FLUSH) 0.9 %
5-40 SYRINGE (ML) INJECTION PRN
Status: CANCELLED | OUTPATIENT
Start: 2024-04-29

## 2024-04-29 RX ORDER — SODIUM CHLORIDE 0.9 % (FLUSH) 0.9 %
5-40 SYRINGE (ML) INJECTION EVERY 12 HOURS SCHEDULED
Status: CANCELLED | OUTPATIENT
Start: 2024-04-29

## 2024-04-29 RX ORDER — SODIUM CHLORIDE 9 MG/ML
INJECTION, SOLUTION INTRAVENOUS PRN
Status: CANCELLED | OUTPATIENT
Start: 2024-04-29

## 2024-04-29 NOTE — PROGRESS NOTES
lab results and image findings reviewed and discussed.     Joel Sanchez MD    CC;  Royal Truong, DO

## 2024-04-29 NOTE — PROGRESS NOTES
Neha Rios presents to the [facility] on 4/29/2024. Neha is complaining of pain low back. The pain is constant. The pain is described as aching, throbbing, shooting, and stabbing. Pain is rated on her best day at a 5, on her worst day at a 10, and on average at a 8 on the VAS scale. She took her last dose of Percocet .    Any procedures since your last visit: No    She is not on NSAIDS and  is not on anticoagulation medications  Pacemaker or defibrillator: No        Medication Contract and Consent for Opioid Use Documents Filed       Patient Documents       Type of Document Status Date Received Received By Description    Medication Contract Received 1/19/2023 11:34 AM EZIO VILLANUEVA 1/6/23 Medication Contract    Medication Contract Received 3/19/2024 10:46 AM JEANIE CHIN Medication contract                       /74   Pulse 84   Temp 98.2 °F (36.8 °C) (Temporal)   Resp 16   Ht 1.702 m (5' 7\")   Wt 110.7 kg (244 lb)   SpO2 95%   BMI 38.22 kg/m²      No LMP recorded.

## 2024-04-29 NOTE — H&P (VIEW-ONLY)
(PRINIVIL;ZESTRIL) 10 MG tablet TAKE 1 TABLET BY MOUTH EVERY DAY 3/21/24  Yes Royal Truong DO   naloxone 4 MG/0.1ML LIQD nasal spray 1 spray by Nasal route as needed for Opioid Reversal 3/21/24  Yes Royal Truong DO   OXcarbazepine (TRILEPTAL) 150 MG tablet Take 1 tablet by mouth 2 times daily 3/21/24  Yes Royal Truong DO   rOPINIRole (REQUIP) 1 MG tablet Take 1 tablet by mouth 3 times daily 3/21/24  Yes Royal Truong DO   SUMAtriptan (IMITREX) 50 MG tablet take 1 tablet by mouth if needed AT ONSET OF HEADACHE may repeat in 4 hours IF headache PERSISTS 3/21/24  Yes Royal Truong DO   venlafaxine (EFFEXOR) 75 MG tablet take 1 tablet by mouth twice a day 3/18/24  Yes Royal Truong DO   clobetasol (TEMOVATE) 0.05 % ointment Apply topically 2 times daily. 11/7/23  Yes Meggan Sanchez, APRN - CNP   cyanocobalamin 1000 MCG/ML injection inject 1 milliliter ( 1000 MCG ) intramuscularly every week for 4 weeks 7/3/23  Yes Royal Truong DO   secukinumab (COSENTYX) 150 MG/ML SOSY Inject 2 mLs into the skin once   Yes ProviderEllie MD   Multiple Vitamin (MULTIVITAMIN PO) Take 1 tablet by mouth daily    Yes ProviderEllie MD       No Known Allergies    Social History     Socioeconomic History    Marital status:      Spouse name: Not on file    Number of children: Not on file    Years of education: Not on file    Highest education level: Not on file   Occupational History    Not on file   Tobacco Use    Smoking status: Never    Smokeless tobacco: Never   Vaping Use    Vaping Use: Never used   Substance and Sexual Activity    Alcohol use: No    Drug use: No    Sexual activity: Not on file   Other Topics Concern    Not on file   Social History Narrative    Not on file     Social Determinants of Health     Financial Resource Strain: Low Risk  (3/30/2023)    Overall Financial Resource Strain (CARDIA)     Difficulty of Paying Living Expenses: Not hard at all   Food Insecurity: Not on  Orders   1. Lumbosacral spondylosis without myelopathy        2. DDD (degenerative disc disease), lumbar        3. Fibromyalgia        4. DDD (degenerative disc disease), cervical        5. Cervical spondylosis        6. Obesity, unspecified classification, unspecified obesity type, unspecified whether serious comorbidity present           48 y.o.  female with h/o chronic low back pain and neck pain ,    Failed conservative treatment.    MRI: C6-7 DDD/ stenosis. NSG- recommended interventions. S/P Cervical DOMINICK with excellent pain relief of the neck/ UE pain. Neck pain is stable.     Past h/o gastric bypass/ h/o psoriatic arthritis.      Has tried Gabapentin.    Ongoing issues is low back pain. H/o Low back pain Predominantly axial in nature- features of facet pain.     MRI of LS spine & Xray LS spine- reviewed and discussed.    S/P lumbar MB RFA on 5/4/2023.  Prior RFA had provided excellent pain relief of > 80% relief for > 8-9 months.    Has noted recurrence of similar pain recently.  Doing HEP    PLAN:  Radiofrequency ablation of Lumbar facet MBNB to address pain form L3-4, L4-5, L5-S1 under fluoroscopy.  (MRI shows facet hypertrophy at those levels).    Moderate sedation due to anxiety of needles.    Caution with chronic NSAID's in view of  prior gastric bypass.    Letter givne to be off workl on the day and next day of proceduer.    NOTE: Prior PT/ INR/ Platelets- normal.    She takes percocet prn/ Zanaflex Prn- by her PCP- apparently takes for fibromyalgia.  Instructed to take percocet only prn for severe pain- she apparently takes it for fibromyalgia. Discussed issues with long term opioid use.     She is also on Lyrica.      Counseling : Patient encouraged to stay active and to watch/lose weight     Encouraged to continue Regular home exercise program as tolerated - stretching / strengthening.     Treatment plan discussed with the patient including medication and procedure side effects.    Pertinent notes/

## 2024-05-03 DIAGNOSIS — M54.12 CERVICAL RADICULOPATHY: ICD-10-CM

## 2024-05-03 DIAGNOSIS — M47.817 LUMBOSACRAL SPONDYLOSIS WITHOUT MYELOPATHY: ICD-10-CM

## 2024-05-03 RX ORDER — OXYCODONE HYDROCHLORIDE AND ACETAMINOPHEN 5; 325 MG/1; MG/1
1 TABLET ORAL EVERY 4 HOURS PRN
Qty: 42 TABLET | Refills: 0 | Status: SHIPPED | OUTPATIENT
Start: 2024-05-03 | End: 2024-05-10

## 2024-05-07 ENCOUNTER — TELEPHONE (OUTPATIENT)
Dept: PAIN MANAGEMENT | Age: 49
End: 2024-05-07

## 2024-05-07 NOTE — TELEPHONE ENCOUNTER
Call to Neha Rios that procedure was approved for 5/16/24 and that [facility] should call her a few days before for the pre op call and between 2:00 PM and 4:00 PM  the business day before with the arrival time. Instructed Neha to hold ibuprofen for 24 hours, naprosyn for 4 days and any aspirin containing products or fish oil for 7 days. Instructed to call office back if any questions. Neha verbalized understanding.    Electronically signed by EZIO HUMPHREY RN on 5/7/2024 at 4:19 PM

## 2024-05-14 RX ORDER — OXYCODONE HYDROCHLORIDE AND ACETAMINOPHEN 5; 325 MG/1; MG/1
1 TABLET ORAL EVERY 4 HOURS PRN
COMMUNITY

## 2024-05-15 DIAGNOSIS — M54.12 CERVICAL NEUROPATHIC PAIN: ICD-10-CM

## 2024-05-15 DIAGNOSIS — M54.12 CERVICAL RADICULOPATHY: ICD-10-CM

## 2024-05-15 DIAGNOSIS — M47.817 LUMBOSACRAL SPONDYLOSIS WITHOUT MYELOPATHY: ICD-10-CM

## 2024-05-15 RX ORDER — OXYCODONE HYDROCHLORIDE AND ACETAMINOPHEN 5; 325 MG/1; MG/1
1 TABLET ORAL EVERY 4 HOURS PRN
Qty: 42 TABLET | Refills: 0 | Status: SHIPPED | OUTPATIENT
Start: 2024-05-15 | End: 2024-05-22

## 2024-05-16 ENCOUNTER — ANESTHESIA EVENT (OUTPATIENT)
Dept: OPERATING ROOM | Age: 49
End: 2024-05-16
Payer: COMMERCIAL

## 2024-05-16 ENCOUNTER — ANESTHESIA (OUTPATIENT)
Dept: OPERATING ROOM | Age: 49
End: 2024-05-16
Payer: COMMERCIAL

## 2024-05-16 ENCOUNTER — HOSPITAL ENCOUNTER (OUTPATIENT)
Age: 49
Setting detail: OUTPATIENT SURGERY
Discharge: HOME | End: 2024-05-16
Attending: ANESTHESIOLOGY | Admitting: ANESTHESIOLOGY
Payer: COMMERCIAL

## 2024-05-16 ENCOUNTER — HOSPITAL ENCOUNTER (OUTPATIENT)
Dept: GENERAL RADIOLOGY | Age: 49
Setting detail: OUTPATIENT SURGERY
Discharge: HOME | End: 2024-05-18
Attending: ANESTHESIOLOGY
Payer: COMMERCIAL

## 2024-05-16 VITALS
HEIGHT: 67 IN | WEIGHT: 244 LBS | TEMPERATURE: 97.5 F | RESPIRATION RATE: 18 BRPM | BODY MASS INDEX: 38.3 KG/M2 | OXYGEN SATURATION: 100 % | DIASTOLIC BLOOD PRESSURE: 75 MMHG | SYSTOLIC BLOOD PRESSURE: 149 MMHG | HEART RATE: 77 BPM

## 2024-05-16 DIAGNOSIS — R52 PAIN MANAGEMENT: ICD-10-CM

## 2024-05-16 LAB
HCG, URINE, POC: NEGATIVE
Lab: NORMAL
NEGATIVE QC PASS/FAIL: NORMAL
POSITIVE QC PASS/FAIL: NORMAL

## 2024-05-16 PROCEDURE — 64636 DESTROY L/S FACET JNT ADDL: CPT | Performed by: ANESTHESIOLOGY

## 2024-05-16 PROCEDURE — 7100000011 HC PHASE II RECOVERY - ADDTL 15 MIN: Performed by: ANESTHESIOLOGY

## 2024-05-16 PROCEDURE — 3700000001 HC ADD 15 MINUTES (ANESTHESIA): Performed by: ANESTHESIOLOGY

## 2024-05-16 PROCEDURE — 3600000002 HC SURGERY LEVEL 2 BASE: Performed by: ANESTHESIOLOGY

## 2024-05-16 PROCEDURE — 2500000003 HC RX 250 WO HCPCS: Performed by: ANESTHESIOLOGY

## 2024-05-16 PROCEDURE — 3700000000 HC ANESTHESIA ATTENDED CARE: Performed by: ANESTHESIOLOGY

## 2024-05-16 PROCEDURE — 6360000002 HC RX W HCPCS

## 2024-05-16 PROCEDURE — 2580000003 HC RX 258

## 2024-05-16 PROCEDURE — 7100000010 HC PHASE II RECOVERY - FIRST 15 MIN: Performed by: ANESTHESIOLOGY

## 2024-05-16 PROCEDURE — 6360000002 HC RX W HCPCS: Performed by: ANESTHESIOLOGY

## 2024-05-16 PROCEDURE — 2709999900 HC NON-CHARGEABLE SUPPLY: Performed by: ANESTHESIOLOGY

## 2024-05-16 PROCEDURE — 64635 DESTROY LUMB/SAC FACET JNT: CPT | Performed by: ANESTHESIOLOGY

## 2024-05-16 PROCEDURE — 3600000012 HC SURGERY LEVEL 2 ADDTL 15MIN: Performed by: ANESTHESIOLOGY

## 2024-05-16 RX ORDER — BUPIVACAINE HYDROCHLORIDE 2.5 MG/ML
INJECTION, SOLUTION EPIDURAL; INFILTRATION; INTRACAUDAL PRN
Status: DISCONTINUED | OUTPATIENT
Start: 2024-05-16 | End: 2024-05-16 | Stop reason: ALTCHOICE

## 2024-05-16 RX ORDER — SODIUM CHLORIDE 9 MG/ML
INJECTION, SOLUTION INTRAVENOUS PRN
Status: DISCONTINUED | OUTPATIENT
Start: 2024-05-16 | End: 2024-05-16 | Stop reason: HOSPADM

## 2024-05-16 RX ORDER — SODIUM CHLORIDE 9 MG/ML
INJECTION, SOLUTION INTRAVENOUS CONTINUOUS PRN
Status: DISCONTINUED | OUTPATIENT
Start: 2024-05-16 | End: 2024-05-16 | Stop reason: SDUPTHER

## 2024-05-16 RX ORDER — SODIUM CHLORIDE 9 MG/ML
INJECTION, SOLUTION INTRAVENOUS PRN
Status: CANCELLED | OUTPATIENT
Start: 2024-05-16

## 2024-05-16 RX ORDER — SODIUM CHLORIDE 0.9 % (FLUSH) 0.9 %
5-40 SYRINGE (ML) INJECTION PRN
Status: DISCONTINUED | OUTPATIENT
Start: 2024-05-16 | End: 2024-05-16 | Stop reason: HOSPADM

## 2024-05-16 RX ORDER — ONDANSETRON 2 MG/ML
4 INJECTION INTRAMUSCULAR; INTRAVENOUS
Status: CANCELLED | OUTPATIENT
Start: 2024-05-16 | End: 2024-05-17

## 2024-05-16 RX ORDER — SODIUM CHLORIDE 0.9 % (FLUSH) 0.9 %
5-40 SYRINGE (ML) INJECTION EVERY 12 HOURS SCHEDULED
Status: CANCELLED | OUTPATIENT
Start: 2024-05-16

## 2024-05-16 RX ORDER — METHYLPREDNISOLONE ACETATE 40 MG/ML
INJECTION, SUSPENSION INTRA-ARTICULAR; INTRALESIONAL; INTRAMUSCULAR; SOFT TISSUE PRN
Status: DISCONTINUED | OUTPATIENT
Start: 2024-05-16 | End: 2024-05-16 | Stop reason: ALTCHOICE

## 2024-05-16 RX ORDER — LIDOCAINE HYDROCHLORIDE 10 MG/ML
INJECTION, SOLUTION EPIDURAL; INFILTRATION; INTRACAUDAL; PERINEURAL PRN
Status: DISCONTINUED | OUTPATIENT
Start: 2024-05-16 | End: 2024-05-16 | Stop reason: ALTCHOICE

## 2024-05-16 RX ORDER — FENTANYL CITRATE 50 UG/ML
INJECTION, SOLUTION INTRAMUSCULAR; INTRAVENOUS PRN
Status: DISCONTINUED | OUTPATIENT
Start: 2024-05-16 | End: 2024-05-16 | Stop reason: SDUPTHER

## 2024-05-16 RX ORDER — SODIUM CHLORIDE 0.9 % (FLUSH) 0.9 %
5-40 SYRINGE (ML) INJECTION EVERY 12 HOURS SCHEDULED
Status: DISCONTINUED | OUTPATIENT
Start: 2024-05-16 | End: 2024-05-16 | Stop reason: HOSPADM

## 2024-05-16 RX ORDER — NALOXONE HYDROCHLORIDE 0.4 MG/ML
INJECTION, SOLUTION INTRAMUSCULAR; INTRAVENOUS; SUBCUTANEOUS PRN
Status: CANCELLED | OUTPATIENT
Start: 2024-05-16

## 2024-05-16 RX ORDER — SODIUM CHLORIDE 0.9 % (FLUSH) 0.9 %
5-40 SYRINGE (ML) INJECTION PRN
Status: CANCELLED | OUTPATIENT
Start: 2024-05-16

## 2024-05-16 RX ORDER — MIDAZOLAM HYDROCHLORIDE 1 MG/ML
INJECTION INTRAMUSCULAR; INTRAVENOUS PRN
Status: DISCONTINUED | OUTPATIENT
Start: 2024-05-16 | End: 2024-05-16 | Stop reason: SDUPTHER

## 2024-05-16 RX ORDER — LIDOCAINE HYDROCHLORIDE 5 MG/ML
INJECTION, SOLUTION INFILTRATION; INTRAVENOUS PRN
Status: DISCONTINUED | OUTPATIENT
Start: 2024-05-16 | End: 2024-05-16 | Stop reason: ALTCHOICE

## 2024-05-16 RX ADMIN — SODIUM CHLORIDE: 9 INJECTION, SOLUTION INTRAVENOUS at 09:12

## 2024-05-16 RX ADMIN — FENTANYL CITRATE 100 MCG: 50 INJECTION, SOLUTION INTRAMUSCULAR; INTRAVENOUS at 11:08

## 2024-05-16 RX ADMIN — MIDAZOLAM 2 MG: 1 INJECTION INTRAMUSCULAR; INTRAVENOUS at 11:08

## 2024-05-16 ASSESSMENT — PAIN - FUNCTIONAL ASSESSMENT: PAIN_FUNCTIONAL_ASSESSMENT: 0-10

## 2024-05-16 ASSESSMENT — PAIN DESCRIPTION - DESCRIPTORS: DESCRIPTORS: DISCOMFORT

## 2024-05-16 NOTE — DISCHARGE INSTRUCTIONS
[facility] Pain Management Department  [place] [facility]-[phone]  Dr. Daniel Rodriguez   Post-Pain Block/Radiofrequency  Home Going Instructions    1-Go home, rest for the remainder of the day  2-Please do not lift over 20 pounds the day of the injection  3-If you received sedation No: alcohol, driving, operating lawn mowers, plows, tractors or other dangerous equipment until next morning. Do not make important decisions or sign legal documents for 24 hours. You may experience light headedness, dizziness, nausea or sleepiness after sedation. Do not stay alone. A responsible adult must be with you for 24 hours. You could be nauseated from the medications you have received. Your IV site may be sore and bruised.    4-No dietary restrictions     5-Resume all medications the same day, blood thinners to be resumed 24 hours after injection if you were instructed to stop any.    6-Keep the surgical site clean and dry, you may shower the next morning and remove the      dressing.     7- No sitz baths, tub baths or hot tubs/swimming for 24 hours.       8- If you have any pain at the injection site(s), application of an ice pack to the area should be       helpful, 20 minutes on/20 minutes off for next 48 hours.  9- Call [facility] immediately at if you develop.  Fever greater than 100.4 F  Have bleeding or drainage from the puncture site  Have progressive Leg/arm numbness and or weakness  Loss of control of bowel and or bladder (wet/soil yourself)  Severe headache with inability to lift head  10-You may return to work the next day

## 2024-05-16 NOTE — ANESTHESIA PRE PROCEDURE
Department of Anesthesiology  Preprocedure Note       Name:  Neha Rios   Age:  48 y.o.  :  1975                                          MRN:  [id number]         Date:  2024      Surgeon: Surgeon(s):  Joel Sanchez MD    Procedure: Procedure(s):  BILATERAL LUMBAR RADIOFREQUENCY ABLATION AT LUMBAR 2, LUMBAR 3, LUMBAR 4 &  LUMBAR 5 DORSAL RAMI UNDER FLUOROSCOPY    Medications prior to admission:   Prior to Admission medications    Medication Sig Start Date End Date Taking? Authorizing Provider   oxyCODONE-acetaminophen (PERCOCET) 5-325 MG per tablet Take 1 tablet by mouth every 4 hours as needed for Pain.   Yes Provider, MD Ellie   oxyCODONE-acetaminophen (PERCOCET) 5-325 MG per tablet Take 1 tablet by mouth every 4 hours as needed for Pain for up to 7 days. Max Daily Amount: 6 tablets 5/15/24 5/22/24  Royal Truong, DO   pregabalin (LYRICA) 75 MG capsule Take 1 capsule by mouth 2 times daily for 60 days. Max Daily Amount: 150 mg 24  Royal Truong,    albuterol sulfate HFA (VENTOLIN HFA) 108 (90 Base) MCG/ACT inhaler Inhale 2 puffs into the lungs 4 times daily as needed for Wheezing 3/25/24   Royal Truong,    folic acid (FOLVITE) 1 MG tablet Take 1 tablet by mouth daily 3/25/24   Royal Truong,    omeprazole (PRILOSEC) 40 MG delayed release capsule Take 1 capsule by mouth 2 times daily 3/25/24   Royal Truong DO   rosuvastatin (CRESTOR) 5 MG tablet Take 1 tablet by mouth daily 3/25/24   Royal Truong,    tiZANidine (ZANAFLEX) 4 MG tablet take 1 tablet by mouth every 6 hours if needed for SPASM(S) and leg pain 3/25/24   Royal Truong, DO   ondansetron (ZOFRAN) 4 MG tablet Take 1 tablet by mouth every 8 hours as needed for Nausea or Vomiting 3/25/24   Royal Truong, DO   lisinopril (PRINIVIL;ZESTRIL) 10 MG tablet TAKE 1 TABLET BY MOUTH EVERY DAY 3/21/24   Royal Truong, DO   naloxone 4 MG/0.1ML LIQD nasal spray 1 spray by Nasal route as needed

## 2024-05-16 NOTE — INTERVAL H&P NOTE
Update History & Physical    The patient's History and Physical of April 29, 2024 was reviewed with the patient and I examined the patient. There was no change. The surgical site was confirmed by the patient and me.     Plan: The risks, benefits, expected outcome, and alternative to the recommended procedure have been discussed with the patient. Patient understands and wants to proceed with the procedure.     Electronically signed by Joel Sanchez MD on 5/16/2024

## 2024-05-16 NOTE — ANESTHESIA POSTPROCEDURE EVALUATION
Department of Anesthesiology  Postprocedure Note    Patient: Neha Rios  MRN: [id number]  YOB: 1975  Date of evaluation: 5/16/2024    Procedure Summary       Date: 05/16/24 Room / Location: [facility] PROCEDURE ROOM 02 / [facility]    Anesthesia Start: 1105 Anesthesia Stop: 1132    Procedure: BILATERAL LUMBAR RADIOFREQUENCY ABLATION AT LUMBAR 2, LUMBAR 3, LUMBAR 4 &  LUMBAR 5 DORSAL RAMI UNDER FLUOROSCOPY (Bilateral: Back) Diagnosis:       Lumbar spondylosis      (Lumbar spondylosis [M47.816])    Surgeons: Joel Sanchez MD Responsible Provider: Lefty Dexter DO    Anesthesia Type: MAC ASA Status: 2            Anesthesia Type: No value filed.    Enrique Phase I: Enrique Score: 10    Enrique Phase II: Enrique Score: 10    Anesthesia Post Evaluation    Patient location during evaluation: bedside  Patient participation: complete - patient participated  Level of consciousness: awake  Pain score: 0  Airway patency: patent  Nausea & Vomiting: no nausea and no vomiting  Cardiovascular status: blood pressure returned to baseline  Respiratory status: acceptable  Hydration status: stable  Pain management: adequate    No notable events documented.

## 2024-05-16 NOTE — OP NOTE
Operative Note      Patient: Neha Riso  YOB: 1975  MRN: [id number]    Date of Procedure: 2024    Pre-Op Diagnosis Codes:     * Lumbar spondylosis [M47.816]    Post-Op Diagnosis: Same       Procedure(s):  BILATERAL LUMBAR RADIOFREQUENCY ABLATION AT LUMBAR 2, LUMBAR 3, LUMBAR 4 &  LUMBAR 5 DORSAL RAMI UNDER FLUOROSCOPY    Surgeon(s):  Joel Sanchez MD    Assistant:   * No surgical staff found *    Anesthesia: Monitor Anesthesia Care    Estimated Blood Loss (mL): Minimal    Complications: None    Specimens:   * No specimens in log *    Implants:  * No implants in log *      Drains: * No LDAs found *    Findings:  Infection Present At Time Of Surgery (PATOS) (choose all levels that have infection present):  No infection present  Other Findings: good needle placement    Detailed Description of Procedure:   2024    Patient: Neha Rios  :  1975  Age:  48 y.o.  Sex:  female     PRE-OPERATIVE DIAGNOSIS: Bilateral Lumbar spondylosis, lumbar facet arthropathy.    POST-OPERATIVE DIAGNOSIS: Same.    PROCEDURE:  Fluoroscopic-guided Bilateral  Lumbar facet medial branch radiofrequency ablation at facet levels L3-4, L4-5, L5-S1 ( L2, L3 L4 MB & L5DR)    SURGEON:  Joel Sanchez MD    ANESTHESIA: MAC    ESTIMATED BLOOD LOSS: None.  ______________________________________________________________________  HISTORY & PHYSICAL: Neha Rios presents today for fluoroscopic-guided Bilateral lumbar facet medial branch radiofrequency ablation at levels L3-4, L4-5, L5-S1. The potential complications of the procedure were explained to Neha again today and she has elected to undergo the aforementioned procedure.    Neha’s complete History & Physical examination were reviewed in depth, a copy of which is in the chart.      DESCRIPTION OF PROCEDURE:    After confirming written and informed consent, a time-out was performed and Neha’s name and date of birth, the procedure to be performed as well

## 2024-05-23 RX ORDER — TIZANIDINE 4 MG/1
TABLET ORAL
Qty: 120 TABLET | Refills: 0 | Status: SHIPPED | OUTPATIENT
Start: 2024-05-23

## 2024-05-23 NOTE — TELEPHONE ENCOUNTER
Patient called today and stated that the Tizanidine has nothing to do with her pain management and that you have been filling it for her.

## 2024-05-28 DIAGNOSIS — M54.12 CERVICAL RADICULOPATHY: Primary | ICD-10-CM

## 2024-05-28 DIAGNOSIS — M54.12 CERVICAL NEUROPATHIC PAIN: ICD-10-CM

## 2024-05-28 DIAGNOSIS — M79.7 FIBROMYALGIA: ICD-10-CM

## 2024-05-29 RX ORDER — OXYCODONE HYDROCHLORIDE AND ACETAMINOPHEN 5; 325 MG/1; MG/1
1 TABLET ORAL EVERY 4 HOURS PRN
Qty: 42 TABLET | Refills: 0 | Status: SHIPPED | OUTPATIENT
Start: 2024-05-29 | End: 2024-06-05

## 2024-06-10 DIAGNOSIS — M79.7 FIBROMYALGIA: ICD-10-CM

## 2024-06-10 DIAGNOSIS — M54.12 CERVICAL RADICULOPATHY: ICD-10-CM

## 2024-06-10 DIAGNOSIS — M54.12 CERVICAL NEUROPATHIC PAIN: ICD-10-CM

## 2024-06-10 RX ORDER — OXYCODONE HYDROCHLORIDE AND ACETAMINOPHEN 5; 325 MG/1; MG/1
1 TABLET ORAL EVERY 4 HOURS PRN
Qty: 42 TABLET | Refills: 0 | Status: SHIPPED | OUTPATIENT
Start: 2024-06-10 | End: 2024-06-17

## 2024-06-10 NOTE — TELEPHONE ENCOUNTER
Patients last appointment 3/18/2024.  Patients next scheduled appointment   Future Appointments   Date Time Provider Department Center   6/24/2024  3:00 PM Joel Sanchez MD [facility] PAIN [facility]

## 2024-06-17 RX ORDER — OXCARBAZEPINE 150 MG/1
150 TABLET, FILM COATED ORAL 2 TIMES DAILY
Qty: 60 TABLET | Refills: 2 | Status: SHIPPED | OUTPATIENT
Start: 2024-06-17

## 2024-06-17 NOTE — TELEPHONE ENCOUNTER
Patients last appointment 3/18/2024.  Patients next scheduled appointment   Future Appointments   Date Time Provider Department Center   6/24/2024  3:00 PM Joel Sanchez MD [facility] PAIN [facility]

## 2024-06-20 DIAGNOSIS — M79.7 FIBROMYALGIA: ICD-10-CM

## 2024-06-20 DIAGNOSIS — M54.12 CERVICAL RADICULOPATHY: ICD-10-CM

## 2024-06-20 DIAGNOSIS — M54.12 CERVICAL NEUROPATHIC PAIN: ICD-10-CM

## 2024-06-20 RX ORDER — OXYCODONE HYDROCHLORIDE AND ACETAMINOPHEN 5; 325 MG/1; MG/1
1 TABLET ORAL EVERY 4 HOURS PRN
Qty: 42 TABLET | Refills: 0 | Status: SHIPPED | OUTPATIENT
Start: 2024-06-20 | End: 2024-06-27

## 2024-06-20 NOTE — TELEPHONE ENCOUNTER
Patients last appointment 3/18/2024.  Patients next scheduled appointment   Future Appointments   Date Time Provider Department Center   6/24/2024  3:00 PM Joel Sanchez MD [facility] PAIN [facility]

## 2024-06-21 DIAGNOSIS — M54.12 CERVICAL RADICULOPATHY: ICD-10-CM

## 2024-06-21 DIAGNOSIS — M79.7 FIBROMYALGIA: ICD-10-CM

## 2024-06-21 DIAGNOSIS — M54.12 CERVICAL NEUROPATHIC PAIN: ICD-10-CM

## 2024-06-21 RX ORDER — PREGABALIN 75 MG/1
75 CAPSULE ORAL 2 TIMES DAILY
Qty: 60 CAPSULE | Refills: 1 | Status: SHIPPED | OUTPATIENT
Start: 2024-06-21 | End: 2024-08-20

## 2024-06-21 NOTE — TELEPHONE ENCOUNTER
Last Appointment:  3/18/2024  Future Appointments   Date Time Provider Department Center   6/24/2024  3:00 PM Joel Sanchez MD [facility] PAIN [facility]   6/28/2024  3:00 PM Royal Truong DO [facility]

## 2024-06-24 ENCOUNTER — OFFICE VISIT (OUTPATIENT)
Dept: PAIN MANAGEMENT | Age: 49
End: 2024-06-24
Payer: COMMERCIAL

## 2024-06-24 ENCOUNTER — PREP FOR PROCEDURE (OUTPATIENT)
Dept: PAIN MANAGEMENT | Age: 49
End: 2024-06-24

## 2024-06-24 VITALS
HEART RATE: 85 BPM | TEMPERATURE: 96.8 F | RESPIRATION RATE: 16 BRPM | DIASTOLIC BLOOD PRESSURE: 95 MMHG | WEIGHT: 244.05 LBS | SYSTOLIC BLOOD PRESSURE: 158 MMHG | BODY MASS INDEX: 38.3 KG/M2 | HEIGHT: 67 IN | OXYGEN SATURATION: 97 %

## 2024-06-24 DIAGNOSIS — M51.36 DDD (DEGENERATIVE DISC DISEASE), LUMBAR: ICD-10-CM

## 2024-06-24 DIAGNOSIS — M54.16 LUMBAR RADICULAR PAIN: Primary | ICD-10-CM

## 2024-06-24 DIAGNOSIS — M47.817 LUMBOSACRAL SPONDYLOSIS WITHOUT MYELOPATHY: ICD-10-CM

## 2024-06-24 DIAGNOSIS — F11.90 CHRONIC, CONTINUOUS USE OF OPIOIDS: ICD-10-CM

## 2024-06-24 DIAGNOSIS — M47.812 CERVICAL SPONDYLOSIS: ICD-10-CM

## 2024-06-24 DIAGNOSIS — M50.30 DDD (DEGENERATIVE DISC DISEASE), CERVICAL: ICD-10-CM

## 2024-06-24 DIAGNOSIS — M54.16 LUMBAR RADICULOPATHY: ICD-10-CM

## 2024-06-24 PROCEDURE — 99214 OFFICE O/P EST MOD 30 MIN: CPT | Performed by: ANESTHESIOLOGY

## 2024-06-24 RX ORDER — ONDANSETRON 4 MG/1
TABLET, ORALLY DISINTEGRATING ORAL
COMMUNITY
Start: 2024-06-03 | End: 2024-06-28

## 2024-06-24 RX ORDER — SODIUM CHLORIDE 0.9 % (FLUSH) 0.9 %
5-40 SYRINGE (ML) INJECTION PRN
Status: CANCELLED | OUTPATIENT
Start: 2024-06-24

## 2024-06-24 RX ORDER — OXYCODONE HYDROCHLORIDE AND ACETAMINOPHEN 5; 325 MG/1; MG/1
1 TABLET ORAL EVERY 4 HOURS PRN
Qty: 42 TABLET | Refills: 0 | OUTPATIENT
Start: 2024-06-24 | End: 2024-07-01

## 2024-06-24 RX ORDER — SODIUM CHLORIDE 9 MG/ML
INJECTION, SOLUTION INTRAVENOUS PRN
Status: CANCELLED | OUTPATIENT
Start: 2024-06-24

## 2024-06-24 RX ORDER — SECUKINUMAB 150 MG/ML
INJECTION SUBCUTANEOUS
COMMUNITY
Start: 2024-05-28

## 2024-06-24 RX ORDER — SODIUM CHLORIDE 0.9 % (FLUSH) 0.9 %
5-40 SYRINGE (ML) INJECTION EVERY 12 HOURS SCHEDULED
Status: CANCELLED | OUTPATIENT
Start: 2024-06-24

## 2024-06-24 NOTE — PROGRESS NOTES
Neha Rios presents to the [facility] on 6/24/2024. Neha is complaining of pain right low back radiating to right leg. The pain is constant. The pain is described as aching, shooting, and sharp. Pain is rated on her best day at a 6, on her worst day at a 10, and on average at a 8 on the VAS scale. She took her last dose of Percocet and Lyrica today, Tizanidine PRN.     Any procedures since your last visit: Yes, BILATERAL LUMBAR RADIOFREQUENCY ABLATION AT LUMBAR 2, LUMBAR 3, LUMBAR 4 & LUMBAR 5 DORSAL RAMI UNDER FLUOROSCOPY.    Pacemaker or defibrillator: No.    She is not on NSAIDS and is not on anticoagulation medications to include none.     Medication Contract and Consent for Opioid Use Documents Filed       Patient Documents       Type of Document Status Date Received Received By Description    Medication Contract Received 1/19/2023 11:34 AM EZIO VILLANUEVA 1/6/23 Medication Contract    Medication Contract Received 3/19/2024 10:46 AM JEANIE CHIN Medication contract                    BP (!) 158/95   Pulse 85   Temp 96.8 °F (36 °C) (Infrared)   Resp 16   Ht 1.702 m (5' 7\")   Wt 110.7 kg (244 lb 0.8 oz)   SpO2 97%   BMI 38.22 kg/m²      No LMP recorded.

## 2024-06-24 NOTE — PROGRESS NOTES
[facility]        [address] [place] [id number]  Dept: [phone]      Follow up Note      Neha Rios     Date of Visit:  6/24/2024    CC:  Patient presents for follow up   Chief Complaint   Patient presents with    Follow-up     BILATERAL LUMBAR RADIOFREQUENCY ABLATION AT LUMBAR 2, LUMBAR 3, LUMBAR 4 &  LUMBAR 5 DORSAL RAMI UNDER FLUOROSCOPY       HPI:    Low back/ LE pain.    Prior RFA had provided excellent pain relief.       Nursing notes and details of the pain history reviewed. Please see intake notes for details.    Note: prior treatment for neck pain. S/P ALDO excellent pain relief. Neck pain is stable.    NOTE:  H/o psoriatic arthritis- on Methotrexate  S/p Gastric bypass in 2009  H/o fibromyalgia- diffuse pain     Previous treatments:   Physical Therapy/ HEP : yes, has been doing HEP since the onset      Medications: - NSAID's : yes                        - Membrane stabilizers : yes - Gabapentin                       - Opioids : yes, short term                       - Adjuvants or Others : yes, oral steroids/ muscle relaxants     Surgeries: no C spine surgery     She has not been on anticoagulation medications      She is not diabetic.     H/O Smoking: no  H/O alcohol abuse : no  H/O Illicit drug use : no     Employment: employed     Imaging:     MRI of LS spine: 12/22/2022:  FINDINGS:   No evidence of lumbar spine fracture.  There is retrolisthesis of L2 on L3 of   approximately 3 mm.  Vertebral body height and interspaces appear well   maintained.  Conus medullaris is visualized and appears unremarkable.  No   evidence of epidural mass or hematoma.       L1/L2: No central canal stenosis or neural foraminal narrowing.  There is   moderate bilateral facet hypertrophy.       L2/L3: Anterior disc bulge present with anterior osteophytosis.  3 mm of   retrolisthesis of L2 on L3.  Moderate bilateral facet hypertrophy and   hypertrophy of ligamentum flavum.  No significant

## 2024-06-28 ENCOUNTER — OFFICE VISIT (OUTPATIENT)
Dept: FAMILY MEDICINE CLINIC | Age: 49
End: 2024-06-28
Payer: COMMERCIAL

## 2024-06-28 VITALS
TEMPERATURE: 97 F | HEART RATE: 92 BPM | SYSTOLIC BLOOD PRESSURE: 136 MMHG | BODY MASS INDEX: 38.77 KG/M2 | RESPIRATION RATE: 16 BRPM | DIASTOLIC BLOOD PRESSURE: 74 MMHG | OXYGEN SATURATION: 96 % | WEIGHT: 247 LBS | HEIGHT: 67 IN

## 2024-06-28 DIAGNOSIS — M06.9 RHEUMATOID ARTHRITIS, INVOLVING UNSPECIFIED SITE, UNSPECIFIED WHETHER RHEUMATOID FACTOR PRESENT (HCC): ICD-10-CM

## 2024-06-28 DIAGNOSIS — M54.12 CERVICAL RADICULOPATHY: ICD-10-CM

## 2024-06-28 DIAGNOSIS — M50.30 DDD (DEGENERATIVE DISC DISEASE), CERVICAL: ICD-10-CM

## 2024-06-28 DIAGNOSIS — M25.50 ARTHRALGIA, UNSPECIFIED JOINT: ICD-10-CM

## 2024-06-28 DIAGNOSIS — Z00.00 ROUTINE GENERAL MEDICAL EXAMINATION AT A HEALTH CARE FACILITY: ICD-10-CM

## 2024-06-28 DIAGNOSIS — L30.9 DERMATITIS: ICD-10-CM

## 2024-06-28 DIAGNOSIS — E78.2 MIXED HYPERLIPIDEMIA: ICD-10-CM

## 2024-06-28 DIAGNOSIS — M79.7 FIBROMYALGIA: Primary | ICD-10-CM

## 2024-06-28 DIAGNOSIS — I10 PRIMARY HYPERTENSION: ICD-10-CM

## 2024-06-28 DIAGNOSIS — F31.9 BIPOLAR 1 DISORDER (HCC): ICD-10-CM

## 2024-06-28 PROCEDURE — 99213 OFFICE O/P EST LOW 20 MIN: CPT | Performed by: FAMILY MEDICINE

## 2024-06-28 PROCEDURE — 3075F SYST BP GE 130 - 139MM HG: CPT | Performed by: FAMILY MEDICINE

## 2024-06-28 PROCEDURE — 3078F DIAST BP <80 MM HG: CPT | Performed by: FAMILY MEDICINE

## 2024-06-28 RX ORDER — SUMATRIPTAN 50 MG/1
TABLET, FILM COATED ORAL
Qty: 9 TABLET | Refills: 5 | Status: CANCELLED | OUTPATIENT
Start: 2024-06-28

## 2024-06-28 RX ORDER — ONDANSETRON 4 MG/1
TABLET, ORALLY DISINTEGRATING ORAL
Qty: 21 TABLET | Refills: 3 | Status: SHIPPED | OUTPATIENT
Start: 2024-06-28

## 2024-06-28 RX ORDER — ROSUVASTATIN CALCIUM 5 MG/1
5 TABLET, COATED ORAL DAILY
Qty: 90 TABLET | Refills: 1 | Status: SHIPPED | OUTPATIENT
Start: 2024-06-28

## 2024-06-28 RX ORDER — OXCARBAZEPINE 150 MG/1
150 TABLET, FILM COATED ORAL 2 TIMES DAILY
Qty: 180 TABLET | Refills: 1 | Status: SHIPPED | OUTPATIENT
Start: 2024-06-28

## 2024-06-28 RX ORDER — PREGABALIN 100 MG/1
100 CAPSULE ORAL 2 TIMES DAILY
Qty: 180 CAPSULE | Refills: 1 | Status: SHIPPED | OUTPATIENT
Start: 2024-06-28 | End: 2024-09-26

## 2024-06-28 RX ORDER — OMEPRAZOLE 40 MG/1
40 CAPSULE, DELAYED RELEASE ORAL 2 TIMES DAILY
Qty: 180 CAPSULE | Refills: 1 | Status: SHIPPED | OUTPATIENT
Start: 2024-06-28

## 2024-06-28 RX ORDER — ROPINIROLE 2 MG/1
2 TABLET, FILM COATED ORAL 3 TIMES DAILY
Qty: 270 TABLET | Refills: 1 | Status: SHIPPED | OUTPATIENT
Start: 2024-06-28

## 2024-06-28 RX ORDER — TIZANIDINE 4 MG/1
TABLET ORAL
Qty: 120 TABLET | Refills: 1 | Status: SHIPPED | OUTPATIENT
Start: 2024-06-28

## 2024-06-28 RX ORDER — FOLIC ACID 1 MG/1
1000 TABLET ORAL DAILY
Qty: 90 TABLET | Refills: 1 | Status: SHIPPED | OUTPATIENT
Start: 2024-06-28

## 2024-06-28 RX ORDER — VENLAFAXINE 75 MG/1
75 TABLET ORAL 2 TIMES DAILY
Qty: 180 TABLET | Refills: 1 | Status: SHIPPED | OUTPATIENT
Start: 2024-06-28

## 2024-06-28 RX ORDER — LISINOPRIL 10 MG/1
TABLET ORAL
Qty: 90 TABLET | Refills: 1 | Status: SHIPPED | OUTPATIENT
Start: 2024-06-28

## 2024-06-28 RX ORDER — ONDANSETRON 4 MG/1
4 TABLET, FILM COATED ORAL EVERY 8 HOURS PRN
Qty: 60 TABLET | Refills: 2 | Status: CANCELLED | OUTPATIENT
Start: 2024-06-28

## 2024-06-28 SDOH — ECONOMIC STABILITY: FOOD INSECURITY: WITHIN THE PAST 12 MONTHS, THE FOOD YOU BOUGHT JUST DIDN'T LAST AND YOU DIDN'T HAVE MONEY TO GET MORE.: NEVER TRUE

## 2024-06-28 SDOH — ECONOMIC STABILITY: FOOD INSECURITY: WITHIN THE PAST 12 MONTHS, YOU WORRIED THAT YOUR FOOD WOULD RUN OUT BEFORE YOU GOT MONEY TO BUY MORE.: NEVER TRUE

## 2024-06-28 SDOH — ECONOMIC STABILITY: INCOME INSECURITY: HOW HARD IS IT FOR YOU TO PAY FOR THE VERY BASICS LIKE FOOD, HOUSING, MEDICAL CARE, AND HEATING?: NOT HARD AT ALL

## 2024-06-28 NOTE — PROGRESS NOTES
24  Neha Rios : 1975 Sex: female  Age: 48 y.o.    Chief Complaint   Patient presents with    Hyperlipidemia     3 month follow up   last labs 3/18/24                         HPI  overall check up   Follow up on meds     Review of Systems   Constitutional:  Negative for activity change, appetite change, chills, diaphoresis, fatigue, fever and unexpected weight change.   HENT:  Negative for congestion, dental problem, drooling, ear discharge, ear pain, facial swelling, hearing loss, mouth sores, nosebleeds, postnasal drip, rhinorrhea, sinus pressure, sinus pain, sneezing, sore throat, tinnitus, trouble swallowing and voice change.    Eyes:  Negative for photophobia, pain, discharge, redness, itching and visual disturbance.   Respiratory:  Negative for apnea, cough, choking, chest tightness, shortness of breath, wheezing and stridor.    Cardiovascular:  Negative for chest pain, palpitations and leg swelling.   Gastrointestinal:  Negative for abdominal distention, abdominal pain, anal bleeding, blood in stool, constipation, diarrhea, nausea, rectal pain and vomiting.   Endocrine: Negative for cold intolerance, heat intolerance, polydipsia, polyphagia and polyuria.   Genitourinary:  Negative for decreased urine volume, difficulty urinating, dysuria, enuresis, frequency, genital sores and urgency. Flank pain: not severe.  Musculoskeletal:  Positive for arthralgias and back pain. Negative for gait problem, joint swelling, myalgias, neck pain and neck stiffness.   Skin:  Negative for color change, pallor, rash and wound.   Allergic/Immunologic: Negative for environmental allergies, food allergies and immunocompromised state.   Neurological:  Negative for dizziness, seizures, syncope, facial asymmetry, speech difficulty, weakness, light-headedness, numbness and headaches.   Hematological:  Negative for adenopathy. Does not bruise/bleed easily.   Psychiatric/Behavioral:  Positive for sleep disturbance.

## 2024-07-03 DIAGNOSIS — M54.12 CERVICAL NEUROPATHIC PAIN: ICD-10-CM

## 2024-07-03 DIAGNOSIS — M79.7 FIBROMYALGIA: ICD-10-CM

## 2024-07-03 DIAGNOSIS — M54.12 CERVICAL RADICULOPATHY: ICD-10-CM

## 2024-07-03 NOTE — TELEPHONE ENCOUNTER
Patients last appointment 6/28/2024.  Patients next scheduled appointment   Future Appointments   Date Time Provider Department Center   9/9/2024  3:00 PM Joel Sanchez MD [facility] PAIN [facility]

## 2024-07-05 RX ORDER — OXYCODONE HYDROCHLORIDE AND ACETAMINOPHEN 5; 325 MG/1; MG/1
1 TABLET ORAL EVERY 4 HOURS PRN
Qty: 42 TABLET | Refills: 0 | Status: SHIPPED | OUTPATIENT
Start: 2024-07-05 | End: 2024-07-12

## 2024-07-15 DIAGNOSIS — M54.12 CERVICAL RADICULOPATHY: Primary | ICD-10-CM

## 2024-07-15 DIAGNOSIS — M25.50 ARTHRALGIA, UNSPECIFIED JOINT: ICD-10-CM

## 2024-07-15 DIAGNOSIS — M47.817 LUMBOSACRAL SPONDYLOSIS WITHOUT MYELOPATHY: ICD-10-CM

## 2024-07-15 RX ORDER — OXYCODONE HYDROCHLORIDE AND ACETAMINOPHEN 5; 325 MG/1; MG/1
1 TABLET ORAL EVERY 4 HOURS PRN
COMMUNITY
End: 2024-07-15 | Stop reason: SDUPTHER

## 2024-07-15 RX ORDER — OXYCODONE HYDROCHLORIDE AND ACETAMINOPHEN 5; 325 MG/1; MG/1
1 TABLET ORAL EVERY 4 HOURS PRN
Qty: 42 TABLET | Refills: 0 | Status: SHIPPED | OUTPATIENT
Start: 2024-07-15 | End: 2024-07-22

## 2024-07-15 RX ORDER — VENLAFAXINE 75 MG/1
75 TABLET ORAL 2 TIMES DAILY
Qty: 60 TABLET | Refills: 2 | OUTPATIENT
Start: 2024-07-15

## 2024-07-25 DIAGNOSIS — M54.12 CERVICAL RADICULOPATHY: ICD-10-CM

## 2024-07-25 DIAGNOSIS — M25.50 ARTHRALGIA, UNSPECIFIED JOINT: ICD-10-CM

## 2024-07-25 DIAGNOSIS — M47.817 LUMBOSACRAL SPONDYLOSIS WITHOUT MYELOPATHY: ICD-10-CM

## 2024-07-25 RX ORDER — OXYCODONE HYDROCHLORIDE AND ACETAMINOPHEN 5; 325 MG/1; MG/1
1 TABLET ORAL EVERY 4 HOURS PRN
Qty: 42 TABLET | Refills: 0 | Status: SHIPPED | OUTPATIENT
Start: 2024-07-25 | End: 2024-08-01

## 2024-07-25 NOTE — TELEPHONE ENCOUNTER
Patients last appointment 6/28/2024.  Patients next scheduled appointment   Future Appointments   Date Time Provider Department Center   9/9/2024  3:00 PM Joel Sanchez MD [facility] PAIN [facility]      Patient does have a Medication Contract signed from 3/19/24.

## 2024-07-31 ENCOUNTER — OFFICE VISIT (OUTPATIENT)
Age: 49
End: 2024-07-31
Payer: COMMERCIAL

## 2024-07-31 VITALS
WEIGHT: 252.3 LBS | TEMPERATURE: 97.1 F | HEIGHT: 67 IN | HEART RATE: 52 BPM | BODY MASS INDEX: 39.6 KG/M2 | DIASTOLIC BLOOD PRESSURE: 80 MMHG | SYSTOLIC BLOOD PRESSURE: 122 MMHG | OXYGEN SATURATION: 97 %

## 2024-07-31 DIAGNOSIS — M25.579 FOOT AND ANKLE PAIN: Primary | ICD-10-CM

## 2024-07-31 DIAGNOSIS — M79.673 FOOT AND ANKLE PAIN: Primary | ICD-10-CM

## 2024-07-31 DIAGNOSIS — I86.8 SPIDER VARICOSE VEINS: ICD-10-CM

## 2024-07-31 PROCEDURE — 99213 OFFICE O/P EST LOW 20 MIN: CPT | Performed by: NURSE PRACTITIONER

## 2024-07-31 RX ORDER — LISINOPRIL 10 MG/1
TABLET ORAL
Qty: 90 TABLET | Refills: 1 | Status: SHIPPED | OUTPATIENT
Start: 2024-07-31

## 2024-07-31 RX ORDER — ROSUVASTATIN CALCIUM 5 MG/1
5 TABLET, COATED ORAL DAILY
Qty: 90 TABLET | Refills: 1 | Status: SHIPPED | OUTPATIENT
Start: 2024-07-31

## 2024-07-31 RX ORDER — FOLIC ACID 1 MG/1
1000 TABLET ORAL DAILY
Qty: 90 TABLET | Refills: 1 | Status: SHIPPED | OUTPATIENT
Start: 2024-07-31

## 2024-07-31 ASSESSMENT — ENCOUNTER SYMPTOMS
FACIAL SWELLING: 0
CHEST TIGHTNESS: 0
CHOKING: 0
SINUS PRESSURE: 0
ABDOMINAL DISTENTION: 0
NAUSEA: 0
CONSTIPATION: 0
COUGH: 0
PHOTOPHOBIA: 0
RHINORRHEA: 0
SINUS PAIN: 0
BACK PAIN: 0
WHEEZING: 0
SORE THROAT: 0
DIARRHEA: 0
BLOOD IN STOOL: 0
ABDOMINAL PAIN: 0
STRIDOR: 0
EYE REDNESS: 0
RECTAL PAIN: 0
SHORTNESS OF BREATH: 0
APNEA: 0
VOMITING: 0
ANAL BLEEDING: 0
EYE PAIN: 0
VOICE CHANGE: 0
TROUBLE SWALLOWING: 0
COLOR CHANGE: 0
EYE DISCHARGE: 0
EYE ITCHING: 0

## 2024-07-31 NOTE — PROGRESS NOTES
24  Neha Rios : 1975 Sex: female  Age: 48 y.o.    Chief Complaint   Patient presents with    Foot Swelling     Left foot swollen, been going on for 4 months months now        Neha is here complaining of swelling to her left foot and ankle with changes in coloration around the left ankle.  She has noted some varicosities of the veins in this area.  There is a tender area to the left heel.  She has been on her feet a lot with her job.  By the end of the day when she gets home there is increased swelling.  She denies any injury to the leg or foot.  She states this has been going on for about 4 months.        Review of Systems   Constitutional:  Negative for activity change, appetite change, chills, diaphoresis, fatigue, fever and unexpected weight change.   HENT:  Negative for congestion, dental problem, drooling, ear discharge, ear pain, facial swelling, hearing loss, mouth sores, nosebleeds, postnasal drip, rhinorrhea, sinus pressure, sinus pain, sneezing, sore throat, tinnitus, trouble swallowing and voice change.    Eyes:  Negative for photophobia, pain, discharge, redness, itching and visual disturbance.   Respiratory:  Negative for apnea, cough, choking, chest tightness, shortness of breath, wheezing and stridor.    Cardiovascular:  Negative for chest pain, palpitations and leg swelling.   Gastrointestinal:  Negative for abdominal distention, abdominal pain, anal bleeding, blood in stool, constipation, diarrhea, nausea, rectal pain and vomiting.   Endocrine: Negative for cold intolerance, heat intolerance, polydipsia, polyphagia and polyuria.   Genitourinary:  Negative for decreased urine volume, difficulty urinating, dysuria, enuresis, flank pain, frequency, genital sores, hematuria and urgency.   Musculoskeletal:  Positive for arthralgias and joint swelling. Negative for back pain, gait problem, myalgias, neck pain and neck stiffness.   Skin:  Negative for color change, pallor, rash and

## 2024-08-06 DIAGNOSIS — M47.817 LUMBOSACRAL SPONDYLOSIS WITHOUT MYELOPATHY: ICD-10-CM

## 2024-08-06 DIAGNOSIS — M54.12 CERVICAL RADICULOPATHY: ICD-10-CM

## 2024-08-06 DIAGNOSIS — M25.50 ARTHRALGIA, UNSPECIFIED JOINT: ICD-10-CM

## 2024-08-06 RX ORDER — OXYCODONE HYDROCHLORIDE AND ACETAMINOPHEN 5; 325 MG/1; MG/1
1 TABLET ORAL EVERY 4 HOURS PRN
Qty: 42 TABLET | Refills: 0 | Status: SHIPPED | OUTPATIENT
Start: 2024-08-06 | End: 2024-08-13

## 2024-08-06 NOTE — TELEPHONE ENCOUNTER
Patients last appointment 6/28/2024.  Patients next scheduled appointment   Future Appointments   Date Time Provider Department Center   9/26/2024  2:45 PM Joel Sanchez MD BDM PAIN [facility]

## 2024-08-09 ENCOUNTER — TELEPHONE (OUTPATIENT)
Dept: FAMILY MEDICINE CLINIC | Age: 49
End: 2024-08-09

## 2024-08-09 DIAGNOSIS — M77.50 BONE SPUR OF FOOT: Primary | ICD-10-CM

## 2024-08-09 PROBLEM — M54.16 LUMBAR RADICULOPATHY: Status: ACTIVE | Noted: 2024-06-24

## 2024-08-09 NOTE — TELEPHONE ENCOUNTER
----- Message from LYNSEY Rogers - CNP sent at 8/8/2024  4:35 PM EDT -----  Spurs and arthritis. No fractures

## 2024-08-14 RX ORDER — OXYCODONE HYDROCHLORIDE AND ACETAMINOPHEN 5; 325 MG/1; MG/1
1 TABLET ORAL 3 TIMES DAILY
COMMUNITY
End: 2024-08-19 | Stop reason: SDUPTHER

## 2024-08-14 NOTE — PROGRESS NOTES
[facility] PRE-ADMISSION TESTING INSTRUCTIONS    The Preadmission Testing patient is instructed accordingly using the following criteria (check applicable):    ARRIVAL INSTRUCTIONS:  [x] Parking the day of Surgery is located in the Main Entrance lot.  Upon entering the door, make an immediate right to the surgery reception desk    [x] Bring photo ID and insurance card    [] Bring in a copy of Living will or Durable Power of  papers.    [x] Please be sure to arrange for responsible adult to provide transportation to and from the hospital    [x] Please arrange for responsible adult to be with you for the 24 hour period post procedure due to having anesthesia    [x] If you awake am of surgery not feeling well or have temperature >100 please call [phone]    GENERAL INSTRUCTIONS:    [x] May have clear liquids until 4 hours prior to surgery. Examples include water, fruit juices (no pulp), jello, popsicles, black coffee or tea, beef or chicken broth.               No gum, candy or mints.    [x] You may brush your teeth, but do not swallow any water    [x] Take medications as instructed with 1-2 oz of water    [] Stop herbal supplements and vitamins 5 days prior to procedure    [] Follow preop dosing of blood thinners per physician instructions    [] Take 1/2 dose of evening insulin, but no insulin after midnight    [] No oral diabetic medications after midnight    [] If diabetic and have low blood sugar or feel symptomatic, take 1-2oz apple juice only    [] Bring inhalers day of surgery    [] Bring C-PAP/ Bi-Pap day of surgery    [x] Bring urine specimen day of surgery    [x] Shower or bath with soap, lather and rinse well, AM of Surgery, no lotion, powders or creams to surgical site    [] Follow bowel prep as instructed per surgeon    [x] No tobacco products within 24 hours of surgery     [x] No alcohol or illegal drug use within 24 hours of surgery.    [x] Jewelry, body piercing's,  eyeglasses, contact lenses and dentures are not permitted into surgery (bring cases)      [x] Please do not wear any nail polish, make up or hair products on the day of surgery    [x] You can expect a call the business day prior to procedure to notify you if your arrival time changes    [x] If you receive a survey after surgery we would greatly appreciate your comments    [] Parent/guardian of a minor must accompany their child and remain on the premises  the entire time they are under our care     [] Pediatric patients may bring favorite toy, blanket or comfort item with them    [] A caregiver or family member must remain with the patient during their stay if they are mentally handicapped, have dementia, disoriented or unable to use a call light or would be a safety concern if left unattended    [x] Please notify surgeon if you develop any illness between now and time of surgery (cold, cough, sore throat, fever, nausea, vomiting) or any signs of infections  including skin, wounds, and dental.    [x]  The Outpatient Pharmacy is available to fill your prescription here on your day of surgery, ask your preop nurse for details    [] Other instructions    EDUCATIONAL MATERIALS PROVIDED:    [] PAT Preoperative Education Packet/Booklet     [] Medication List    [] Transfusion bracelet given to pt. Pt instructed to place on wrist or bring to hospital day of surgery. Informed that if bracelet lost or forgotten at home, bloodwork will have to repeated which could cause a delay in surgery.    [] Shower with soap, lather and rinse well, and use CHG wipes provided the evening before surgery as instructed    [] Incentive spirometer with instructions

## 2024-08-15 ENCOUNTER — ANESTHESIA EVENT (OUTPATIENT)
Dept: OPERATING ROOM | Age: 49
End: 2024-08-15
Payer: COMMERCIAL

## 2024-08-15 ENCOUNTER — HOSPITAL ENCOUNTER (OUTPATIENT)
Dept: GENERAL RADIOLOGY | Age: 49
Setting detail: OUTPATIENT SURGERY
Discharge: HOME | End: 2024-08-17
Attending: ANESTHESIOLOGY
Payer: COMMERCIAL

## 2024-08-15 ENCOUNTER — ANESTHESIA (OUTPATIENT)
Dept: OPERATING ROOM | Age: 49
End: 2024-08-15
Payer: COMMERCIAL

## 2024-08-15 ENCOUNTER — HOSPITAL ENCOUNTER (OUTPATIENT)
Age: 49
Setting detail: OUTPATIENT SURGERY
Discharge: HOME | End: 2024-08-15
Attending: ANESTHESIOLOGY | Admitting: ANESTHESIOLOGY
Payer: COMMERCIAL

## 2024-08-15 VITALS
DIASTOLIC BLOOD PRESSURE: 89 MMHG | OXYGEN SATURATION: 97 % | TEMPERATURE: 97.5 F | HEIGHT: 67 IN | RESPIRATION RATE: 18 BRPM | BODY MASS INDEX: 37.67 KG/M2 | WEIGHT: 240 LBS | SYSTOLIC BLOOD PRESSURE: 156 MMHG | HEART RATE: 85 BPM

## 2024-08-15 DIAGNOSIS — R52 PAIN MANAGEMENT: ICD-10-CM

## 2024-08-15 LAB
HCG, URINE, POC: NEGATIVE
Lab: NORMAL
NEGATIVE QC PASS/FAIL: NORMAL
POSITIVE QC PASS/FAIL: NORMAL

## 2024-08-15 PROCEDURE — 2580000003 HC RX 258: Performed by: NURSE ANESTHETIST, CERTIFIED REGISTERED

## 2024-08-15 PROCEDURE — 2709999900 HC NON-CHARGEABLE SUPPLY: Performed by: ANESTHESIOLOGY

## 2024-08-15 PROCEDURE — 7100000010 HC PHASE II RECOVERY - FIRST 15 MIN: Performed by: ANESTHESIOLOGY

## 2024-08-15 PROCEDURE — 2500000003 HC RX 250 WO HCPCS: Performed by: ANESTHESIOLOGY

## 2024-08-15 PROCEDURE — 3700000000 HC ANESTHESIA ATTENDED CARE: Performed by: ANESTHESIOLOGY

## 2024-08-15 PROCEDURE — 3600000002 HC SURGERY LEVEL 2 BASE: Performed by: ANESTHESIOLOGY

## 2024-08-15 PROCEDURE — 6360000002 HC RX W HCPCS: Performed by: NURSE ANESTHETIST, CERTIFIED REGISTERED

## 2024-08-15 PROCEDURE — 64484 NJX AA&/STRD TFRM EPI L/S EA: CPT | Performed by: ANESTHESIOLOGY

## 2024-08-15 PROCEDURE — 6360000004 HC RX CONTRAST MEDICATION: Performed by: ANESTHESIOLOGY

## 2024-08-15 PROCEDURE — 6360000002 HC RX W HCPCS: Performed by: ANESTHESIOLOGY

## 2024-08-15 PROCEDURE — 64483 NJX AA&/STRD TFRM EPI L/S 1: CPT | Performed by: ANESTHESIOLOGY

## 2024-08-15 PROCEDURE — 7100000011 HC PHASE II RECOVERY - ADDTL 15 MIN: Performed by: ANESTHESIOLOGY

## 2024-08-15 RX ORDER — IOPAMIDOL 612 MG/ML
INJECTION, SOLUTION INTRATHECAL PRN
Status: DISCONTINUED | OUTPATIENT
Start: 2024-08-15 | End: 2024-08-15 | Stop reason: ALTCHOICE

## 2024-08-15 RX ORDER — LIDOCAINE HYDROCHLORIDE 5 MG/ML
INJECTION, SOLUTION INFILTRATION; INTRAVENOUS PRN
Status: DISCONTINUED | OUTPATIENT
Start: 2024-08-15 | End: 2024-08-15 | Stop reason: ALTCHOICE

## 2024-08-15 RX ORDER — SODIUM CHLORIDE 0.9 % (FLUSH) 0.9 %
5-40 SYRINGE (ML) INJECTION PRN
Status: DISCONTINUED | OUTPATIENT
Start: 2024-08-15 | End: 2024-08-15 | Stop reason: HOSPADM

## 2024-08-15 RX ORDER — SODIUM CHLORIDE 9 MG/ML
INJECTION, SOLUTION INTRAVENOUS CONTINUOUS PRN
Status: DISCONTINUED | OUTPATIENT
Start: 2024-08-15 | End: 2024-08-15 | Stop reason: SDUPTHER

## 2024-08-15 RX ORDER — MIDAZOLAM HYDROCHLORIDE 1 MG/ML
INJECTION INTRAMUSCULAR; INTRAVENOUS PRN
Status: DISCONTINUED | OUTPATIENT
Start: 2024-08-15 | End: 2024-08-15 | Stop reason: SDUPTHER

## 2024-08-15 RX ORDER — SODIUM CHLORIDE 9 MG/ML
INJECTION, SOLUTION INTRAVENOUS PRN
Status: DISCONTINUED | OUTPATIENT
Start: 2024-08-15 | End: 2024-08-15 | Stop reason: HOSPADM

## 2024-08-15 RX ORDER — FENTANYL CITRATE 50 UG/ML
INJECTION, SOLUTION INTRAMUSCULAR; INTRAVENOUS PRN
Status: DISCONTINUED | OUTPATIENT
Start: 2024-08-15 | End: 2024-08-15 | Stop reason: SDUPTHER

## 2024-08-15 RX ORDER — METHYLPREDNISOLONE ACETATE 40 MG/ML
INJECTION, SUSPENSION INTRA-ARTICULAR; INTRALESIONAL; INTRAMUSCULAR; SOFT TISSUE PRN
Status: DISCONTINUED | OUTPATIENT
Start: 2024-08-15 | End: 2024-08-15 | Stop reason: ALTCHOICE

## 2024-08-15 RX ORDER — SODIUM CHLORIDE 0.9 % (FLUSH) 0.9 %
5-40 SYRINGE (ML) INJECTION EVERY 12 HOURS SCHEDULED
Status: DISCONTINUED | OUTPATIENT
Start: 2024-08-15 | End: 2024-08-15 | Stop reason: HOSPADM

## 2024-08-15 RX ADMIN — FENTANYL CITRATE 100 MCG: 50 INJECTION, SOLUTION INTRAMUSCULAR; INTRAVENOUS at 12:33

## 2024-08-15 RX ADMIN — MIDAZOLAM 2 MG: 1 INJECTION INTRAMUSCULAR; INTRAVENOUS at 12:33

## 2024-08-15 RX ADMIN — SODIUM CHLORIDE: 9 INJECTION, SOLUTION INTRAVENOUS at 11:50

## 2024-08-15 ASSESSMENT — PAIN - FUNCTIONAL ASSESSMENT: PAIN_FUNCTIONAL_ASSESSMENT: 0-10

## 2024-08-15 ASSESSMENT — PAIN DESCRIPTION - PAIN TYPE: TYPE: ACUTE PAIN

## 2024-08-15 ASSESSMENT — PAIN DESCRIPTION - LOCATION: LOCATION: ANKLE

## 2024-08-15 ASSESSMENT — PAIN SCALES - GENERAL: PAINLEVEL_OUTOF10: 7

## 2024-08-15 NOTE — H&P
[facility]      Procedure History & Physical      Neha Rios     HPI:    Patient  is here for Lumbar TFESI  for low back/ LE pain.  Labs/imaging studies reviewed   All question and concerns addressed including R/B/A associated with the procedure    Past Medical History:   Diagnosis Date    Arthritis     psoriatic    Chronic back pain     Depression     GERD (gastroesophageal reflux disease)     Headache(784.0)     Herpes genital     \"years ago\" ; no outbreaks    Hyperlipidemia     Hypertension        Past Surgical History:   Procedure Laterality Date    NERVE BLOCK Bilateral 1/19/2023    # 1 BILATERAL LUMBAR MEDIAL BRANCH NERVE BLOCK UNDER FLUOROSCOPIC GUIDANCE AT L2, L3, L4 & L5 DORSAL RAMI performed by Joel Sanchez MD at [facility] OR    NERVE BLOCK Bilateral 4/6/2023    # 2 BILATERAL LUMBAR MEDIAL BRANCH NERVE BLOCK UNDER FLUOROSCOPIC GUIDANCE AT L2, L3, L4 MB & L5 DORSAL RAMI performed by Joel Sanchez MD at [facility] OR    PAIN MANAGEMENT PROCEDURE N/A 12/16/2021    CERVICAL EPIDURAL STEROID INJECTION UNDER FLUORO AT C6 - C7 LEFT PARAMEDIAN performed by Joel Sanchez MD at [facility] OR    PAIN MANAGEMENT PROCEDURE Left 02/07/2022    #2 CERVICAL EPIDURAL STEROID INJECTION UNDER FLUOROSCOPIC GUIDANCE AT C6-C7 LEFT PARAMEDIAN performed by Joel Sanchez MD at [facility] OR    PAIN MANAGEMENT PROCEDURE Left 04/07/2022    CERVICAL EPIDURAL STEROID INJECTION C6-C7 LEFT PARAMEDIAN performed by Joel Sanchez MD at [facility] OR    PAIN MANAGEMENT PROCEDURE N/A 12/05/2022    CERVICAL  EPIDURAL STEROID INJECTION UNDER FLUOROSCOPIC GUIDANCE AT C6-C7 performed by Joel Sanchez MD at [facility] OR    PAIN MANAGEMENT PROCEDURE Bilateral 5/4/2023    BILATERAL LUMBAR RADIOFREQUENCY ABLATION AT L2, L3, L4 MB & L5 DORSAL RAMI UNDER FLUOROSCOPY performed by Joel Sanchez MD at [facility] OR    PAIN MANAGEMENT PROCEDURE Bilateral 5/16/2024    BILATERAL LUMBAR RADIOFREQUENCY ABLATION AT LUMBAR 2,  ONSET OF HEADACHE may repeat in 4 hours IF headache PERSISTS 3/21/24  Yes Royal Truong, DO   clobetasol (TEMOVATE) 0.05 % ointment Apply topically 2 times daily. 11/7/23  Yes Meggan Sanchez, APRN - CNP   Multiple Vitamin (MULTIVITAMIN PO) Take 1 tablet by mouth daily    Yes Provider, MD Ellie   albuterol sulfate HFA (VENTOLIN HFA) 108 (90 Base) MCG/ACT inhaler Inhale 2 puffs into the lungs 4 times daily as needed for Wheezing 3/25/24   Royal Truong, DO   naloxone 4 MG/0.1ML LIQD nasal spray 1 spray by Nasal route as needed for Opioid Reversal 3/21/24   Royal Truong, DO   cyanocobalamin 1000 MCG/ML injection inject 1 milliliter ( 1000 MCG ) intramuscularly every week for 4 weeks  Patient not taking: Reported on 6/24/2024 7/3/23   Royal Truong, DO       No Known Allergies    Social History     Socioeconomic History    Marital status:      Spouse name: Not on file    Number of children: Not on file    Years of education: Not on file    Highest education level: Not on file   Occupational History    Not on file   Tobacco Use    Smoking status: Never     Passive exposure: Never    Smokeless tobacco: Never   Vaping Use    Vaping status: Never Used   Substance and Sexual Activity    Alcohol use: No    Drug use: No    Sexual activity: Defer   Other Topics Concern    Not on file   Social History Narrative    Not on file     Social Determinants of Health     Financial Resource Strain: Low Risk  (6/28/2024)    Overall Financial Resource Strain (CARDIA)     Difficulty of Paying Living Expenses: Not hard at all   Food Insecurity: No Food Insecurity (6/28/2024)    Hunger Vital Sign     Worried About Running Out of Food in the Last Year: Never true     Ran Out of Food in the Last Year: Never true   Transportation Needs: Unknown (6/28/2024)    PRAPARE - Transportation     Lack of Transportation (Medical): Not on file     Lack of Transportation (Non-Medical): No   Physical Activity: Not on file   Stress:

## 2024-08-15 NOTE — OP NOTE
Operative Note      Patient: Neha Rios  YOB: 1975  MRN: [id number]    Date of Procedure: 8/15/2024    Pre-Op Diagnosis Codes:      * Lumbar radiculopathy [M54.16]    Post-Op Diagnosis: Same       Procedure(s):  RIGHT LUMBAR 5 AND SACRAL 1 TRANSFORAMINAL EPIDURAL STEROID INJECTION UNDER FLUOROSCOPIC GUIDANCE    Surgeon(s):  Joel Sanchez MD    Assistant:   * No surgical staff found *    Anesthesia: Monitor Anesthesia Care    Estimated Blood Loss (mL): Minimal    Complications: None    Specimens:   * No specimens in log *    Implants:  * No implants in log *      Drains: * No LDAs found *    Findings:  Infection Present At Time Of Surgery (PATOS) (choose all levels that have infection present):  No infection present  Other Findings: GOOD NEEDLE PLACEMENT    Detailed Description of Procedure:   8/15/2024    Patient: Neha Rios  :  1975  Age:  48 y.o.  Sex:  female     PRE-OPERATIVE DIAGNOSIS: Lumbar disc displacement, lumbar neural foraminal stenosis, lumbar radiculopathy.     POST-OPERATIVE DIAGNOSIS: Same.    PROCEDURE: Right Transforaminal epidural steroid injection under fluoroscopic guidance at foraminal level L5 & S1    SURGEON: Joel Sanchez MD    ANESTHESIA: MAC    ESTIMATED BLOOD LOSS: None.  ______________________________________________________________________  BRIEF HISTORY: Neha Rios comes in today for the  Right transforaminal epidural steroid injection under fluoroscopic guidance at foraminal level L5 & S1 . The potential complications of this procedure were discussed with her again today.  She has elected to undergo the aforementioned procedure.     Neha’s complete History & Physical examination were reviewed in depth, a copy of which is in the chart.      DESCRIPTION OF PROCEDURE:    After confirming written and informed consent, a time-out was performed and Neha’s name and date of birth, the procedure to be performed as well as the plan for the location of

## 2024-08-15 NOTE — ANESTHESIA PRE PROCEDURE
BMI:   Wt Readings from Last 3 Encounters:   08/14/24 108.9 kg (240 lb)   07/31/24 114.4 kg (252 lb 4.8 oz)   06/28/24 112 kg (247 lb)     Body mass index is 37.59 kg/m².    CBC:   Lab Results   Component Value Date/Time    WBC 7.2 03/18/2024 01:32 PM    RBC 3.83 03/18/2024 01:32 PM    HGB 12.3 03/18/2024 01:32 PM    HCT 38.6 03/18/2024 01:32 PM    .8 03/18/2024 01:32 PM    RDW 12.3 03/18/2024 01:32 PM     03/18/2024 01:32 PM       CMP:   Lab Results   Component Value Date/Time     03/18/2024 01:32 PM    K 4.6 03/18/2024 01:32 PM    K 3.8 02/20/2023 11:05 PM     03/18/2024 01:32 PM    CO2 19 03/18/2024 01:32 PM    BUN 20 03/18/2024 01:32 PM    CREATININE 1.0 03/18/2024 01:32 PM    GFRAA >60 05/27/2022 11:15 AM    LABGLOM >60 03/18/2024 01:32 PM    GLUCOSE 99 03/18/2024 01:32 PM    GLUCOSE 96 08/27/2011 10:55 AM    CALCIUM 8.7 03/18/2024 01:32 PM    BILITOT 0.4 03/18/2024 01:32 PM    ALKPHOS 79 03/18/2024 01:32 PM    AST 20 03/18/2024 01:32 PM    ALT 20 03/18/2024 01:32 PM       POC Tests: No results for input(s): \"POCGLU\", \"POCNA\", \"POCK\", \"POCCL\", \"POCBUN\", \"POCHEMO\", \"POCHCT\" in the last 72 hours.    Coags:   Lab Results   Component Value Date/Time    PROTIME 10.6 04/07/2023 11:25 AM    INR 1.0 04/07/2023 11:25 AM    APTT 35.4 05/26/2022 12:26 PM       HCG (If Applicable):   Lab Results   Component Value Date    PREGTESTUR Neg 08/11/2023        ABGs: No results found for: \"PHART\", \"PO2ART\", \"NFN9SLX\", \"TZU0GAV\", \"BEART\", \"O2MISMIN\"     Type & Screen (If Applicable):  No results found for: \"LABABO\"    Drug/Infectious Status (If Applicable):  No results found for: \"HIV\", \"HEPCAB\"    COVID-19 Screening (If Applicable):   Lab Results   Component Value Date/Time    COVID19 Detected 02/28/2023 01:53 PM    COVID19 Not Detected 05/27/2022 11:15 AM           Anesthesia Evaluation  Patient summary reviewed and Nursing notes reviewed   no history of

## 2024-08-15 NOTE — DISCHARGE INSTRUCTIONS
[facility] Pain Management Department  [place] [phone]  Dr. Daniel Rodriguez   Post-Pain Block/Radiofrequency  Home Going Instructions    1-Go home, rest for the remainder of the day  2-Please do not lift over 20 pounds the day of the injection  3-If you received sedation No: alcohol, driving, operating lawn mowers, plows, tractors or other dangerous equipment until next morning. Do not make important decisions or sign legal documents for 24 hours. You may experience light headedness, dizziness, nausea or sleepiness after sedation. Do not stay alone. A responsible adult must be with you for 24 hours. You could be nauseated from the medications you have received. Your IV site may be sore and bruised.    4-No dietary restrictions     5-Resume all medications the same day, blood thinners to be resumed 24 hours after injection if you were instructed to stop any.    6-Keep the surgical site clean and dry, you may shower the next morning and remove the      dressing.     7- No sitz baths, tub baths or hot tubs/swimming for 24 hours.       8- If you have any pain at the injection site(s), application of an ice pack to the area should be       helpful, 20 minutes on/20 minutes off for next 48 hours.  9- Call [facility] immediately at if you develop.  Fever greater than 100.4 F  Have bleeding or drainage from the puncture site  Have progressive Leg/arm numbness and or weakness  Loss of control of bowel and or bladder (wet/soil yourself)  Severe headache with inability to lift head  10-You may return to work the next day

## 2024-08-15 NOTE — ANESTHESIA POSTPROCEDURE EVALUATION
Department of Anesthesiology  Postprocedure Note    Patient: Neha Rios  MRN: [id number]  YOB: 1975  Date of evaluation: 8/15/2024    Procedure Summary       Date: 08/15/24 Room / Location: [facility] PROCEDURE ROOM 02 / [facility]    Anesthesia Start: 1229 Anesthesia Stop:     Procedure: RIGHT LUMBAR 5 AND SACRAL 1 TRANSFORAMINAL EPIDURAL STEROID INJECTION UNDER FLUOROSCOPIC GUIDANCE (Right: Back) Diagnosis:       Lumbar radiculopathy      (Lumbar radiculopathy [M54.16])    Surgeons: Joel Sanchez MD Responsible Provider: Simone Malone MD    Anesthesia Type: MAC ASA Status: 3            Anesthesia Type: No value filed.    Enrique Phase I: Enrique Score: 10    Enrique Phase II:      Anesthesia Post Evaluation    Patient location during evaluation: PACU  Patient participation: complete - patient participated  Level of consciousness: awake  Airway patency: patent  Nausea & Vomiting: no nausea and no vomiting  Cardiovascular status: hemodynamically stable  Respiratory status: acceptable  Hydration status: euvolemic  Pain management: adequate        No notable events documented.

## 2024-08-19 DIAGNOSIS — M47.817 LUMBOSACRAL SPONDYLOSIS WITHOUT MYELOPATHY: ICD-10-CM

## 2024-08-19 DIAGNOSIS — M25.50 ARTHRALGIA, UNSPECIFIED JOINT: Primary | ICD-10-CM

## 2024-08-19 RX ORDER — OXYCODONE HYDROCHLORIDE AND ACETAMINOPHEN 5; 325 MG/1; MG/1
1 TABLET ORAL EVERY 4 HOURS PRN
Qty: 42 TABLET | Refills: 0 | Status: SHIPPED | OUTPATIENT
Start: 2024-08-19 | End: 2024-08-26

## 2024-08-19 NOTE — TELEPHONE ENCOUNTER
Last Appointment:  6/28/2024  Future Appointments   Date Time Provider Department Center   9/26/2024  2:45 PM Joel Sanchez MD BDM PAIN [facility]      Pt called requesting a refill on her oxyCODONE.    Thank you.

## 2024-08-19 NOTE — TELEPHONE ENCOUNTER
Patient returning call. Transferred to Nurse.   While on the phone with the patient to inform her of her PTT result, she asked if Meggan thought that she might be having a heart attack. I spoke with Meggan and told the patient that Meggan does not think that she is having a heart attack, but she could be having a flare of her rheumatoid or fibromyalgia. She expressed understanding.

## 2024-08-22 RX ORDER — TIZANIDINE 4 MG/1
TABLET ORAL
Qty: 120 TABLET | Refills: 1 | Status: SHIPPED | OUTPATIENT
Start: 2024-08-22

## 2024-08-22 NOTE — TELEPHONE ENCOUNTER
Patients last appointment 6/28/2024.  Patients next scheduled appointment   Future Appointments   Date Time Provider Department Center   9/26/2024  2:45 PM Joel Sanchez MD BDM PAIN [facility]

## 2024-08-29 DIAGNOSIS — M47.817 LUMBOSACRAL SPONDYLOSIS WITHOUT MYELOPATHY: ICD-10-CM

## 2024-08-29 DIAGNOSIS — M25.50 ARTHRALGIA, UNSPECIFIED JOINT: ICD-10-CM

## 2024-08-29 RX ORDER — OXYCODONE AND ACETAMINOPHEN 5; 325 MG/1; MG/1
1 TABLET ORAL EVERY 4 HOURS PRN
Qty: 42 TABLET | Refills: 0 | Status: SHIPPED | OUTPATIENT
Start: 2024-08-29 | End: 2024-09-05

## 2024-08-29 NOTE — TELEPHONE ENCOUNTER
Patients last appointment 6/28/2024.  Patients next scheduled appointment   Future Appointments   Date Time Provider Department Center   9/26/2024  2:45 PM Joel Sanchez MD BDM PAIN [facility]

## 2024-09-09 DIAGNOSIS — M47.817 LUMBOSACRAL SPONDYLOSIS WITHOUT MYELOPATHY: Primary | ICD-10-CM

## 2024-09-09 RX ORDER — OXYCODONE AND ACETAMINOPHEN 5; 325 MG/1; MG/1
1 TABLET ORAL EVERY 4 HOURS PRN
COMMUNITY
End: 2024-09-09 | Stop reason: SDUPTHER

## 2024-09-09 RX ORDER — OXYCODONE AND ACETAMINOPHEN 5; 325 MG/1; MG/1
1 TABLET ORAL EVERY 4 HOURS PRN
Qty: 42 TABLET | Refills: 0 | Status: SHIPPED | OUTPATIENT
Start: 2024-09-09 | End: 2024-09-16

## 2024-09-20 DIAGNOSIS — M47.817 LUMBOSACRAL SPONDYLOSIS WITHOUT MYELOPATHY: ICD-10-CM

## 2024-09-20 RX ORDER — OXYCODONE AND ACETAMINOPHEN 5; 325 MG/1; MG/1
1 TABLET ORAL EVERY 4 HOURS PRN
Qty: 42 TABLET | Refills: 0 | Status: SHIPPED | OUTPATIENT
Start: 2024-09-20 | End: 2024-09-27

## 2024-10-02 DIAGNOSIS — M54.12 CERVICAL RADICULOPATHY: ICD-10-CM

## 2024-10-02 DIAGNOSIS — M77.50 BONE SPUR OF FOOT: ICD-10-CM

## 2024-10-02 DIAGNOSIS — M47.817 LUMBOSACRAL SPONDYLOSIS WITHOUT MYELOPATHY: Primary | ICD-10-CM

## 2024-10-02 RX ORDER — OXYCODONE AND ACETAMINOPHEN 5; 325 MG/1; MG/1
1 TABLET ORAL EVERY 4 HOURS PRN
COMMUNITY
End: 2024-10-02 | Stop reason: SDUPTHER

## 2024-10-02 RX ORDER — OXYCODONE AND ACETAMINOPHEN 5; 325 MG/1; MG/1
1 TABLET ORAL EVERY 4 HOURS PRN
Qty: 42 TABLET | Refills: 0 | Status: SHIPPED | OUTPATIENT
Start: 2024-10-02 | End: 2024-10-09

## 2024-10-02 NOTE — TELEPHONE ENCOUNTER
Refill requested     Thank you.    Informed patient that she needs an appointment. Transferred call to Kylie to make the appointment.      Last Appointment:  6/28/2024  Future Appointments   Date Time Provider Department Center   10/4/2024  2:30 PM Royal Truong DO [facility] DEP

## 2024-10-04 ENCOUNTER — OFFICE VISIT (OUTPATIENT)
Dept: FAMILY MEDICINE CLINIC | Age: 49
End: 2024-10-04
Payer: COMMERCIAL

## 2024-10-04 VITALS
SYSTOLIC BLOOD PRESSURE: 104 MMHG | TEMPERATURE: 96.9 F | DIASTOLIC BLOOD PRESSURE: 70 MMHG | WEIGHT: 253.8 LBS | HEART RATE: 104 BPM | HEIGHT: 67 IN | BODY MASS INDEX: 39.83 KG/M2 | OXYGEN SATURATION: 95 % | RESPIRATION RATE: 16 BRPM

## 2024-10-04 DIAGNOSIS — M50.30 DDD (DEGENERATIVE DISC DISEASE), CERVICAL: ICD-10-CM

## 2024-10-04 DIAGNOSIS — M77.50 BONE SPUR OF FOOT: ICD-10-CM

## 2024-10-04 DIAGNOSIS — M25.579 FOOT AND ANKLE PAIN: Primary | ICD-10-CM

## 2024-10-04 DIAGNOSIS — R73.01 IFG (IMPAIRED FASTING GLUCOSE): ICD-10-CM

## 2024-10-04 DIAGNOSIS — M79.673 FOOT AND ANKLE PAIN: Primary | ICD-10-CM

## 2024-10-04 DIAGNOSIS — F41.9 ANXIETY AND DEPRESSION: ICD-10-CM

## 2024-10-04 DIAGNOSIS — G25.81 RESTLESS LEGS: ICD-10-CM

## 2024-10-04 DIAGNOSIS — E78.2 MIXED HYPERLIPIDEMIA: ICD-10-CM

## 2024-10-04 DIAGNOSIS — M47.817 LUMBOSACRAL SPONDYLOSIS WITHOUT MYELOPATHY: ICD-10-CM

## 2024-10-04 DIAGNOSIS — F32.A ANXIETY AND DEPRESSION: ICD-10-CM

## 2024-10-04 DIAGNOSIS — M79.7 FIBROMYALGIA: ICD-10-CM

## 2024-10-04 DIAGNOSIS — M06.9 RHEUMATOID ARTHRITIS, INVOLVING UNSPECIFIED SITE, UNSPECIFIED WHETHER RHEUMATOID FACTOR PRESENT (HCC): ICD-10-CM

## 2024-10-04 DIAGNOSIS — G89.29 CHRONIC BACK PAIN, UNSPECIFIED BACK LOCATION, UNSPECIFIED BACK PAIN LATERALITY: ICD-10-CM

## 2024-10-04 DIAGNOSIS — M79.89 LEG SWELLING: ICD-10-CM

## 2024-10-04 DIAGNOSIS — F31.9 BIPOLAR 1 DISORDER (HCC): ICD-10-CM

## 2024-10-04 DIAGNOSIS — M54.9 CHRONIC BACK PAIN, UNSPECIFIED BACK LOCATION, UNSPECIFIED BACK PAIN LATERALITY: ICD-10-CM

## 2024-10-04 PROCEDURE — 99213 OFFICE O/P EST LOW 20 MIN: CPT | Performed by: FAMILY MEDICINE

## 2024-10-04 RX ORDER — PREGABALIN 100 MG/1
100 CAPSULE ORAL 2 TIMES DAILY
Qty: 180 CAPSULE | Refills: 1 | Status: SHIPPED | OUTPATIENT
Start: 2024-10-04 | End: 2025-04-02

## 2024-10-04 NOTE — PROGRESS NOTES
24  Neha Rios : 1975 Sex: female  Age: 49 y.o.    Chief Complaint   Patient presents with   • Medication Check   • Leg Swelling     Left leg swelling-feels tight,        Neha is here for med check, and left leg and foot edema, with redness.        Review of Systems   Constitutional:  Negative for activity change, appetite change, chills, diaphoresis, fatigue, fever and unexpected weight change.   HENT:  Negative for congestion, dental problem, drooling, ear discharge, ear pain, facial swelling, hearing loss, mouth sores, nosebleeds, postnasal drip, rhinorrhea, sinus pressure, sinus pain, sneezing, sore throat, tinnitus, trouble swallowing and voice change.    Eyes:  Negative for photophobia, pain, discharge, redness, itching and visual disturbance.   Respiratory:  Negative for apnea, cough, choking, chest tightness, shortness of breath, wheezing and stridor.    Cardiovascular:  Negative for chest pain, palpitations and leg swelling.   Gastrointestinal:  Negative for abdominal distention, abdominal pain, anal bleeding, blood in stool, constipation, diarrhea, nausea, rectal pain and vomiting.   Endocrine: Negative for cold intolerance, heat intolerance, polydipsia, polyphagia and polyuria.   Genitourinary:  Negative for decreased urine volume, difficulty urinating, dysuria, enuresis, flank pain, frequency, genital sores, hematuria and urgency.   Musculoskeletal:  Positive for arthralgias and joint swelling. Negative for back pain, gait problem, myalgias, neck pain and neck stiffness.        She does wear compression hose to keep left leg edema down. The leg will get pinkish-almost like a rash without warmth    Skin:  Negative for color change, pallor, rash and wound.   Allergic/Immunologic: Negative for environmental allergies, food allergies and immunocompromised state.   Neurological:  Negative for dizziness, tremors, seizures, syncope, facial asymmetry, speech difficulty, weakness,

## 2024-10-14 DIAGNOSIS — M47.817 LUMBOSACRAL SPONDYLOSIS WITHOUT MYELOPATHY: ICD-10-CM

## 2024-10-14 DIAGNOSIS — M25.579 FOOT AND ANKLE PAIN: Primary | ICD-10-CM

## 2024-10-14 DIAGNOSIS — M79.673 FOOT AND ANKLE PAIN: Primary | ICD-10-CM

## 2024-10-14 RX ORDER — OXYCODONE AND ACETAMINOPHEN 5; 325 MG/1; MG/1
1 TABLET ORAL EVERY 4 HOURS PRN
COMMUNITY
End: 2024-10-14 | Stop reason: SDUPTHER

## 2024-10-14 RX ORDER — OXYCODONE AND ACETAMINOPHEN 5; 325 MG/1; MG/1
1 TABLET ORAL EVERY 4 HOURS PRN
Qty: 42 TABLET | Refills: 0 | Status: SHIPPED | OUTPATIENT
Start: 2024-10-14 | End: 2024-10-21

## 2024-10-25 ENCOUNTER — TELEPHONE (OUTPATIENT)
Dept: FAMILY MEDICINE CLINIC | Age: 49
End: 2024-10-25

## 2024-10-28 ENCOUNTER — OFFICE VISIT (OUTPATIENT)
Age: 49
End: 2024-10-28
Payer: COMMERCIAL

## 2024-10-28 VITALS
DIASTOLIC BLOOD PRESSURE: 80 MMHG | HEART RATE: 97 BPM | SYSTOLIC BLOOD PRESSURE: 132 MMHG | HEIGHT: 67 IN | BODY MASS INDEX: 40.4 KG/M2 | WEIGHT: 257.4 LBS | OXYGEN SATURATION: 98 % | TEMPERATURE: 97.1 F

## 2024-10-28 DIAGNOSIS — M47.817 LUMBOSACRAL SPONDYLOSIS WITHOUT MYELOPATHY: ICD-10-CM

## 2024-10-28 DIAGNOSIS — M79.673 FOOT AND ANKLE PAIN: ICD-10-CM

## 2024-10-28 DIAGNOSIS — M79.89 LEFT LEG SWELLING: Primary | ICD-10-CM

## 2024-10-28 DIAGNOSIS — M25.579 FOOT AND ANKLE PAIN: ICD-10-CM

## 2024-10-28 PROCEDURE — 99213 OFFICE O/P EST LOW 20 MIN: CPT | Performed by: NURSE PRACTITIONER

## 2024-10-28 RX ORDER — OXYCODONE AND ACETAMINOPHEN 5; 325 MG/1; MG/1
1 TABLET ORAL EVERY 4 HOURS PRN
Qty: 42 TABLET | Refills: 0 | Status: SHIPPED | OUTPATIENT
Start: 2024-10-28 | End: 2024-11-04

## 2024-10-28 ASSESSMENT — ENCOUNTER SYMPTOMS
PHOTOPHOBIA: 0
COUGH: 0
RHINORRHEA: 0
CONSTIPATION: 0
EYE DISCHARGE: 0
TROUBLE SWALLOWING: 0
CHOKING: 0
BACK PAIN: 1
FACIAL SWELLING: 0
EYE REDNESS: 0
CHEST TIGHTNESS: 0
ABDOMINAL DISTENTION: 0
STRIDOR: 0
VOICE CHANGE: 0
BLOOD IN STOOL: 0
EYE PAIN: 0
SINUS PRESSURE: 0
ANAL BLEEDING: 0
WHEEZING: 0
DIARRHEA: 0
NAUSEA: 0
SINUS PAIN: 0
SHORTNESS OF BREATH: 0
RECTAL PAIN: 0
COLOR CHANGE: 0
ABDOMINAL PAIN: 0
EYE ITCHING: 0
VOMITING: 0
APNEA: 0
SORE THROAT: 0

## 2024-10-28 NOTE — PROGRESS NOTES
Conjunctiva/sclera: Conjunctivae normal.      Pupils: Pupils are equal, round, and reactive to light.   Neck:      Vascular: No carotid bruit.   Cardiovascular:      Rate and Rhythm: Normal rate and regular rhythm.      Pulses: Normal pulses.      Heart sounds: Normal heart sounds. No murmur heard.     No friction rub. No gallop.   Pulmonary:      Effort: Pulmonary effort is normal. No respiratory distress.      Breath sounds: No stridor. No wheezing, rhonchi or rales.   Chest:      Chest wall: No tenderness.   Abdominal:      General: Abdomen is flat. Bowel sounds are normal. There is no distension.      Palpations: Abdomen is soft. There is no mass.      Tenderness: There is no abdominal tenderness. There is no right CVA tenderness, left CVA tenderness, guarding or rebound.      Hernia: No hernia is present.   Musculoskeletal:         General: Swelling (left leg) present. No tenderness, deformity or signs of injury. Normal range of motion.      Cervical back: Normal range of motion and neck supple. No rigidity. No muscular tenderness.      Right lower leg: No edema.      Left lower leg: Edema present.   Lymphadenopathy:      Cervical: No cervical adenopathy.   Skin:     General: Skin is warm and dry.      Capillary Refill: Capillary refill takes less than 2 seconds.      Coloration: Skin is not jaundiced or pale.      Findings: No bruising, erythema, lesion or rash.   Neurological:      General: No focal deficit present.      Mental Status: She is alert and oriented to person, place, and time. Mental status is at baseline.      Cranial Nerves: No cranial nerve deficit.      Sensory: No sensory deficit.      Motor: No weakness.      Coordination: Coordination normal.      Gait: Gait normal.      Deep Tendon Reflexes: Reflexes normal.   Psychiatric:         Mood and Affect: Mood normal.         Behavior: Behavior normal.         Thought Content: Thought content normal.         Judgment: Judgment normal.

## 2024-10-31 NOTE — TELEPHONE ENCOUNTER
Name of Medication(s) Requested:  Requested Prescriptions     Pending Prescriptions Disp Refills    tiZANidine (ZANAFLEX) 4 MG tablet [Pharmacy Med Name: TIZANIDINE HCL 4 MG TABLET] 120 tablet 1     Sig: TAKE 1 TABLET BY MOUTH EVERY 6 HOURS IF NEEDED FOR SPASMS AND LEG PAIN       Medication is on current medication list Yes    Dosage and directions were verified? Yes    Quantity verified: 30 day supply     Pharmacy Verified?  Yes    Last Appointment:  10/28/2024    Future appts:  No future appointments.     (If no appt send self scheduling link. .REFILLAPPT)  Scheduling request sent?     [] Yes  [x] No    Does patient need updated?  [] Yes  [x] No

## 2024-11-07 DIAGNOSIS — M54.12 CERVICAL NEUROPATHIC PAIN: ICD-10-CM

## 2024-11-07 DIAGNOSIS — M54.12 CERVICAL RADICULOPATHY: ICD-10-CM

## 2024-11-07 RX ORDER — OXYCODONE AND ACETAMINOPHEN 5; 325 MG/1; MG/1
1 TABLET ORAL EVERY 4 HOURS PRN
Qty: 42 TABLET | Refills: 0 | Status: SHIPPED | OUTPATIENT
Start: 2024-11-07 | End: 2024-11-14

## 2024-11-07 NOTE — TELEPHONE ENCOUNTER
Name of Medication(s) Requested:  Requested Prescriptions     Pending Prescriptions Disp Refills    oxyCODONE-acetaminophen (ENDOCET) 5-325 MG per tablet 42 tablet 0     Sig: Take 1 tablet by mouth every 4 hours as needed for Pain for up to 7 days. Intended supply: 7 days. Take lowest dose possible to manage pain Max Daily Amount: 6 tablets       Medication is on current medication list Yes    Dosage and directions were verified? Yes    Quantity verified: 30 day supply     Pharmacy Verified?  Yes    Last Appointment:  10/4/2024    Future appts:  No future appointments.     (If no appt send self scheduling link. .REFILLAPPT)  Scheduling request sent?     [] Yes  [x] No    Does patient need updated?  [] Yes  [x] No

## 2024-11-18 RX ORDER — ONDANSETRON 4 MG/1
TABLET, ORALLY DISINTEGRATING ORAL
Qty: 21 TABLET | Refills: 3 | Status: SHIPPED | OUTPATIENT
Start: 2024-11-18

## 2024-11-18 NOTE — TELEPHONE ENCOUNTER
Name of Medication(s) Requested:  Requested Prescriptions     Pending Prescriptions Disp Refills    ondansetron (ZOFRAN-ODT) 4 MG disintegrating tablet [Pharmacy Med Name: ONDANSETRON ODT 4 MG TABLET] 21 tablet 3     Sig: DISSOLVE 1 TABLET BY MOUTH EVERY 8 HOURS IF NEEDED FOR NAUSEA AND VOMITING       Medication is on current medication list Yes    Dosage and directions were verified? Yes    Quantity verified: 30 day supply     Pharmacy Verified?  Yes    Last Appointment:  10/4/2024    Future appts:  No future appointments.     (If no appt send self scheduling link. .REFILLAPPT)  Scheduling request sent?     [x] Yes  [] No    Does patient need updated?  [] Yes  [x] No

## 2024-11-19 DIAGNOSIS — M79.7 FIBROMYALGIA: ICD-10-CM

## 2024-11-19 DIAGNOSIS — M47.817 LUMBOSACRAL SPONDYLOSIS WITHOUT MYELOPATHY: ICD-10-CM

## 2024-11-19 DIAGNOSIS — M54.12 CERVICAL RADICULOPATHY: Primary | ICD-10-CM

## 2024-11-19 RX ORDER — OXYCODONE AND ACETAMINOPHEN 5; 325 MG/1; MG/1
1 TABLET ORAL EVERY 4 HOURS PRN
Qty: 42 TABLET | Refills: 0 | Status: SHIPPED | OUTPATIENT
Start: 2024-11-19 | End: 2024-12-19

## 2024-11-19 RX ORDER — OXYCODONE AND ACETAMINOPHEN 5; 325 MG/1; MG/1
1 TABLET ORAL EVERY 4 HOURS PRN
COMMUNITY
End: 2024-11-19 | Stop reason: SDUPTHER

## 2024-11-19 NOTE — TELEPHONE ENCOUNTER
Name of Medication(s) Requested:  Requested Prescriptions     Pending Prescriptions Disp Refills    oxyCODONE-acetaminophen (PERCOCET) 5-325 MG per tablet 42 tablet 0     Sig: Take 1 tablet by mouth every 4 hours as needed for Pain for up to 30 days. Max Daily Amount: 6 tablets       Medication is on current medication list Yes    Dosage and directions were verified? Yes    Quantity verified: 30 day supply     Pharmacy Verified?  Yes    Last Appointment:  10/4/2024    Future appts:  No future appointments.     (If no appt send self scheduling link. .REFILLAPPT)  Scheduling request sent?     [x] Yes  [] No    Does patient need updated?  [x] Yes  [] No

## 2024-12-02 DIAGNOSIS — M47.817 LUMBOSACRAL SPONDYLOSIS WITHOUT MYELOPATHY: ICD-10-CM

## 2024-12-02 DIAGNOSIS — M54.12 CERVICAL RADICULOPATHY: ICD-10-CM

## 2024-12-02 DIAGNOSIS — M79.7 FIBROMYALGIA: ICD-10-CM

## 2024-12-02 RX ORDER — OXYCODONE AND ACETAMINOPHEN 5; 325 MG/1; MG/1
1 TABLET ORAL EVERY 4 HOURS PRN
Qty: 42 TABLET | Refills: 0 | Status: SHIPPED | OUTPATIENT
Start: 2024-12-02 | End: 2025-01-01

## 2024-12-02 NOTE — TELEPHONE ENCOUNTER
Name of Medication(s) Requested:  Requested Prescriptions     Pending Prescriptions Disp Refills    oxyCODONE-acetaminophen (PERCOCET) 5-325 MG per tablet 42 tablet 0     Sig: Take 1 tablet by mouth every 4 hours as needed for Pain for up to 30 days. Max Daily Amount: 6 tablets       Medication is on current medication list Yes    Dosage and directions were verified? Yes    Quantity verified: 30 day supply     Pharmacy Verified?  Yes    Last Appointment:  10/4/2024    Future appts:  No future appointments.     (If no appt send self scheduling link. .REFILLAPPT)  Scheduling request sent?     [] Yes  [x] No    Does patient need updated?  [] Yes  [x] No

## 2024-12-12 DIAGNOSIS — M54.12 CERVICAL RADICULOPATHY: ICD-10-CM

## 2024-12-12 DIAGNOSIS — M47.817 LUMBOSACRAL SPONDYLOSIS WITHOUT MYELOPATHY: ICD-10-CM

## 2024-12-12 DIAGNOSIS — M79.7 FIBROMYALGIA: ICD-10-CM

## 2024-12-12 RX ORDER — OXYCODONE AND ACETAMINOPHEN 5; 325 MG/1; MG/1
1 TABLET ORAL EVERY 4 HOURS PRN
Qty: 42 TABLET | Refills: 0 | Status: SHIPPED | OUTPATIENT
Start: 2024-12-12 | End: 2025-01-11

## 2024-12-13 ENCOUNTER — TELEPHONE (OUTPATIENT)
Dept: FAMILY MEDICINE CLINIC | Age: 49
End: 2024-12-13

## 2024-12-13 NOTE — TELEPHONE ENCOUNTER
On 12/13/24 Phone call to patient LVM gave phone number to call and schedule CT Abdomen if still interested. The referral was from Oct.

## 2024-12-23 RX ORDER — ROPINIROLE 2 MG/1
2 TABLET, FILM COATED ORAL 3 TIMES DAILY
Qty: 270 TABLET | Refills: 1 | Status: SHIPPED | OUTPATIENT
Start: 2024-12-23

## 2024-12-23 NOTE — TELEPHONE ENCOUNTER
Name of Medication(s) Requested:  Requested Prescriptions     Pending Prescriptions Disp Refills    rOPINIRole (REQUIP) 2 MG tablet [Pharmacy Med Name: ROPINIROLE HCL 2 MG TABLET] 270 tablet 1     Sig: TAKE 1 TABLET BY MOUTH THREE TIMES A DAY       Medication is on current medication list Yes    Dosage and directions were verified? Yes    Quantity verified: 90 day supply     Pharmacy Verified?  Yes    Last Appointment:  10/4/2024    Future appts:  No future appointments.     (If no appt send self scheduling link. .REFILLAPPT)  Scheduling request sent?     [] Yes  [x] No    Does patient need updated?  [] Yes  [x] No

## 2024-12-26 DIAGNOSIS — M79.7 FIBROMYALGIA: ICD-10-CM

## 2024-12-26 DIAGNOSIS — M47.817 LUMBOSACRAL SPONDYLOSIS WITHOUT MYELOPATHY: ICD-10-CM

## 2024-12-26 DIAGNOSIS — M54.12 CERVICAL RADICULOPATHY: ICD-10-CM

## 2024-12-26 RX ORDER — OXYCODONE AND ACETAMINOPHEN 5; 325 MG/1; MG/1
1 TABLET ORAL EVERY 4 HOURS PRN
Qty: 42 TABLET | Refills: 0 | Status: SHIPPED | OUTPATIENT
Start: 2024-12-26 | End: 2025-01-25

## 2024-12-26 NOTE — TELEPHONE ENCOUNTER

## 2025-01-07 DIAGNOSIS — M79.7 FIBROMYALGIA: ICD-10-CM

## 2025-01-07 DIAGNOSIS — M54.12 CERVICAL RADICULOPATHY: ICD-10-CM

## 2025-01-07 DIAGNOSIS — M47.817 LUMBOSACRAL SPONDYLOSIS WITHOUT MYELOPATHY: ICD-10-CM

## 2025-01-07 RX ORDER — OXYCODONE AND ACETAMINOPHEN 5; 325 MG/1; MG/1
1 TABLET ORAL EVERY 4 HOURS PRN
Qty: 42 TABLET | Refills: 0 | Status: SHIPPED | OUTPATIENT
Start: 2025-01-07 | End: 2025-02-06

## 2025-01-08 RX ORDER — VENLAFAXINE 75 MG/1
75 TABLET ORAL 2 TIMES DAILY
Qty: 180 TABLET | Refills: 1 | Status: SHIPPED | OUTPATIENT
Start: 2025-01-08

## 2025-01-08 RX ORDER — OMEPRAZOLE 40 MG/1
40 CAPSULE, DELAYED RELEASE ORAL 2 TIMES DAILY
Qty: 180 CAPSULE | Refills: 1 | Status: SHIPPED | OUTPATIENT
Start: 2025-01-08

## 2025-01-08 NOTE — TELEPHONE ENCOUNTER
Name of Medication(s) Requested:  Requested Prescriptions     Pending Prescriptions Disp Refills    venlafaxine (EFFEXOR) 75 MG tablet [Pharmacy Med Name: VENLAFAXINE HCL 75 MG TABLET] 180 tablet 1     Sig: TAKE 1 TABLET BY MOUTH TWICE A DAY    omeprazole (PRILOSEC) 40 MG delayed release capsule [Pharmacy Med Name: OMEPRAZOLE DR 40 MG CAPSULE] 180 capsule 1     Sig: TAKE 1 CAPSULE BY MOUTH TWICE A DAY       Medication is on current medication list Yes    Dosage and directions were verified? Yes    Quantity verified: 90 day supply     Pharmacy Verified?  Yes    Last Appointment:  10/4/2024    Future appts:  No future appointments.     (If no appt send self scheduling link. .REFILLAPPT)  Scheduling request sent?     [] Yes  [x] No    Does patient need updated?  [] Yes  [x] No

## 2025-01-13 NOTE — TELEPHONE ENCOUNTER
Name of Medication(s) Requested:  Requested Prescriptions     Pending Prescriptions Disp Refills    tiZANidine (ZANAFLEX) 4 MG tablet [Pharmacy Med Name: TIZANIDINE HCL 4 MG TABLET] 120 tablet 2     Sig: TAKE 1 TABLET BY MOUTH EVERY 6 HOURS IF NEEDED FOR SPASMS AND LEG PAIN       Medication is on current medication list Yes    Dosage and directions were verified? Yes    Quantity verified: 30 day supply     Pharmacy Verified?  Yes    Last Appointment:  10/4/2024    Future appts:  No future appointments.     (If no appt send self scheduling link. .REFILLAPPT)  Scheduling request sent?     [] Yes  [x] No    Does patient need updated?  [] Yes  [x] No

## 2025-01-20 DIAGNOSIS — M54.12 CERVICAL RADICULOPATHY: ICD-10-CM

## 2025-01-20 DIAGNOSIS — M79.7 FIBROMYALGIA: ICD-10-CM

## 2025-01-20 DIAGNOSIS — M47.817 LUMBOSACRAL SPONDYLOSIS WITHOUT MYELOPATHY: ICD-10-CM

## 2025-01-20 RX ORDER — OXYCODONE AND ACETAMINOPHEN 5; 325 MG/1; MG/1
1 TABLET ORAL EVERY 4 HOURS PRN
Qty: 42 TABLET | Refills: 0 | Status: SHIPPED | OUTPATIENT
Start: 2025-01-20 | End: 2025-02-03

## 2025-01-30 DIAGNOSIS — M47.817 LUMBOSACRAL SPONDYLOSIS WITHOUT MYELOPATHY: ICD-10-CM

## 2025-01-30 DIAGNOSIS — M79.7 FIBROMYALGIA: ICD-10-CM

## 2025-01-30 DIAGNOSIS — M54.12 CERVICAL RADICULOPATHY: ICD-10-CM

## 2025-01-30 NOTE — TELEPHONE ENCOUNTER
Name of Medication(s) Requested:  Requested Prescriptions     Pending Prescriptions Disp Refills    oxyCODONE-acetaminophen (PERCOCET) 5-325 MG per tablet 42 tablet 0     Sig: Take 1 tablet by mouth every 4 hours as needed for Pain for up to 14 days. Max Daily Amount: 6 tablets       Medication is on current medication list Yes    Dosage and directions were verified? Yes    Quantity verified: 30 day supply     Pharmacy Verified?  Yes    Last Appointment:  10/4/2024    Future appts:  No future appointments.     (If no appt send self scheduling link. .REFILLAPPT)  Scheduling request sent?     [] Yes  [x] No    Does patient need updated?  [] Yes  [x] No

## 2025-01-31 RX ORDER — OXYCODONE AND ACETAMINOPHEN 5; 325 MG/1; MG/1
1 TABLET ORAL EVERY 4 HOURS PRN
Qty: 42 TABLET | Refills: 0 | Status: SHIPPED | OUTPATIENT
Start: 2025-01-31 | End: 2025-02-14

## 2025-02-11 ENCOUNTER — OFFICE VISIT (OUTPATIENT)
Age: 50
End: 2025-02-11
Payer: COMMERCIAL

## 2025-02-11 VITALS
HEIGHT: 67 IN | HEART RATE: 97 BPM | OXYGEN SATURATION: 99 % | SYSTOLIC BLOOD PRESSURE: 126 MMHG | DIASTOLIC BLOOD PRESSURE: 82 MMHG | WEIGHT: 251.4 LBS | BODY MASS INDEX: 39.46 KG/M2 | TEMPERATURE: 97.1 F

## 2025-02-11 DIAGNOSIS — M79.7 FIBROMYALGIA: ICD-10-CM

## 2025-02-11 DIAGNOSIS — F31.9 BIPOLAR 1 DISORDER (HCC): ICD-10-CM

## 2025-02-11 DIAGNOSIS — M54.12 CERVICAL RADICULOPATHY: ICD-10-CM

## 2025-02-11 DIAGNOSIS — M06.9 RHEUMATOID ARTHRITIS, INVOLVING UNSPECIFIED SITE, UNSPECIFIED WHETHER RHEUMATOID FACTOR PRESENT (HCC): ICD-10-CM

## 2025-02-11 DIAGNOSIS — M54.12 CERVICAL NEUROPATHIC PAIN: Primary | ICD-10-CM

## 2025-02-11 DIAGNOSIS — M47.817 LUMBOSACRAL SPONDYLOSIS WITHOUT MYELOPATHY: ICD-10-CM

## 2025-02-11 PROCEDURE — 99214 OFFICE O/P EST MOD 30 MIN: CPT | Performed by: NURSE PRACTITIONER

## 2025-02-11 RX ORDER — OXYCODONE AND ACETAMINOPHEN 5; 325 MG/1; MG/1
1 TABLET ORAL EVERY 4 HOURS PRN
Qty: 42 TABLET | Refills: 0 | Status: SHIPPED | OUTPATIENT
Start: 2025-02-11 | End: 2025-02-25

## 2025-02-11 SDOH — ECONOMIC STABILITY: FOOD INSECURITY: WITHIN THE PAST 12 MONTHS, THE FOOD YOU BOUGHT JUST DIDN'T LAST AND YOU DIDN'T HAVE MONEY TO GET MORE.: NEVER TRUE

## 2025-02-11 SDOH — ECONOMIC STABILITY: FOOD INSECURITY: WITHIN THE PAST 12 MONTHS, YOU WORRIED THAT YOUR FOOD WOULD RUN OUT BEFORE YOU GOT MONEY TO BUY MORE.: NEVER TRUE

## 2025-02-11 ASSESSMENT — ENCOUNTER SYMPTOMS
ANAL BLEEDING: 0
RECTAL PAIN: 0
SORE THROAT: 0
EYE ITCHING: 0
FACIAL SWELLING: 0
CHEST TIGHTNESS: 0
SINUS PAIN: 0
ABDOMINAL DISTENTION: 0
VOICE CHANGE: 0
STRIDOR: 0
DIARRHEA: 0
BACK PAIN: 1
PHOTOPHOBIA: 0
NAUSEA: 0
WHEEZING: 0
BLOOD IN STOOL: 0
EYE REDNESS: 0
COLOR CHANGE: 0
TROUBLE SWALLOWING: 0
VOMITING: 0
ABDOMINAL PAIN: 0
EYE PAIN: 0
SHORTNESS OF BREATH: 0
CONSTIPATION: 0
SINUS PRESSURE: 0
APNEA: 0
RHINORRHEA: 0
COUGH: 0
CHOKING: 0
EYE DISCHARGE: 0

## 2025-02-11 ASSESSMENT — PATIENT HEALTH QUESTIONNAIRE - PHQ9
8. MOVING OR SPEAKING SO SLOWLY THAT OTHER PEOPLE COULD HAVE NOTICED. OR THE OPPOSITE, BEING SO FIGETY OR RESTLESS THAT YOU HAVE BEEN MOVING AROUND A LOT MORE THAN USUAL: NOT AT ALL
7. TROUBLE CONCENTRATING ON THINGS, SUCH AS READING THE NEWSPAPER OR WATCHING TELEVISION: NOT AT ALL
10. IF YOU CHECKED OFF ANY PROBLEMS, HOW DIFFICULT HAVE THESE PROBLEMS MADE IT FOR YOU TO DO YOUR WORK, TAKE CARE OF THINGS AT HOME, OR GET ALONG WITH OTHER PEOPLE: NOT DIFFICULT AT ALL
SUM OF ALL RESPONSES TO PHQ QUESTIONS 1-9: 0
SUM OF ALL RESPONSES TO PHQ QUESTIONS 1-9: 0
4. FEELING TIRED OR HAVING LITTLE ENERGY: NOT AT ALL
5. POOR APPETITE OR OVEREATING: NOT AT ALL
2. FEELING DOWN, DEPRESSED OR HOPELESS: NOT AT ALL
SUM OF ALL RESPONSES TO PHQ QUESTIONS 1-9: 0
9. THOUGHTS THAT YOU WOULD BE BETTER OFF DEAD, OR OF HURTING YOURSELF: NOT AT ALL
3. TROUBLE FALLING OR STAYING ASLEEP: NOT AT ALL
6. FEELING BAD ABOUT YOURSELF - OR THAT YOU ARE A FAILURE OR HAVE LET YOURSELF OR YOUR FAMILY DOWN: NOT AT ALL
SUM OF ALL RESPONSES TO PHQ QUESTIONS 1-9: 0

## 2025-02-11 NOTE — PROGRESS NOTES
25  Neha Rios : 1975 Sex: female  Age: 49 y.o.    Chief Complaint   Patient presents with    Medication Refill       Patient here for refill for pain medication. She is having pain to back and legs at level 8 without pain med. Medication brings it to level 2 with good relief. She states it makes her pain tolerable and she is able to function to do her normal ADLs.         Review of Systems   Constitutional:  Negative for activity change, appetite change, chills, diaphoresis, fatigue, fever and unexpected weight change.   HENT:  Negative for congestion, dental problem, drooling, ear discharge, ear pain, facial swelling, hearing loss, mouth sores, nosebleeds, postnasal drip, rhinorrhea, sinus pressure, sinus pain, sneezing, sore throat, tinnitus, trouble swallowing and voice change.    Eyes:  Negative for photophobia, pain, discharge, redness, itching and visual disturbance.   Respiratory:  Negative for apnea, cough, choking, chest tightness, shortness of breath, wheezing and stridor.    Cardiovascular:  Negative for chest pain, palpitations and leg swelling.   Gastrointestinal:  Negative for abdominal distention, abdominal pain, anal bleeding, blood in stool, constipation, diarrhea, nausea, rectal pain and vomiting.   Endocrine: Negative for cold intolerance, heat intolerance, polydipsia, polyphagia and polyuria.   Genitourinary:  Negative for decreased urine volume, difficulty urinating, dysuria, enuresis, flank pain, frequency, genital sores, hematuria and urgency.   Musculoskeletal:  Positive for arthralgias and back pain. Negative for gait problem, joint swelling, myalgias, neck pain and neck stiffness.   Skin:  Negative for color change, pallor, rash and wound.   Allergic/Immunologic: Negative for environmental allergies, food allergies and immunocompromised state.   Neurological:  Negative for dizziness, tremors, seizures, syncope, facial asymmetry, speech difficulty, weakness,

## 2025-02-17 ENCOUNTER — OFFICE VISIT (OUTPATIENT)
Age: 50
End: 2025-02-17

## 2025-02-17 VITALS
OXYGEN SATURATION: 98 % | HEIGHT: 67 IN | TEMPERATURE: 97.4 F | WEIGHT: 252.1 LBS | BODY MASS INDEX: 39.57 KG/M2 | DIASTOLIC BLOOD PRESSURE: 68 MMHG | HEART RATE: 95 BPM | RESPIRATION RATE: 16 BRPM | SYSTOLIC BLOOD PRESSURE: 126 MMHG

## 2025-02-17 DIAGNOSIS — J10.1 INFLUENZA A: ICD-10-CM

## 2025-02-17 DIAGNOSIS — R52 BODY ACHES: Primary | ICD-10-CM

## 2025-02-17 LAB
INFLUENZA A ANTIGEN, POC: NORMAL
INFLUENZA B ANTIGEN, POC: NORMAL
Lab: NORMAL
PERFORMING INSTRUMENT: NORMAL
QC PASS/FAIL: NORMAL
SARS-COV-2, POC: NORMAL

## 2025-02-17 RX ORDER — OSELTAMIVIR PHOSPHATE 75 MG/1
75 CAPSULE ORAL 2 TIMES DAILY
Qty: 10 CAPSULE | Refills: 0 | Status: SHIPPED | OUTPATIENT
Start: 2025-02-17 | End: 2025-02-22

## 2025-02-17 RX ORDER — DEXAMETHASONE SODIUM PHOSPHATE 10 MG/ML
10 INJECTION, SOLUTION INTRA-ARTICULAR; INTRALESIONAL; INTRAMUSCULAR; INTRAVENOUS; SOFT TISSUE ONCE
Status: COMPLETED | OUTPATIENT
Start: 2025-02-17 | End: 2025-02-17

## 2025-02-17 RX ADMIN — DEXAMETHASONE SODIUM PHOSPHATE 10 MG: 10 INJECTION, SOLUTION INTRA-ARTICULAR; INTRALESIONAL; INTRAMUSCULAR; INTRAVENOUS; SOFT TISSUE at 16:22

## 2025-02-17 NOTE — PROGRESS NOTES
25  Neha Rios : 1975 Sex: female  Age: 49 y.o.    Chief Complaint   Patient presents with    Other     Sick. Body aches, nausea, weakness, HA       HPI  sick ,body aches ,sorethroat     Review of Systems   HENT:  Positive for postnasal drip, rhinorrhea, sinus pressure, sinus pain and sore throat.    Respiratory:  Positive for cough.    Musculoskeletal:  Positive for arthralgias (aches pain with the flu onset).         Physical Exam  HENT:      Nose: Rhinorrhea present.      Mouth/Throat:      Pharynx: Posterior oropharyngeal erythema present.   Skin:     Comments: Rash hive like          Assessment and Plan:  Neha was seen today for other.    Diagnoses and all orders for this visit:    Body aches  -     POCT COVID-19, Antigen  -     POCT Influenza A/B Antigen  -     dexAMETHasone (DECADRON) injection 10 mg    Influenza A  -     dexAMETHasone (DECADRON) injection 10 mg    Other orders  -     oseltamivir (TAMIFLU) 75 MG capsule; Take 1 capsule by mouth 2 times daily for 5 days          Discussions/Education provided to patients during visit:  [] Discussed the importance to stop smoking.  [] Advised to monitor eating habits.  [] Reviewed and discussed Imaging results.  [] Reviewed and discussed Lab results.  [] Discussed the importance of drinking plenty of fluids.  [] Cut down on Salt, Caffeine, and Sugar.  [] Non Compliant Patient   [x] Communicated with patient any concerns, to phone office.  Influenza A positive   Tests are positive for flu a   Given Tamiflu and Decadron injection  Recheck 1 week as needed    Time spent face to face with patient discussing medications and labs: 30 minutes.        No follow-ups on file.      Seen by:     Royal Truong DO

## 2025-02-24 DIAGNOSIS — M79.7 FIBROMYALGIA: ICD-10-CM

## 2025-02-24 DIAGNOSIS — M47.817 LUMBOSACRAL SPONDYLOSIS WITHOUT MYELOPATHY: ICD-10-CM

## 2025-02-24 DIAGNOSIS — M54.12 CERVICAL RADICULOPATHY: ICD-10-CM

## 2025-02-24 RX ORDER — OXYCODONE AND ACETAMINOPHEN 5; 325 MG/1; MG/1
1 TABLET ORAL EVERY 4 HOURS PRN
Qty: 42 TABLET | Refills: 0 | Status: SHIPPED | OUTPATIENT
Start: 2025-02-24 | End: 2025-03-10

## 2025-02-24 NOTE — TELEPHONE ENCOUNTER
Name of Medication(s) Requested:  Requested Prescriptions     Pending Prescriptions Disp Refills    oxyCODONE-acetaminophen (PERCOCET) 5-325 MG per tablet 42 tablet 0     Sig: Take 1 tablet by mouth every 4 hours as needed for Pain for up to 14 days. Max Daily Amount: 6 tablets       Medication is on current medication list Yes    Dosage and directions were verified? Yes    Quantity verified: 30 day supply     Pharmacy Verified?  Yes    Last Appointment:  2/17/2025    Future appts:  No future appointments.     (If no appt send self scheduling link. .REFILLAPPT)  Scheduling request sent?     [] Yes  [x] No    Does patient need updated?  [] Yes  [x] No

## 2025-03-06 DIAGNOSIS — M79.7 FIBROMYALGIA: ICD-10-CM

## 2025-03-06 DIAGNOSIS — M54.12 CERVICAL RADICULOPATHY: ICD-10-CM

## 2025-03-06 DIAGNOSIS — M47.817 LUMBOSACRAL SPONDYLOSIS WITHOUT MYELOPATHY: ICD-10-CM

## 2025-03-06 RX ORDER — OXYCODONE AND ACETAMINOPHEN 5; 325 MG/1; MG/1
1 TABLET ORAL EVERY 4 HOURS PRN
Qty: 42 TABLET | Refills: 0 | Status: SHIPPED | OUTPATIENT
Start: 2025-03-06 | End: 2025-03-20

## 2025-03-06 NOTE — TELEPHONE ENCOUNTER

## 2025-03-18 DIAGNOSIS — M79.7 FIBROMYALGIA: ICD-10-CM

## 2025-03-18 DIAGNOSIS — M54.12 CERVICAL RADICULOPATHY: ICD-10-CM

## 2025-03-18 DIAGNOSIS — M47.817 LUMBOSACRAL SPONDYLOSIS WITHOUT MYELOPATHY: ICD-10-CM

## 2025-03-18 NOTE — TELEPHONE ENCOUNTER
Name of Medication(s) Requested:  Requested Prescriptions     Pending Prescriptions Disp Refills    oxyCODONE-acetaminophen (PERCOCET) 5-325 MG per tablet 42 tablet 0     Sig: Take 1 tablet by mouth every 4 hours as needed for Pain for up to 14 days. Max Daily Amount: 6 tablets       Medication is on current medication list Yes    Dosage and directions were verified? Yes    Quantity verified: 30 day supply     Pharmacy Verified?  Yes    Last Appointment:  2/17/2025    Future appts:  Future Appointments   Date Time Provider Department Center   3/28/2025 11:45 AM Joel Sanchez MD [facility]        (If no appt send self scheduling link. .REFILLAPPT)  Scheduling request sent?     [] Yes  [x] No    Does patient need updated?  [] Yes  [x] No

## 2025-03-19 DIAGNOSIS — M79.7 FIBROMYALGIA: ICD-10-CM

## 2025-03-19 DIAGNOSIS — M54.12 CERVICAL RADICULOPATHY: ICD-10-CM

## 2025-03-19 DIAGNOSIS — M47.817 LUMBOSACRAL SPONDYLOSIS WITHOUT MYELOPATHY: ICD-10-CM

## 2025-03-19 RX ORDER — OXYCODONE AND ACETAMINOPHEN 5; 325 MG/1; MG/1
1 TABLET ORAL EVERY 4 HOURS PRN
Qty: 42 TABLET | Refills: 0 | Status: SHIPPED | OUTPATIENT
Start: 2025-03-19 | End: 2025-04-02

## 2025-03-19 RX ORDER — OXYCODONE AND ACETAMINOPHEN 5; 325 MG/1; MG/1
1 TABLET ORAL EVERY 4 HOURS PRN
Qty: 42 TABLET | Refills: 0 | OUTPATIENT
Start: 2025-03-19 | End: 2025-04-02

## 2025-03-19 NOTE — TELEPHONE ENCOUNTER
Name of Medication(s) Requested:  Requested Prescriptions     Pending Prescriptions Disp Refills    oxyCODONE-acetaminophen (PERCOCET) 5-325 MG per tablet 42 tablet 0     Sig: Take 1 tablet by mouth every 4 hours as needed for Pain for up to 14 days. Max Daily Amount: 6 tablets       Medication is on current medication list Yes    Dosage and directions were verified? Yes    Quantity verified: 30 day supply     Pharmacy Verified?  Yes    Last Appointment:  2/17/2025    Future appts:  Future Appointments   Date Time Provider Department Center   3/28/2025 11:45 AM Joel Sanchez MD [facility]        (If no appt send self scheduling link. .REFILLAPPT)  Scheduling request sent?     [] Yes  [x] No    Does patient need updated?  [] Yes  [x] No

## 2025-03-26 RX ORDER — OXCARBAZEPINE 150 MG/1
150 TABLET, FILM COATED ORAL 2 TIMES DAILY
Qty: 180 TABLET | Refills: 1 | Status: SHIPPED | OUTPATIENT
Start: 2025-03-26

## 2025-03-26 NOTE — TELEPHONE ENCOUNTER
Name of Medication(s) Requested:  Requested Prescriptions     Pending Prescriptions Disp Refills    OXcarbazepine (TRILEPTAL) 150 MG tablet [Pharmacy Med Name: OXCARBAZEPINE 150 MG TABLET] 60 tablet 0     Sig: TAKE 1 TABLET BY MOUTH TWICE A DAY       Medication is on current medication list Yes    Dosage and directions were verified? Yes    Quantity verified: 90 day supply     Pharmacy Verified?  Yes    Last Appointment:  2/17/2025    Future appts:  Future Appointments   Date Time Provider Department Center   3/28/2025 11:45 AM Joel Sanchez MD [facility]        (If no appt send self scheduling link. .REFILLAPPT)  Scheduling request sent?     [] Yes  [x] No    Does patient need updated?  [] Yes  [x] No

## 2025-03-28 ENCOUNTER — PREP FOR PROCEDURE (OUTPATIENT)
Dept: PAIN MANAGEMENT | Age: 50
End: 2025-03-28

## 2025-03-28 ENCOUNTER — OFFICE VISIT (OUTPATIENT)
Dept: PAIN MANAGEMENT | Age: 50
End: 2025-03-28
Payer: COMMERCIAL

## 2025-03-28 VITALS
WEIGHT: 252 LBS | OXYGEN SATURATION: 96 % | HEIGHT: 67 IN | TEMPERATURE: 96.8 F | RESPIRATION RATE: 18 BRPM | HEART RATE: 86 BPM | BODY MASS INDEX: 39.55 KG/M2 | SYSTOLIC BLOOD PRESSURE: 126 MMHG | DIASTOLIC BLOOD PRESSURE: 88 MMHG

## 2025-03-28 DIAGNOSIS — M50.30 DDD (DEGENERATIVE DISC DISEASE), CERVICAL: ICD-10-CM

## 2025-03-28 DIAGNOSIS — M54.12 CERVICAL RADICULOPATHY: ICD-10-CM

## 2025-03-28 DIAGNOSIS — M79.7 FIBROMYALGIA: ICD-10-CM

## 2025-03-28 DIAGNOSIS — M47.817 LUMBOSACRAL SPONDYLOSIS WITHOUT MYELOPATHY: Primary | ICD-10-CM

## 2025-03-28 DIAGNOSIS — M47.817 LUMBOSACRAL SPONDYLOSIS WITHOUT MYELOPATHY: ICD-10-CM

## 2025-03-28 DIAGNOSIS — M47.812 CERVICAL SPONDYLOSIS: ICD-10-CM

## 2025-03-28 DIAGNOSIS — F11.90 CHRONIC, CONTINUOUS USE OF OPIOIDS: ICD-10-CM

## 2025-03-28 DIAGNOSIS — E66.9 OBESITY, UNSPECIFIED CLASS, UNSPECIFIED OBESITY TYPE, UNSPECIFIED WHETHER SERIOUS COMORBIDITY PRESENT: ICD-10-CM

## 2025-03-28 DIAGNOSIS — M51.369 DEGENERATION OF INTERVERTEBRAL DISC OF LUMBAR REGION, UNSPECIFIED WHETHER PAIN PRESENT: ICD-10-CM

## 2025-03-28 PROCEDURE — 99214 OFFICE O/P EST MOD 30 MIN: CPT

## 2025-03-28 PROCEDURE — 99214 OFFICE O/P EST MOD 30 MIN: CPT | Performed by: ANESTHESIOLOGY

## 2025-03-28 RX ORDER — OXYCODONE AND ACETAMINOPHEN 5; 325 MG/1; MG/1
1 TABLET ORAL EVERY 4 HOURS PRN
Qty: 42 TABLET | Refills: 0 | Status: SHIPPED | OUTPATIENT
Start: 2025-03-28 | End: 2025-04-11

## 2025-03-28 RX ORDER — SODIUM CHLORIDE 9 MG/ML
INJECTION, SOLUTION INTRAVENOUS PRN
Status: CANCELLED | OUTPATIENT
Start: 2025-03-28

## 2025-03-28 RX ORDER — SODIUM CHLORIDE 0.9 % (FLUSH) 0.9 %
5-40 SYRINGE (ML) INJECTION PRN
Status: CANCELLED | OUTPATIENT
Start: 2025-03-28

## 2025-03-28 RX ORDER — SODIUM CHLORIDE 0.9 % (FLUSH) 0.9 %
5-40 SYRINGE (ML) INJECTION EVERY 12 HOURS SCHEDULED
Status: CANCELLED | OUTPATIENT
Start: 2025-03-28

## 2025-03-28 NOTE — PROGRESS NOTES
[facility]        [address] [place] [id number]  Dept: [phone]    Follow up Note      Neha Rios     Date of Visit:  3/28/2025    CC:  Patient presents for follow up   Chief Complaint   Patient presents with    Follow-up     RIGHT LUMBAR 5 AND SACRAL 1 TRANSFORAMINAL EPIDURAL STEROID INJECTION UNDER FLUOROSCOPIC GUIDANCE   8/15/24     HPI:  Low back/ LE pain.    Prior RFA had provided excellent pain relief.    Prior TFESI had helped significantly.       Nursing notes and details of the pain history reviewed. Please see intake notes for details.    Note: prior treatment for neck pain. S/P ALDO excellent pain relief. Neck pain is stable.    NOTE:  H/o psoriatic arthritis- on Methotrexate  S/p Gastric bypass in 2009  H/o fibromyalgia- diffuse pain     Previous treatments:   Physical Therapy/ HEP : yes, has been doing HEP since the onset      Medications: - NSAID's : yes                        - Membrane stabilizers : yes - Gabapentin                       - Opioids : yes, short term                       - Adjuvants or Others : yes, oral steroids/ muscle relaxants     Surgeries: no C spine surgery     She has not been on anticoagulation medications      She is not diabetic.     H/O Smoking: no  H/O alcohol abuse : no  H/O Illicit drug use : no     Employment: employed     Imaging:     MRI of LS spine: 12/22/2022:  FINDINGS:   No evidence of lumbar spine fracture.  There is retrolisthesis of L2 on L3 of   approximately 3 mm.  Vertebral body height and interspaces appear well   maintained.  Conus medullaris is visualized and appears unremarkable.  No   evidence of epidural mass or hematoma.       L1/L2: No central canal stenosis or neural foraminal narrowing.  There is   moderate bilateral facet hypertrophy.       L2/L3: Anterior disc bulge present with anterior osteophytosis.  3 mm of   retrolisthesis of L2 on L3.  Moderate bilateral facet hypertrophy and   hypertrophy of

## 2025-03-28 NOTE — TELEPHONE ENCOUNTER
Name of Medication(s) Requested:  Requested Prescriptions     Pending Prescriptions Disp Refills    oxyCODONE-acetaminophen (PERCOCET) 5-325 MG per tablet 42 tablet 0     Sig: Take 1 tablet by mouth every 4 hours as needed for Pain for up to 14 days. Max Daily Amount: 6 tablets       Medication is on current medication list Yes    Dosage and directions were verified? Yes    Quantity verified: 30 day supply     Pharmacy Verified?  Yes    Last Appointment:  2/17/2025    Future appts:  Future Appointments   Date Time Provider Department Center   5/28/2025  2:00 PM Joel Sanchez MD [facility]        (If no appt send self scheduling link. .REFILLAPPT)  Scheduling request sent?     [] Yes  [x] No    Does patient need updated?  [] Yes  [x] No

## 2025-03-28 NOTE — PROGRESS NOTES
Neha Rios presents to the [facility] on 3/28/2025. Neha is complaining of pain in her lower back  that radiates to BLE and with diffuse muscle pain. The pain is constant. The pain is described as aching, throbbing, shooting, and sharp. Pain is rated on her best day at a 3, on her worst day at a 9, and on average at a 5 on the VAS scale. She took her last dose of ibuprofen, Percocet, Lyrica, and Zanaflex today.      Any procedures since your last visit: Yes, with 80 % relief for 5-6 months    She is  on NSAIDS and  is not on anticoagulation medications to include none and is managed by NA.     Pacemaker or defibrillator: No    Medication Contract and Consent for Opioid Use Documents Filed       Patient Documents       Type of Document Status Date Received Received By Description    Medication Contract Received 1/19/2023 11:34 AM EZIO VILLANUEVA 1/6/23 Medication Contract    Medication Contract Received 3/19/2024 10:46 AM JEANIE CHIN Medication contract    Consent Opioid Use Received 1/31/2025 10:16 AM ELIS MARVIN Pain contract 03.18.24                       Resp 18   Ht 1.702 m (5' 7.01\")   Wt 114.3 kg (252 lb)   BMI 39.46 kg/m²      No LMP recorded.

## 2025-04-01 RX ORDER — ONDANSETRON 4 MG/1
TABLET, ORALLY DISINTEGRATING ORAL
Qty: 21 TABLET | Refills: 3 | Status: SHIPPED | OUTPATIENT
Start: 2025-04-01

## 2025-04-01 NOTE — TELEPHONE ENCOUNTER
Name of Medication(s) Requested:  Requested Prescriptions     Pending Prescriptions Disp Refills    ondansetron (ZOFRAN-ODT) 4 MG disintegrating tablet 21 tablet 3     Sig: dissolve 1 tablet by mouth every 8 hours if needed for nausea and vomiting       Medication is on current medication list Yes    Dosage and directions were verified? Yes    Quantity verified: 30 day supply     Pharmacy Verified?  Yes    Last Appointment:  2/17/2025    Future appts:  Future Appointments   Date Time Provider Department Center   5/28/2025  2:00 PM Joel Sanchez MD [facility]        (If no appt send self scheduling link. .REFILLAPPT)  Scheduling request sent?     [] Yes  [x] No    Does patient need updated?  [] Yes  [x] No

## 2025-04-04 DIAGNOSIS — M79.7 FIBROMYALGIA: ICD-10-CM

## 2025-04-04 RX ORDER — PREGABALIN 100 MG/1
100 CAPSULE ORAL 2 TIMES DAILY
Qty: 180 CAPSULE | Refills: 1 | Status: SHIPPED | OUTPATIENT
Start: 2025-04-04 | End: 2025-10-01

## 2025-04-04 NOTE — TELEPHONE ENCOUNTER
Name of Medication(s) Requested:  Requested Prescriptions     Pending Prescriptions Disp Refills    pregabalin (LYRICA) 100 MG capsule 180 capsule 1     Sig: Take 1 capsule by mouth 2 times daily for 180 days. Max Daily Amount: 200 mg       Medication is on current medication list Yes    Dosage and directions were verified? Yes    Quantity verified: 90 day supply     Pharmacy Verified?  Yes    Last Appointment:  2/17/2025    Future appts:  Future Appointments   Date Time Provider Department Center   5/28/2025  2:00 PM Joel Sanchez MD [facility]        (If no appt send self scheduling link. .REFILLAPPT)  Scheduling request sent?     [] Yes  [x] No    Does patient need updated?  [] Yes  [x] No

## 2025-04-10 ENCOUNTER — TELEPHONE (OUTPATIENT)
Dept: PAIN MANAGEMENT | Age: 50
End: 2025-04-10

## 2025-04-10 DIAGNOSIS — M47.817 LUMBOSACRAL SPONDYLOSIS WITHOUT MYELOPATHY: Primary | ICD-10-CM

## 2025-04-10 DIAGNOSIS — M79.7 FIBROMYALGIA: ICD-10-CM

## 2025-04-10 DIAGNOSIS — M47.817 LUMBOSACRAL SPONDYLOSIS WITHOUT MYELOPATHY: ICD-10-CM

## 2025-04-10 DIAGNOSIS — M54.12 CERVICAL RADICULOPATHY: ICD-10-CM

## 2025-04-10 RX ORDER — OXYCODONE AND ACETAMINOPHEN 5; 325 MG/1; MG/1
1 TABLET ORAL EVERY 4 HOURS PRN
Qty: 42 TABLET | Refills: 0 | Status: SHIPPED | OUTPATIENT
Start: 2025-04-10 | End: 2025-04-24

## 2025-04-10 NOTE — TELEPHONE ENCOUNTER
Pt states that it is still for the same problems that you prescribed it for in the first place and it does last her the 12-14 days sometimes she calls after 10 days just to make sure it gets to you in time before she is out but it is still lasting her the 12- 14 days.

## 2025-04-10 NOTE — TELEPHONE ENCOUNTER
Name of Medication(s) Requested:  Requested Prescriptions      No prescriptions requested or ordered in this encounter       Medication is on current medication list Yes    Dosage and directions were verified? Yes    Quantity verified: 90 day supply     Pharmacy Verified?  Yes    Last Appointment:  2/17/2025    Future appts:  Future Appointments   Date Time Provider Department Center   5/28/2025  2:00 PM Joel Sanchez MD [facility]        (If no appt send self scheduling link. .REFILLAPPT)  Scheduling request sent?     [] Yes  [x] No    Does patient need updated?  [] Yes  [x] No

## 2025-04-10 NOTE — TELEPHONE ENCOUNTER
Call to Neha Rios that procedure was scheduled for 4/17/2025 and that [facility] should call her a few days before for the pre op call and between 2:00 PM and 4:00 PM  the business day before with the arrival time. Instructed Neha to hold ibuprofen for 24 hours, Celebrex, Mobic, and naprosyn for 4 days and any aspirin containing products, CoQ 10, or fish oil for 7 days. Instructed to call office back if any questions. Neha verbalized understanding.    Electronically signed by Desean Gill RN on 4/10/2025 at 1:19 PM

## 2025-04-14 NOTE — PROGRESS NOTES
[facility] PRE-ADMISSION TESTING INSTRUCTIONS    The Preadmission Testing patient is instructed accordingly using the following criteria (check applicable):    ARRIVAL INSTRUCTIONS:  [x] Parking the day of Surgery is located in the Main Entrance lot.  Upon entering the door, make an immediate right to the surgery reception desk    [x] Bring photo ID and insurance card    [] Bring in a copy of Living will or Durable Power of  papers.    [x] Please be sure to arrange for responsible adult to provide transportation to and from the hospital    [x] Please arrange for responsible adult to be with you for the 24 hour period post procedure due to having anesthesia    [x] If you awake am of surgery not feeling well or have temperature >100 please call [phone]    GENERAL INSTRUCTIONS:    [x] May have clear liquids until 4 hours prior to surgery. Examples include water, fruit juices (no pulp), jello, popsicles, black coffee or tea, beef or chicken broth.               No gum, candy or mints.    [x] You may brush your teeth, but do not swallow any water    [x] Take medications as instructed with 1-2 oz of water (TRILEPTAL, PREGABLIN, EFFEXOR, REQUIP, AND OMEPRAZOLE. PERCOCET IF NEEDED)    [x] Stop herbal supplements and vitamins 5 days prior to procedure    [] Follow preop dosing of blood thinners per physician instructions    [] Take 1/2 dose of evening insulin, but no insulin after midnight    [] No oral diabetic medications after midnight    [] If diabetic and have low blood sugar or feel symptomatic, take 1-2oz apple juice only    [x] Bring inhalers day of surgery    [] Bring C-PAP/ Bi-Pap day of surgery    [x] Bring urine specimen day of surgery    [x] Shower or bath with soap, lather and rinse well, AM of Surgery, no lotion, powders or creams to surgical site    [] Follow bowel prep as instructed per surgeon    [x] No tobacco products within 24 hours of surgery     [x] No alcohol or

## 2025-04-18 DIAGNOSIS — M54.12 CERVICAL RADICULOPATHY: ICD-10-CM

## 2025-04-18 DIAGNOSIS — M79.7 FIBROMYALGIA: ICD-10-CM

## 2025-04-18 DIAGNOSIS — M47.817 LUMBOSACRAL SPONDYLOSIS WITHOUT MYELOPATHY: ICD-10-CM

## 2025-04-18 RX ORDER — OXYCODONE AND ACETAMINOPHEN 5; 325 MG/1; MG/1
1 TABLET ORAL EVERY 4 HOURS PRN
Qty: 42 TABLET | Refills: 0 | Status: SHIPPED | OUTPATIENT
Start: 2025-04-18 | End: 2025-05-02

## 2025-04-21 ENCOUNTER — TELEPHONE (OUTPATIENT)
Dept: FAMILY MEDICINE CLINIC | Age: 50
End: 2025-04-21

## 2025-04-21 RX ORDER — ROPINIROLE 2 MG/1
2 TABLET, FILM COATED ORAL 3 TIMES DAILY
Qty: 270 TABLET | Refills: 1 | Status: SHIPPED | OUTPATIENT
Start: 2025-04-21

## 2025-04-21 RX ORDER — ROPINIROLE 2 MG/1
2 TABLET, FILM COATED ORAL 3 TIMES DAILY
Qty: 270 TABLET | Refills: 1 | OUTPATIENT
Start: 2025-04-21

## 2025-04-21 NOTE — TELEPHONE ENCOUNTER
Name of Medication(s) Requested:  Requested Prescriptions     Pending Prescriptions Disp Refills    rOPINIRole (REQUIP) 2 MG tablet 270 tablet 1     Sig: Take 1 tablet by mouth 3 times daily       Medication is on current medication list Yes    Dosage and directions were verified? Yes    Quantity verified: 90 day supply     Pharmacy Verified?  Yes    Last Appointment:  2/17/2025    Future appts:  Future Appointments   Date Time Provider Department Center   5/28/2025  2:00 PM Joel Sanchez MD [facility]        (If no appt send self scheduling link. .REFILLAPPT)  Scheduling request sent?     [] Yes  [x] No    Does patient need updated?  [] Yes  [x] No

## 2025-04-28 NOTE — PROGRESS NOTES
[facility] PRE-ADMISSION TESTING INSTRUCTIONS    The Preadmission Testing patient is instructed accordingly using the following criteria (check applicable):    ARRIVAL INSTRUCTIONS:  [x] Parking the day of Surgery is located in the Main Entrance lot.  Upon entering the door, make an immediate right to the surgery reception desk    [x] Bring photo ID and insurance card    [] Bring in a copy of Living will or Durable Power of  papers.    [x] Please be sure to arrange for responsible adult to provide transportation to and from the hospital    [x] Please arrange for responsible adult to be with you for the 24 hour period post procedure due to having anesthesia    [x] If you awake am of surgery not feeling well or have temperature >100 please call [phone]    GENERAL INSTRUCTIONS:    [x] May have clear liquids until 4 hours prior to surgery. Examples include water, fruit juices (no pulp), jello, popsicles, black coffee or tea, beef or chicken broth.               No gum, candy or mints.    [x] You may brush your teeth, but do not swallow any water    [x] Take medications as instructed with 1-2 oz of water (PERCOCET IF NEEDED. TRILEPTAL, LYRICA, EFFEXOR, PRILOSEC)    [x] Stop herbal supplements and vitamins 5 days prior to procedure    [] Follow preop dosing of blood thinners per physician instructions    [] Take 1/2 dose of evening insulin, but no insulin after midnight    [] No oral diabetic medications after midnight    [] If diabetic and have low blood sugar or feel symptomatic, take 1-2oz apple juice only    [] Bring inhalers day of surgery    [] Bring C-PAP/ Bi-Pap day of surgery    [] Bring urine specimen day of surgery    [x] Shower or bath with soap, lather and rinse well, AM of Surgery, no lotion, powders or creams to surgical site    [] Follow bowel prep as instructed per surgeon    [x] No tobacco products within 24 hours of surgery     [x] No alcohol or illegal drug use

## 2025-05-01 ENCOUNTER — HOSPITAL ENCOUNTER (OUTPATIENT)
Dept: GENERAL RADIOLOGY | Age: 50
Setting detail: OUTPATIENT SURGERY
Discharge: HOME | End: 2025-05-03
Attending: ANESTHESIOLOGY
Payer: COMMERCIAL

## 2025-05-01 ENCOUNTER — ANESTHESIA EVENT (OUTPATIENT)
Dept: OPERATING ROOM | Age: 50
End: 2025-05-01
Payer: COMMERCIAL

## 2025-05-01 ENCOUNTER — HOSPITAL ENCOUNTER (OUTPATIENT)
Age: 50
Setting detail: OUTPATIENT SURGERY
Discharge: HOME | End: 2025-05-01
Attending: ANESTHESIOLOGY | Admitting: ANESTHESIOLOGY
Payer: COMMERCIAL

## 2025-05-01 ENCOUNTER — ANESTHESIA (OUTPATIENT)
Dept: OPERATING ROOM | Age: 50
End: 2025-05-01
Payer: COMMERCIAL

## 2025-05-01 VITALS
HEIGHT: 67 IN | BODY MASS INDEX: 37.67 KG/M2 | SYSTOLIC BLOOD PRESSURE: 167 MMHG | HEART RATE: 72 BPM | DIASTOLIC BLOOD PRESSURE: 97 MMHG | RESPIRATION RATE: 18 BRPM | TEMPERATURE: 97 F | OXYGEN SATURATION: 97 % | WEIGHT: 240 LBS

## 2025-05-01 DIAGNOSIS — M47.817 LUMBOSACRAL SPONDYLOSIS WITHOUT MYELOPATHY: ICD-10-CM

## 2025-05-01 DIAGNOSIS — M54.12 CERVICAL RADICULOPATHY: ICD-10-CM

## 2025-05-01 DIAGNOSIS — R52 PAIN MANAGEMENT: ICD-10-CM

## 2025-05-01 DIAGNOSIS — M79.7 FIBROMYALGIA: ICD-10-CM

## 2025-05-01 LAB
HCG, URINE, POC: NEGATIVE
Lab: NORMAL
NEGATIVE QC PASS/FAIL: NORMAL
POSITIVE QC PASS/FAIL: NORMAL

## 2025-05-01 PROCEDURE — 7100000011 HC PHASE II RECOVERY - ADDTL 15 MIN: Performed by: ANESTHESIOLOGY

## 2025-05-01 PROCEDURE — 3700000000 HC ANESTHESIA ATTENDED CARE: Performed by: ANESTHESIOLOGY

## 2025-05-01 PROCEDURE — 2580000003 HC RX 258: Performed by: NURSE ANESTHETIST, CERTIFIED REGISTERED

## 2025-05-01 PROCEDURE — 6360000002 HC RX W HCPCS: Performed by: ANESTHESIOLOGY

## 2025-05-01 PROCEDURE — 6360000002 HC RX W HCPCS: Performed by: NURSE ANESTHETIST, CERTIFIED REGISTERED

## 2025-05-01 PROCEDURE — 64635 DESTROY LUMB/SAC FACET JNT: CPT | Performed by: ANESTHESIOLOGY

## 2025-05-01 PROCEDURE — 64636 DESTROY L/S FACET JNT ADDL: CPT | Performed by: ANESTHESIOLOGY

## 2025-05-01 PROCEDURE — 6360000002 HC RX W HCPCS

## 2025-05-01 PROCEDURE — 3600000012 HC SURGERY LEVEL 2 ADDTL 15MIN: Performed by: ANESTHESIOLOGY

## 2025-05-01 PROCEDURE — 3700000001 HC ADD 15 MINUTES (ANESTHESIA): Performed by: ANESTHESIOLOGY

## 2025-05-01 PROCEDURE — 7100000010 HC PHASE II RECOVERY - FIRST 15 MIN: Performed by: ANESTHESIOLOGY

## 2025-05-01 PROCEDURE — 2709999900 HC NON-CHARGEABLE SUPPLY: Performed by: ANESTHESIOLOGY

## 2025-05-01 PROCEDURE — 3600000002 HC SURGERY LEVEL 2 BASE: Performed by: ANESTHESIOLOGY

## 2025-05-01 RX ORDER — SODIUM CHLORIDE 9 MG/ML
INJECTION, SOLUTION INTRAVENOUS
Status: DISCONTINUED | OUTPATIENT
Start: 2025-05-01 | End: 2025-05-01 | Stop reason: SDUPTHER

## 2025-05-01 RX ORDER — SODIUM CHLORIDE 9 MG/ML
INJECTION, SOLUTION INTRAVENOUS PRN
Status: DISCONTINUED | OUTPATIENT
Start: 2025-05-01 | End: 2025-05-01 | Stop reason: HOSPADM

## 2025-05-01 RX ORDER — LIDOCAINE HYDROCHLORIDE 10 MG/ML
INJECTION, SOLUTION EPIDURAL; INFILTRATION; INTRACAUDAL; PERINEURAL PRN
Status: DISCONTINUED | OUTPATIENT
Start: 2025-05-01 | End: 2025-05-01 | Stop reason: ALTCHOICE

## 2025-05-01 RX ORDER — OXYCODONE AND ACETAMINOPHEN 5; 325 MG/1; MG/1
1 TABLET ORAL EVERY 4 HOURS PRN
Qty: 42 TABLET | Refills: 0 | Status: SHIPPED | OUTPATIENT
Start: 2025-05-01 | End: 2025-05-15

## 2025-05-01 RX ORDER — LIDOCAINE HYDROCHLORIDE 5 MG/ML
INJECTION, SOLUTION INFILTRATION; INTRAVENOUS PRN
Status: DISCONTINUED | OUTPATIENT
Start: 2025-05-01 | End: 2025-05-01 | Stop reason: ALTCHOICE

## 2025-05-01 RX ORDER — SODIUM CHLORIDE 0.9 % (FLUSH) 0.9 %
5-40 SYRINGE (ML) INJECTION EVERY 12 HOURS SCHEDULED
Status: DISCONTINUED | OUTPATIENT
Start: 2025-05-01 | End: 2025-05-01 | Stop reason: HOSPADM

## 2025-05-01 RX ORDER — BUPIVACAINE HYDROCHLORIDE 2.5 MG/ML
INJECTION, SOLUTION EPIDURAL; INFILTRATION; INTRACAUDAL; PERINEURAL PRN
Status: DISCONTINUED | OUTPATIENT
Start: 2025-05-01 | End: 2025-05-01 | Stop reason: ALTCHOICE

## 2025-05-01 RX ORDER — METHYLPREDNISOLONE ACETATE 40 MG/ML
INJECTION, SUSPENSION INTRA-ARTICULAR; INTRALESIONAL; INTRAMUSCULAR; SOFT TISSUE PRN
Status: DISCONTINUED | OUTPATIENT
Start: 2025-05-01 | End: 2025-05-01 | Stop reason: ALTCHOICE

## 2025-05-01 RX ORDER — FENTANYL CITRATE 50 UG/ML
INJECTION, SOLUTION INTRAMUSCULAR; INTRAVENOUS
Status: DISCONTINUED | OUTPATIENT
Start: 2025-05-01 | End: 2025-05-01 | Stop reason: SDUPTHER

## 2025-05-01 RX ORDER — MIDAZOLAM HYDROCHLORIDE 1 MG/ML
INJECTION, SOLUTION INTRAMUSCULAR; INTRAVENOUS
Status: DISCONTINUED | OUTPATIENT
Start: 2025-05-01 | End: 2025-05-01 | Stop reason: SDUPTHER

## 2025-05-01 RX ORDER — SODIUM CHLORIDE 0.9 % (FLUSH) 0.9 %
5-40 SYRINGE (ML) INJECTION PRN
Status: DISCONTINUED | OUTPATIENT
Start: 2025-05-01 | End: 2025-05-01 | Stop reason: HOSPADM

## 2025-05-01 RX ADMIN — FENTANYL CITRATE 50 MCG: 50 INJECTION, SOLUTION INTRAMUSCULAR; INTRAVENOUS at 14:01

## 2025-05-01 RX ADMIN — SODIUM CHLORIDE: 9 INJECTION, SOLUTION INTRAVENOUS at 13:54

## 2025-05-01 RX ADMIN — FENTANYL CITRATE 50 MCG: 50 INJECTION, SOLUTION INTRAMUSCULAR; INTRAVENOUS at 13:58

## 2025-05-01 RX ADMIN — MIDAZOLAM 1 MG: 1 INJECTION INTRAMUSCULAR; INTRAVENOUS at 14:01

## 2025-05-01 RX ADMIN — MIDAZOLAM 1 MG: 1 INJECTION INTRAMUSCULAR; INTRAVENOUS at 13:58

## 2025-05-01 ASSESSMENT — PAIN - FUNCTIONAL ASSESSMENT: PAIN_FUNCTIONAL_ASSESSMENT: 0-10

## 2025-05-01 NOTE — ANESTHESIA PRE PROCEDURE
Department of Anesthesiology  Preprocedure Note       Name:  Neha Rios   Age:  49 y.o.  :  1975                                          MRN:  [id number]         Date:  2025      Surgeon: Surgeon(s):  Joel Sanchez MD    Procedure: Procedure(s):  BILATERAL LUMBAR RADIOFREQUENCY ABLATION AT LUMBAR 3, LUMBAR 4 AND LUMBAR 5 DORSAL RAMI UNDER FLUOROSCOPY    Medications prior to admission:   Prior to Admission medications    Medication Sig Start Date End Date Taking? Authorizing Provider   folic acid (FOLVITE) 1 MG tablet Take 1 tablet by mouth daily 24  Yes Meggan Sanchez, APRN - CNP   Multiple Vitamin (MULTIVITAMIN PO) Take 1 tablet by mouth daily    Yes Provider, MD Ellie   rOPINIRole (REQUIP) 2 MG tablet Take 1 tablet by mouth 3 times daily 25   Royal Truong DO   oxyCODONE-acetaminophen (PERCOCET) 5-325 MG per tablet Take 1 tablet by mouth every 4 hours as needed for Pain for up to 14 days. Max Daily Amount: 6 tablets 25  Royal Truong DO   pregabalin (LYRICA) 100 MG capsule Take 1 capsule by mouth 2 times daily for 180 days. Max Daily Amount: 200 mg 4/4/25 10/1/25  Royal Truong DO   ondansetron (ZOFRAN-ODT) 4 MG disintegrating tablet dissolve 1 tablet by mouth every 8 hours if needed for nausea and vomiting 25   Royal Truong DO   OXcarbazepine (TRILEPTAL) 150 MG tablet TAKE 1 TABLET BY MOUTH TWICE A DAY 3/26/25   Royal Truong DO   tiZANidine (ZANAFLEX) 4 MG tablet TAKE 1 TABLET BY MOUTH EVERY 6 HOURS IF NEEDED FOR SPASMS AND LEG PAIN 25   Royal Truong DO   venlafaxine (EFFEXOR) 75 MG tablet TAKE 1 TABLET BY MOUTH TWICE A DAY 25   Royal Truong DO   omeprazole (PRILOSEC) 40 MG delayed release capsule TAKE 1 CAPSULE BY MOUTH TWICE A DAY 25   Royal Truong DO   lisinopril (PRINIVIL;ZESTRIL) 10 MG tablet TAKE 1 TABLET BY MOUTH EVERY DAY 24   Meggan Sanchez, APRN - CNP   rosuvastatin (CRESTOR) 5 MG tablet Take 1 tablet

## 2025-05-01 NOTE — OP NOTE
Operative Note      Patient: Neha Rios  YOB: 1975  MRN: [id number]    Date of Procedure: 2025    Pre-Op Diagnosis Codes:      * Lumbosacral spondylosis without myelopathy [M47.817]    Post-Op Diagnosis: Same       Procedure(s):  BILATERAL LUMBAR RADIOFREQUENCY ABLATION AT LUMBAR 3, LUMBAR 4 AND LUMBAR 5 DORSAL RAMI UNDER FLUOROSCOPY    Surgeon(s):  Joel Sanchez MD    Assistant:   * No surgical staff found *    Anesthesia: Monitor Anesthesia Care    Estimated Blood Loss (mL): Minimal    Complications: None    Specimens:   * No specimens in log *    Implants:  * No implants in log *      Drains: * No LDAs found *    Findings:  Infection Present At Time Of Surgery (PATOS) (choose all levels that have infection present):  No infection present  Other Findings: good needle placement    Detailed Description of Procedure:   2025    Patient: Neha Rios  :  1975  Age:  49 y.o.  Sex:  female     PRE-OPERATIVE DIAGNOSIS:  Lumbar spondylosis, lumbar facet arthropathy.    POST-OPERATIVE DIAGNOSIS: Same.    PROCEDURE:  Fluoroscopic-guided Bilateral  Lumbar facet medial branch radiofrequency ablation at levels L3, L4, L5     SURGEON:  Joel Sanchez MD    ANESTHESIA: MAC    ESTIMATED BLOOD LOSS: None.  ______________________________________________________________________  HISTORY & PHYSICAL: Neha Rios presents today for fluoroscopic-guided Bilateral lumbar facet medial branch radiofrequency ablation at levels L3, L4, L5  The potential complications of the procedure were explained to Neha again today and she has elected to undergo the aforementioned procedure.    Neha’s complete History & Physical examination were reviewed in depth, a copy of which is in the chart.      DESCRIPTION OF PROCEDURE:    After confirming written and informed consent, a time-out was performed and Neha’s name and date of birth, the procedure to be performed as well as the plan for the location of

## 2025-05-01 NOTE — ANESTHESIA POSTPROCEDURE EVALUATION
Department of Anesthesiology  Postprocedure Note    Patient: Neha Rios  MRN: [id number]  YOB: 1975  Date of evaluation: 5/1/2025    Procedure Summary       Date: 05/01/25 Room / Location: [facility] PROCEDURE ROOM 02 / [facility]    Anesthesia Start: 1354 Anesthesia Stop: 1420    Procedure: BILATERAL LUMBAR RADIOFREQUENCY ABLATION AT LUMBAR 3, LUMBAR 4 AND LUMBAR 5 DORSAL RAMI UNDER FLUOROSCOPY (Bilateral: Spine Lumbar) Diagnosis:       Lumbosacral spondylosis without myelopathy      (Lumbosacral spondylosis without myelopathy [M47.817])    Surgeons: Joel Sanchez MD Responsible Provider: Debbie Ott DO    Anesthesia Type: MAC ASA Status: 3            Anesthesia Type: MAC    Enrique Phase I:      Enrique Phase II: Enrique Score: 10    Anesthesia Post Evaluation    Patient location during evaluation: PACU  Patient participation: complete - patient participated  Level of consciousness: awake and alert  Nausea & Vomiting: no vomiting and no nausea  Cardiovascular status: hemodynamically stable  Respiratory status: acceptable and spontaneous ventilation  Hydration status: stable  Pain management: adequate    No notable events documented.

## 2025-05-01 NOTE — DISCHARGE INSTRUCTIONS
[facility] Pain Management Department  [place] [phone]  Dr. Daniel Rodriguez   Post-Pain Block/Radiofrequency  Home Going Instructions    1-Go home, rest for the remainder of the day  2-Please do not lift over 20 pounds the day of the injection  3-If you received sedation No: alcohol, driving, operating lawn mowers, plows, tractors or other dangerous equipment until next morning. Do not make important decisions or sign legal documents for 24 hours. You may experience light headedness, dizziness, nausea or sleepiness after sedation. Do not stay alone. A responsible adult must be with you for 24 hours. You could be nauseated from the medications you have received. Your IV site may be sore and bruised.    4-No dietary restrictions     5-Resume all medications the same day, blood thinners to be resumed 24 hours after injection if you were instructed to stop any.    6-Keep the surgical site clean and dry, you may shower the next morning and remove the      dressing.     7- No sitz baths, tub baths or hot tubs/swimming for 24 hours.       8- If you have any pain at the injection site(s), application of an ice pack to the area should be       helpful, 20 minutes on/20 minutes off for next 48 hours.  9- Call [facility] immediately at if you develop.  Fever greater than 100.4 F  Have bleeding or drainage from the puncture site  Have progressive Leg/arm numbness and or weakness  Loss of control of bowel and or bladder (wet/soil yourself)  Severe headache with inability to lift head  10-You may return to work the next day

## 2025-05-01 NOTE — TELEPHONE ENCOUNTER
Name of Medication(s) Requested:  Requested Prescriptions     Pending Prescriptions Disp Refills    oxyCODONE-acetaminophen (PERCOCET) 5-325 MG per tablet 42 tablet 0     Sig: Take 1 tablet by mouth every 4 hours as needed for Pain for up to 14 days. Max Daily Amount: 6 tablets       Medication is on current medication list Yes    Dosage and directions were verified? Yes    Quantity verified: 30 day supply     Pharmacy Verified?  Yes    Last Appointment:  2/17/2025    Future appts:  Future Appointments   Date Time Provider Department Center   5/28/2025  2:00 PM Joel Sanchez MD [facility] PAIN [facility]        (If no appt send self scheduling link. .REFILLAPPT)  Scheduling request sent?     [] Yes  [x] No    Does patient need updated?  [] Yes  [x] No

## 2025-05-01 NOTE — H&P
[facility]      Procedure History & Physical      Neha Rios     HPI:    Patient  is here for Lumbar NB RFA  for low back pain.  Labs/imaging studies reviewed   All question and concerns addressed including R/B/A associated with the procedure    Past Medical History:   Diagnosis Date    Arthritis     psoriatic    Chronic back pain     Depression     GERD (gastroesophageal reflux disease)     Headache(784.0)     Herpes genital     \"years ago\" ; no outbreaks    Hyperlipidemia     Hypertension        Past Surgical History:   Procedure Laterality Date    COLONOSCOPY      NERVE BLOCK Bilateral 01/19/2023    # 1 BILATERAL LUMBAR MEDIAL BRANCH NERVE BLOCK UNDER FLUOROSCOPIC GUIDANCE AT L2, L3, L4 & L5 DORSAL RAMI performed by Joel Sanchez MD at [facility] OR    NERVE BLOCK Bilateral 04/06/2023    # 2 BILATERAL LUMBAR MEDIAL BRANCH NERVE BLOCK UNDER FLUOROSCOPIC GUIDANCE AT L2, L3, L4 MB & L5 DORSAL RAMI performed by Joel Sanchez MD at [facility] OR    PAIN MANAGEMENT PROCEDURE N/A 12/16/2021    CERVICAL EPIDURAL STEROID INJECTION UNDER FLUORO AT C6 - C7 LEFT PARAMEDIAN performed by Joel Sanchez MD at [facility] OR    PAIN MANAGEMENT PROCEDURE Left 02/07/2022    #2 CERVICAL EPIDURAL STEROID INJECTION UNDER FLUOROSCOPIC GUIDANCE AT C6-C7 LEFT PARAMEDIAN performed by Joel Sanchez MD at [facility] OR    PAIN MANAGEMENT PROCEDURE Left 04/07/2022    CERVICAL EPIDURAL STEROID INJECTION C6-C7 LEFT PARAMEDIAN performed by Joel Sanchez MD at [facility] OR    PAIN MANAGEMENT PROCEDURE N/A 12/05/2022    CERVICAL  EPIDURAL STEROID INJECTION UNDER FLUOROSCOPIC GUIDANCE AT C6-C7 performed by Joel Sanchez MD at [facility] OR    PAIN MANAGEMENT PROCEDURE Bilateral 05/04/2023    BILATERAL LUMBAR RADIOFREQUENCY ABLATION AT L2, L3, L4 MB & L5 DORSAL RAMI UNDER FLUOROSCOPY performed by Joel Sanchez MD at [facility] OR    PAIN MANAGEMENT PROCEDURE Bilateral 05/16/2024    BILATERAL LUMBAR RADIOFREQUENCY

## 2025-05-13 DIAGNOSIS — M47.817 LUMBOSACRAL SPONDYLOSIS WITHOUT MYELOPATHY: ICD-10-CM

## 2025-05-13 DIAGNOSIS — M54.12 CERVICAL RADICULOPATHY: ICD-10-CM

## 2025-05-13 DIAGNOSIS — M79.7 FIBROMYALGIA: ICD-10-CM

## 2025-05-13 RX ORDER — OXYCODONE AND ACETAMINOPHEN 5; 325 MG/1; MG/1
1 TABLET ORAL EVERY 4 HOURS PRN
Qty: 42 TABLET | Refills: 0 | Status: SHIPPED | OUTPATIENT
Start: 2025-05-13 | End: 2025-05-27

## 2025-05-16 ENCOUNTER — OFFICE VISIT (OUTPATIENT)
Age: 50
End: 2025-05-16
Payer: COMMERCIAL

## 2025-05-16 VITALS
TEMPERATURE: 97.4 F | HEART RATE: 101 BPM | HEIGHT: 67 IN | WEIGHT: 250.8 LBS | RESPIRATION RATE: 16 BRPM | DIASTOLIC BLOOD PRESSURE: 86 MMHG | OXYGEN SATURATION: 96 % | BODY MASS INDEX: 39.36 KG/M2 | SYSTOLIC BLOOD PRESSURE: 132 MMHG

## 2025-05-16 DIAGNOSIS — E53.8 B12 DEFICIENCY: ICD-10-CM

## 2025-05-16 DIAGNOSIS — J01.90 ACUTE SINUSITIS, RECURRENCE NOT SPECIFIED, UNSPECIFIED LOCATION: ICD-10-CM

## 2025-05-16 DIAGNOSIS — N92.4 MENOPAUSAL BLEEDING: ICD-10-CM

## 2025-05-16 DIAGNOSIS — D64.9 ANEMIA, UNSPECIFIED TYPE: Primary | ICD-10-CM

## 2025-05-16 DIAGNOSIS — M06.9 RHEUMATOID ARTHRITIS, INVOLVING UNSPECIFIED SITE, UNSPECIFIED WHETHER RHEUMATOID FACTOR PRESENT (HCC): ICD-10-CM

## 2025-05-16 LAB
BASOPHILS ABSOLUTE: 0.05 K/UL (ref 0–0.2)
BASOPHILS RELATIVE PERCENT: 1 % (ref 0–2)
EOSINOPHILS ABSOLUTE: 0.15 K/UL (ref 0.05–0.5)
EOSINOPHILS RELATIVE PERCENT: 2 % (ref 0–6)
HCT VFR BLD CALC: 36.7 % (ref 34–48)
HEMOGLOBIN: 11.6 G/DL (ref 11.5–15.5)
IMMATURE GRANULOCYTES %: 0 % (ref 0–5)
IMMATURE GRANULOCYTES ABSOLUTE: 0.03 K/UL (ref 0–0.58)
IRON: 51 UG/DL (ref 37–145)
LYMPHOCYTES ABSOLUTE: 2.95 K/UL (ref 1.5–4)
LYMPHOCYTES RELATIVE PERCENT: 36 % (ref 20–42)
MCH RBC QN AUTO: 30.4 PG (ref 26–35)
MCHC RBC AUTO-ENTMCNC: 31.6 G/DL (ref 32–34.5)
MCV RBC AUTO: 96.3 FL (ref 80–99.9)
MONOCYTES ABSOLUTE: 0.54 K/UL (ref 0.1–0.95)
MONOCYTES RELATIVE PERCENT: 7 % (ref 2–12)
NEUTROPHILS ABSOLUTE: 4.55 K/UL (ref 1.8–7.3)
NEUTROPHILS RELATIVE PERCENT: 55 % (ref 43–80)
PDW BLD-RTO: 12.6 % (ref 11.5–15)
PLATELET # BLD: 274 K/UL (ref 130–450)
PMV BLD AUTO: 9.5 FL (ref 7–12)
RBC # BLD: 3.81 M/UL (ref 3.5–5.5)
WBC # BLD: 8.3 K/UL (ref 4.5–11.5)

## 2025-05-16 PROCEDURE — 99213 OFFICE O/P EST LOW 20 MIN: CPT | Performed by: FAMILY MEDICINE

## 2025-05-16 PROCEDURE — 96372 THER/PROPH/DIAG INJ SC/IM: CPT | Performed by: FAMILY MEDICINE

## 2025-05-16 RX ORDER — CYANOCOBALAMIN 1000 UG/ML
INJECTION, SOLUTION INTRAMUSCULAR; SUBCUTANEOUS
Qty: 4 ML | Refills: 0 | Status: SHIPPED | OUTPATIENT
Start: 2025-05-16

## 2025-05-16 RX ORDER — DEXAMETHASONE SODIUM PHOSPHATE 4 MG/ML
4 INJECTION, SOLUTION INTRA-ARTICULAR; INTRALESIONAL; INTRAMUSCULAR; INTRAVENOUS; SOFT TISSUE ONCE
Status: COMPLETED | OUTPATIENT
Start: 2025-05-16 | End: 2025-05-16

## 2025-05-16 RX ORDER — AZITHROMYCIN 250 MG/1
TABLET, FILM COATED ORAL
Qty: 6 TABLET | Refills: 0 | Status: SHIPPED | OUTPATIENT
Start: 2025-05-16 | End: 2025-05-26

## 2025-05-16 RX ORDER — FERROUS SULFATE 325(65) MG
325 TABLET ORAL
Qty: 30 TABLET | Refills: 5 | Status: SHIPPED | OUTPATIENT
Start: 2025-05-16

## 2025-05-16 RX ADMIN — DEXAMETHASONE SODIUM PHOSPHATE 4 MG: 4 INJECTION, SOLUTION INTRA-ARTICULAR; INTRALESIONAL; INTRAMUSCULAR; INTRAVENOUS; SOFT TISSUE at 16:02

## 2025-05-16 NOTE — PROGRESS NOTES
25  Neha Rios : 1975 Sex: female  Age: 49 y.o.    Chief Complaint   Patient presents with    Other     She hasn't felt good in about 2 weeks. She is very fatigued, no energy. Would like to get back on her b-12 injections if possible.        HPInot ffeeling good   Atigue   Long periods     Review of Systems   Constitutional:  Positive for fatigue. Negative for activity change, appetite change, chills, diaphoresis, fever and unexpected weight change.   HENT:  Positive for sinus pain and sore throat. Negative for congestion, dental problem, drooling, ear discharge, ear pain, facial swelling, hearing loss, mouth sores, nosebleeds, postnasal drip, rhinorrhea, sinus pressure, sneezing, tinnitus, trouble swallowing and voice change.    Eyes:  Negative for photophobia, pain, discharge, redness, itching and visual disturbance.   Respiratory:  Negative for apnea, cough, choking, chest tightness, shortness of breath, wheezing and stridor.    Cardiovascular:  Negative for chest pain, palpitations and leg swelling.   Gastrointestinal:  Negative for abdominal distention, abdominal pain, anal bleeding, blood in stool, constipation, diarrhea, nausea, rectal pain and vomiting.   Endocrine: Negative for cold intolerance, heat intolerance, polydipsia, polyphagia and polyuria.   Genitourinary:  Negative for decreased urine volume, difficulty urinating, dysuria, enuresis, flank pain, frequency, genital sores, hematuria and urgency.   Musculoskeletal:  Positive for arthralgias and back pain. Negative for gait problem, joint swelling, myalgias, neck pain and neck stiffness.   Skin:  Negative for color change, pallor, rash and wound.   Allergic/Immunologic: Negative for environmental allergies, food allergies and immunocompromised state.   Neurological:  Negative for dizziness, tremors, seizures, syncope, facial asymmetry, speech difficulty, weakness, light-headedness, numbness and headaches.   Hematological:  Negative

## 2025-05-17 LAB
FOLATE: >40 NG/ML (ref 4.6–34.8)
VITAMIN B-12: 671 PG/ML (ref 232–1245)

## 2025-05-19 ENCOUNTER — RESULTS FOLLOW-UP (OUTPATIENT)
Dept: FAMILY MEDICINE CLINIC | Age: 50
End: 2025-05-19

## 2025-05-22 DIAGNOSIS — M79.7 FIBROMYALGIA: ICD-10-CM

## 2025-05-22 DIAGNOSIS — M47.817 LUMBOSACRAL SPONDYLOSIS WITHOUT MYELOPATHY: ICD-10-CM

## 2025-05-22 DIAGNOSIS — M54.12 CERVICAL RADICULOPATHY: ICD-10-CM

## 2025-05-22 RX ORDER — OXYCODONE AND ACETAMINOPHEN 5; 325 MG/1; MG/1
1 TABLET ORAL EVERY 4 HOURS PRN
Qty: 42 TABLET | Refills: 0 | Status: SHIPPED | OUTPATIENT
Start: 2025-05-22 | End: 2025-06-05

## 2025-05-22 NOTE — TELEPHONE ENCOUNTER
Name of Medication(s) Requested:  Requested Prescriptions     Pending Prescriptions Disp Refills    oxyCODONE-acetaminophen (PERCOCET) 5-325 MG per tablet 42 tablet 0     Sig: Take 1 tablet by mouth every 4 hours as needed for Pain for up to 14 days. Max Daily Amount: 6 tablets       Medication is on current medication list Yes    Dosage and directions were verified? Yes    Quantity verified: 14 day supply     Pharmacy Verified?  Yes    Last Appointment:  5/16/2025    Future appts:  Future Appointments   Date Time Provider Department Center   5/28/2025  2:00 PM Joel Sanchez MD [facility] PAIN [facility]        (If no appt send self scheduling link. .REFILLAPPT)  Scheduling request sent?     [] Yes  [x] No    Does patient need updated?  [] Yes  [x] No

## 2025-05-29 ASSESSMENT — ENCOUNTER SYMPTOMS
EYE PAIN: 0
DIARRHEA: 0
CONSTIPATION: 0
RHINORRHEA: 0
EYE DISCHARGE: 0
NAUSEA: 0
CHEST TIGHTNESS: 0
VOMITING: 0
APNEA: 0
EYE REDNESS: 0
ABDOMINAL PAIN: 0
SHORTNESS OF BREATH: 0
PHOTOPHOBIA: 0
TROUBLE SWALLOWING: 0
VOICE CHANGE: 0
RECTAL PAIN: 0
EYE ITCHING: 0
COUGH: 0
COLOR CHANGE: 0
STRIDOR: 0
BACK PAIN: 1
ABDOMINAL DISTENTION: 0
SINUS PRESSURE: 0
FACIAL SWELLING: 0
BLOOD IN STOOL: 0
ANAL BLEEDING: 0
WHEEZING: 0
CHOKING: 0

## 2025-06-05 DIAGNOSIS — M79.7 FIBROMYALGIA: ICD-10-CM

## 2025-06-05 DIAGNOSIS — M47.817 LUMBOSACRAL SPONDYLOSIS WITHOUT MYELOPATHY: ICD-10-CM

## 2025-06-05 DIAGNOSIS — M54.12 CERVICAL RADICULOPATHY: ICD-10-CM

## 2025-06-05 RX ORDER — OXYCODONE AND ACETAMINOPHEN 5; 325 MG/1; MG/1
1 TABLET ORAL EVERY 4 HOURS PRN
Qty: 42 TABLET | Refills: 0 | Status: SHIPPED | OUTPATIENT
Start: 2025-06-05 | End: 2025-06-19

## 2025-06-05 NOTE — TELEPHONE ENCOUNTER
We have received your refill request.  It has been forwarded to your provider.  You are past dueName of Medication(s) Requested:  Requested Prescriptions     Pending Prescriptions Disp Refills    oxyCODONE-acetaminophen (PERCOCET) 5-325 MG per tablet 42 tablet 0     Sig: Take 1 tablet by mouth every 4 hours as needed for Pain for up to 14 days. Max Daily Amount: 6 tablets       Medication is on current medication list Yes    Dosage and directions were verified? Yes    Quantity verified: 30 day supply     Pharmacy Verified?  Yes    Last Appointment:  5/16/2025    Future appts:  No future appointments.     (If no appt send self scheduling link. .REFILLAPPT)  Scheduling request sent?     [x] Yes  [] No    Does patient need updated?  [] Yes  [x] No for an appointment.   Please schedule using the instructions below or call the office during normal business hours.      How to schedule your appointment from this message:  Click the calendar icon at the bottom of this message.  Available appointment days and times will be shown on the next screen, please choose the appointment time and follow the prompts to complete scheduling.

## 2025-06-11 RX ORDER — CYANOCOBALAMIN 1000 UG/ML
INJECTION, SOLUTION INTRAMUSCULAR; SUBCUTANEOUS
Qty: 4 ML | Refills: 0 | Status: SHIPPED | OUTPATIENT
Start: 2025-06-11

## 2025-06-11 NOTE — TELEPHONE ENCOUNTER
Name of Medication(s) Requested:  Requested Prescriptions     Pending Prescriptions Disp Refills    cyanocobalamin 1000 MCG/ML injection [Pharmacy Med Name: CYANOCOBALAMIN 1,000 MCG/ML VL] 4 mL 0     Sig: INJECTIONS WEEKLY FOR 4 WEEKS THEN MONTHLY       Medication is on current medication list Yes    Dosage and directions were verified? Yes    Quantity verified: 30 day supply     Pharmacy Verified?  Yes    Last Appointment:  5/16/2025    Future appts:  No future appointments.     (If no appt send self scheduling link. .REFILLAPPT)  Scheduling request sent?     [] Yes  [x] No    Does patient need updated?  [] Yes  [x] No

## 2025-06-16 DIAGNOSIS — M79.7 FIBROMYALGIA: ICD-10-CM

## 2025-06-16 DIAGNOSIS — M47.817 LUMBOSACRAL SPONDYLOSIS WITHOUT MYELOPATHY: ICD-10-CM

## 2025-06-16 DIAGNOSIS — M54.12 CERVICAL RADICULOPATHY: ICD-10-CM

## 2025-06-16 RX ORDER — LISINOPRIL 10 MG/1
10 TABLET ORAL DAILY
Qty: 90 TABLET | Refills: 1 | Status: SHIPPED | OUTPATIENT
Start: 2025-06-16

## 2025-06-16 RX ORDER — FOLIC ACID 1 MG/1
1000 TABLET ORAL DAILY
Qty: 90 TABLET | Refills: 1 | Status: SHIPPED | OUTPATIENT
Start: 2025-06-16

## 2025-06-16 RX ORDER — OXYCODONE AND ACETAMINOPHEN 5; 325 MG/1; MG/1
1 TABLET ORAL EVERY 4 HOURS PRN
Qty: 42 TABLET | Refills: 0 | Status: SHIPPED | OUTPATIENT
Start: 2025-06-16 | End: 2025-06-30

## 2025-06-16 NOTE — TELEPHONE ENCOUNTER
Name of Medication(s) Requested:  Requested Prescriptions     Pending Prescriptions Disp Refills    lisinopril (PRINIVIL;ZESTRIL) 10 MG tablet [Pharmacy Med Name: LISINOPRIL 10 MG TABLET] 90 tablet 1     Sig: TAKE 1 TABLET BY MOUTH EVERY DAY       Medication is on current medication list Yes    Dosage and directions were verified? Yes    Quantity verified: 90 day supply     Pharmacy Verified?  Yes    Last Appointment:  2/11/2025    Future appts:  No future appointments.     (If no appt send self scheduling link. .REFILLAPPT)  Scheduling request sent?     [] Yes  [x] No    Does patient need updated?  [] Yes  [x] No

## 2025-06-16 NOTE — TELEPHONE ENCOUNTER
Name of Medication(s) Requested:  Requested Prescriptions     Pending Prescriptions Disp Refills    folic acid (FOLVITE) 1 MG tablet [Pharmacy Med Name: FOLIC ACID 1 MG TABLET] 90 tablet 1     Sig: TAKE 1 TABLET BY MOUTH EVERY DAY       Medication is on current medication list Yes    Dosage and directions were verified? Yes    Quantity verified: 90 day supply     Pharmacy Verified?  Yes    Last Appointment:  5/16/2025    Future appts:  No future appointments.     (If no appt send self scheduling link. .REFILLAPPT)  Scheduling request sent?     [] Yes  [x] No    Does patient need updated?  [] Yes  [x] No

## 2025-06-16 NOTE — TELEPHONE ENCOUNTER
Name of Medication(s) Requested:  Requested Prescriptions     Pending Prescriptions Disp Refills    oxyCODONE-acetaminophen (PERCOCET) 5-325 MG per tablet 42 tablet 0     Sig: Take 1 tablet by mouth every 4 hours as needed for Pain for up to 14 days. Max Daily Amount: 6 tablets       Medication is on current medication list Yes    Dosage and directions were verified? Yes    Quantity verified: 30 day supply     Pharmacy Verified?  Yes    Last Appointment:  5/16/2025    Future appts:  No future appointments.     (If no appt send self scheduling link. .REFILLAPPT)  Scheduling request sent?     [] Yes  [x] No    Does patient need updated?  [] Yes  [x] No

## 2025-06-23 NOTE — TELEPHONE ENCOUNTER
Name of Medication(s) Requested:  Requested Prescriptions     Pending Prescriptions Disp Refills    rosuvastatin (CRESTOR) 5 MG tablet [Pharmacy Med Name: ROSUVASTATIN CALCIUM 5 MG TAB] 90 tablet 1     Sig: TAKE 1 TABLET BY MOUTH EVERY DAY       Medication is on current medication list Yes    Dosage and directions were verified? Yes    Quantity verified: 90 day supply     Pharmacy Verified?  Yes    Last Appointment:  5/16/2025    Future appts:  No future appointments.     (If no appt send self scheduling link. .REFILLAPPT)  Scheduling request sent?     [] Yes  [x] No    Does patient need updated?  [] Yes  [x] No

## 2025-06-24 RX ORDER — ROSUVASTATIN CALCIUM 5 MG/1
5 TABLET, COATED ORAL DAILY
Qty: 90 TABLET | Refills: 1 | Status: SHIPPED | OUTPATIENT
Start: 2025-06-24

## 2025-06-26 RX ORDER — VENLAFAXINE 75 MG/1
75 TABLET ORAL 2 TIMES DAILY
Qty: 180 TABLET | Refills: 1 | Status: SHIPPED | OUTPATIENT
Start: 2025-06-26

## 2025-06-26 NOTE — TELEPHONE ENCOUNTER
Name of Medication(s) Requested:  Requested Prescriptions     Pending Prescriptions Disp Refills    venlafaxine (EFFEXOR) 75 MG tablet [Pharmacy Med Name: VENLAFAXINE HCL 75 MG TABLET] 180 tablet 1     Sig: TAKE 1 TABLET BY MOUTH TWICE A DAY       Medication is on current medication list Yes    Dosage and directions were verified? Yes    Quantity verified: 90 day supply     Pharmacy Verified?  Yes    Last Appointment:  5/16/2025    Future appts:  No future appointments.     (If no appt send self scheduling link. .REFILLAPPT)  Scheduling request sent?     [] Yes  [x] No    Does patient need updated?  [] Yes  [x] No

## 2025-06-27 DIAGNOSIS — M79.7 FIBROMYALGIA: ICD-10-CM

## 2025-06-27 DIAGNOSIS — M54.12 CERVICAL RADICULOPATHY: ICD-10-CM

## 2025-06-27 DIAGNOSIS — M47.817 LUMBOSACRAL SPONDYLOSIS WITHOUT MYELOPATHY: ICD-10-CM

## 2025-06-27 RX ORDER — OXYCODONE AND ACETAMINOPHEN 5; 325 MG/1; MG/1
1 TABLET ORAL EVERY 4 HOURS PRN
Qty: 42 TABLET | Refills: 0 | Status: SHIPPED | OUTPATIENT
Start: 2025-06-27 | End: 2025-07-11

## 2025-07-07 DIAGNOSIS — M47.817 LUMBOSACRAL SPONDYLOSIS WITHOUT MYELOPATHY: ICD-10-CM

## 2025-07-07 DIAGNOSIS — M79.7 FIBROMYALGIA: ICD-10-CM

## 2025-07-07 DIAGNOSIS — M54.12 CERVICAL RADICULOPATHY: ICD-10-CM

## 2025-07-07 RX ORDER — OXYCODONE AND ACETAMINOPHEN 5; 325 MG/1; MG/1
1 TABLET ORAL EVERY 4 HOURS PRN
Qty: 42 TABLET | Refills: 0 | Status: SHIPPED | OUTPATIENT
Start: 2025-07-07 | End: 2025-07-21

## 2025-07-07 NOTE — TELEPHONE ENCOUNTER
Name of Medication(s) Requested:  Requested Prescriptions     Pending Prescriptions Disp Refills    oxyCODONE-acetaminophen (PERCOCET) 5-325 MG per tablet 42 tablet 0     Sig: Take 1 tablet by mouth every 4 hours as needed for Pain for up to 14 days. Max Daily Amount: 6 tablets       Medication is on current medication list Yes    Dosage and directions were verified? Yes    Quantity verified: 30 day supply     Pharmacy Verified?  Yes    Last Appointment:  5/16/2025    Future appts:  Future Appointments   Date Time Provider Department Center   7/18/2025  2:45 PM Royal Truong DO [facility]        (If no appt send self scheduling link. .REFILLAPPT)  Scheduling request sent?     [] Yes  [x] No    Does patient need updated?  [] Yes  [x] No

## 2025-07-08 RX ORDER — OMEPRAZOLE 40 MG/1
40 CAPSULE, DELAYED RELEASE ORAL 2 TIMES DAILY
Qty: 180 CAPSULE | Refills: 1 | Status: SHIPPED | OUTPATIENT
Start: 2025-07-08

## 2025-07-17 ENCOUNTER — OFFICE VISIT (OUTPATIENT)
Dept: FAMILY MEDICINE CLINIC | Age: 50
End: 2025-07-17

## 2025-07-17 VITALS
BODY MASS INDEX: 40.59 KG/M2 | OXYGEN SATURATION: 96 % | TEMPERATURE: 98.2 F | DIASTOLIC BLOOD PRESSURE: 84 MMHG | HEIGHT: 67 IN | SYSTOLIC BLOOD PRESSURE: 126 MMHG | HEART RATE: 91 BPM | WEIGHT: 258.6 LBS | RESPIRATION RATE: 16 BRPM

## 2025-07-17 DIAGNOSIS — E66.813 OBESITY, CLASS 3 (HCC): Primary | ICD-10-CM

## 2025-07-17 DIAGNOSIS — D64.9 ANEMIA, UNSPECIFIED TYPE: ICD-10-CM

## 2025-07-17 DIAGNOSIS — M47.817 LUMBOSACRAL SPONDYLOSIS WITHOUT MYELOPATHY: ICD-10-CM

## 2025-07-17 DIAGNOSIS — M54.12 CERVICAL RADICULOPATHY: ICD-10-CM

## 2025-07-17 DIAGNOSIS — R53.83 OTHER FATIGUE: ICD-10-CM

## 2025-07-17 DIAGNOSIS — M79.7 FIBROMYALGIA: ICD-10-CM

## 2025-07-17 DIAGNOSIS — M06.9 RHEUMATOID ARTHRITIS, INVOLVING UNSPECIFIED SITE, UNSPECIFIED WHETHER RHEUMATOID FACTOR PRESENT (HCC): ICD-10-CM

## 2025-07-17 DIAGNOSIS — Z13.220 SCREENING FOR LIPID DISORDERS: ICD-10-CM

## 2025-07-17 LAB
ALBUMIN: 3.8 G/DL (ref 3.5–5.2)
ALP BLD-CCNC: 61 U/L (ref 35–104)
ALT SERPL-CCNC: 15 U/L (ref 0–35)
ANION GAP SERPL CALCULATED.3IONS-SCNC: 14 MMOL/L (ref 7–16)
AST SERPL-CCNC: 22 U/L (ref 0–35)
BASOPHILS ABSOLUTE: 0.06 K/UL (ref 0–0.2)
BASOPHILS RELATIVE PERCENT: 1 % (ref 0–2)
BILIRUB SERPL-MCNC: 0.3 MG/DL (ref 0–1.2)
BUN BLDV-MCNC: 26 MG/DL (ref 6–20)
CALCIUM SERPL-MCNC: 9.2 MG/DL (ref 8.6–10)
CHLORIDE BLD-SCNC: 106 MMOL/L (ref 98–107)
CHOLESTEROL, TOTAL: 141 MG/DL
CO2: 22 MMOL/L (ref 22–29)
CREAT SERPL-MCNC: 0.9 MG/DL (ref 0.5–1)
EOSINOPHILS ABSOLUTE: 0.2 K/UL (ref 0.05–0.5)
EOSINOPHILS RELATIVE PERCENT: 3 % (ref 0–6)
GFR, ESTIMATED: 81 ML/MIN/1.73M2
GLUCOSE BLD-MCNC: 112 MG/DL (ref 74–99)
HCT VFR BLD CALC: 38.6 % (ref 34–48)
HDLC SERPL-MCNC: 57 MG/DL
HEMOGLOBIN: 12.6 G/DL (ref 11.5–15.5)
IMMATURE GRANULOCYTES %: 0 % (ref 0–5)
IMMATURE GRANULOCYTES ABSOLUTE: 0.03 K/UL (ref 0–0.58)
LDL CHOLESTEROL: 50 MG/DL
LYMPHOCYTES ABSOLUTE: 2.13 K/UL (ref 1.5–4)
LYMPHOCYTES RELATIVE PERCENT: 29 % (ref 20–42)
MCH RBC QN AUTO: 32.1 PG (ref 26–35)
MCHC RBC AUTO-ENTMCNC: 32.6 G/DL (ref 32–34.5)
MCV RBC AUTO: 98.2 FL (ref 80–99.9)
MONOCYTES ABSOLUTE: 0.49 K/UL (ref 0.1–0.95)
MONOCYTES RELATIVE PERCENT: 7 % (ref 2–12)
NEUTROPHILS ABSOLUTE: 4.34 K/UL (ref 1.8–7.3)
NEUTROPHILS RELATIVE PERCENT: 60 % (ref 43–80)
PDW BLD-RTO: 13.2 % (ref 11.5–15)
PLATELET # BLD: 266 K/UL (ref 130–450)
PMV BLD AUTO: 9.4 FL (ref 7–12)
POTASSIUM SERPL-SCNC: 4.4 MMOL/L (ref 3.5–5.1)
RBC # BLD: 3.93 M/UL (ref 3.5–5.5)
SODIUM BLD-SCNC: 142 MMOL/L (ref 136–145)
T4 FREE: 0.9 NG/DL (ref 0.9–1.7)
TOTAL PROTEIN: 6.4 G/DL (ref 6.4–8.3)
TRIGL SERPL-MCNC: 170 MG/DL
TSH SERPL DL<=0.05 MIU/L-ACNC: 1.02 UIU/ML (ref 0.27–4.2)
VLDLC SERPL CALC-MCNC: 34 MG/DL
WBC # BLD: 7.3 K/UL (ref 4.5–11.5)

## 2025-07-17 RX ORDER — SECUKINUMAB 300 MG/2ML
300 INJECTION SUBCUTANEOUS
COMMUNITY
Start: 2025-07-13

## 2025-07-17 RX ORDER — ONDANSETRON 4 MG/1
TABLET, ORALLY DISINTEGRATING ORAL
Qty: 21 TABLET | Refills: 3 | Status: SHIPPED | OUTPATIENT
Start: 2025-07-17

## 2025-07-17 RX ORDER — OXYCODONE AND ACETAMINOPHEN 5; 325 MG/1; MG/1
1 TABLET ORAL EVERY 4 HOURS PRN
Qty: 42 TABLET | Refills: 0 | Status: SHIPPED | OUTPATIENT
Start: 2025-07-17 | End: 2025-07-31

## 2025-07-17 ASSESSMENT — ENCOUNTER SYMPTOMS
APNEA: 0
EYE ITCHING: 0
BLOOD IN STOOL: 0
WHEEZING: 0
VOMITING: 0
RECTAL PAIN: 0
EYE PAIN: 0
ABDOMINAL DISTENTION: 0
DIARRHEA: 0
COUGH: 0
TROUBLE SWALLOWING: 0
RHINORRHEA: 0
STRIDOR: 0
ABDOMINAL PAIN: 0
CONSTIPATION: 0
COLOR CHANGE: 0
FACIAL SWELLING: 0
CHEST TIGHTNESS: 0
ANAL BLEEDING: 0
PHOTOPHOBIA: 0
SINUS PRESSURE: 0
VOICE CHANGE: 0
SINUS PAIN: 1
NAUSEA: 0
SHORTNESS OF BREATH: 0
BACK PAIN: 1
EYE REDNESS: 0
SORE THROAT: 1
CHOKING: 0
EYE DISCHARGE: 0

## 2025-07-17 NOTE — PROGRESS NOTES
25  Neha Rios : 1975 Sex: female  Age: 49 y.o.    Chief Complaint   Patient presents with    Follow-up     3 month follow up, for medication, discuss going back on Ozempic       HPI overall check up   Mentally doing well   Meds are regulated   Fibromyalgia has been flarining   Rheumatologist is adjusting meds up     Review of Systems   Constitutional:  Positive for fatigue. Negative for activity change, appetite change, chills, diaphoresis, fever and unexpected weight change.   HENT:  Positive for sinus pain and sore throat. Negative for congestion, dental problem, drooling, ear discharge, ear pain, facial swelling, hearing loss, mouth sores, nosebleeds, postnasal drip, rhinorrhea, sinus pressure, sneezing, tinnitus, trouble swallowing and voice change.    Eyes:  Negative for photophobia, pain, discharge, redness, itching and visual disturbance.   Respiratory:  Negative for apnea, cough, choking, chest tightness, shortness of breath, wheezing and stridor.    Cardiovascular:  Negative for chest pain, palpitations and leg swelling.   Gastrointestinal:  Negative for abdominal distention, abdominal pain, anal bleeding, blood in stool, constipation, diarrhea, nausea, rectal pain and vomiting.   Endocrine: Negative for cold intolerance, heat intolerance, polydipsia, polyphagia and polyuria.   Genitourinary:  Negative for decreased urine volume, difficulty urinating, dysuria, enuresis, flank pain, frequency, genital sores, hematuria and urgency.   Musculoskeletal:  Positive for arthralgias and back pain. Negative for gait problem, joint swelling, myalgias, neck pain and neck stiffness.   Skin:  Negative for color change, pallor, rash and wound.   Allergic/Immunologic: Negative for environmental allergies, food allergies and immunocompromised state.   Neurological:  Negative for dizziness, tremors, seizures, syncope, facial asymmetry, speech difficulty, weakness, light-headedness, numbness and headaches.

## 2025-07-23 ENCOUNTER — RESULTS FOLLOW-UP (OUTPATIENT)
Dept: FAMILY MEDICINE CLINIC | Age: 50
End: 2025-07-23

## 2025-07-23 NOTE — TELEPHONE ENCOUNTER
Patient stated that she called her insurance and was told that they will cover [employer].  Order pended.  Please review, complete, and sign.

## 2025-07-29 DIAGNOSIS — M47.817 LUMBOSACRAL SPONDYLOSIS WITHOUT MYELOPATHY: ICD-10-CM

## 2025-07-29 DIAGNOSIS — M54.12 CERVICAL RADICULOPATHY: ICD-10-CM

## 2025-07-29 DIAGNOSIS — M79.7 FIBROMYALGIA: ICD-10-CM

## 2025-07-29 RX ORDER — OXYCODONE AND ACETAMINOPHEN 5; 325 MG/1; MG/1
1 TABLET ORAL EVERY 4 HOURS PRN
Qty: 42 TABLET | Refills: 0 | Status: SHIPPED | OUTPATIENT
Start: 2025-07-29 | End: 2025-08-12

## 2025-08-08 DIAGNOSIS — M54.12 CERVICAL RADICULOPATHY: ICD-10-CM

## 2025-08-08 DIAGNOSIS — M79.7 FIBROMYALGIA: ICD-10-CM

## 2025-08-08 DIAGNOSIS — M47.817 LUMBOSACRAL SPONDYLOSIS WITHOUT MYELOPATHY: ICD-10-CM

## 2025-08-08 RX ORDER — OXYCODONE AND ACETAMINOPHEN 5; 325 MG/1; MG/1
1 TABLET ORAL EVERY 4 HOURS PRN
Qty: 42 TABLET | Refills: 0 | Status: SHIPPED | OUTPATIENT
Start: 2025-08-08 | End: 2025-08-22

## 2025-08-18 DIAGNOSIS — M54.12 CERVICAL RADICULOPATHY: ICD-10-CM

## 2025-08-18 DIAGNOSIS — M79.7 FIBROMYALGIA: ICD-10-CM

## 2025-08-18 DIAGNOSIS — M47.817 LUMBOSACRAL SPONDYLOSIS WITHOUT MYELOPATHY: ICD-10-CM

## 2025-08-19 RX ORDER — OXYCODONE AND ACETAMINOPHEN 5; 325 MG/1; MG/1
1 TABLET ORAL EVERY 4 HOURS PRN
Qty: 42 TABLET | Refills: 0 | Status: SHIPPED | OUTPATIENT
Start: 2025-08-19 | End: 2025-09-02

## 2025-08-28 DIAGNOSIS — M47.817 LUMBOSACRAL SPONDYLOSIS WITHOUT MYELOPATHY: ICD-10-CM

## 2025-08-28 DIAGNOSIS — M79.7 FIBROMYALGIA: ICD-10-CM

## 2025-08-28 DIAGNOSIS — M54.12 CERVICAL RADICULOPATHY: ICD-10-CM

## 2025-08-28 RX ORDER — OXYCODONE AND ACETAMINOPHEN 5; 325 MG/1; MG/1
1 TABLET ORAL EVERY 4 HOURS PRN
Qty: 42 TABLET | Refills: 0 | Status: SHIPPED | OUTPATIENT
Start: 2025-08-28 | End: 2025-09-11

## (undated) DEVICE — SYRINGE, LUER LOCK, 5ML: Brand: MEDLINE

## (undated) DEVICE — NEEDLE HYPO 25GA L1.5IN BLU POLYPR HUB S STL REG BVL STR

## (undated) DEVICE — Device: Brand: PORTEX

## (undated) DEVICE — NON-DEHP CATHETER EXTENSION SET, MALE LUER LOCK ADAPTER

## (undated) DEVICE — 12 ML SYRINGE,LUER-LOCK TIP: Brand: MONOJECT

## (undated) DEVICE — 3M™ RED DOT™ MONITORING ELECTRODE WITH FOAM TAPE AND STICKY GEL 2560, 50/BAG, 20/CASE, 72/PLT: Brand: RED DOT™

## (undated) DEVICE — NEEDLE HYPO 18GA L1.5IN PNK POLYPR HUB S STL THN WALL FILL

## (undated) DEVICE — 6 ML SYRINGE LUER-LOCK TIP: Brand: MONOJECT

## (undated) DEVICE — BANDAGE ADH W1XL3IN NAT FAB WVN FLX DURABLE N ADH PD SEAL

## (undated) DEVICE — GLOVE ORANGE PI 7 1/2   MSG9075

## (undated) DEVICE — GAUZE,SPONGE,4"X4",12PLY,STERILE,LF,2'S: Brand: MEDLINE

## (undated) DEVICE — Z DISCONTINUED APPLICATOR SURG PREP 0.35OZ 2% CHG 70% ISO ALC W/ HI LT

## (undated) DEVICE — NEEDLE HYPO 18GA L1.5IN PNK POLYPR HUB S STL REG BVL STR

## (undated) DEVICE — GAUZE,SPONGE,4"X4",8PLY,STRL,LF,10/TRAY: Brand: MEDLINE

## (undated) DEVICE — SYRINGE MED 5ML STD CLR PLAS LUERLOCK TIP N CTRL DISP